# Patient Record
Sex: FEMALE | Race: WHITE | NOT HISPANIC OR LATINO | Employment: OTHER | URBAN - METROPOLITAN AREA
[De-identification: names, ages, dates, MRNs, and addresses within clinical notes are randomized per-mention and may not be internally consistent; named-entity substitution may affect disease eponyms.]

---

## 2017-01-04 ENCOUNTER — CLINICAL SUPPORT (OUTPATIENT)
Dept: FAMILY MEDICINE CLINIC | Facility: CLINIC | Age: 72
End: 2017-01-04

## 2017-01-04 DIAGNOSIS — Z23 IMMUNIZATION DUE: Primary | ICD-10-CM

## 2017-01-04 DIAGNOSIS — E55.9 VITAMIN D DEFICIENCY: ICD-10-CM

## 2017-01-04 DIAGNOSIS — I10 ESSENTIAL HYPERTENSION: Primary | ICD-10-CM

## 2017-01-04 DIAGNOSIS — R73.02 IMPAIRED GLUCOSE TOLERANCE: ICD-10-CM

## 2017-01-04 DIAGNOSIS — E78.5 HYPERLIPIDEMIA, UNSPECIFIED HYPERLIPIDEMIA TYPE: ICD-10-CM

## 2017-01-04 LAB
25(OH)D3+25(OH)D2 SERPL-MCNC: 29.5 NG/ML
ALBUMIN SERPL-MCNC: 4.1 G/DL (ref 3.5–5.2)
ALBUMIN/GLOB SERPL: 1.5 G/DL
ALP SERPL-CCNC: 87 U/L (ref 40–129)
ALT SERPL-CCNC: 15 U/L (ref 5–33)
AST SERPL-CCNC: 19 U/L (ref 5–32)
BASOPHILS # BLD AUTO: 0.03 10*3/MM3 (ref 0–0.2)
BASOPHILS NFR BLD AUTO: 0.4 % (ref 0–2)
BILIRUB SERPL-MCNC: 0.3 MG/DL (ref 0.2–1.2)
BUN SERPL-MCNC: 16 MG/DL (ref 8–23)
BUN/CREAT SERPL: 20 (ref 7–25)
CALCIUM SERPL-MCNC: 9.5 MG/DL (ref 8.8–10.5)
CHLORIDE SERPL-SCNC: 97 MMOL/L (ref 98–107)
CHOLEST SERPL-MCNC: 218 MG/DL (ref 0–200)
CO2 SERPL-SCNC: 28.3 MMOL/L (ref 22–29)
CREAT SERPL-MCNC: 0.8 MG/DL (ref 0.57–1)
EOSINOPHIL # BLD AUTO: 0.1 10*3/MM3 (ref 0.1–0.3)
EOSINOPHIL NFR BLD AUTO: 1.2 % (ref 0–4)
ERYTHROCYTE [DISTWIDTH] IN BLOOD BY AUTOMATED COUNT: 13 % (ref 11.5–14.5)
GLOBULIN SER CALC-MCNC: 2.7 GM/DL
GLUCOSE SERPL-MCNC: 97 MG/DL (ref 65–99)
HBA1C MFR BLD: 5 % (ref 4.8–5.6)
HCT VFR BLD AUTO: 41.1 % (ref 37–47)
HDLC SERPL-MCNC: 68 MG/DL (ref 40–60)
HGB BLD-MCNC: 12.9 G/DL (ref 12–16)
IMM GRANULOCYTES # BLD: 0.02 10*3/MM3 (ref 0–0.03)
IMM GRANULOCYTES NFR BLD: 0.2 % (ref 0–0.5)
LDLC SERPL CALC-MCNC: 115 MG/DL (ref 0–100)
LYMPHOCYTES # BLD AUTO: 2.62 10*3/MM3 (ref 0.6–4.8)
LYMPHOCYTES NFR BLD AUTO: 31.4 % (ref 20–45)
MCH RBC QN AUTO: 26.8 PG (ref 27–31)
MCHC RBC AUTO-ENTMCNC: 31.4 G/DL (ref 31–37)
MCV RBC AUTO: 85.4 FL (ref 81–99)
MONOCYTES # BLD AUTO: 0.45 10*3/MM3 (ref 0–1)
MONOCYTES NFR BLD AUTO: 5.4 % (ref 3–8)
NEUTROPHILS # BLD AUTO: 5.13 10*3/MM3 (ref 1.5–8.3)
NEUTROPHILS NFR BLD AUTO: 61.4 % (ref 45–70)
NRBC BLD AUTO-RTO: 0 /100 WBC (ref 0–0)
PLATELET # BLD AUTO: 178 10*3/MM3 (ref 140–500)
POTASSIUM SERPL-SCNC: 3.5 MMOL/L (ref 3.5–5.2)
PROT SERPL-MCNC: 6.8 G/DL (ref 6–8.5)
RBC # BLD AUTO: 4.81 10*6/MM3 (ref 4.2–5.4)
SODIUM SERPL-SCNC: 139 MMOL/L (ref 136–145)
TRIGL SERPL-MCNC: 173 MG/DL (ref 0–150)
TSH SERPL DL<=0.005 MIU/L-ACNC: 2.25 MIU/ML (ref 0.27–4.2)
VLDLC SERPL CALC-MCNC: 34.6 MG/DL (ref 7–27)
WBC # BLD AUTO: 8.35 10*3/MM3 (ref 4.8–10.8)

## 2017-01-04 PROCEDURE — G0008 ADMIN INFLUENZA VIRUS VAC: HCPCS | Performed by: FAMILY MEDICINE

## 2017-01-12 ENCOUNTER — OFFICE VISIT (OUTPATIENT)
Dept: FAMILY MEDICINE CLINIC | Facility: CLINIC | Age: 72
End: 2017-01-12

## 2017-01-12 VITALS
DIASTOLIC BLOOD PRESSURE: 70 MMHG | OXYGEN SATURATION: 96 % | HEIGHT: 62 IN | TEMPERATURE: 98.1 F | SYSTOLIC BLOOD PRESSURE: 130 MMHG | BODY MASS INDEX: 32.2 KG/M2 | HEART RATE: 79 BPM | WEIGHT: 175 LBS

## 2017-01-12 DIAGNOSIS — H69.81 EUSTACHIAN TUBE DYSFUNCTION, RIGHT: ICD-10-CM

## 2017-01-12 DIAGNOSIS — I10 ESSENTIAL HYPERTENSION: ICD-10-CM

## 2017-01-12 DIAGNOSIS — E78.5 HYPERLIPIDEMIA, UNSPECIFIED HYPERLIPIDEMIA TYPE: ICD-10-CM

## 2017-01-12 DIAGNOSIS — G40.909 SEIZURE DISORDER (HCC): Primary | ICD-10-CM

## 2017-01-12 DIAGNOSIS — E55.9 VITAMIN D DEFICIENCY: ICD-10-CM

## 2017-01-12 PROCEDURE — 99214 OFFICE O/P EST MOD 30 MIN: CPT | Performed by: FAMILY MEDICINE

## 2017-01-12 RX ORDER — ATORVASTATIN CALCIUM 40 MG/1
40 TABLET, FILM COATED ORAL DAILY
Qty: 90 TABLET | Refills: 1 | Status: SHIPPED | OUTPATIENT
Start: 2017-01-12 | End: 2017-05-31 | Stop reason: SDUPTHER

## 2017-01-12 RX ORDER — CARBAMAZEPINE 200 MG/1
200 TABLET ORAL 3 TIMES DAILY
Qty: 270 TABLET | Refills: 1 | Status: SHIPPED | OUTPATIENT
Start: 2017-01-12 | End: 2017-06-10 | Stop reason: SDUPTHER

## 2017-01-12 RX ORDER — CHOLECALCIFEROL (VITAMIN D3) 1250 MCG
50000 CAPSULE ORAL
Qty: 12 CAPSULE | Refills: 1 | Status: ON HOLD | OUTPATIENT
Start: 2017-01-12 | End: 2021-10-23 | Stop reason: ALTCHOICE

## 2017-01-12 RX ORDER — FLUTICASONE PROPIONATE 50 MCG
2 SPRAY, SUSPENSION (ML) NASAL DAILY
Qty: 1 EACH | Refills: 5 | Status: SHIPPED | OUTPATIENT
Start: 2017-01-12 | End: 2017-02-11

## 2017-01-12 NOTE — PROGRESS NOTES
"Subjective   Shyann Davis is a 71 y.o. female with   Chief Complaint   Patient presents with   • Hypertension     follow up on labs   • Earache   .    History of Present Illness     Shyann is a 71 yr old white female that presents today for follow up of HTN, Vitamin D Deficiency, HLD, and Glucose Intolerance.  Current medications include Vitamin D3 to improve Vitamin D Def, Metoprolol to control HTN, and Simvastatin to control cholesterol.  Additionally Shyann uses Tegretol to control seizure disorder.  Fasting labs have been performed prior to this visit and patient is here for review of same.    Shyann has also been having right ear pain for a couple weeks.  She states that she can not equalize her ears at this point.  There has been no congestion, sore throat, cough, fever, or headache.    The following portions of the patient's history were reviewed and updated as appropriate: allergies, current medications, past family history, past medical history, past social history, past surgical history and problem list.    Review of Systems   Constitutional:        Exogenous obesity   HENT: Positive for ear pain.    Cardiovascular:        HTN  HLD   Endocrine:        Vitamin D Deficiency   Neurological: Positive for seizures.   Psychiatric/Behavioral: Positive for dysphoric mood. The patient is nervous/anxious.        Objective     Vitals:    01/12/17 0805   BP: 130/70   Pulse: 79   Temp: 98.1 °F (36.7 °C)   SpO2: 96%     BP Readings from Last 3 Encounters:   01/12/17 130/70   05/12/16 136/84   10/13/15 126/82      Wt Readings from Last 3 Encounters:   01/12/17 175 lb (79.4 kg)   05/12/16 176 lb (79.8 kg)   10/13/15 184 lb 4.9 oz (83.6 kg)      BMI: Estimated body mass index is 32.01 kg/(m^2) as calculated from the following:    Height as of this encounter: 62\" (157.5 cm).    Weight as of this encounter: 175 lb (79.4 kg).    BSA: Estimated body surface area is 1.81 meters squared as calculated from the following:    Height " "as of this encounter: 62\" (157.5 cm).    Weight as of this encounter: 175 lb (79.4 kg).  Recent Results (from the past 168 hour(s))   Carbamazepine Level, Free & Total    Collection Time: 01/12/17  9:01 AM   Result Value Ref Range    Carbamazepine, Free 2.5 0.6 - 4.2 ug/mL     Office Visit on 01/12/2017   Component Date Value Ref Range Status   • Carbamazepine, Free 01/12/2017 2.5  0.6 - 4.2 ug/mL Final                                    Detection Limit = 0.5   Orders Only on 01/04/2017   Component Date Value Ref Range Status   • WBC 01/04/2017 8.35  4.80 - 10.80 10*3/mm3 Final   • RBC 01/04/2017 4.81  4.20 - 5.40 10*6/mm3 Final   • Hemoglobin 01/04/2017 12.9  12.0 - 16.0 g/dL Final   • Hematocrit 01/04/2017 41.1  37.0 - 47.0 % Final   • MCV 01/04/2017 85.4  81.0 - 99.0 fL Final   • MCH 01/04/2017 26.8* 27.0 - 31.0 pg Final   • MCHC 01/04/2017 31.4  31.0 - 37.0 g/dL Final   • RDW 01/04/2017 13.0  11.5 - 14.5 % Final   • Platelets 01/04/2017 178  140 - 500 10*3/mm3 Final   • Neutrophil Rel % 01/04/2017 61.4  45.0 - 70.0 % Final   • Lymphocyte Rel % 01/04/2017 31.4  20.0 - 45.0 % Final   • Monocyte Rel % 01/04/2017 5.4  3.0 - 8.0 % Final   • Eosinophil Rel % 01/04/2017 1.2  0.0 - 4.0 % Final   • Basophil Rel % 01/04/2017 0.4  0.0 - 2.0 % Final   • Neutrophils Absolute 01/04/2017 5.13  1.50 - 8.30 10*3/mm3 Final   • Lymphocytes Absolute 01/04/2017 2.62  0.60 - 4.80 10*3/mm3 Final   • Monocytes Absolute 01/04/2017 0.45  0.00 - 1.00 10*3/mm3 Final   • Eosinophils Absolute 01/04/2017 0.10  0.10 - 0.30 10*3/mm3 Final   • Basophils Absolute 01/04/2017 0.03  0.00 - 0.20 10*3/mm3 Final   • Immature Granulocyte Rel % 01/04/2017 0.2  0.0 - 0.5 % Final   • Immature Grans Absolute 01/04/2017 0.02  0.00 - 0.03 10*3/mm3 Final   • nRBC 01/04/2017 0.0  0.0 - 0.0 /100 WBC Final   • Glucose 01/04/2017 97  65 - 99 mg/dL Final   • BUN 01/04/2017 16  8 - 23 mg/dL Final   • Creatinine 01/04/2017 0.80  0.57 - 1.00 mg/dL Final   • eGFR Non "  Am 01/04/2017 71  >60 mL/min/1.73 Final    Comment: The MDRD GFR formula is only valid for adults with stable  renal function between ages 18 and 70.     • eGFR  Am 01/04/2017 86  >60 mL/min/1.73 Final   • BUN/Creatinine Ratio 01/04/2017 20.0  7.0 - 25.0 Final   • Sodium 01/04/2017 139  136 - 145 mmol/L Final   • Potassium 01/04/2017 3.5  3.5 - 5.2 mmol/L Final   • Chloride 01/04/2017 97* 98 - 107 mmol/L Final   • Total CO2 01/04/2017 28.3  22.0 - 29.0 mmol/L Final   • Calcium 01/04/2017 9.5  8.8 - 10.5 mg/dL Final   • Total Protein 01/04/2017 6.8  6.0 - 8.5 g/dL Final   • Albumin 01/04/2017 4.10  3.50 - 5.20 g/dL Final   • Globulin 01/04/2017 2.7  gm/dL Final   • A/G Ratio 01/04/2017 1.5  g/dL Final   • Total Bilirubin 01/04/2017 0.3  0.2 - 1.2 mg/dL Final   • Alkaline Phosphatase 01/04/2017 87  40 - 129 U/L Final   • AST (SGOT) 01/04/2017 19  5 - 32 U/L Final   • ALT (SGPT) 01/04/2017 15  5 - 33 U/L Final   • Hemoglobin A1C 01/04/2017 5.00  4.80 - 5.60 % Final   • Total Cholesterol 01/04/2017 218* 0 - 200 mg/dL Final   • Triglycerides 01/04/2017 173* 0 - 150 mg/dL Final   • HDL Cholesterol 01/04/2017 68* 40 - 60 mg/dL Final   • VLDL Cholesterol 01/04/2017 34.6* 7 - 27 mg/dL Final   • LDL Cholesterol  01/04/2017 115* 0 - 100 mg/dL Final   • TSH 01/04/2017 2.250  0.270 - 4.200 mIU/mL Final   • 25 Hydroxy, Vitamin D 01/04/2017 29.5  ng/mL Final    Comment: Reference Range for Total Vitamin D 25(OH)  Deficiency    <20.0 ng/mL  Insufficiency 21-29 ng/mL  Sufficiency   30-74 ng/mL       The above results have been reviewed and explained to Shyann with her understanding.  A copy has been provided to her.      Physical Exam   Constitutional: She is oriented to person, place, and time. She appears well-developed and well-nourished.   HENT:   Head: Normocephalic and atraumatic.   Right Ear: Hearing, tympanic membrane, external ear and ear canal normal.   Left Ear: Hearing, tympanic membrane, external ear and  ear canal normal.   Nose: Nose normal.   Mouth/Throat: Uvula is midline, oropharynx is clear and moist and mucous membranes are normal.   Neck: Trachea normal and phonation normal. Neck supple. Normal carotid pulses present. Carotid bruit is not present. No thyroid mass and no thyromegaly present.   Cardiovascular: Normal rate, regular rhythm and normal heart sounds.  Exam reveals no gallop and no friction rub.    No murmur heard.  Pulmonary/Chest: Effort normal and breath sounds normal. No respiratory distress. She has no decreased breath sounds. She has no wheezes. She has no rhonchi. She has no rales.   Lymphadenopathy:     She has no cervical adenopathy.   Neurological: She is alert and oriented to person, place, and time.   Skin: Skin is warm and dry. No rash noted.   Psychiatric: She has a normal mood and affect. Her speech is normal and behavior is normal. Judgment and thought content normal. Cognition and memory are normal.   Nursing note and vitals reviewed.      Assessment/Plan   Shyann was seen today for hypertension and earache.    Diagnoses and all orders for this visit:    Seizure disorder  -     Carbamazepine Level, Free & Total  -     Carbamazepine Level, Free & Total; Future    Essential hypertension  -     CBC & AUTO Differential; Future  -     Comprehensive Metabolic Panel; Future  -     Lipid Panel; Future  -     TSH; Future    Hyperlipidemia, unspecified hyperlipidemia type  -     CBC & AUTO Differential; Future  -     Comprehensive Metabolic Panel; Future  -     Lipid Panel; Future    Vitamin D deficiency  -     CBC & AUTO Differential; Future  -     Comprehensive Metabolic Panel; Future  -     Vitamin D 25 Hydroxy; Future    Eustachian tube dysfunction, right    Other orders  -     Cholecalciferol (VITAMIN D3) 24826 UNITS capsule; Take 1 capsule by mouth Every 7 (Seven) Days.  -     atorvastatin (LIPITOR) 40 MG tablet; Take 1 tablet by mouth Daily.  -     fluticasone (FLONASE) 50 MCG/ACT nasal  spray; 2 sprays into each nostril Daily for 30 days. Administer 2 sprays in each nostril for each dose.  -     carBAMazepine (TEGretol) 200 MG tablet; Take 1 tablet by mouth 3 (Three) Times a Day.        Scribed for Reese Underwood MD by Zohaib Marks. 01/12/2017    IReese MD personally performed the services described in this documentation, as scribed by Zohaib Marks in my presence, and it is both accurate and complete

## 2017-01-12 NOTE — MR AVS SNAPSHOT
Shyann Davis   1/12/2017 8:00 AM   Office Visit    Provider:  Reese Underwood DO   Department:  [facility]   Dept Phone:  [phone]                Your Full Care Plan              Today's Medication Changes          These changes are accurate as of: 1/12/17  8:54 AM.  If you have any questions, ask your nurse or doctor.               New Medication(s)Ordered:     atorvastatin 40 MG tablet   Commonly known as:  LIPITOR   Take 1 tablet by mouth Daily.       fluticasone 50 MCG/ACT nasal spray   Commonly known as:  FLONASE   2 sprays into each nostril Daily for 30 days. Administer 2 sprays in each nostril for each dose.         Medication(s)that have changed:     carBAMazepine 200 MG tablet   Commonly known as:  TEGretol   Take 1 tablet by mouth 3 (Three) Times a Day.   What changed:  See the new instructions.         Stop taking medication(s)listed here:     ergocalciferol 91077 UNITS capsule   Commonly known as:  ERGOCALCIFEROL           simvastatin 40 MG tablet   Commonly known as:  ZOCOR                Where to Get Your Medications      These medications were sent to [facility]/pharmacy #[id number] - [place], [place] - [address] [address] [address] [address] - [phone] [facility] [phone]   [address] [address]     Phone:  [phone]     fluticasone 50 MCG/ACT nasal spray         These medications were sent to [facility] - [place], [place] - [address] - [phone] [facility] [phone] FX  [address], [facility] [address]     Phone:  [phone]     atorvastatin 40 MG tablet    carBAMazepine 200 MG tablet    Vitamin D3 91807 UNITS capsule                  Your Updated Medication List          This list is accurate as of: 1/12/17  8:54 AM.  Always use your most recent med list.                atorvastatin 40 MG tablet   Commonly known as:  LIPITOR   Take 1 tablet by mouth Daily.       carBAMazepine 200 MG  tablet   Commonly known as:  TEGretol   Take 1 tablet by mouth 3 (Three) Times a Day.       clotrimazole-betamethasone 1-0.05 % cream   Commonly known as:  LOTRISONE   Apply  topically 2 (two) times a day.       fluticasone 50 MCG/ACT nasal spray   Commonly known as:  FLONASE   2 sprays into each nostril Daily for 30 days. Administer 2 sprays in each nostril for each dose.       metoprolol succinate XL 25 MG 24 hr tablet   Commonly known as:  TOPROL-XL   TAKE 1 TABLET EVERY DAY       MULTIPLE VITAMINS ESSENTIAL PO       mupirocin 2 % ointment   Commonly known as:  BACTROBAN       PARoxetine 20 MG tablet   Commonly known as:  PAXIL       Vitamin D3 29921 UNITS capsule   Take 1 capsule by mouth Every 7 (Seven) Days.               We Performed the Following     Carbamazepine Level, Free & Total       You Were Diagnosed With        Codes Comments    Seizure disorder    -  Primary ICD-10-CM: G40.909  ICD-9-CM: 345.90     Essential hypertension     ICD-10-CM: I10  ICD-9-CM: 401.9     Hyperlipidemia, unspecified hyperlipidemia type     ICD-10-CM: E78.5  ICD-9-CM: 272.4     Vitamin D deficiency     ICD-10-CM: E55.9  ICD-9-CM: 268.9     Eustachian tube dysfunction, right     ICD-10-CM: H69.81  ICD-9-CM: 381.81       Instructions    Future A1C will only be added to labs if warranted by elevated Glucose demonstrated on CMP. Discontinue Simvastatin and start Atorvastatin.       Patient Instructions History      MyChart Signup     Our records indicate that you have declined [facility] signup. If you would like to sign up for [employer], please email [email or web address] or call [phone] to obtain an activation code.             Other Info from Your Visit           Your Appointments     May 05, 2017 10:10 AM EDT   [facility]   [facility] (--)    [address]  [place] [address]   [phone]            May 11, 2017 10:15 AM EDT   Follow  "Up with Reese Underwood DO   [facility] (--)    [address] [facility] [address] [phone]           Arrive 15 minutes prior to appointment.              Allergies     Cephalexin      Erythromycin  GI Intolerance    Neomycin-bacitracin Zn-polymyx      Sulfa Antibiotics  Other (See Comments)      Reason for Visit     Hypertension follow up on labs    Earache           Vital Signs     Blood Pressure Pulse Temperature Height Weight Oxygen Saturation    130/70 79 98.1 °F (36.7 °C) 62\" (157.5 cm) 175 lb (79.4 kg) 96%    Body Mass Index Smoking Status                32.01 kg/m2 Never Smoker          Problems and Diagnoses Noted     High cholesterol or triglycerides    High blood pressure    Seizure disorder    Vitamin D deficiency    Eustachian tube dysfunction          "

## 2017-01-13 LAB — CARBAMAZEPINE FREE SERPL-MCNC: 2.5 UG/ML (ref 0.6–4.2)

## 2017-03-28 RX ORDER — METOPROLOL SUCCINATE 25 MG/1
TABLET, EXTENDED RELEASE ORAL
Qty: 90 TABLET | Refills: 1 | Status: SHIPPED | OUTPATIENT
Start: 2017-03-28 | End: 2017-05-31 | Stop reason: SDUPTHER

## 2017-04-12 ENCOUNTER — RESULTS ENCOUNTER (OUTPATIENT)
Dept: FAMILY MEDICINE CLINIC | Facility: CLINIC | Age: 72
End: 2017-04-12

## 2017-04-12 DIAGNOSIS — G40.909 SEIZURE DISORDER (HCC): ICD-10-CM

## 2017-04-12 DIAGNOSIS — E55.9 VITAMIN D DEFICIENCY: ICD-10-CM

## 2017-04-12 DIAGNOSIS — E78.5 HYPERLIPIDEMIA, UNSPECIFIED HYPERLIPIDEMIA TYPE: ICD-10-CM

## 2017-04-12 DIAGNOSIS — I10 ESSENTIAL HYPERTENSION: ICD-10-CM

## 2017-05-08 LAB
25(OH)D3+25(OH)D2 SERPL-MCNC: 36.7 NG/ML
ALBUMIN SERPL-MCNC: 3.7 G/DL (ref 3.5–5.2)
ALBUMIN/GLOB SERPL: 1.3 G/DL
ALP SERPL-CCNC: 97 U/L (ref 40–129)
ALT SERPL-CCNC: 17 U/L (ref 5–33)
AST SERPL-CCNC: 18 U/L (ref 5–32)
BASOPHILS # BLD AUTO: 0.02 10*3/MM3 (ref 0–0.2)
BASOPHILS NFR BLD AUTO: 0.2 % (ref 0–2)
BILIRUB SERPL-MCNC: 0.2 MG/DL (ref 0.2–1.2)
BUN SERPL-MCNC: 13 MG/DL (ref 8–23)
BUN/CREAT SERPL: 15.9 (ref 7–25)
CALCIUM SERPL-MCNC: 9.3 MG/DL (ref 8.8–10.5)
CARBAMAZEPINE FREE SERPL-MCNC: 2 UG/ML (ref 0.6–4.2)
CARBAMAZEPINE SERPL-MCNC: 8.7 UG/ML (ref 4–12)
CHLORIDE SERPL-SCNC: 99 MMOL/L (ref 98–107)
CHOLEST SERPL-MCNC: 156 MG/DL (ref 0–200)
CO2 SERPL-SCNC: 28 MMOL/L (ref 22–29)
CREAT SERPL-MCNC: 0.82 MG/DL (ref 0.57–1)
EOSINOPHIL # BLD AUTO: 0.07 10*3/MM3 (ref 0.1–0.3)
EOSINOPHIL NFR BLD AUTO: 0.8 % (ref 0–4)
ERYTHROCYTE [DISTWIDTH] IN BLOOD BY AUTOMATED COUNT: 13 % (ref 11.5–14.5)
GLOBULIN SER CALC-MCNC: 2.9 GM/DL
GLUCOSE SERPL-MCNC: 101 MG/DL (ref 65–99)
HCT VFR BLD AUTO: 38.1 % (ref 37–47)
HDLC SERPL-MCNC: 57 MG/DL (ref 40–60)
HGB BLD-MCNC: 12.4 G/DL (ref 12–16)
IMM GRANULOCYTES # BLD: 0.02 10*3/MM3 (ref 0–0.03)
IMM GRANULOCYTES NFR BLD: 0.2 % (ref 0–0.5)
LDLC SERPL CALC-MCNC: 71 MG/DL (ref 0–100)
LYMPHOCYTES # BLD AUTO: 2.02 10*3/MM3 (ref 0.6–4.8)
LYMPHOCYTES NFR BLD AUTO: 23.6 % (ref 20–45)
MCH RBC QN AUTO: 26.9 PG (ref 27–31)
MCHC RBC AUTO-ENTMCNC: 32.5 G/DL (ref 31–37)
MCV RBC AUTO: 82.6 FL (ref 81–99)
MONOCYTES # BLD AUTO: 0.4 10*3/MM3 (ref 0–1)
MONOCYTES NFR BLD AUTO: 4.7 % (ref 3–8)
NEUTROPHILS # BLD AUTO: 6.04 10*3/MM3 (ref 1.5–8.3)
NEUTROPHILS NFR BLD AUTO: 70.5 % (ref 45–70)
NRBC BLD AUTO-RTO: 0 /100 WBC (ref 0–0)
PLATELET # BLD AUTO: 161 10*3/MM3 (ref 140–500)
POTASSIUM SERPL-SCNC: 3.7 MMOL/L (ref 3.5–5.2)
PROT SERPL-MCNC: 6.6 G/DL (ref 6–8.5)
RBC # BLD AUTO: 4.61 10*6/MM3 (ref 4.2–5.4)
SODIUM SERPL-SCNC: 138 MMOL/L (ref 136–145)
TRIGL SERPL-MCNC: 140 MG/DL (ref 0–150)
TSH SERPL DL<=0.005 MIU/L-ACNC: 1.63 MIU/ML (ref 0.27–4.2)
VLDLC SERPL CALC-MCNC: 28 MG/DL (ref 7–27)
WBC # BLD AUTO: 8.57 10*3/MM3 (ref 4.8–10.8)

## 2017-05-31 ENCOUNTER — OFFICE VISIT (OUTPATIENT)
Dept: FAMILY MEDICINE CLINIC | Facility: CLINIC | Age: 72
End: 2017-05-31

## 2017-05-31 VITALS
DIASTOLIC BLOOD PRESSURE: 70 MMHG | WEIGHT: 176 LBS | TEMPERATURE: 98.1 F | HEIGHT: 62 IN | SYSTOLIC BLOOD PRESSURE: 120 MMHG | BODY MASS INDEX: 32.39 KG/M2 | HEART RATE: 73 BPM | OXYGEN SATURATION: 97 %

## 2017-05-31 DIAGNOSIS — F41.8 DEPRESSION WITH ANXIETY: ICD-10-CM

## 2017-05-31 DIAGNOSIS — R73.02 IMPAIRED GLUCOSE TOLERANCE: ICD-10-CM

## 2017-05-31 DIAGNOSIS — E55.9 VITAMIN D DEFICIENCY: ICD-10-CM

## 2017-05-31 DIAGNOSIS — E78.5 HYPERLIPIDEMIA, UNSPECIFIED HYPERLIPIDEMIA TYPE: Primary | ICD-10-CM

## 2017-05-31 DIAGNOSIS — I10 ESSENTIAL HYPERTENSION: ICD-10-CM

## 2017-05-31 DIAGNOSIS — E66.09 EXOGENOUS OBESITY: ICD-10-CM

## 2017-05-31 DIAGNOSIS — G40.909 SEIZURE DISORDER (HCC): ICD-10-CM

## 2017-05-31 PROCEDURE — 99213 OFFICE O/P EST LOW 20 MIN: CPT | Performed by: FAMILY MEDICINE

## 2017-05-31 RX ORDER — METOPROLOL SUCCINATE 25 MG/1
TABLET, EXTENDED RELEASE ORAL
Qty: 90 TABLET | Refills: 1 | Status: SHIPPED | OUTPATIENT
Start: 2017-05-31 | End: 2017-10-17 | Stop reason: SDUPTHER

## 2017-05-31 RX ORDER — ATORVASTATIN CALCIUM 40 MG/1
40 TABLET, FILM COATED ORAL DAILY
Qty: 90 TABLET | Refills: 1 | Status: SHIPPED | OUTPATIENT
Start: 2017-05-31 | End: 2017-10-17 | Stop reason: SDUPTHER

## 2017-05-31 RX ORDER — PAROXETINE HYDROCHLORIDE 20 MG/1
TABLET, FILM COATED ORAL
Qty: 90 TABLET | Refills: 1 | Status: SHIPPED | OUTPATIENT
Start: 2017-05-31 | End: 2017-10-17 | Stop reason: SDUPTHER

## 2017-06-12 RX ORDER — CARBAMAZEPINE 200 MG/1
TABLET ORAL
Qty: 270 TABLET | Refills: 1 | Status: SHIPPED | OUTPATIENT
Start: 2017-06-12 | End: 2017-10-17 | Stop reason: SDUPTHER

## 2017-06-14 ENCOUNTER — OFFICE VISIT (OUTPATIENT)
Dept: FAMILY MEDICINE CLINIC | Facility: CLINIC | Age: 72
End: 2017-06-14

## 2017-06-14 VITALS
TEMPERATURE: 98.2 F | WEIGHT: 172 LBS | OXYGEN SATURATION: 96 % | BODY MASS INDEX: 31.65 KG/M2 | HEIGHT: 62 IN | SYSTOLIC BLOOD PRESSURE: 110 MMHG | HEART RATE: 79 BPM | DIASTOLIC BLOOD PRESSURE: 64 MMHG

## 2017-06-14 DIAGNOSIS — J01.00 ACUTE NON-RECURRENT MAXILLARY SINUSITIS: Primary | ICD-10-CM

## 2017-06-14 PROCEDURE — 99213 OFFICE O/P EST LOW 20 MIN: CPT | Performed by: FAMILY MEDICINE

## 2017-06-14 RX ORDER — AMOXICILLIN AND CLAVULANATE POTASSIUM 875; 125 MG/1; MG/1
1 TABLET, FILM COATED ORAL 2 TIMES DAILY
Qty: 20 TABLET | Refills: 0 | Status: SHIPPED | OUTPATIENT
Start: 2017-06-14 | End: 2017-12-21

## 2017-06-14 NOTE — PROGRESS NOTES
Subjective   Shyann Davis is a 71 y.o. female with   Chief Complaint   Patient presents with   • Cough   .    History of Present Illness     For the last week, Shyann, a 71 yr old white female, has been having cough, congestion, drainage, with thick mucus that almost chokes her.  She reports that her  was ill just prior to developing symptoms. Cough is mostly all day but may increase at night.  Shyann denies fever, nausea. She denies chest pain, shortness of air or audible wheezing.     The following portions of the patient's history were reviewed and updated as appropriate: allergies, current medications, past family history, past medical history, past social history, past surgical history and problem list.    Review of Systems   HENT: Positive for congestion and postnasal drip.    Respiratory: Positive for cough. Negative for shortness of breath.    All other systems reviewed and are negative.      Objective     Vitals:    06/14/17 0829   BP: 110/64   Pulse: 79   Temp: 98.2 °F (36.8 °C)   SpO2: 96%       No results found for this or any previous visit (from the past 168 hour(s)).    Physical Exam   Constitutional: She is oriented to person, place, and time. She appears well-developed and well-nourished.   HENT:   Head: Normocephalic and atraumatic.   Right Ear: Hearing, tympanic membrane, external ear and ear canal normal.   Left Ear: Hearing, tympanic membrane, external ear and ear canal normal.   Nose: Nose normal.   Mouth/Throat: Uvula is midline, oropharynx is clear and moist and mucous membranes are normal.   Turbinates are somewhat pale on examination   Neck: Trachea normal and phonation normal. Neck supple. Normal carotid pulses present. Carotid bruit is not present. No thyroid mass and no thyromegaly present.   Cardiovascular: Normal rate, regular rhythm and normal heart sounds.  Exam reveals no gallop and no friction rub.    No murmur heard.  Pulmonary/Chest: Effort normal and breath sounds normal.  No respiratory distress. She has no decreased breath sounds. She has no wheezes. She has no rhonchi. She has no rales.   Lymphadenopathy:     She has no cervical adenopathy.   Neurological: She is alert and oriented to person, place, and time.   Skin: Skin is warm and dry. No rash noted.   Psychiatric: She has a normal mood and affect. Her speech is normal and behavior is normal. Judgment and thought content normal. Cognition and memory are normal.   Nursing note and vitals reviewed.      Assessment/Plan   Shyann was seen today for cough.    Diagnoses and all orders for this visit:    Acute non-recurrent maxillary sinusitis    Other orders  -     amoxicillin-clavulanate (AUGMENTIN) 875-125 MG per tablet; Take 1 tablet by mouth 2 (Two) Times a Day.        Return if symptoms worsen or fail to improve.    Scribed for Reese Underwood MD by Zohaib Marks. 06/14/2017    IReese MD personally performed the services described in this documentation, as scribed by Zohaib Marks in my presence, and it is both accurate and complete

## 2017-06-19 ENCOUNTER — APPOINTMENT (OUTPATIENT)
Dept: WOMENS IMAGING | Facility: HOSPITAL | Age: 72
End: 2017-06-19

## 2017-06-19 PROCEDURE — G0202 SCR MAMMO BI INCL CAD: HCPCS | Performed by: RADIOLOGY

## 2017-06-19 PROCEDURE — 76641 ULTRASOUND BREAST COMPLETE: CPT | Performed by: RADIOLOGY

## 2017-06-19 PROCEDURE — 77063 BREAST TOMOSYNTHESIS BI: CPT | Performed by: RADIOLOGY

## 2017-09-22 DIAGNOSIS — G40.909 SEIZURE DISORDER (HCC): Primary | ICD-10-CM

## 2017-09-22 LAB
ALBUMIN SERPL-MCNC: 3.9 G/DL (ref 3.5–5.2)
ALBUMIN/GLOB SERPL: 1.3 G/DL
ALP SERPL-CCNC: 100 U/L (ref 40–129)
ALT SERPL-CCNC: 18 U/L (ref 5–33)
AST SERPL-CCNC: 20 U/L (ref 5–32)
BASOPHILS # BLD AUTO: 0.01 10*3/MM3 (ref 0–0.2)
BASOPHILS NFR BLD AUTO: 0.1 % (ref 0–2)
BILIRUB SERPL-MCNC: 0.3 MG/DL (ref 0.2–1.2)
BUN SERPL-MCNC: 12 MG/DL (ref 8–23)
BUN/CREAT SERPL: 14.8 (ref 7–25)
CALCIUM SERPL-MCNC: 9.5 MG/DL (ref 8.8–10.5)
CARBAMAZEPINE SERPL-MCNC: 6.5 MCG/ML (ref 8–12)
CHLORIDE SERPL-SCNC: 93 MMOL/L (ref 98–107)
CO2 SERPL-SCNC: 30.7 MMOL/L (ref 22–29)
CREAT SERPL-MCNC: 0.81 MG/DL (ref 0.57–1)
EOSINOPHIL # BLD AUTO: 0.04 10*3/MM3 (ref 0.1–0.3)
EOSINOPHIL NFR BLD AUTO: 0.5 % (ref 0–4)
ERYTHROCYTE [DISTWIDTH] IN BLOOD BY AUTOMATED COUNT: 12.9 % (ref 11.5–14.5)
GLOBULIN SER CALC-MCNC: 3.1 GM/DL
GLUCOSE SERPL-MCNC: 92 MG/DL (ref 65–99)
HCT VFR BLD AUTO: 38.7 % (ref 37–47)
HGB BLD-MCNC: 12.7 G/DL (ref 12–16)
IMM GRANULOCYTES # BLD: 0.02 10*3/MM3 (ref 0–0.03)
IMM GRANULOCYTES NFR BLD: 0.3 % (ref 0–0.5)
LYMPHOCYTES # BLD AUTO: 1.46 10*3/MM3 (ref 0.6–4.8)
LYMPHOCYTES NFR BLD AUTO: 20 % (ref 20–45)
MCH RBC QN AUTO: 27.5 PG (ref 27–31)
MCHC RBC AUTO-ENTMCNC: 32.8 G/DL (ref 31–37)
MCV RBC AUTO: 83.9 FL (ref 81–99)
MONOCYTES # BLD AUTO: 0.4 10*3/MM3 (ref 0–1)
MONOCYTES NFR BLD AUTO: 5.5 % (ref 3–8)
NEUTROPHILS # BLD AUTO: 5.38 10*3/MM3 (ref 1.5–8.3)
NEUTROPHILS NFR BLD AUTO: 73.6 % (ref 45–70)
NRBC BLD AUTO-RTO: 0 /100 WBC (ref 0–0)
PLATELET # BLD AUTO: 151 10*3/MM3 (ref 140–500)
POTASSIUM SERPL-SCNC: 3.9 MMOL/L (ref 3.5–5.2)
PROT SERPL-MCNC: 7 G/DL (ref 6–8.5)
RBC # BLD AUTO: 4.61 10*6/MM3 (ref 4.2–5.4)
SODIUM SERPL-SCNC: 134 MMOL/L (ref 136–145)
WBC # BLD AUTO: 7.31 10*3/MM3 (ref 4.8–10.8)

## 2017-10-17 DIAGNOSIS — E78.5 HYPERLIPIDEMIA, UNSPECIFIED HYPERLIPIDEMIA TYPE: ICD-10-CM

## 2017-10-17 DIAGNOSIS — F41.8 DEPRESSION WITH ANXIETY: ICD-10-CM

## 2017-10-17 DIAGNOSIS — I10 ESSENTIAL HYPERTENSION: ICD-10-CM

## 2017-10-17 RX ORDER — ATORVASTATIN CALCIUM 40 MG/1
TABLET, FILM COATED ORAL
Qty: 90 TABLET | Refills: 0 | Status: SHIPPED | OUTPATIENT
Start: 2017-10-17 | End: 2017-12-21 | Stop reason: SDUPTHER

## 2017-10-17 RX ORDER — METOPROLOL SUCCINATE 25 MG/1
TABLET, EXTENDED RELEASE ORAL
Qty: 90 TABLET | Refills: 0 | Status: SHIPPED | OUTPATIENT
Start: 2017-10-17 | End: 2017-12-21 | Stop reason: SDUPTHER

## 2017-10-17 RX ORDER — PAROXETINE HYDROCHLORIDE 20 MG/1
TABLET, FILM COATED ORAL
Qty: 90 TABLET | Refills: 0 | Status: SHIPPED | OUTPATIENT
Start: 2017-10-17 | End: 2017-12-21 | Stop reason: ALTCHOICE

## 2017-10-17 RX ORDER — CARBAMAZEPINE 200 MG/1
TABLET ORAL
Qty: 270 TABLET | Refills: 0 | Status: SHIPPED | OUTPATIENT
Start: 2017-10-17 | End: 2017-12-21 | Stop reason: SDUPTHER

## 2017-11-14 ENCOUNTER — FLU SHOT (OUTPATIENT)
Dept: FAMILY MEDICINE CLINIC | Facility: CLINIC | Age: 72
End: 2017-11-14

## 2017-11-14 PROCEDURE — 90662 IIV NO PRSV INCREASED AG IM: CPT | Performed by: FAMILY MEDICINE

## 2017-11-14 PROCEDURE — G0008 ADMIN INFLUENZA VIRUS VAC: HCPCS | Performed by: FAMILY MEDICINE

## 2017-12-13 DIAGNOSIS — G40.909 SEIZURE DISORDER (HCC): ICD-10-CM

## 2017-12-13 DIAGNOSIS — I10 ESSENTIAL HYPERTENSION: Primary | ICD-10-CM

## 2017-12-13 DIAGNOSIS — E78.5 HYPERLIPIDEMIA, UNSPECIFIED HYPERLIPIDEMIA TYPE: ICD-10-CM

## 2017-12-13 DIAGNOSIS — E55.9 VITAMIN D DEFICIENCY: ICD-10-CM

## 2017-12-13 DIAGNOSIS — R73.02 IMPAIRED GLUCOSE TOLERANCE: ICD-10-CM

## 2017-12-15 LAB
25(OH)D3+25(OH)D2 SERPL-MCNC: 49.2 NG/ML
ALBUMIN SERPL-MCNC: 4 G/DL (ref 3.5–5.2)
ALBUMIN/GLOB SERPL: 1.5 G/DL
ALP SERPL-CCNC: 94 U/L (ref 40–129)
ALT SERPL-CCNC: 20 U/L (ref 5–33)
AST SERPL-CCNC: 20 U/L (ref 5–32)
BASOPHILS # BLD AUTO: 0.02 10*3/MM3 (ref 0–0.2)
BASOPHILS NFR BLD AUTO: 0.2 % (ref 0–2)
BILIRUB SERPL-MCNC: 0.3 MG/DL (ref 0.2–1.2)
BUN SERPL-MCNC: 17 MG/DL (ref 8–23)
BUN/CREAT SERPL: 21 (ref 7–25)
CALCIUM SERPL-MCNC: 9.4 MG/DL (ref 8.8–10.5)
CARBAMAZEPINE SERPL-MCNC: 8.7 MCG/ML (ref 4–12)
CHLORIDE SERPL-SCNC: 98 MMOL/L (ref 98–107)
CHOLEST SERPL-MCNC: 171 MG/DL (ref 0–200)
CO2 SERPL-SCNC: 27.9 MMOL/L (ref 22–29)
CREAT SERPL-MCNC: 0.81 MG/DL (ref 0.57–1)
EOSINOPHIL # BLD AUTO: 0.06 10*3/MM3 (ref 0.1–0.3)
EOSINOPHIL NFR BLD AUTO: 0.7 % (ref 0–4)
ERYTHROCYTE [DISTWIDTH] IN BLOOD BY AUTOMATED COUNT: 13.3 % (ref 11.5–14.5)
GFR SERPLBLD CREATININE-BSD FMLA CKD-EPI: 70 ML/MIN/1.73
GFR SERPLBLD CREATININE-BSD FMLA CKD-EPI: 84 ML/MIN/1.73
GLOBULIN SER CALC-MCNC: 2.7 GM/DL
GLUCOSE SERPL-MCNC: 95 MG/DL (ref 65–99)
HBA1C MFR BLD: 5.3 % (ref 4.8–5.6)
HCT VFR BLD AUTO: 37.9 % (ref 37–47)
HDLC SERPL-MCNC: 57 MG/DL (ref 40–60)
HGB BLD-MCNC: 12.4 G/DL (ref 12–16)
IMM GRANULOCYTES # BLD: 0.03 10*3/MM3 (ref 0–0.03)
IMM GRANULOCYTES NFR BLD: 0.4 % (ref 0–0.5)
LDLC SERPL CALC-MCNC: 78 MG/DL (ref 0–100)
LYMPHOCYTES # BLD AUTO: 1.57 10*3/MM3 (ref 0.6–4.8)
LYMPHOCYTES NFR BLD AUTO: 19.3 % (ref 20–45)
MCH RBC QN AUTO: 27.4 PG (ref 27–31)
MCHC RBC AUTO-ENTMCNC: 32.7 G/DL (ref 31–37)
MCV RBC AUTO: 83.7 FL (ref 81–99)
MONOCYTES # BLD AUTO: 0.49 10*3/MM3 (ref 0–1)
MONOCYTES NFR BLD AUTO: 6 % (ref 3–8)
NEUTROPHILS # BLD AUTO: 5.95 10*3/MM3 (ref 1.5–8.3)
NEUTROPHILS NFR BLD AUTO: 73.4 % (ref 45–70)
NRBC BLD AUTO-RTO: 0 /100 WBC (ref 0–0)
PLATELET # BLD AUTO: 137 10*3/MM3 (ref 140–500)
POTASSIUM SERPL-SCNC: 4.1 MMOL/L (ref 3.5–5.2)
PROT SERPL-MCNC: 6.7 G/DL (ref 6–8.5)
RBC # BLD AUTO: 4.53 10*6/MM3 (ref 4.2–5.4)
SODIUM SERPL-SCNC: 139 MMOL/L (ref 136–145)
TRIGL SERPL-MCNC: 182 MG/DL (ref 0–150)
TSH SERPL DL<=0.005 MIU/L-ACNC: 1.83 MIU/ML (ref 0.27–4.2)
VLDLC SERPL CALC-MCNC: 36.4 MG/DL (ref 7–27)
WBC # BLD AUTO: 8.12 10*3/MM3 (ref 4.8–10.8)

## 2017-12-21 ENCOUNTER — OFFICE VISIT (OUTPATIENT)
Dept: FAMILY MEDICINE CLINIC | Facility: CLINIC | Age: 72
End: 2017-12-21

## 2017-12-21 VITALS
HEIGHT: 62 IN | DIASTOLIC BLOOD PRESSURE: 80 MMHG | OXYGEN SATURATION: 98 % | HEART RATE: 92 BPM | SYSTOLIC BLOOD PRESSURE: 126 MMHG | BODY MASS INDEX: 32.76 KG/M2 | WEIGHT: 178 LBS | TEMPERATURE: 98.2 F

## 2017-12-21 DIAGNOSIS — D69.6 THROMBOCYTOPENIA (HCC): ICD-10-CM

## 2017-12-21 DIAGNOSIS — E66.09 EXOGENOUS OBESITY: ICD-10-CM

## 2017-12-21 DIAGNOSIS — F41.8 DEPRESSION WITH ANXIETY: ICD-10-CM

## 2017-12-21 DIAGNOSIS — G40.909 SEIZURE DISORDER (HCC): ICD-10-CM

## 2017-12-21 DIAGNOSIS — N95.9 POST MENOPAUSAL PROBLEMS: ICD-10-CM

## 2017-12-21 DIAGNOSIS — E78.5 HYPERLIPIDEMIA, UNSPECIFIED HYPERLIPIDEMIA TYPE: Primary | ICD-10-CM

## 2017-12-21 DIAGNOSIS — I10 ESSENTIAL HYPERTENSION: ICD-10-CM

## 2017-12-21 DIAGNOSIS — E55.9 VITAMIN D DEFICIENCY: ICD-10-CM

## 2017-12-21 DIAGNOSIS — R73.02 GLUCOSE INTOLERANCE (IMPAIRED GLUCOSE TOLERANCE): ICD-10-CM

## 2017-12-21 PROCEDURE — 99213 OFFICE O/P EST LOW 20 MIN: CPT | Performed by: FAMILY MEDICINE

## 2017-12-21 RX ORDER — CARBAMAZEPINE 200 MG/1
200 TABLET ORAL 3 TIMES DAILY
Qty: 270 TABLET | Refills: 1 | Status: SHIPPED | OUTPATIENT
Start: 2017-12-21 | End: 2018-05-13 | Stop reason: SDUPTHER

## 2017-12-21 RX ORDER — ATORVASTATIN CALCIUM 40 MG/1
40 TABLET, FILM COATED ORAL DAILY
Qty: 90 TABLET | Refills: 1 | Status: SHIPPED | OUTPATIENT
Start: 2017-12-21 | End: 2018-05-13 | Stop reason: SDUPTHER

## 2017-12-21 RX ORDER — METOPROLOL SUCCINATE 25 MG/1
25 TABLET, EXTENDED RELEASE ORAL DAILY
Qty: 90 TABLET | Refills: 1 | Status: SHIPPED | OUTPATIENT
Start: 2017-12-21 | End: 2018-05-13 | Stop reason: SDUPTHER

## 2017-12-21 RX ORDER — TOLTERODINE TARTRATE 2 MG/1
TABLET, EXTENDED RELEASE ORAL
COMMUNITY
Start: 2017-10-16 | End: 2019-01-02 | Stop reason: CLARIF

## 2017-12-21 RX ORDER — PAROXETINE HYDROCHLORIDE 20 MG/1
20 TABLET, FILM COATED ORAL EVERY MORNING
Qty: 90 TABLET | Refills: 1 | Status: SHIPPED | OUTPATIENT
Start: 2017-12-21 | End: 2018-05-13 | Stop reason: SDUPTHER

## 2017-12-21 NOTE — PROGRESS NOTES
Subjective   Shyann Davis is a 72 y.o. female with   Chief Complaint   Patient presents with   • Hyperlipidemia     follow up labs   • Medication Problem     Insurance company sent letter they wont pay for Paxil after first of year, also a phone call about the Tolterodine   .    History of Present Illness     Patient presents today for follow up of stable HLD, stable HTN, stable Vit D Deficiency, and stable Seizure disorder and stable Glucose Intolerance on current medications.    Patient states that her insurance is demanding a change with medication.  They will no longer cover Paroxetine and is forcing her to change SSRI.    Patient states that she has had difficulty with keeping her balance.  She has fallen several times.  Patient sees Neurology for Seizure disorder.      The following portions of the patient's history were reviewed and updated as appropriate: allergies, current medications, past family history, past medical history, past social history, past surgical history and problem list.    Review of Systems   Cardiovascular: Negative for chest pain, palpitations and leg swelling.        HLD  HTN   Endocrine: Negative for cold intolerance and heat intolerance.        Glucose Intolerance  Vit D Def     Neurological: Positive for seizures.       Objective     Vitals:    12/21/17 1012   BP: 126/80   Pulse: 92   Temp: 98.2 °F (36.8 °C)   SpO2: 98%     BP Readings from Last 3 Encounters:   12/21/17 126/80   06/14/17 110/64   05/31/17 120/70      Wt Readings from Last 3 Encounters:   12/21/17 80.7 kg (178 lb)   06/14/17 78 kg (172 lb)   05/31/17 79.8 kg (176 lb)      Orders Only on 12/13/2017   Component Date Value Ref Range Status   • WBC 12/14/2017 8.12  4.80 - 10.80 10*3/mm3 Final   • RBC 12/14/2017 4.53  4.20 - 5.40 10*6/mm3 Final   • Hemoglobin 12/14/2017 12.4  12.0 - 16.0 g/dL Final   • Hematocrit 12/14/2017 37.9  37.0 - 47.0 % Final   • MCV 12/14/2017 83.7  81.0 - 99.0 fL Final   • MCH 12/14/2017 27.4   27.0 - 31.0 pg Final   • MCHC 12/14/2017 32.7  31.0 - 37.0 g/dL Final   • RDW 12/14/2017 13.3  11.5 - 14.5 % Final   • Platelets 12/14/2017 137* 140 - 500 10*3/mm3 Final   • Neutrophil Rel % 12/14/2017 73.4* 45.0 - 70.0 % Final   • Lymphocyte Rel % 12/14/2017 19.3* 20.0 - 45.0 % Final   • Monocyte Rel % 12/14/2017 6.0  3.0 - 8.0 % Final   • Eosinophil Rel % 12/14/2017 0.7  0.0 - 4.0 % Final   • Basophil Rel % 12/14/2017 0.2  0.0 - 2.0 % Final   • Neutrophils Absolute 12/14/2017 5.95  1.50 - 8.30 10*3/mm3 Final   • Lymphocytes Absolute 12/14/2017 1.57  0.60 - 4.80 10*3/mm3 Final   • Monocytes Absolute 12/14/2017 0.49  0.00 - 1.00 10*3/mm3 Final   • Eosinophils Absolute 12/14/2017 0.06* 0.10 - 0.30 10*3/mm3 Final   • Basophils Absolute 12/14/2017 0.02  0.00 - 0.20 10*3/mm3 Final   • Immature Granulocyte Rel % 12/14/2017 0.4  0.0 - 0.5 % Final   • Immature Grans Absolute 12/14/2017 0.03  0.00 - 0.03 10*3/mm3 Final   • nRBC 12/14/2017 0.0  0.0 - 0.0 /100 WBC Final   • Glucose 12/14/2017 95  65 - 99 mg/dL Final   • BUN 12/14/2017 17  8 - 23 mg/dL Final   • Creatinine 12/14/2017 0.81  0.57 - 1.00 mg/dL Final   • eGFR Non  Am 12/14/2017 70  >60 mL/min/1.73 Final    Comment: The MDRD GFR formula is only valid for adults with stable  renal function between ages 18 and 70.     • eGFR  Am 12/14/2017 84  >60 mL/min/1.73 Final   • BUN/Creatinine Ratio 12/14/2017 21.0  7.0 - 25.0 Final   • Sodium 12/14/2017 139  136 - 145 mmol/L Final   • Potassium 12/14/2017 4.1  3.5 - 5.2 mmol/L Final   • Chloride 12/14/2017 98  98 - 107 mmol/L Final   • Total CO2 12/14/2017 27.9  22.0 - 29.0 mmol/L Final   • Calcium 12/14/2017 9.4  8.8 - 10.5 mg/dL Final   • Total Protein 12/14/2017 6.7  6.0 - 8.5 g/dL Final   • Albumin 12/14/2017 4.00  3.50 - 5.20 g/dL Final   • Globulin 12/14/2017 2.7  gm/dL Final   • A/G Ratio 12/14/2017 1.5  g/dL Final   • Total Bilirubin 12/14/2017 0.3  0.2 - 1.2 mg/dL Final   • Alkaline Phosphatase  12/14/2017 94  40 - 129 U/L Final   • AST (SGOT) 12/14/2017 20  5 - 32 U/L Final   • ALT (SGPT) 12/14/2017 20  5 - 33 U/L Final   • Hemoglobin A1C 12/14/2017 5.30  4.80 - 5.60 % Final   • Total Cholesterol 12/14/2017 171  0 - 200 mg/dL Final   • Triglycerides 12/14/2017 182* 0 - 150 mg/dL Final   • HDL Cholesterol 12/14/2017 57  40 - 60 mg/dL Final   • VLDL Cholesterol 12/14/2017 36.4* 7 - 27 mg/dL Final   • LDL Cholesterol  12/14/2017 78  0 - 100 mg/dL Final   • TSH 12/14/2017 1.830  0.270 - 4.200 mIU/mL Final   • 25 Hydroxy, Vitamin D 12/14/2017 49.2  ng/mL Final    Comment: Reference Range for Total Vitamin D 25(OH)  Deficiency    <20.0 ng/mL  Insufficiency 21-29 ng/mL  Sufficiency   30-74 ng/mL     • Carbamazepine Level 12/14/2017 8.7  4.0 - 12.0 mcg/mL Final       Physical Exam   Constitutional: She is oriented to person, place, and time. She appears well-developed and well-nourished.   HENT:   Head: Normocephalic and atraumatic.   Neck: Trachea normal and phonation normal. Neck supple. Normal carotid pulses present. Carotid bruit is not present. No thyroid mass and no thyromegaly present.   Cardiovascular: Normal rate, regular rhythm and normal heart sounds.  Exam reveals no gallop and no friction rub.    No murmur heard.  Pulmonary/Chest: Effort normal and breath sounds normal. No respiratory distress. She has no decreased breath sounds. She has no wheezes. She has no rhonchi. She has no rales.   Lymphadenopathy:     She has no cervical adenopathy.   Neurological: She is alert and oriented to person, place, and time.   Skin: Skin is warm and dry. No rash noted.   Psychiatric: She has a normal mood and affect. Her speech is normal and behavior is normal. Judgment and thought content normal. Cognition and memory are normal.   Nursing note and vitals reviewed.      Assessment/Plan   Shyann was seen today for hyperlipidemia and medication problem.    Diagnoses and all orders for this visit:    Hyperlipidemia,  unspecified hyperlipidemia type  -     CBC & Differential; Future  -     Comprehensive Metabolic Panel; Future  -     Lipid Panel; Future    Essential hypertension  -     CBC & Differential; Future  -     Comprehensive Metabolic Panel; Future  -     Lipid Panel; Future  -     TSH; Future    Vitamin D deficiency  -     CBC & Differential; Future  -     Comprehensive Metabolic Panel; Future  -     Vitamin D 25 Hydroxy; Future    Seizure disorder  -     CBC & Differential; Future  -     Comprehensive Metabolic Panel; Future  -     Carbamazepine level, total; Future    Glucose intolerance (impaired glucose tolerance)  -     CBC & Differential; Future  -     Comprehensive Metabolic Panel; Future  -     Hemoglobin A1c; Future    Post menopausal problems  -     DEXA Bone Density Axial    Exogenous obesity    Depression with anxiety    Thrombocytopenia    Other orders  -     PARoxetine (PAXIL) 20 MG tablet; Take 1 tablet by mouth Every Morning.  -     atorvastatin (LIPITOR) 40 MG tablet; Take 1 tablet by mouth Daily.  -     carBAMazepine (TEGretol) 200 MG tablet; Take 1 tablet by mouth 3 (Three) Times a Day.  -     metoprolol succinate XL (TOPROL-XL) 25 MG 24 hr tablet; Take 1 tablet by mouth Daily.        Return in about 6 months (around 6/21/2018).        Scribed for Reese Underwood MD by Zohaib Marks. 12/21/2017    IReese MD personally performed the services described in this documentation, as scribed by Zohaib Marks in my presence, and it is both accurate and complete

## 2017-12-26 PROBLEM — F41.8 DEPRESSION WITH ANXIETY: Status: ACTIVE | Noted: 2017-12-26

## 2017-12-26 PROBLEM — D69.6 THROMBOCYTOPENIA (HCC): Status: ACTIVE | Noted: 2017-12-26

## 2017-12-29 ENCOUNTER — APPOINTMENT (OUTPATIENT)
Dept: BONE DENSITY | Facility: HOSPITAL | Age: 72
End: 2017-12-29

## 2017-12-29 PROCEDURE — 77080 DXA BONE DENSITY AXIAL: CPT

## 2018-01-10 DIAGNOSIS — M81.6 LOCALIZED OSTEOPOROSIS WITHOUT CURRENT PATHOLOGICAL FRACTURE: Primary | ICD-10-CM

## 2018-03-08 ENCOUNTER — CLINICAL SUPPORT (OUTPATIENT)
Dept: FAMILY MEDICINE CLINIC | Facility: CLINIC | Age: 73
End: 2018-03-08

## 2018-03-08 DIAGNOSIS — M85.80 OSTEOPENIA, UNSPECIFIED LOCATION: Primary | ICD-10-CM

## 2018-03-08 PROCEDURE — 96372 THER/PROPH/DIAG INJ SC/IM: CPT | Performed by: FAMILY MEDICINE

## 2018-05-14 RX ORDER — ATORVASTATIN CALCIUM 40 MG/1
TABLET, FILM COATED ORAL
Qty: 90 TABLET | Refills: 1 | Status: SHIPPED | OUTPATIENT
Start: 2018-05-14 | End: 2018-11-28 | Stop reason: SDUPTHER

## 2018-05-14 RX ORDER — METOPROLOL SUCCINATE 25 MG/1
TABLET, EXTENDED RELEASE ORAL
Qty: 90 TABLET | Refills: 1 | Status: SHIPPED | OUTPATIENT
Start: 2018-05-14 | End: 2018-11-28 | Stop reason: SDUPTHER

## 2018-05-14 RX ORDER — PAROXETINE HYDROCHLORIDE 20 MG/1
TABLET, FILM COATED ORAL
Qty: 90 TABLET | Refills: 1 | Status: SHIPPED | OUTPATIENT
Start: 2018-05-14 | End: 2018-11-28 | Stop reason: SDUPTHER

## 2018-05-14 RX ORDER — CARBAMAZEPINE 200 MG/1
TABLET ORAL
Qty: 270 TABLET | Refills: 1 | Status: SHIPPED | OUTPATIENT
Start: 2018-05-14 | End: 2018-11-28 | Stop reason: SDUPTHER

## 2018-06-05 DIAGNOSIS — E55.9 VITAMIN D DEFICIENCY: ICD-10-CM

## 2018-06-05 DIAGNOSIS — G40.909 SEIZURE DISORDER (HCC): ICD-10-CM

## 2018-06-05 DIAGNOSIS — I10 ESSENTIAL HYPERTENSION: ICD-10-CM

## 2018-06-05 DIAGNOSIS — E78.5 HYPERLIPIDEMIA, UNSPECIFIED HYPERLIPIDEMIA TYPE: ICD-10-CM

## 2018-06-05 DIAGNOSIS — R73.02 GLUCOSE INTOLERANCE (IMPAIRED GLUCOSE TOLERANCE): ICD-10-CM

## 2018-06-22 LAB
25(OH)D3+25(OH)D2 SERPL-MCNC: 49.8 NG/ML (ref 30–100)
ALBUMIN SERPL-MCNC: 4 G/DL (ref 3.5–4.8)
ALBUMIN/GLOB SERPL: 1.4 {RATIO} (ref 1.2–2.2)
ALP SERPL-CCNC: 82 IU/L (ref 39–117)
ALT SERPL-CCNC: 17 IU/L (ref 0–32)
AST SERPL-CCNC: 20 IU/L (ref 0–40)
BASOPHILS # BLD AUTO: 0 X10E3/UL (ref 0–0.2)
BASOPHILS NFR BLD AUTO: 0 %
BILIRUB SERPL-MCNC: 0.4 MG/DL (ref 0–1.2)
BUN SERPL-MCNC: 19 MG/DL (ref 8–27)
BUN/CREAT SERPL: 18 (ref 12–28)
CALCIUM SERPL-MCNC: 9.9 MG/DL (ref 8.7–10.3)
CARBAMAZEPINE SERPL-MCNC: 8.7 UG/ML (ref 4–12)
CHLORIDE SERPL-SCNC: 98 MMOL/L (ref 96–106)
CHOLEST SERPL-MCNC: 167 MG/DL (ref 100–199)
CO2 SERPL-SCNC: 23 MMOL/L (ref 20–29)
CREAT SERPL-MCNC: 1.08 MG/DL (ref 0.57–1)
EOSINOPHIL # BLD AUTO: 0.1 X10E3/UL (ref 0–0.4)
EOSINOPHIL NFR BLD AUTO: 1 %
ERYTHROCYTE [DISTWIDTH] IN BLOOD BY AUTOMATED COUNT: 14.7 % (ref 12.3–15.4)
GFR SERPLBLD CREATININE-BSD FMLA CKD-EPI: 51 ML/MIN/1.73
GFR SERPLBLD CREATININE-BSD FMLA CKD-EPI: 59 ML/MIN/1.73
GLOBULIN SER CALC-MCNC: 2.9 G/DL (ref 1.5–4.5)
GLUCOSE SERPL-MCNC: 114 MG/DL (ref 65–99)
HBA1C MFR BLD: 5.5 % (ref 4.8–5.6)
HCT VFR BLD AUTO: 37.8 % (ref 34–46.6)
HDLC SERPL-MCNC: 53 MG/DL
HGB BLD-MCNC: 12.4 G/DL (ref 11.1–15.9)
IMM GRANULOCYTES # BLD: 0 X10E3/UL (ref 0–0.1)
IMM GRANULOCYTES NFR BLD: 0 %
LDLC SERPL CALC-MCNC: 70 MG/DL (ref 0–99)
LYMPHOCYTES # BLD AUTO: 2 X10E3/UL (ref 0.7–3.1)
LYMPHOCYTES NFR BLD AUTO: 25 %
MCH RBC QN AUTO: 27 PG (ref 26.6–33)
MCHC RBC AUTO-ENTMCNC: 32.8 G/DL (ref 31.5–35.7)
MCV RBC AUTO: 82 FL (ref 79–97)
MONOCYTES # BLD AUTO: 0.4 X10E3/UL (ref 0.1–0.9)
MONOCYTES NFR BLD AUTO: 4 %
NEUTROPHILS # BLD AUTO: 5.7 X10E3/UL (ref 1.4–7)
NEUTROPHILS NFR BLD AUTO: 70 %
PLATELET # BLD AUTO: 154 X10E3/UL (ref 150–379)
POTASSIUM SERPL-SCNC: 3.7 MMOL/L (ref 3.5–5.2)
PROT SERPL-MCNC: 6.9 G/DL (ref 6–8.5)
RBC # BLD AUTO: 4.6 X10E6/UL (ref 3.77–5.28)
SODIUM SERPL-SCNC: 139 MMOL/L (ref 134–144)
TRIGL SERPL-MCNC: 218 MG/DL (ref 0–149)
TSH SERPL DL<=0.005 MIU/L-ACNC: 2.39 UIU/ML (ref 0.45–4.5)
VLDLC SERPL CALC-MCNC: 44 MG/DL (ref 5–40)
WBC # BLD AUTO: 8.2 X10E3/UL (ref 3.4–10.8)

## 2018-06-28 ENCOUNTER — OFFICE VISIT (OUTPATIENT)
Dept: FAMILY MEDICINE CLINIC | Facility: CLINIC | Age: 73
End: 2018-06-28

## 2018-06-28 VITALS
HEIGHT: 62 IN | WEIGHT: 176 LBS | SYSTOLIC BLOOD PRESSURE: 122 MMHG | OXYGEN SATURATION: 98 % | TEMPERATURE: 98.1 F | HEART RATE: 82 BPM | DIASTOLIC BLOOD PRESSURE: 70 MMHG | RESPIRATION RATE: 16 BRPM | BODY MASS INDEX: 32.39 KG/M2

## 2018-06-28 DIAGNOSIS — E66.09 EXOGENOUS OBESITY: ICD-10-CM

## 2018-06-28 DIAGNOSIS — N28.9 RENAL INSUFFICIENCY: ICD-10-CM

## 2018-06-28 DIAGNOSIS — E55.9 VITAMIN D DEFICIENCY: ICD-10-CM

## 2018-06-28 DIAGNOSIS — E78.2 MIXED HYPERLIPIDEMIA: ICD-10-CM

## 2018-06-28 DIAGNOSIS — R73.02 IMPAIRED GLUCOSE TOLERANCE: ICD-10-CM

## 2018-06-28 DIAGNOSIS — I10 ESSENTIAL HYPERTENSION: Primary | ICD-10-CM

## 2018-06-28 PROCEDURE — 99213 OFFICE O/P EST LOW 20 MIN: CPT | Performed by: FAMILY MEDICINE

## 2018-07-01 PROBLEM — N28.9 RENAL INSUFFICIENCY: Status: ACTIVE | Noted: 2018-07-01

## 2018-07-01 NOTE — PROGRESS NOTES
Subjective   Shyann Davis is a 72 y.o. female with   Chief Complaint   Patient presents with   • Follow-up     on labs   .    History of Present Illness   72-year-old white female here in follow-up with multiple medical problems.  Patient with history of essential hypertension, hyperlipidemia and vitamin D deficiency and overactive bladder.  Medications include Lipitor, vitamin D3, Toprol-XL, and Detrol.  All medications are used on a regular basis and are well tolerated.  Patient also suffers from depression with anxiety features followed by a psychiatrist and also has a seizure disorder followed by neurology.  Other medications include Tegretol, and Paxil.  The following portions of the patient's history were reviewed and updated as appropriate: allergies, current medications, past family history, past medical history, past social history, past surgical history and problem list.    Review of Systems   Constitutional:        Exogenous obesity   Cardiovascular:        Essential hypertension, hyperlipidemia   Genitourinary: Positive for difficulty urinating.   Neurological: Positive for seizures.   Psychiatric/Behavioral: Positive for dysphoric mood. Negative for agitation, decreased concentration, self-injury, sleep disturbance and suicidal ideas. The patient is nervous/anxious.        Objective     Vitals:    06/28/18 1501   BP: 122/70   Pulse: 82   Resp: 16   Temp: 98.1 °F (36.7 °C)   SpO2: 98%       No results found for this or any previous visit (from the past 168 hour(s)).    Physical Exam   Constitutional: She is oriented to person, place, and time. She appears well-developed and well-nourished.   HENT:   Head: Normocephalic and atraumatic.   Neck: Trachea normal and phonation normal. Neck supple. Normal carotid pulses present. Carotid bruit is not present. No thyroid mass and no thyromegaly present.   Cardiovascular: Normal rate, regular rhythm and normal heart sounds.  Exam reveals no gallop and no friction  rub.    No murmur heard.  Pulmonary/Chest: Effort normal and breath sounds normal. No respiratory distress. She has no decreased breath sounds. She has no wheezes. She has no rhonchi. She has no rales.   Lymphadenopathy:     She has no cervical adenopathy.   Neurological: She is alert and oriented to person, place, and time.   Skin: Skin is warm and dry. No rash noted.   Psychiatric: She has a normal mood and affect. Her speech is normal and behavior is normal. Judgment and thought content normal. Cognition and memory are normal.   Nursing note and vitals reviewed.    Orders Only on 06/05/2018   Component Date Value Ref Range Status   • WBC 06/21/2018 8.2  3.4 - 10.8 x10E3/uL Final   • RBC 06/21/2018 4.60  3.77 - 5.28 x10E6/uL Final   • Hemoglobin 06/21/2018 12.4  11.1 - 15.9 g/dL Final   • Hematocrit 06/21/2018 37.8  34.0 - 46.6 % Final   • MCV 06/21/2018 82  79 - 97 fL Final   • MCH 06/21/2018 27.0  26.6 - 33.0 pg Final   • MCHC 06/21/2018 32.8  31.5 - 35.7 g/dL Final   • RDW 06/21/2018 14.7  12.3 - 15.4 % Final   • Platelets 06/21/2018 154  150 - 379 x10E3/uL Final   • Neutrophil Rel % 06/21/2018 70  Not Estab. % Final   • Lymphocyte Rel % 06/21/2018 25  Not Estab. % Final   • Monocyte Rel % 06/21/2018 4  Not Estab. % Final   • Eosinophil Rel % 06/21/2018 1  Not Estab. % Final   • Basophil Rel % 06/21/2018 0  Not Estab. % Final   • Neutrophils Absolute 06/21/2018 5.7  1.4 - 7.0 x10E3/uL Final   • Lymphocytes Absolute 06/21/2018 2.0  0.7 - 3.1 x10E3/uL Final   • Monocytes Absolute 06/21/2018 0.4  0.1 - 0.9 x10E3/uL Final   • Eosinophils Absolute 06/21/2018 0.1  0.0 - 0.4 x10E3/uL Final   • Basophils Absolute 06/21/2018 0.0  0.0 - 0.2 x10E3/uL Final   • Immature Granulocyte Rel % 06/21/2018 0  Not Estab. % Final   • Immature Grans Absolute 06/21/2018 0.0  0.0 - 0.1 x10E3/uL Final   • Glucose 06/21/2018 114* 65 - 99 mg/dL Final   • BUN 06/21/2018 19  8 - 27 mg/dL Final   • Creatinine 06/21/2018 1.08* 0.57 - 1.00  mg/dL Final   • eGFR Non  Am 06/21/2018 51* >59 mL/min/1.73 Final   • eGFR African Am 06/21/2018 59* >59 mL/min/1.73 Final   • BUN/Creatinine Ratio 06/21/2018 18  12 - 28 Final   • Sodium 06/21/2018 139  134 - 144 mmol/L Final   • Potassium 06/21/2018 3.7  3.5 - 5.2 mmol/L Final   • Chloride 06/21/2018 98  96 - 106 mmol/L Final   • Total CO2 06/21/2018 23  20 - 29 mmol/L Final                  **Please note reference interval change**   • Calcium 06/21/2018 9.9  8.7 - 10.3 mg/dL Final   • Total Protein 06/21/2018 6.9  6.0 - 8.5 g/dL Final   • Albumin 06/21/2018 4.0  3.5 - 4.8 g/dL Final   • Globulin 06/21/2018 2.9  1.5 - 4.5 g/dL Final   • A/G Ratio 06/21/2018 1.4  1.2 - 2.2 Final   • Total Bilirubin 06/21/2018 0.4  0.0 - 1.2 mg/dL Final   • Alkaline Phosphatase 06/21/2018 82  39 - 117 IU/L Final   • AST (SGOT) 06/21/2018 20  0 - 40 IU/L Final   • ALT (SGPT) 06/21/2018 17  0 - 32 IU/L Final   • Hemoglobin A1C 06/21/2018 5.5  4.8 - 5.6 % Final    Comment:          Pre-diabetes: 5.7 - 6.4           Diabetes: >6.4           Glycemic control for adults with diabetes: <7.0     • Total Cholesterol 06/21/2018 167  100 - 199 mg/dL Final   • Triglycerides 06/21/2018 218* 0 - 149 mg/dL Final   • HDL Cholesterol 06/21/2018 53  >39 mg/dL Final   • VLDL Cholesterol 06/21/2018 44* 5 - 40 mg/dL Final   • LDL Cholesterol  06/21/2018 70  0 - 99 mg/dL Final   • TSH 06/21/2018 2.390  0.450 - 4.500 uIU/mL Final   • 25 Hydroxy, Vitamin D 06/21/2018 49.8  30.0 - 100.0 ng/mL Final    Comment: Vitamin D deficiency has been defined by the [place] of  [employer]icine and an Endocrine Society practice guideline as a  level of serum 25-OH vitamin D less than 20 ng/mL (1,2).  The Endocrine Society went on to further define vitamin D  insufficiency as a level between 21 and 29 ng/mL (2).  1. IOM ([place] [employer]). 2010. Dietary reference     intakes for calcium and D. [place]: The     National Academies Press.  2. Kp PEARCE,  Dannie KAPLAN, Violeta SHARP, et al.     Evaluation, treatment, and prevention of vitamin D     deficiency: an Endocrine Society clinical practice     guideline. JCEM. 2011 Jul; 96(7):1911-30.     • Carbamazepine Level 06/21/2018 8.7  4.0 - 12.0 ug/mL Final    Comment:          In conjunction with other antiepileptic drugs                                 Therapeutic  4.0 -  8.0                                 Toxicity     9.0 - 12.0                                     Carbamazepine alone                                 Therapeutic  8.0 - 12.0                                  Detection Limit =  2.0                            <2.0 indicated None Detected           Assessment/Plan   Shyann was seen today for follow-up.    Diagnoses and all orders for this visit:    Essential hypertension    Mixed hyperlipidemia    Exogenous obesity    Vitamin D deficiency    Impaired glucose tolerance    Renal insufficiency        Return in about 6 months (around 12/28/2018) for Recheck.

## 2018-09-11 ENCOUNTER — CLINICAL SUPPORT (OUTPATIENT)
Dept: FAMILY MEDICINE CLINIC | Facility: CLINIC | Age: 73
End: 2018-09-11

## 2018-09-11 DIAGNOSIS — M85.80 OSTEOPENIA, UNSPECIFIED LOCATION: Primary | ICD-10-CM

## 2018-09-11 PROCEDURE — 96372 THER/PROPH/DIAG INJ SC/IM: CPT | Performed by: FAMILY MEDICINE

## 2018-11-26 ENCOUNTER — LAB (OUTPATIENT)
Dept: LAB | Facility: HOSPITAL | Age: 73
End: 2018-11-26

## 2018-11-26 ENCOUNTER — TRANSCRIBE ORDERS (OUTPATIENT)
Dept: ADMINISTRATIVE | Facility: HOSPITAL | Age: 73
End: 2018-11-26

## 2018-11-26 DIAGNOSIS — Z79.899 LONG-TERM CURRENT USE OF HIGH RISK MEDICATION OTHER THAN ANTICOAGULANT: ICD-10-CM

## 2018-11-26 DIAGNOSIS — D64.9 FATIGUE ASSOCIATED WITH ANEMIA: Primary | ICD-10-CM

## 2018-11-26 DIAGNOSIS — D64.9 FATIGUE ASSOCIATED WITH ANEMIA: ICD-10-CM

## 2018-11-26 LAB
ALBUMIN SERPL-MCNC: 4.1 G/DL (ref 3.5–5.2)
ALBUMIN/GLOB SERPL: 1.2 G/DL
ALP SERPL-CCNC: 71 U/L (ref 40–129)
ALT SERPL W P-5'-P-CCNC: 18 U/L (ref 5–33)
ANION GAP SERPL CALCULATED.3IONS-SCNC: 10.8 MMOL/L
AST SERPL-CCNC: 20 U/L (ref 5–32)
BASOPHILS # BLD AUTO: 0.01 10*3/MM3 (ref 0–0.2)
BASOPHILS NFR BLD AUTO: 0.1 % (ref 0–2)
BILIRUB SERPL-MCNC: 0.3 MG/DL (ref 0.2–1.2)
BUN BLD-MCNC: 18 MG/DL (ref 8–23)
BUN/CREAT SERPL: 20.5 (ref 7–25)
CALCIUM SPEC-SCNC: 9.3 MG/DL (ref 8.8–10.5)
CHLORIDE SERPL-SCNC: 98 MMOL/L (ref 98–107)
CO2 SERPL-SCNC: 26.2 MMOL/L (ref 22–29)
CREAT BLD-MCNC: 0.88 MG/DL (ref 0.57–1)
DEPRECATED RDW RBC AUTO: 40.7 FL (ref 37–54)
EOSINOPHIL # BLD AUTO: 0.05 10*3/MM3 (ref 0.1–0.3)
EOSINOPHIL NFR BLD AUTO: 0.5 % (ref 0–4)
ERYTHROCYTE [DISTWIDTH] IN BLOOD BY AUTOMATED COUNT: 13.6 % (ref 11.5–14.5)
GFR SERPL CREATININE-BSD FRML MDRD: 63 ML/MIN/1.73
GLOBULIN UR ELPH-MCNC: 3.3 GM/DL
GLUCOSE BLD-MCNC: 111 MG/DL (ref 65–99)
HCT VFR BLD AUTO: 38 % (ref 37–47)
HGB BLD-MCNC: 12.8 G/DL (ref 12–16)
IMM GRANULOCYTES # BLD: 0.02 10*3/MM3 (ref 0–0.03)
IMM GRANULOCYTES NFR BLD: 0.2 % (ref 0–0.5)
LYMPHOCYTES # BLD AUTO: 2.01 10*3/MM3 (ref 0.6–4.8)
LYMPHOCYTES NFR BLD AUTO: 22 % (ref 20–45)
MCH RBC QN AUTO: 28 PG (ref 27–31)
MCHC RBC AUTO-ENTMCNC: 33.7 G/DL (ref 31–37)
MCV RBC AUTO: 83.2 FL (ref 81–99)
MONOCYTES # BLD AUTO: 0.48 10*3/MM3 (ref 0–1)
MONOCYTES NFR BLD AUTO: 5.3 % (ref 3–8)
NEUTROPHILS # BLD AUTO: 6.57 10*3/MM3 (ref 1.5–8.3)
NEUTROPHILS NFR BLD AUTO: 71.9 % (ref 45–70)
NRBC BLD MANUAL-RTO: 0 /100 WBC (ref 0–0)
PLATELET # BLD AUTO: 139 10*3/MM3 (ref 140–500)
PMV BLD AUTO: 11.6 FL (ref 7.4–10.4)
POTASSIUM BLD-SCNC: 4 MMOL/L (ref 3.5–5.2)
PROT SERPL-MCNC: 7.4 G/DL (ref 6–8.5)
RBC # BLD AUTO: 4.57 10*6/MM3 (ref 4.2–5.4)
SODIUM BLD-SCNC: 135 MMOL/L (ref 136–145)
WBC NRBC COR # BLD: 9.14 10*3/MM3 (ref 4.8–10.8)

## 2018-11-26 PROCEDURE — 85025 COMPLETE CBC W/AUTO DIFF WBC: CPT

## 2018-11-26 PROCEDURE — 80157 ASSAY CARBAMAZEPINE FREE: CPT

## 2018-11-26 PROCEDURE — 80053 COMPREHEN METABOLIC PANEL: CPT

## 2018-11-26 PROCEDURE — 36415 COLL VENOUS BLD VENIPUNCTURE: CPT

## 2018-11-26 PROCEDURE — 80156 ASSAY CARBAMAZEPINE TOTAL: CPT

## 2018-11-28 LAB
CARBAMAZEPINE FREE SERPL-MCNC: 2.5 UG/ML (ref 0.6–4.2)
CARBAMAZEPINE SERPL-MCNC: 10.6 UG/ML (ref 4–12)

## 2018-11-28 RX ORDER — METOPROLOL SUCCINATE 25 MG/1
TABLET, EXTENDED RELEASE ORAL
Qty: 90 TABLET | Refills: 1 | Status: SHIPPED | OUTPATIENT
Start: 2018-11-28 | End: 2019-05-25 | Stop reason: SDUPTHER

## 2018-11-28 RX ORDER — CARBAMAZEPINE 200 MG/1
TABLET ORAL
Qty: 270 TABLET | Refills: 1 | Status: SHIPPED | OUTPATIENT
Start: 2018-11-28 | End: 2019-05-25 | Stop reason: SDUPTHER

## 2018-11-28 RX ORDER — PAROXETINE HYDROCHLORIDE 20 MG/1
TABLET, FILM COATED ORAL
Qty: 90 TABLET | Refills: 1 | Status: SHIPPED | OUTPATIENT
Start: 2018-11-28 | End: 2019-05-25 | Stop reason: SDUPTHER

## 2018-11-28 RX ORDER — ATORVASTATIN CALCIUM 40 MG/1
TABLET, FILM COATED ORAL
Qty: 90 TABLET | Refills: 1 | Status: SHIPPED | OUTPATIENT
Start: 2018-11-28 | End: 2019-05-25 | Stop reason: SDUPTHER

## 2018-11-29 DIAGNOSIS — G40.909 SEIZURE DISORDER (HCC): ICD-10-CM

## 2018-11-29 DIAGNOSIS — R73.02 IMPAIRED GLUCOSE TOLERANCE: Primary | ICD-10-CM

## 2018-11-29 DIAGNOSIS — E55.9 VITAMIN D DEFICIENCY: ICD-10-CM

## 2018-11-29 DIAGNOSIS — E78.2 MIXED HYPERLIPIDEMIA: ICD-10-CM

## 2018-11-29 DIAGNOSIS — Z79.899 HIGH RISK MEDICATION USE: ICD-10-CM

## 2018-12-11 ENCOUNTER — APPOINTMENT (OUTPATIENT)
Dept: WOMENS IMAGING | Facility: HOSPITAL | Age: 73
End: 2018-12-11

## 2018-12-11 PROCEDURE — 77063 BREAST TOMOSYNTHESIS BI: CPT | Performed by: RADIOLOGY

## 2018-12-11 PROCEDURE — 77067 SCR MAMMO BI INCL CAD: CPT | Performed by: RADIOLOGY

## 2018-12-19 ENCOUNTER — FLU SHOT (OUTPATIENT)
Dept: FAMILY MEDICINE CLINIC | Facility: CLINIC | Age: 73
End: 2018-12-19

## 2018-12-19 DIAGNOSIS — Z23 NEED FOR INFLUENZA VACCINATION: Primary | ICD-10-CM

## 2018-12-19 PROCEDURE — G0008 ADMIN INFLUENZA VIRUS VAC: HCPCS | Performed by: FAMILY MEDICINE

## 2018-12-19 PROCEDURE — 90662 IIV NO PRSV INCREASED AG IM: CPT | Performed by: FAMILY MEDICINE

## 2018-12-20 ENCOUNTER — APPOINTMENT (OUTPATIENT)
Dept: WOMENS IMAGING | Facility: HOSPITAL | Age: 73
End: 2018-12-20

## 2018-12-20 DIAGNOSIS — N63.24 MASS OF LOWER INNER QUADRANT OF LEFT BREAST: ICD-10-CM

## 2018-12-20 DIAGNOSIS — N63.20 MASS OF BREAST, LEFT: Primary | ICD-10-CM

## 2018-12-20 LAB
25(OH)D3+25(OH)D2 SERPL-MCNC: 56.9 NG/ML
ALBUMIN SERPL-MCNC: 4.1 G/DL (ref 3.5–5.2)
ALBUMIN/GLOB SERPL: 1.3 G/DL
ALP SERPL-CCNC: 74 U/L (ref 40–129)
ALT SERPL-CCNC: 20 U/L (ref 5–33)
AST SERPL-CCNC: 23 U/L (ref 5–32)
BASOPHILS # BLD AUTO: 0.01 10*3/MM3 (ref 0–0.2)
BASOPHILS NFR BLD AUTO: 0.1 % (ref 0–2)
BILIRUB SERPL-MCNC: 0.4 MG/DL (ref 0.2–1.2)
BUN SERPL-MCNC: 14 MG/DL (ref 8–23)
BUN/CREAT SERPL: 17.3 (ref 7–25)
CALCIUM SERPL-MCNC: 9.7 MG/DL (ref 8.8–10.5)
CARBAMAZEPINE SERPL-MCNC: 8 MCG/ML (ref 4–12)
CHLORIDE SERPL-SCNC: 96 MMOL/L (ref 98–107)
CHOLEST SERPL-MCNC: 164 MG/DL (ref 0–200)
CO2 SERPL-SCNC: 28.9 MMOL/L (ref 22–29)
CREAT SERPL-MCNC: 0.81 MG/DL (ref 0.57–1)
EOSINOPHIL # BLD AUTO: 0.05 10*3/MM3 (ref 0.1–0.3)
EOSINOPHIL NFR BLD AUTO: 0.6 % (ref 0–4)
ERYTHROCYTE [DISTWIDTH] IN BLOOD BY AUTOMATED COUNT: 13.6 % (ref 11.5–14.5)
GLOBULIN SER CALC-MCNC: 3.1 GM/DL
GLUCOSE SERPL-MCNC: 98 MG/DL (ref 65–99)
HBA1C MFR BLD: 5 % (ref 4.8–5.6)
HCT VFR BLD AUTO: 39.5 % (ref 37–47)
HDLC SERPL-MCNC: 56 MG/DL (ref 40–60)
HGB BLD-MCNC: 12.6 G/DL (ref 12–16)
IMM GRANULOCYTES # BLD: 0.03 10*3/MM3 (ref 0–0.03)
IMM GRANULOCYTES NFR BLD: 0.4 % (ref 0–0.5)
LDLC SERPL CALC-MCNC: 70 MG/DL (ref 0–100)
LYMPHOCYTES # BLD AUTO: 1.78 10*3/MM3 (ref 0.6–4.8)
LYMPHOCYTES NFR BLD AUTO: 22.4 % (ref 20–45)
MCH RBC QN AUTO: 26.8 PG (ref 27–31)
MCHC RBC AUTO-ENTMCNC: 31.9 G/DL (ref 31–37)
MCV RBC AUTO: 83.9 FL (ref 81–99)
MONOCYTES # BLD AUTO: 0.44 10*3/MM3 (ref 0–1)
MONOCYTES NFR BLD AUTO: 5.5 % (ref 3–8)
NEUTROPHILS # BLD AUTO: 5.65 10*3/MM3 (ref 1.5–8.3)
NEUTROPHILS NFR BLD AUTO: 71 % (ref 45–70)
NRBC BLD AUTO-RTO: 0 /100 WBC (ref 0–0)
PLATELET # BLD AUTO: 138 10*3/MM3 (ref 140–500)
POTASSIUM SERPL-SCNC: 4.1 MMOL/L (ref 3.5–5.2)
PROT SERPL-MCNC: 7.2 G/DL (ref 6–8.5)
RBC # BLD AUTO: 4.71 10*6/MM3 (ref 4.2–5.4)
SODIUM SERPL-SCNC: 135 MMOL/L (ref 136–145)
TRIGL SERPL-MCNC: 190 MG/DL (ref 0–150)
TSH SERPL DL<=0.005 MIU/L-ACNC: 1.81 MIU/ML (ref 0.27–4.2)
VLDLC SERPL CALC-MCNC: 38 MG/DL (ref 7–27)
WBC # BLD AUTO: 7.96 10*3/MM3 (ref 4.8–10.8)

## 2018-12-20 PROCEDURE — 76641 ULTRASOUND BREAST COMPLETE: CPT | Performed by: RADIOLOGY

## 2019-01-02 ENCOUNTER — OFFICE VISIT (OUTPATIENT)
Dept: FAMILY MEDICINE CLINIC | Facility: CLINIC | Age: 74
End: 2019-01-02

## 2019-01-02 VITALS
TEMPERATURE: 98.1 F | DIASTOLIC BLOOD PRESSURE: 80 MMHG | SYSTOLIC BLOOD PRESSURE: 110 MMHG | HEART RATE: 78 BPM | OXYGEN SATURATION: 98 % | RESPIRATION RATE: 16 BRPM | BODY MASS INDEX: 31.83 KG/M2 | HEIGHT: 62 IN | WEIGHT: 173 LBS

## 2019-01-02 DIAGNOSIS — Z00.00 MEDICARE ANNUAL WELLNESS VISIT, SUBSEQUENT: Primary | ICD-10-CM

## 2019-01-02 DIAGNOSIS — E55.9 VITAMIN D DEFICIENCY: ICD-10-CM

## 2019-01-02 DIAGNOSIS — N32.81 OVERACTIVE BLADDER: ICD-10-CM

## 2019-01-02 DIAGNOSIS — G40.909 SEIZURE DISORDER (HCC): ICD-10-CM

## 2019-01-02 DIAGNOSIS — I10 ESSENTIAL HYPERTENSION: ICD-10-CM

## 2019-01-02 DIAGNOSIS — E66.09 EXOGENOUS OBESITY: ICD-10-CM

## 2019-01-02 DIAGNOSIS — R73.02 IMPAIRED GLUCOSE TOLERANCE: ICD-10-CM

## 2019-01-02 DIAGNOSIS — E78.2 MIXED HYPERLIPIDEMIA: ICD-10-CM

## 2019-01-02 PROCEDURE — G0439 PPPS, SUBSEQ VISIT: HCPCS | Performed by: FAMILY MEDICINE

## 2019-01-02 PROCEDURE — 96160 PT-FOCUSED HLTH RISK ASSMT: CPT | Performed by: FAMILY MEDICINE

## 2019-01-02 PROCEDURE — 99213 OFFICE O/P EST LOW 20 MIN: CPT | Performed by: FAMILY MEDICINE

## 2019-01-02 RX ORDER — FESOTERODINE FUMARATE 4 MG/1
4 TABLET, EXTENDED RELEASE ORAL
Qty: 90 TABLET | Refills: 1 | Status: SHIPPED | OUTPATIENT
Start: 2019-01-02 | End: 2019-03-13

## 2019-01-02 NOTE — PROGRESS NOTES
QUICK REFERENCE INFORMATION:   The ABCs of Providing the Annual Wellness Visit   CMS.gov [employer] Subsequent Wellness Visit      Subjective   History of Present Illness    Shyann Davis is a 73 y.o. female who presents for an Subsequent Wellness Visit. In addition, we addressed the following health issues: stable HTN, stable HLD, stable glucose intolerance and stable glucose intolerance.  Pt has a history of exogenous obesity and her weight remain stable.  Pt is currently prescribed Lipitor 40img Tegretol 200mg,,Toprol XL 25 mg Paxil 20mg and she tolerates these well.  Pt states she does have pain and problems moving to a standing position.  She does report a couple falls. But these have usually been an occasion when she has slipped on water.  She also repots a hearing deficient.  She also states she is unable to drive due to some visual changes since having brain surgery done due to her seizure disorder.  Shyann states she is doing well and she has no further complaints at this time.      PMH, PSH, SocHx, FamHx, Allergies, and Medications: Reviewed and updated in the Visit Navigator.     Outpatient Medications Prior to Visit   Medication Sig Dispense Refill   • atorvastatin (LIPITOR) 40 MG tablet TAKE 1 TABLET EVERY DAY 90 tablet 1   • carBAMazepine (TEGretol) 200 MG tablet TAKE 1 TABLET THREE TIMES DAILY 270 tablet 1   • Cholecalciferol (VITAMIN D3) 80797 UNITS capsule Take 1 capsule by mouth Every 7 (Seven) Days. 12 capsule 1   • clotrimazole-betamethasone (LOTRISONE) cream Apply  topically 2 (two) times a day. 15 g 0   • metoprolol succinate XL (TOPROL-XL) 25 MG 24 hr tablet TAKE 1 TABLET EVERY DAY 90 tablet 1   • MULTIPLE VITAMINS ESSENTIAL PO Take  by mouth.     • mupirocin (BACTROBAN) 2 % ointment Apply  topically.     • PARoxetine (PAXIL) 20 MG tablet TAKE 1 TABLET EVERY MORNING 90 tablet 1   • PROLIA 60 MG/ML solution syringe FOR  BY YOUR PHYSICIAN TO INJECT 60MG  (1ML) SUBCUTANEOUSLY EVERY 6 MONTHS 1 syringe 0   • tolterodine (DETROL) 2 MG tablet        No facility-administered medications prior to visit.        Patient Active Problem List   Diagnosis   • Impaired glucose tolerance   • Hyperlipidemia   • Hypertension   • Seizure disorder (CMS/HCC)   • Vitamin D deficiency   • Osteopenia   • Arteriovenous malformation of gastrointestinal tract   • Exogenous obesity   • Depression with anxiety   • Thrombocytopenia (CMS/HCC)   • Renal insufficiency   • Overactive bladder       Health Habits:  Dental Exam. up to date  Eye Exam. up to date  Exercise: 5 times/week.  Current exercise activities include: walking    Social:  See review in SnapShot activity and in SocHx section of Visit Navigator.    Health Risk Assessment:  The patient has completed a Health Risk Assessment. This has been reviewed with them and has been scanned into OnBase as a separate document.    Current Medical Providers:  Patient Care Team:  Reese Underwood DO as PCP - General (Family Medicine)    The [facility] providers who are involved in the care of this patient are listed above. Additional providers and suppliers are listed below:      Recent Hospitalizations:  No recent hospitalization(s)..    Age-appropriate Screening Schedule:  Refer to the list below for future screening recommendations based on patient's age. Orders for these recommended tests are listed in the plan section. The patient has been provided with a written plan.    Health Maintenance   Topic Date Due   • HEPATITIS A VACCINE ADULT (1 of 2) 07/20/1963   • ZOSTER VACCINE (1 of 2) 07/20/1995   • PNEUMOCOCCAL VACCINES (65+ LOW/MEDIUM RISK) (1 of 2 - PCV13) 07/20/2010   • HEPATITIS C SCREENING  01/19/2016   • MEDICARE ANNUAL WELLNESS  01/19/2016   • COLONOSCOPY  01/19/2016   • LIPID PANEL  12/19/2019   • DXA SCAN  12/29/2019   • MAMMOGRAM  12/11/2020   • TDAP/TD VACCINES (2 - Td) 10/01/2022   • INFLUENZA VACCINE  Completed       Depression  Screen:   PHQ-2/PHQ-9 Depression Screening 1/2/2019   Little interest or pleasure in doing things 0   Feeling down, depressed, or hopeless 0   Total Score 0       Functional and Cognitive Screening:  Functional & Cognitive Status 1/2/2019   Do you have difficulty preparing food and eating? No   Do you have difficulty bathing yourself, getting dressed or grooming yourself? No   Do you have difficulty using the toilet? No   Do you have difficulty moving around from place to place? No   Do you have trouble with steps or getting out of a bed or a chair? Yes   In the past year have you fallen or experienced a near fall? Yes   Current Diet Well Balanced Diet   Dental Exam Up to date   Eye Exam Up to date   Exercise (times per week) 5 times per week   Current Exercise Activities Include Walking   Do you need help using the phone?  No   Are you deaf or do you have serious difficulty hearing?  Yes   Do you need help with transportation? Yes   Do you need help shopping? Yes   Do you need help preparing meals?  No   Do you need help with housework?  No   Do you need help with laundry? No   Do you need help taking your medications? No   Do you need help managing money? No   Do you ever drive or ride in a car without wearing a seat belt? No   Have you felt unusual stress, anger or loneliness in the last month? No   Who do you live with? Spouse   If you need help, do you have trouble finding someone available to you? No   Have you been bothered in the last four weeks by sexual problems? No   Do you have difficulty concentrating, remembering or making decisions? Yes       Does the patient have evidence of cognitive impairment? No    Identification of Risk Factors:  Risk factors include: weight , unhealthy diet, cardiovascular risk and inactivity.    Review of Systems   Constitutional:        Exogenous Obesity   Cardiovascular: Negative for chest pain, palpitations and leg swelling.        HTN  HLD   Endocrine:        Impaired  "Glucose Intolerance  Vit D Def   Neurological: Positive for seizures.   All other systems reviewed and are negative.    Physical Exam   Constitutional: She is oriented to person, place, and time. She appears well-developed and well-nourished.   HENT:   Head: Normocephalic and atraumatic.   Neck: Trachea normal and phonation normal. Neck supple. Normal carotid pulses present. Carotid bruit is not present. No thyroid mass and no thyromegaly present.   Cardiovascular: Normal rate, regular rhythm and normal heart sounds. Exam reveals no gallop and no friction rub.   No murmur heard.  Pulmonary/Chest: Effort normal and breath sounds normal. No respiratory distress. She has no decreased breath sounds. She has no wheezes. She has no rhonchi. She has no rales.   Lymphadenopathy:     She has no cervical adenopathy.   Neurological: She is alert and oriented to person, place, and time.   Skin: Skin is warm and dry. No rash noted.   Psychiatric: She has a normal mood and affect. Her speech is normal and behavior is normal. Judgment and thought content normal. Cognition and memory are normal.   Nursing note and vitals reviewed.      Pertinent items are noted in HPI.    Objective     Vitals:    01/02/19 0812   BP: 110/80   Pulse: 78   Resp: 16   Temp: 98.1 °F (36.7 °C)   TempSrc: Oral   SpO2: 98%   Weight: 78.5 kg (173 lb)   Height: 157.5 cm (62\")     BP Readings from Last 3 Encounters:   01/02/19 110/80   06/28/18 122/70   12/21/17 126/80      Wt Readings from Last 3 Encounters:   01/02/19 78.5 kg (173 lb)   06/28/18 79.8 kg (176 lb)   12/21/17 80.7 kg (178 lb)        Body mass index is 31.64 kg/m².  No visits with results within 2 Week(s) from this visit.   Latest known visit with results is:   Orders Only on 11/29/2018   Component Date Value Ref Range Status   • WBC 12/19/2018 7.96  4.80 - 10.80 10*3/mm3 Final   • RBC 12/19/2018 4.71  4.20 - 5.40 10*6/mm3 Final   • Hemoglobin 12/19/2018 12.6  12.0 - 16.0 g/dL Final   • " Hematocrit 12/19/2018 39.5  37.0 - 47.0 % Final   • MCV 12/19/2018 83.9  81.0 - 99.0 fL Final   • MCH 12/19/2018 26.8* 27.0 - 31.0 pg Final   • MCHC 12/19/2018 31.9  31.0 - 37.0 g/dL Final   • RDW 12/19/2018 13.6  11.5 - 14.5 % Final   • Platelets 12/19/2018 138* 140 - 500 10*3/mm3 Final   • Neutrophil Rel % 12/19/2018 71.0* 45.0 - 70.0 % Final   • Lymphocyte Rel % 12/19/2018 22.4  20.0 - 45.0 % Final   • Monocyte Rel % 12/19/2018 5.5  3.0 - 8.0 % Final   • Eosinophil Rel % 12/19/2018 0.6  0.0 - 4.0 % Final   • Basophil Rel % 12/19/2018 0.1  0.0 - 2.0 % Final   • Neutrophils Absolute 12/19/2018 5.65  1.50 - 8.30 10*3/mm3 Final   • Lymphocytes Absolute 12/19/2018 1.78  0.60 - 4.80 10*3/mm3 Final   • Monocytes Absolute 12/19/2018 0.44  0.00 - 1.00 10*3/mm3 Final   • Eosinophils Absolute 12/19/2018 0.05* 0.10 - 0.30 10*3/mm3 Final   • Basophils Absolute 12/19/2018 0.01  0.00 - 0.20 10*3/mm3 Final   • Immature Granulocyte Rel % 12/19/2018 0.4  0.0 - 0.5 % Final   • Immature Grans Absolute 12/19/2018 0.03  0.00 - 0.03 10*3/mm3 Final   • nRBC 12/19/2018 0.0  0.0 - 0.0 /100 WBC Final   • Glucose 12/19/2018 98  65 - 99 mg/dL Final   • BUN 12/19/2018 14  8 - 23 mg/dL Final   • Creatinine 12/19/2018 0.81  0.57 - 1.00 mg/dL Final   • eGFR Non  Am 12/19/2018 69  >60 mL/min/1.73 Final    Comment: The MDRD GFR formula is only valid for adults with stable  renal function between ages 18 and 70.     • eGFR  Am 12/19/2018 84  >60 mL/min/1.73 Final   • BUN/Creatinine Ratio 12/19/2018 17.3  7.0 - 25.0 Final   • Sodium 12/19/2018 135* 136 - 145 mmol/L Final   • Potassium 12/19/2018 4.1  3.5 - 5.2 mmol/L Final   • Chloride 12/19/2018 96* 98 - 107 mmol/L Final   • Total CO2 12/19/2018 28.9  22.0 - 29.0 mmol/L Final   • Calcium 12/19/2018 9.7  8.8 - 10.5 mg/dL Final   • Total Protein 12/19/2018 7.2  6.0 - 8.5 g/dL Final   • Albumin 12/19/2018 4.10  3.50 - 5.20 g/dL Final   • Globulin 12/19/2018 3.1  gm/dL Final   • A/G Ratio  12/19/2018 1.3  g/dL Final   • Total Bilirubin 12/19/2018 0.4  0.2 - 1.2 mg/dL Final   • Alkaline Phosphatase 12/19/2018 74  40 - 129 U/L Final   • AST (SGOT) 12/19/2018 23  5 - 32 U/L Final   • ALT (SGPT) 12/19/2018 20  5 - 33 U/L Final   • Hemoglobin A1C 12/19/2018 5.00  4.80 - 5.60 % Final   • Total Cholesterol 12/19/2018 164  0 - 200 mg/dL Final   • Triglycerides 12/19/2018 190* 0 - 150 mg/dL Final   • HDL Cholesterol 12/19/2018 56  40 - 60 mg/dL Final   • VLDL Cholesterol 12/19/2018 38* 7 - 27 mg/dL Final   • LDL Cholesterol  12/19/2018 70  0 - 100 mg/dL Final   • TSH 12/19/2018 1.810  0.270 - 4.200 mIU/mL Final   • 25 Hydroxy, Vitamin D 12/19/2018 56.9  ng/ml Final    Comment: Reference Range for Total Vitamin D 25(OH)  Deficiency <20.0 ng/mL  Insufficiency 21-29 ng/mL  Sufficiency  ng/mL  Toxicity >100 ng/mL     • Carbamazepine Level 12/19/2018 8.0  4.0 - 12.0 mcg/mL Final       Assessment/Plan   Patient Self-Management and Personalized Health Advice  The patient has been provided with information about: diet, exercise, weight management, prevention of cardiac or vascular disease and the relationship between weight and GERD and preventive services including:   · Advance directive.    Discussed the patient's BMI with her. The BMI is above average; BMI management plan is completed.    Orders:  No orders of the defined types were placed in this encounter.    Patient Instructions     No visits with results within 2 Week(s) from this visit.   Latest known visit with results is:   Orders Only on 11/29/2018   Component Date Value Ref Range Status   • WBC 12/19/2018 7.96  4.80 - 10.80 10*3/mm3 Final   • RBC 12/19/2018 4.71  4.20 - 5.40 10*6/mm3 Final   • Hemoglobin 12/19/2018 12.6  12.0 - 16.0 g/dL Final   • Hematocrit 12/19/2018 39.5  37.0 - 47.0 % Final   • MCV 12/19/2018 83.9  81.0 - 99.0 fL Final   • MCH 12/19/2018 26.8* 27.0 - 31.0 pg Final   • MCHC 12/19/2018 31.9  31.0 - 37.0 g/dL Final   • RDW  12/19/2018 13.6  11.5 - 14.5 % Final   • Platelets 12/19/2018 138* 140 - 500 10*3/mm3 Final   • Neutrophil Rel % 12/19/2018 71.0* 45.0 - 70.0 % Final   • Lymphocyte Rel % 12/19/2018 22.4  20.0 - 45.0 % Final   • Monocyte Rel % 12/19/2018 5.5  3.0 - 8.0 % Final   • Eosinophil Rel % 12/19/2018 0.6  0.0 - 4.0 % Final   • Basophil Rel % 12/19/2018 0.1  0.0 - 2.0 % Final   • Neutrophils Absolute 12/19/2018 5.65  1.50 - 8.30 10*3/mm3 Final   • Lymphocytes Absolute 12/19/2018 1.78  0.60 - 4.80 10*3/mm3 Final   • Monocytes Absolute 12/19/2018 0.44  0.00 - 1.00 10*3/mm3 Final   • Eosinophils Absolute 12/19/2018 0.05* 0.10 - 0.30 10*3/mm3 Final   • Basophils Absolute 12/19/2018 0.01  0.00 - 0.20 10*3/mm3 Final   • Immature Granulocyte Rel % 12/19/2018 0.4  0.0 - 0.5 % Final   • Immature Grans Absolute 12/19/2018 0.03  0.00 - 0.03 10*3/mm3 Final   • nRBC 12/19/2018 0.0  0.0 - 0.0 /100 WBC Final   • Glucose 12/19/2018 98  65 - 99 mg/dL Final   • BUN 12/19/2018 14  8 - 23 mg/dL Final   • Creatinine 12/19/2018 0.81  0.57 - 1.00 mg/dL Final   • eGFR Non  Am 12/19/2018 69  >60 mL/min/1.73 Final    Comment: The MDRD GFR formula is only valid for adults with stable  renal function between ages 18 and 70.     • eGFR  Am 12/19/2018 84  >60 mL/min/1.73 Final   • BUN/Creatinine Ratio 12/19/2018 17.3  7.0 - 25.0 Final   • Sodium 12/19/2018 135* 136 - 145 mmol/L Final   • Potassium 12/19/2018 4.1  3.5 - 5.2 mmol/L Final   • Chloride 12/19/2018 96* 98 - 107 mmol/L Final   • Total CO2 12/19/2018 28.9  22.0 - 29.0 mmol/L Final   • Calcium 12/19/2018 9.7  8.8 - 10.5 mg/dL Final   • Total Protein 12/19/2018 7.2  6.0 - 8.5 g/dL Final   • Albumin 12/19/2018 4.10  3.50 - 5.20 g/dL Final   • Globulin 12/19/2018 3.1  gm/dL Final   • A/G Ratio 12/19/2018 1.3  g/dL Final   • Total Bilirubin 12/19/2018 0.4  0.2 - 1.2 mg/dL Final   • Alkaline Phosphatase 12/19/2018 74  40 - 129 U/L Final   • AST (SGOT) 12/19/2018 23  5 - 32 U/L Final   •  ALT (SGPT) 12/19/2018 20  5 - 33 U/L Final   • Hemoglobin A1C 12/19/2018 5.00  4.80 - 5.60 % Final   • Total Cholesterol 12/19/2018 164  0 - 200 mg/dL Final   • Triglycerides 12/19/2018 190* 0 - 150 mg/dL Final   • HDL Cholesterol 12/19/2018 56  40 - 60 mg/dL Final   • VLDL Cholesterol 12/19/2018 38* 7 - 27 mg/dL Final   • LDL Cholesterol  12/19/2018 70  0 - 100 mg/dL Final   • TSH 12/19/2018 1.810  0.270 - 4.200 mIU/mL Final   • 25 Hydroxy, Vitamin D 12/19/2018 56.9  ng/ml Final    Comment: Reference Range for Total Vitamin D 25(OH)  Deficiency <20.0 ng/mL  Insufficiency 21-29 ng/mL  Sufficiency  ng/mL  Toxicity >100 ng/mL     • Carbamazepine Level 12/19/2018 8.0  4.0 - 12.0 mcg/mL Final         Follow Up:  Return in about 6 months (around 7/2/2019).     An After Visit Summary and PPPS with all of these plans were given to the patient.      Scribed for Reese Underwood MD by Anastasia Buchanan CMA. 01/02/2019    I, Reese Underwood MD personally performed the services described in this documentation, as scribed by Anastasia Buchanan CMA in my presence, and it is both accurate and complete

## 2019-01-02 NOTE — PATIENT INSTRUCTIONS
No visits with results within 2 Week(s) from this visit.   Latest known visit with results is:   Orders Only on 11/29/2018   Component Date Value Ref Range Status   • WBC 12/19/2018 7.96  4.80 - 10.80 10*3/mm3 Final   • RBC 12/19/2018 4.71  4.20 - 5.40 10*6/mm3 Final   • Hemoglobin 12/19/2018 12.6  12.0 - 16.0 g/dL Final   • Hematocrit 12/19/2018 39.5  37.0 - 47.0 % Final   • MCV 12/19/2018 83.9  81.0 - 99.0 fL Final   • MCH 12/19/2018 26.8* 27.0 - 31.0 pg Final   • MCHC 12/19/2018 31.9  31.0 - 37.0 g/dL Final   • RDW 12/19/2018 13.6  11.5 - 14.5 % Final   • Platelets 12/19/2018 138* 140 - 500 10*3/mm3 Final   • Neutrophil Rel % 12/19/2018 71.0* 45.0 - 70.0 % Final   • Lymphocyte Rel % 12/19/2018 22.4  20.0 - 45.0 % Final   • Monocyte Rel % 12/19/2018 5.5  3.0 - 8.0 % Final   • Eosinophil Rel % 12/19/2018 0.6  0.0 - 4.0 % Final   • Basophil Rel % 12/19/2018 0.1  0.0 - 2.0 % Final   • Neutrophils Absolute 12/19/2018 5.65  1.50 - 8.30 10*3/mm3 Final   • Lymphocytes Absolute 12/19/2018 1.78  0.60 - 4.80 10*3/mm3 Final   • Monocytes Absolute 12/19/2018 0.44  0.00 - 1.00 10*3/mm3 Final   • Eosinophils Absolute 12/19/2018 0.05* 0.10 - 0.30 10*3/mm3 Final   • Basophils Absolute 12/19/2018 0.01  0.00 - 0.20 10*3/mm3 Final   • Immature Granulocyte Rel % 12/19/2018 0.4  0.0 - 0.5 % Final   • Immature Grans Absolute 12/19/2018 0.03  0.00 - 0.03 10*3/mm3 Final   • nRBC 12/19/2018 0.0  0.0 - 0.0 /100 WBC Final   • Glucose 12/19/2018 98  65 - 99 mg/dL Final   • BUN 12/19/2018 14  8 - 23 mg/dL Final   • Creatinine 12/19/2018 0.81  0.57 - 1.00 mg/dL Final   • eGFR Non  Am 12/19/2018 69  >60 mL/min/1.73 Final    Comment: The MDRD GFR formula is only valid for adults with stable  renal function between ages 18 and 70.     • eGFR  Am 12/19/2018 84  >60 mL/min/1.73 Final   • BUN/Creatinine Ratio 12/19/2018 17.3  7.0 - 25.0 Final   • Sodium 12/19/2018 135* 136 - 145 mmol/L Final   • Potassium 12/19/2018 4.1  3.5 - 5.2  mmol/L Final   • Chloride 12/19/2018 96* 98 - 107 mmol/L Final   • Total CO2 12/19/2018 28.9  22.0 - 29.0 mmol/L Final   • Calcium 12/19/2018 9.7  8.8 - 10.5 mg/dL Final   • Total Protein 12/19/2018 7.2  6.0 - 8.5 g/dL Final   • Albumin 12/19/2018 4.10  3.50 - 5.20 g/dL Final   • Globulin 12/19/2018 3.1  gm/dL Final   • A/G Ratio 12/19/2018 1.3  g/dL Final   • Total Bilirubin 12/19/2018 0.4  0.2 - 1.2 mg/dL Final   • Alkaline Phosphatase 12/19/2018 74  40 - 129 U/L Final   • AST (SGOT) 12/19/2018 23  5 - 32 U/L Final   • ALT (SGPT) 12/19/2018 20  5 - 33 U/L Final   • Hemoglobin A1C 12/19/2018 5.00  4.80 - 5.60 % Final   • Total Cholesterol 12/19/2018 164  0 - 200 mg/dL Final   • Triglycerides 12/19/2018 190* 0 - 150 mg/dL Final   • HDL Cholesterol 12/19/2018 56  40 - 60 mg/dL Final   • VLDL Cholesterol 12/19/2018 38* 7 - 27 mg/dL Final   • LDL Cholesterol  12/19/2018 70  0 - 100 mg/dL Final   • TSH 12/19/2018 1.810  0.270 - 4.200 mIU/mL Final   • 25 Hydroxy, Vitamin D 12/19/2018 56.9  ng/ml Final    Comment: Reference Range for Total Vitamin D 25(OH)  Deficiency <20.0 ng/mL  Insufficiency 21-29 ng/mL  Sufficiency  ng/mL  Toxicity >100 ng/mL     • Carbamazepine Level 12/19/2018 8.0  4.0 - 12.0 mcg/mL Final

## 2019-01-10 DIAGNOSIS — R92.8 OTHER ABNORMAL AND INCONCLUSIVE FINDINGS ON DIAGNOSTIC IMAGING OF BREAST: ICD-10-CM

## 2019-01-10 DIAGNOSIS — N60.02 CYST (SOLITARY) OF BREAST, LEFT: Primary | ICD-10-CM

## 2019-01-16 ENCOUNTER — APPOINTMENT (OUTPATIENT)
Dept: WOMENS IMAGING | Facility: HOSPITAL | Age: 74
End: 2019-01-16

## 2019-01-16 PROCEDURE — 76942 ECHO GUIDE FOR BIOPSY: CPT | Performed by: RADIOLOGY

## 2019-01-16 PROCEDURE — 19000 PUNCTURE ASPIR CYST BREAST: CPT | Performed by: RADIOLOGY

## 2019-02-07 ENCOUNTER — TELEPHONE (OUTPATIENT)
Dept: FAMILY MEDICINE CLINIC | Facility: CLINIC | Age: 74
End: 2019-02-07

## 2019-02-07 NOTE — TELEPHONE ENCOUNTER
This patient is due for her Prolia Injection March 12th.  Could you please input this order for her?    Thank you!!

## 2019-02-08 DIAGNOSIS — M81.0 AGE-RELATED OSTEOPOROSIS WITHOUT CURRENT PATHOLOGICAL FRACTURE: Primary | ICD-10-CM

## 2019-03-13 ENCOUNTER — HOSPITAL ENCOUNTER (OUTPATIENT)
Dept: INFUSION THERAPY | Facility: HOSPITAL | Age: 74
Discharge: HOME | End: 2019-03-13
Admitting: PHYSICIAN ASSISTANT

## 2019-03-13 VITALS
HEART RATE: 79 BPM | OXYGEN SATURATION: 96 % | RESPIRATION RATE: 16 BRPM | HEIGHT: 62 IN | TEMPERATURE: 97.1 F | SYSTOLIC BLOOD PRESSURE: 126 MMHG | BODY MASS INDEX: 32.2 KG/M2 | DIASTOLIC BLOOD PRESSURE: 70 MMHG | WEIGHT: 175 LBS

## 2019-03-13 DIAGNOSIS — M81.0 AGE-RELATED OSTEOPOROSIS WITHOUT CURRENT PATHOLOGICAL FRACTURE: Primary | ICD-10-CM

## 2019-03-13 DIAGNOSIS — E83.42 HYPOMAGNESEMIA: Primary | ICD-10-CM

## 2019-03-13 LAB
CALCIUM SPEC-SCNC: 9.6 MG/DL (ref 8.6–10.5)
MAGNESIUM SERPL-MCNC: 1.5 MG/DL (ref 1.6–2.4)
PHOSPHATE SERPL-MCNC: 3.2 MG/DL (ref 2.5–4.5)

## 2019-03-13 PROCEDURE — 96372 THER/PROPH/DIAG INJ SC/IM: CPT

## 2019-03-13 PROCEDURE — 25010000002 DENOSUMAB 60 MG/ML SOLUTION

## 2019-03-13 PROCEDURE — 83735 ASSAY OF MAGNESIUM: CPT | Performed by: PHYSICIAN ASSISTANT

## 2019-03-13 PROCEDURE — 82310 ASSAY OF CALCIUM: CPT | Performed by: PHYSICIAN ASSISTANT

## 2019-03-13 PROCEDURE — 84100 ASSAY OF PHOSPHORUS: CPT | Performed by: PHYSICIAN ASSISTANT

## 2019-03-13 RX ORDER — TOLTERODINE TARTRATE 2 MG/1
2 TABLET, EXTENDED RELEASE ORAL 2 TIMES DAILY
COMMUNITY
End: 2019-12-17 | Stop reason: ALTCHOICE

## 2019-03-13 RX ADMIN — DENOSUMAB 60 MG: 60 INJECTION SUBCUTANEOUS at 12:21

## 2019-03-13 NOTE — NURSING NOTE
NURSING PROGRESS NOTE       Pt to [facility] per ambulatory with spouse.Pt ID verified by (2) identifiers. Allergies, medications and medical history reviewed and verified.Labs obtained.1200 Call placed to [facility], talked with Sarah with Lab value MG 1.5 (LOW), calcium , phosphorus, normal limits. Talked with Timmy in ref to lab values.Informed for patient to come in next week to [place] office for repeat of labs. Josefa Kumar RN      Tolerated prescribed treatment without incident. Patient received AVS, Pt verbalized understanding.  Discharged to home. Condition Satisfactory .  Josefa Kumar RN

## 2019-03-18 ENCOUNTER — RESULTS ENCOUNTER (OUTPATIENT)
Dept: FAMILY MEDICINE CLINIC | Facility: CLINIC | Age: 74
End: 2019-03-18

## 2019-03-18 DIAGNOSIS — E83.42 HYPOMAGNESEMIA: ICD-10-CM

## 2019-03-20 LAB — MAGNESIUM SERPL-MCNC: 1.9 MG/DL (ref 1.6–2.4)

## 2019-04-18 DIAGNOSIS — R73.02 IMPAIRED GLUCOSE TOLERANCE: Primary | ICD-10-CM

## 2019-04-18 DIAGNOSIS — E55.9 VITAMIN D DEFICIENCY: ICD-10-CM

## 2019-04-18 DIAGNOSIS — G40.909 SEIZURE DISORDER (HCC): ICD-10-CM

## 2019-04-18 DIAGNOSIS — E78.2 MIXED HYPERLIPIDEMIA: ICD-10-CM

## 2019-04-18 DIAGNOSIS — I10 ESSENTIAL HYPERTENSION: ICD-10-CM

## 2019-05-28 RX ORDER — PAROXETINE HYDROCHLORIDE 20 MG/1
TABLET, FILM COATED ORAL
Qty: 90 TABLET | Refills: 1 | Status: SHIPPED | OUTPATIENT
Start: 2019-05-28 | End: 2019-12-17 | Stop reason: SDUPTHER

## 2019-05-28 RX ORDER — FESOTERODINE FUMARATE 4 MG/1
TABLET, FILM COATED, EXTENDED RELEASE ORAL
Qty: 90 TABLET | Refills: 1 | Status: SHIPPED | OUTPATIENT
Start: 2019-05-28 | End: 2019-12-17 | Stop reason: SDUPTHER

## 2019-05-28 RX ORDER — ATORVASTATIN CALCIUM 40 MG/1
TABLET, FILM COATED ORAL
Qty: 90 TABLET | Refills: 1 | Status: SHIPPED | OUTPATIENT
Start: 2019-05-28 | End: 2019-12-17 | Stop reason: SDUPTHER

## 2019-05-28 RX ORDER — METOPROLOL SUCCINATE 25 MG/1
TABLET, EXTENDED RELEASE ORAL
Qty: 90 TABLET | Refills: 1 | Status: SHIPPED | OUTPATIENT
Start: 2019-05-28 | End: 2019-12-17 | Stop reason: SDUPTHER

## 2019-05-28 RX ORDER — CARBAMAZEPINE 200 MG/1
TABLET ORAL
Qty: 270 TABLET | Refills: 1 | Status: SHIPPED | OUTPATIENT
Start: 2019-05-28 | End: 2019-12-17 | Stop reason: SDUPTHER

## 2019-05-28 NOTE — PATIENT INSTRUCTIONS
"  Call [facility] at [phone] if you have any problems or concerns.    We know you have a Choice in healthcare and appreciate you using [facility].  Our purpose is to provide you \"Excellent Care\".  We hope that you will always choose us in the future and continue to recommend us to your family and friends.              Denosumab injection  What is this medicine?  DENOSUMAB (den oh keena mab) slows bone breakdown. Prolia is used to treat osteoporosis in women after menopause and in men, and in people who are taking corticosteroids for 6 months or more. Xgeva is used to treat a high calcium level due to cancer and to prevent bone fractures and other bone problems caused by multiple myeloma or cancer bone metastases. Xgeva is also used to treat giant cell tumor of the bone.  This medicine may be used for other purposes; ask your health care provider or pharmacist if you have questions.  COMMON BRAND NAME(S): Prolia, XGEVA  What should I tell my health care provider before I take this medicine?  They need to know if you have any of these conditions:  -dental disease  -having surgery or tooth extraction  -infection  -kidney disease  -low levels of calcium or Vitamin D in the blood  -malnutrition  -on hemodialysis  -skin conditions or sensitivity  -thyroid or parathyroid disease  -an unusual reaction to denosumab, other medicines, foods, dyes, or preservatives  -pregnant or trying to get pregnant  -breast-feeding  How should I use this medicine?  This medicine is for injection under the skin. It is given by a health care professional in a hospital or clinic setting.  If you are getting Prolia, a special MedGuide will be given to you by the pharmacist with each prescription and refill. Be sure to read this information carefully each time.  For Prolia, talk to your pediatrician regarding the use of this medicine in children. Special care may be needed. For Xgeva, talk to your " 27-May-2019 pediatrician regarding the use of this medicine in children. While this drug may be prescribed for children as young as 13 years for selected conditions, precautions do apply.  Overdosage: If you think you have taken too much of this medicine contact a poison control center or emergency room at once.  NOTE: This medicine is only for you. Do not share this medicine with others.  What if I miss a dose?  It is important not to miss your dose. Call your doctor or health care professional if you are unable to keep an appointment.  What may interact with this medicine?  Do not take this medicine with any of the following medications:  -other medicines containing denosumab  This medicine may also interact with the following medications:  -medicines that lower your chance of fighting infection  -steroid medicines like prednisone or cortisone  This list may not describe all possible interactions. Give your health care provider a list of all the medicines, herbs, non-prescription drugs, or dietary supplements you use. Also tell them if you smoke, drink alcohol, or use illegal drugs. Some items may interact with your medicine.  What should I watch for while using this medicine?  Visit your doctor or health care professional for regular checks on your progress. Your doctor or health care professional may order blood tests and other tests to see how you are doing.  Call your doctor or health care professional for advice if you get a fever, chills or sore throat, or other symptoms of a cold or flu. Do not treat yourself. This drug may decrease your body's ability to fight infection. Try to avoid being around people who are sick.  You should make sure you get enough calcium and vitamin D while you are taking this medicine, unless your doctor tells you not to. Discuss the foods you eat and the vitamins you take with your health care professional.  See your dentist regularly. Brush and floss your teeth as directed. Before you have  any dental work done, tell your dentist you are receiving this medicine.  Do not become pregnant while taking this medicine or for 5 months after stopping it. Talk with your doctor or health care professional about your birth control options while taking this medicine. Women should inform their doctor if they wish to become pregnant or think they might be pregnant. There is a potential for serious side effects to an unborn child. Talk to your health care professional or pharmacist for more information.  What side effects may I notice from receiving this medicine?  Side effects that you should report to your doctor or health care professional as soon as possible:  -allergic reactions like skin rash, itching or hives, swelling of the face, lips, or tongue  -bone pain  -breathing problems  -dizziness  -jaw pain, especially after dental work  -redness, blistering, peeling of the skin  -signs and symptoms of infection like fever or chills; cough; sore throat; pain or trouble passing urine  -signs of low calcium like fast heartbeat, muscle cramps or muscle pain; pain, tingling, numbness in the hands or feet; seizures  -unusual bleeding or bruising  -unusually weak or tired  Side effects that usually do not require medical attention (report to your doctor or health care professional if they continue or are bothersome):  -constipation  -diarrhea  -headache  -joint pain  -loss of appetite  -muscle pain  -runny nose  -tiredness  -upset stomach  This list may not describe all possible side effects. Call your doctor for medical advice about side effects. You may report side effects to FDA at 9-571-FDA-6419.  Where should I keep my medicine?  This medicine is only given in a clinic, doctor's office, or other health care setting and will not be stored at home.  NOTE: This sheet is a summary. It may not cover all possible information. If you have questions about this medicine, talk to your doctor, pharmacist, or health care  provider.  © 2019 [employer] Standard (2018-05-23 14:50:05)    Bone Health  Bones protect organs, store calcium, and anchor muscles. Good health habits, such as eating nutritious foods and exercising regularly, are important for maintaining healthy bones. They can also help to prevent a condition that causes bones to lose density and become weak and brittle (osteoporosis).  Why is bone mass important?  Bone mass refers to the amount of bone tissue that you have. The higher your bone mass, the stronger your bones. An important step toward having healthy bones throughout life is to have strong and dense bones during childhood. A young adult who has a high bone mass is more likely to have a high bone mass later in life. Bone mass at its greatest it is called peak bone mass.  A large decline in bone mass occurs in older adults. In women, it occurs about the time of menopause. During this time, it is important to practice good health habits, because if more bone is lost than what is replaced, the bones will become less healthy and more likely to break (fracture). If you find that you have a low bone mass, you may be able to prevent osteoporosis or further bone loss by changing your diet and lifestyle.  How can I find out if my bone mass is low?  Bone mass can be measured with an X-ray test that is called a bone mineral density (BMD) test. This test is recommended for all women who are age 65 or older. It may also be recommended for men who are age 70 or older, or for people who are more likely to develop osteoporosis due to:  · Having bones that break easily.  · Having a long-term disease that weakens bones, such as kidney disease or rheumatoid arthritis.  · Having menopause earlier than normal.  · Taking medicine that weakens bones, such as steroids, thyroid hormones, or hormone treatment for breast cancer or prostate cancer.  · Smoking.  · Drinking three or more alcoholic drinks each day.    What are the nutritional  recommendations for healthy bones?  To have healthy bones, you need to get enough of the right minerals and vitamins. Most nutrition experts recommend getting these nutrients from the foods that you eat. Nutritional recommendations vary from person to person. Ask your health care provider what is healthy for you. Here are some general guidelines.  Calcium Recommendations  Calcium is the most important (essential) mineral for bone health. Most people can get enough calcium from their diet, but supplements may be recommended for people who are at risk for osteoporosis. Good sources of calcium include:  · Dairy products, such as low-fat or nonfat milk, cheese, and yogurt.  · Dark green leafy vegetables, such as bok danelle and broccoli.  · Calcium-fortified foods, such as orange juice, cereal, bread, soy beverages, and tofu products.  · Nuts, such as almonds.    Follow these recommended amounts for daily calcium intake:  · Children, age 1?3: 700 mg.  · Children, age 4?8: 1,000 mg.  · Children, age 9?13: 1,300 mg.  · Teens, age 14?18: 1,300 mg.  · Adults, age 19?50: 1,000 mg.  · Adults, age 51?70:  ? Men: 1,000 mg.  ? Women: 1,200 mg.  · Adults, age 71 or older: 1,200 mg.  · Pregnant and breastfeeding females:  ? Teens: 1,300 mg.  ? Adults: 1,000 mg.    Vitamin D Recommendations  Vitamin D is the most essential vitamin for bone health. It helps the body to absorb calcium. Sunlight stimulates the skin to make vitamin D, so be sure to get enough sunlight. If you live in a cold climate or you do not get outside often, your health care provider may recommend that you take vitamin D supplements. Good sources of vitamin D in your diet include:  · Egg yolks.  · Saltwater fish.  · Milk and cereal fortified with vitamin D.    Follow these recommended amounts for daily vitamin D intake:  · Children and teens, age 1?18: 600 international units.  · Adults, age 50 or younger: 400-800 international units.  · Adults, age 51 or older:  800-1,000 international units.    Other Nutrients  Other nutrients for bone health include:  · Phosphorus. This mineral is found in meat, poultry, dairy foods, nuts, and legumes. The recommended daily intake for adult men and adult women is 700 mg.  · Magnesium. This mineral is found in seeds, nuts, dark green vegetables, and legumes. The recommended daily intake for adult men is 400?420 mg. For adult women, it is 310?320 mg.  · Vitamin K. This vitamin is found in green leafy vegetables. The recommended daily intake is 120 mg for adult men and 90 mg for adult women.    What type of physical activity is best for building and maintaining healthy bones?  Weight-bearing and strength-building activities are important for building and maintaining peak bone mass. Weight-bearing activities cause muscles and bones to work against gravity. Strength-building activities increases muscle strength that supports bones. Weight-bearing and muscle-building activities include:  · Walking and hiking.  · Jogging and running.  · Dancing.  · Gym exercises.  · Lifting weights.  · Tennis and racquetball.  · Climbing stairs.  · Aerobics.    Adults should get at least 30 minutes of moderate physical activity on most days. Children should get at least 60 minutes of moderate physical activity on most days. Ask your health care provide what type of exercise is best for you.  Where can I find more information?  For more information, check out the following websites:  · National Osteoporosis Foundation: http://nof.org/learn/basics  · National Institutes of Health: http://www.niams.nih.gov/Health_Info/Bone/Bone_Health/bone_health_for_life.asp    This information is not intended to replace advice given to you by your health care provider. Make sure you discuss any questions you have with your health care provider.  Document Released: 03/09/2005 Document Revised: 07/07/2017 Document Reviewed: 12/23/2015  [employer] Interactive Patient Education © 2019  [employer]    Osteoporosis  Osteoporosis happens when your bones become thinner and weaker. Weak bones can break (fracture) more easily when you slip or fall. Bones most at risk of breaking are in the hip, wrist, and spine.  Follow these instructions at home:  · Get enough calcium and vitamin D. These nutrients are good for your bones.  · Exercise as told by your doctor.  · Do not use any tobacco products. This includes cigarettes, chewing tobacco, and electronic cigarettes. If you need help quitting, ask your doctor.  · Limit the amount of alcohol you drink.  · Take medicines only as told by your doctor.  · Keep all follow-up visits as told by your doctor. This is important.  · Take care at home to prevent falls. Some ways to do this are:  ? Keep rooms well lit and tidy.  ? Put safety rails on your stairs.  ? Put a rubber mat in the bathroom and other places that are often wet or slippery.  Get help right away if:  · You fall.  · You hurt yourself.  This information is not intended to replace advice given to you by your health care provider. Make sure you discuss any questions you have with your health care provider.  Document Released: 03/11/2013 Document Revised: 05/25/2017 Document Reviewed: 05/28/2015  [employer] Interactive Patient Education © 2018 [employer]

## 2019-06-19 LAB
25(OH)D3+25(OH)D2 SERPL-MCNC: 56.9 NG/ML (ref 30–100)
ALBUMIN SERPL-MCNC: 4.1 G/DL (ref 3.5–5.2)
ALBUMIN/GLOB SERPL: 1.7 G/DL
ALP SERPL-CCNC: 71 U/L (ref 39–117)
ALT SERPL-CCNC: 27 U/L (ref 1–33)
AST SERPL-CCNC: 21 U/L (ref 1–32)
BASOPHILS # BLD AUTO: (no result) 10*3/UL
BILIRUB SERPL-MCNC: 0.4 MG/DL (ref 0.2–1.2)
BUN SERPL-MCNC: 13 MG/DL (ref 8–23)
BUN/CREAT SERPL: 12.7 (ref 7–25)
CALCIUM SERPL-MCNC: 9.5 MG/DL (ref 8.6–10.5)
CARBAMAZEPINE FREE SERPL-MCNC: 2.2 UG/ML (ref 0.6–4.2)
CARBAMAZEPINE SERPL-MCNC: 8.9 UG/ML (ref 4–12)
CHLORIDE SERPL-SCNC: 99 MMOL/L (ref 98–107)
CHOLEST SERPL-MCNC: 145 MG/DL (ref 0–200)
CO2 SERPL-SCNC: 30 MMOL/L (ref 22–29)
CREAT SERPL-MCNC: 1.02 MG/DL (ref 0.57–1)
DIFFERENTIAL COMMENT: ABNORMAL
EOSINOPHIL # BLD AUTO: (no result) 10*3/UL
EOSINOPHIL NFR BLD AUTO: (no result) %
ERYTHROCYTE [DISTWIDTH] IN BLOOD BY AUTOMATED COUNT: 14.5 % (ref 12.3–15.4)
GLOBULIN SER CALC-MCNC: 2.4 GM/DL
GLUCOSE SERPL-MCNC: 100 MG/DL (ref 65–99)
HBA1C MFR BLD: 5.2 % (ref 4.8–5.6)
HCT VFR BLD AUTO: 37.5 % (ref 34–46.6)
HDLC SERPL-MCNC: 56 MG/DL (ref 40–60)
HGB BLD-MCNC: 11.7 G/DL (ref 12–15.9)
LDLC SERPL CALC-MCNC: 54 MG/DL (ref 0–100)
LYMPHOCYTES # BLD AUTO: (no result) 10*3/UL
LYMPHOCYTES # BLD MANUAL: 1.11 10*3/MM3 (ref 0.7–3.1)
LYMPHOCYTES NFR BLD AUTO: (no result) %
LYMPHOCYTES NFR BLD MANUAL: 15.2 % (ref 19.6–45.3)
MCH RBC QN AUTO: 25.9 PG (ref 26.6–33)
MCHC RBC AUTO-ENTMCNC: 31.2 G/DL (ref 31.5–35.7)
MCV RBC AUTO: 83 FL (ref 79–97)
MONOCYTES # BLD MANUAL: 0.37 10*3/MM3 (ref 0.1–0.9)
MONOCYTES NFR BLD AUTO: (no result) %
MONOCYTES NFR BLD MANUAL: 5.1 % (ref 5–12)
NEUTROPHILS # BLD MANUAL: 5.8 10*3/MM3 (ref 1.7–7)
NEUTROPHILS NFR BLD AUTO: (no result) %
NEUTROPHILS NFR BLD MANUAL: 79.8 % (ref 42.7–76)
PLATELET # BLD AUTO: 134 10*3/MM3 (ref 140–450)
PLATELET BLD QL SMEAR: ABNORMAL
POTASSIUM SERPL-SCNC: 3.9 MMOL/L (ref 3.5–5.2)
PROT SERPL-MCNC: 6.5 G/DL (ref 6–8.5)
RBC # BLD AUTO: 4.52 10*6/MM3 (ref 3.77–5.28)
RBC MORPH BLD: ABNORMAL
SODIUM SERPL-SCNC: 139 MMOL/L (ref 136–145)
TRIGL SERPL-MCNC: 177 MG/DL (ref 0–150)
TSH SERPL DL<=0.005 MIU/L-ACNC: 2.47 MIU/ML (ref 0.27–4.2)
VLDLC SERPL CALC-MCNC: 35.4 MG/DL (ref 5–40)
WBC # BLD AUTO: 7.27 10*3/MM3 (ref 3.4–10.8)

## 2019-07-01 ENCOUNTER — OFFICE VISIT (OUTPATIENT)
Dept: FAMILY MEDICINE CLINIC | Facility: CLINIC | Age: 74
End: 2019-07-01

## 2019-07-01 VITALS
OXYGEN SATURATION: 96 % | HEIGHT: 62 IN | SYSTOLIC BLOOD PRESSURE: 108 MMHG | DIASTOLIC BLOOD PRESSURE: 70 MMHG | HEART RATE: 70 BPM | WEIGHT: 170 LBS | BODY MASS INDEX: 31.28 KG/M2

## 2019-07-01 DIAGNOSIS — N32.81 OVERACTIVE BLADDER: ICD-10-CM

## 2019-07-01 DIAGNOSIS — R73.02 IMPAIRED GLUCOSE TOLERANCE: ICD-10-CM

## 2019-07-01 DIAGNOSIS — E55.9 VITAMIN D DEFICIENCY: ICD-10-CM

## 2019-07-01 DIAGNOSIS — E78.2 MIXED HYPERLIPIDEMIA: ICD-10-CM

## 2019-07-01 DIAGNOSIS — M81.0 AGE-RELATED OSTEOPOROSIS WITHOUT CURRENT PATHOLOGICAL FRACTURE: ICD-10-CM

## 2019-07-01 DIAGNOSIS — E66.09 EXOGENOUS OBESITY: ICD-10-CM

## 2019-07-01 DIAGNOSIS — I10 ESSENTIAL HYPERTENSION: Primary | ICD-10-CM

## 2019-07-01 PROCEDURE — 99213 OFFICE O/P EST LOW 20 MIN: CPT | Performed by: FAMILY MEDICINE

## 2019-07-01 NOTE — PROGRESS NOTES
Subjective   Shyann Davis is a 73 y.o. female with   Chief Complaint   Patient presents with   • Hypertension     lab follow up.     .    History of Present Illness   73-year-old white female with multiple medical problems here in follow-up.  Patient with known history of hyperlipidemia as well as essential hypertension.  She is using atorvastatin at 40 mg daily as well as Toprol-XL at 25 mg daily.  She has a history of overactive bladder and is using Detrol 2 mg daily.  She also has a history of seizure disorder using Tegretol at 200 mg 3 times a day.  She also has a history of depression with anxiety features and is followed by psychiatry using fluoxetine at 20 mg daily.  There is also a history of osteoporosis with patient using Prolia at 60 mg every 6 months.  Fasting labs have been acquired prior to this visit.  She apparently was found to be low in magnesium at her last Prolia shot which one day later was in the normal range.  The following portions of the patient's history were reviewed and updated as appropriate: allergies, current medications, past family history, past medical history, past social history, past surgical history and problem list.    Review of Systems   Constitutional:        Exogenous obesity   Cardiovascular:        Essential hypertension, hyperlipidemia   Genitourinary:        Overactive bladder   Musculoskeletal:        Osteoporosis   Neurological: Positive for seizures.   Psychiatric/Behavioral: Positive for dysphoric mood. The patient is nervous/anxious.        Objective     Vitals:    07/01/19 0955   BP: 108/70   Pulse: 70   SpO2: 96%       No results found for this or any previous visit (from the past 168 hour(s)).    Physical Exam   Constitutional: She is oriented to person, place, and time. She appears well-developed and well-nourished.   HENT:   Head: Normocephalic and atraumatic.   Neck: Trachea normal and phonation normal. Neck supple. Normal carotid pulses present. Carotid  bruit is not present. No thyroid mass and no thyromegaly present.   Cardiovascular: Normal rate, regular rhythm and normal heart sounds. Exam reveals no gallop and no friction rub.   No murmur heard.  Pulmonary/Chest: Effort normal and breath sounds normal. No respiratory distress. She has no decreased breath sounds. She has no wheezes. She has no rhonchi. She has no rales.   Lymphadenopathy:     She has no cervical adenopathy.   Neurological: She is alert and oriented to person, place, and time.   Skin: Skin is warm and dry. No rash noted.   Psychiatric: She has a normal mood and affect. Her speech is normal and behavior is normal. Judgment and thought content normal. Cognition and memory are normal.   Nursing note and vitals reviewed.    No visits with results within 4 Week(s) from this visit.   Latest known visit with results is:   Orders Only on 04/18/2019   Component Date Value Ref Range Status   • Carbamazepine Level 06/17/2019 8.9  4.0 - 12.0 ug/mL Final    Comment:          In conjunction with other antiepileptic drugs                                 Therapeutic  4.0 -  8.0                                 Toxicity     9.0 - 12.0                                     Carbamazepine alone                                 Therapeutic  8.0 - 12.0                                  Detection Limit =  0.5                            <0.5 indicated None Detected     • Carbamazepine, Free 06/17/2019 2.2  0.6 - 4.2 ug/mL Final                                    Detection Limit = 0.5   • WBC 06/17/2019 7.27  3.40 - 10.80 10*3/mm3 Final   • RBC 06/17/2019 4.52  3.77 - 5.28 10*6/mm3 Final   • Hemoglobin 06/17/2019 11.7* 12.0 - 15.9 g/dL Final   • Hematocrit 06/17/2019 37.5  34.0 - 46.6 % Final   • MCV 06/17/2019 83.0  79.0 - 97.0 fL Final   • MCH 06/17/2019 25.9* 26.6 - 33.0 pg Final   • MCHC 06/17/2019 31.2* 31.5 - 35.7 g/dL Final   • RDW 06/17/2019 14.5  12.3 - 15.4 % Final   • Platelets 06/17/2019 134* 140 - 450 10*3/mm3  Final   • Neutrophil Rel % 06/17/2019 CANCELED   Final-Edited    Comment: Test not performed    Result canceled by the ancillary.     • Lymphocyte Rel % 06/17/2019 CANCELED   Final-Edited    Comment: Test not performed    Result canceled by the ancillary.     • Monocyte Rel % 06/17/2019 CANCELED   Final-Edited    Comment: Test not performed    Result canceled by the ancillary.     • Eosinophil Rel % 06/17/2019 CANCELED   Final-Edited    Comment: Test not performed    Result canceled by the ancillary.     • Lymphocytes Absolute 06/17/2019 CANCELED   Final-Edited    Comment: Test not performed    Result canceled by the ancillary.     • Eosinophils Absolute 06/17/2019 CANCELED   Final-Edited    Comment: Test not performed    Result canceled by the ancillary.     • Basophils Absolute 06/17/2019 CANCELED   Final-Edited    Comment: Test not performed    Result canceled by the ancillary.     • Glucose 06/17/2019 100* 65 - 99 mg/dL Final   • BUN 06/17/2019 13  8 - 23 mg/dL Final   • Creatinine 06/17/2019 1.02* 0.57 - 1.00 mg/dL Final   • eGFR Non African Am 06/17/2019 53* >60 mL/min/1.73 Final    Comment: The MDRD GFR formula is only valid for adults with stable  renal function between ages 18 and 70.     • eGFR  Am 06/17/2019 64  >60 mL/min/1.73 Final   • BUN/Creatinine Ratio 06/17/2019 12.7  7.0 - 25.0 Final   • Sodium 06/17/2019 139  136 - 145 mmol/L Final   • Potassium 06/17/2019 3.9  3.5 - 5.2 mmol/L Final   • Chloride 06/17/2019 99  98 - 107 mmol/L Final   • Total CO2 06/17/2019 30.0* 22.0 - 29.0 mmol/L Final   • Calcium 06/17/2019 9.5  8.6 - 10.5 mg/dL Final   • Total Protein 06/17/2019 6.5  6.0 - 8.5 g/dL Final   • Albumin 06/17/2019 4.10  3.50 - 5.20 g/dL Final   • Globulin 06/17/2019 2.4  gm/dL Final   • A/G Ratio 06/17/2019 1.7  g/dL Final   • Total Bilirubin 06/17/2019 0.4  0.2 - 1.2 mg/dL Final   • Alkaline Phosphatase 06/17/2019 71  39 - 117 U/L Final   • AST (SGOT) 06/17/2019 21  1 - 32 U/L Final   •  ALT (SGPT) 06/17/2019 27  1 - 33 U/L Final   • Hemoglobin A1C 06/17/2019 5.20  4.80 - 5.60 % Final    Comment: Hemoglobin A1C Ranges:  Increased Risk for Diabetes  5.7% to 6.4%  Diabetes                     >= 6.5%  Diabetic Goal                < 7.0%     • Total Cholesterol 06/17/2019 145  0 - 200 mg/dL Final   • Triglycerides 06/17/2019 177* 0 - 150 mg/dL Final   • HDL Cholesterol 06/17/2019 56  40 - 60 mg/dL Final   • VLDL Cholesterol 06/17/2019 35.4  5 - 40 mg/dL Final   • LDL Cholesterol  06/17/2019 54  0 - 100 mg/dL Final   • TSH 06/17/2019 2.470  0.270 - 4.200 mIU/mL Final   • 25 Hydroxy, Vitamin D 06/17/2019 56.9  30.0 - 100.0 ng/mL Final    Comment: Reference Range for Total Vitamin D 25(OH)  Deficiency <20.0 ng/mL  Insufficiency 21-29 ng/mL  Sufficiency  ng/mL  Toxicity >100 ng/ml     • Neutrophil Rel % 06/17/2019 79.8* 42.7 - 76.0 % Final   • Lymphocyte Rel % 06/17/2019 15.2* 19.6 - 45.3 % Final   • Monocyte Rel % 06/17/2019 5.1  5.0 - 12.0 % Final   • Neutrophils Absolute 06/17/2019 5.80  1.70 - 7.00 10*3/mm3 Final   • Lymphocytes Absolute 06/17/2019 1.11  0.70 - 3.10 10*3/mm3 Final   • Monocytes Absolute 06/17/2019 0.37  0.10 - 0.90 10*3/mm3 Final   • Differential Comment 06/17/2019 Comment   Final    WBC MORPHOLOGY               Normal                      Normal     N   • Comment 06/17/2019 Comment   Final    ANISOCYTOSIS                 Mod/2+                      None Seen  N   • Plt Comment 06/17/2019 Comment   Final    PLATELET MORPHOLOGY          Normal                      Normal     N         Assessment/Plan   Shyann was seen today for hypertension.    Diagnoses and all orders for this visit:    Essential hypertension    Mixed hyperlipidemia    Exogenous obesity    Vitamin D deficiency    Impaired glucose tolerance    Age-related osteoporosis without current pathological fracture    Overactive bladder        Return in about 6 months (around 1/1/2020) for Recheck.

## 2019-09-16 ENCOUNTER — HOSPITAL ENCOUNTER (OUTPATIENT)
Dept: INFUSION THERAPY | Facility: HOSPITAL | Age: 74
Discharge: HOME | End: 2019-09-16
Admitting: PHYSICIAN ASSISTANT

## 2019-09-16 VITALS
HEIGHT: 62 IN | WEIGHT: 168.6 LBS | RESPIRATION RATE: 16 BRPM | TEMPERATURE: 98.2 F | OXYGEN SATURATION: 96 % | DIASTOLIC BLOOD PRESSURE: 68 MMHG | HEART RATE: 71 BPM | BODY MASS INDEX: 31.03 KG/M2 | SYSTOLIC BLOOD PRESSURE: 125 MMHG

## 2019-09-16 DIAGNOSIS — M81.0 AGE-RELATED OSTEOPOROSIS WITHOUT CURRENT PATHOLOGICAL FRACTURE: Primary | ICD-10-CM

## 2019-09-16 LAB
CALCIUM SPEC-SCNC: 9.9 MG/DL (ref 8.6–10.5)
MAGNESIUM SERPL-MCNC: 1.9 MG/DL (ref 1.6–2.4)
PHOSPHATE SERPL-MCNC: 3 MG/DL (ref 2.5–4.5)

## 2019-09-16 PROCEDURE — 96372 THER/PROPH/DIAG INJ SC/IM: CPT

## 2019-09-16 PROCEDURE — 83735 ASSAY OF MAGNESIUM: CPT | Performed by: PHYSICIAN ASSISTANT

## 2019-09-16 PROCEDURE — 82310 ASSAY OF CALCIUM: CPT | Performed by: PHYSICIAN ASSISTANT

## 2019-09-16 PROCEDURE — 36415 COLL VENOUS BLD VENIPUNCTURE: CPT

## 2019-09-16 PROCEDURE — 84100 ASSAY OF PHOSPHORUS: CPT | Performed by: PHYSICIAN ASSISTANT

## 2019-09-16 PROCEDURE — 25010000002 DENOSUMAB 60 MG/ML SOLUTION PREFILLED SYRINGE

## 2019-09-16 RX ADMIN — DENOSUMAB 60 MG: 60 INJECTION SUBCUTANEOUS at 11:43

## 2019-09-16 NOTE — PATIENT INSTRUCTIONS
"  Call [facility] at [phone] if you have any problems or concerns.    We know you have a Choice in healthcare and appreciate you using [facility].  Our purpose is to provide you \"Excellent Care\".  We hope that you will always choose us in the future and continue to recommend us to your family and friends.  Denosumab injection  What is this medicine?  DENOSUMAB (den oh keena mab) slows bone breakdown. Prolia is used to treat osteoporosis in women after menopause and in men, and in people who are taking corticosteroids for 6 months or more. Xgeva is used to treat a high calcium level due to cancer and to prevent bone fractures and other bone problems caused by multiple myeloma or cancer bone metastases. Xgeva is also used to treat giant cell tumor of the bone.  This medicine may be used for other purposes; ask your health care provider or pharmacist if you have questions.  COMMON BRAND NAME(S): Prolia, XGEVA  What should I tell my health care provider before I take this medicine?  They need to know if you have any of these conditions:  -dental disease  -having surgery or tooth extraction  -infection  -kidney disease  -low levels of calcium or Vitamin D in the blood  -malnutrition  -on hemodialysis  -skin conditions or sensitivity  -thyroid or parathyroid disease  -an unusual reaction to denosumab, other medicines, foods, dyes, or preservatives  -pregnant or trying to get pregnant  -breast-feeding  How should I use this medicine?  This medicine is for injection under the skin. It is given by a health care professional in a hospital or clinic setting.  A special MedGuide will be given to you before each treatment. Be sure to read this information carefully each time.  For Prolia, talk to your pediatrician regarding the use of this medicine in children. Special care may be needed. For Xgeva, talk to your pediatrician regarding the use of this medicine in children. While this " drug may be prescribed for children as young as 13 years for selected conditions, precautions do apply.  Overdosage: If you think you have taken too much of this medicine contact a poison control center or emergency room at once.  NOTE: This medicine is only for you. Do not share this medicine with others.  What if I miss a dose?  It is important not to miss your dose. Call your doctor or health care professional if you are unable to keep an appointment.  What may interact with this medicine?  Do not take this medicine with any of the following medications:  -other medicines containing denosumab  This medicine may also interact with the following medications:  -medicines that lower your chance of fighting infection  -steroid medicines like prednisone or cortisone  This list may not describe all possible interactions. Give your health care provider a list of all the medicines, herbs, non-prescription drugs, or dietary supplements you use. Also tell them if you smoke, drink alcohol, or use illegal drugs. Some items may interact with your medicine.  What should I watch for while using this medicine?  Visit your doctor or health care professional for regular checks on your progress. Your doctor or health care professional may order blood tests and other tests to see how you are doing.  Call your doctor or health care professional for advice if you get a fever, chills or sore throat, or other symptoms of a cold or flu. Do not treat yourself. This drug may decrease your body's ability to fight infection. Try to avoid being around people who are sick.  You should make sure you get enough calcium and vitamin D while you are taking this medicine, unless your doctor tells you not to. Discuss the foods you eat and the vitamins you take with your health care professional.  See your dentist regularly. Brush and floss your teeth as directed. Before you have any dental work done, tell your dentist you are receiving this  medicine.  Do not become pregnant while taking this medicine or for 5 months after stopping it. Talk with your doctor or health care professional about your birth control options while taking this medicine. Women should inform their doctor if they wish to become pregnant or think they might be pregnant. There is a potential for serious side effects to an unborn child. Talk to your health care professional or pharmacist for more information.  What side effects may I notice from receiving this medicine?  Side effects that you should report to your doctor or health care professional as soon as possible:  -allergic reactions like skin rash, itching or hives, swelling of the face, lips, or tongue  -bone pain  -breathing problems  -dizziness  -jaw pain, especially after dental work  -redness, blistering, peeling of the skin  -signs and symptoms of infection like fever or chills; cough; sore throat; pain or trouble passing urine  -signs of low calcium like fast heartbeat, muscle cramps or muscle pain; pain, tingling, numbness in the hands or feet; seizures  -unusual bleeding or bruising  -unusually weak or tired  Side effects that usually do not require medical attention (report to your doctor or health care professional if they continue or are bothersome):  -constipation  -diarrhea  -headache  -joint pain  -loss of appetite  -muscle pain  -runny nose  -tiredness  -upset stomach  This list may not describe all possible side effects. Call your doctor for medical advice about side effects. You may report side effects to FDA at 1-983-FDA-6481.  Where should I keep my medicine?  This medicine is only given in a clinic, doctor's office, or other health care setting and will not be stored at home.  NOTE: This sheet is a summary. It may not cover all possible information. If you have questions about this medicine, talk to your doctor, pharmacist, or health care provider.  © 2019 [employer]/Gold Standard (2019-04-26  16:10:44)  Fesoterodine extended-release tablets  What is this medicine?  FESOTERODINE (fes oh TER oh ish) is used to treat overactive bladder. This medicine reduces the amount of bathroom visits. It may also help to control wetting accidents.  This medicine may be used for other purposes; ask your health care provider or pharmacist if you have questions.  COMMON BRAND NAME(S): Edie  What should I tell my health care provider before I take this medicine?  They need to know if you have any of these conditions:  -difficulty passing urine  -glaucoma  -kidney disease  -liver disease  -myasthenia gravis  -prostate disease  -stomach or intestine problems  -an unusual or allergic reaction to fesoterodine, tolterodine, other medicines, foods, dyes, or preservatives  -pregnant or trying to get pregnant  -breast-feeding  How should I use this medicine?  Take this medicine by mouth with a glass of water. Follow the directions on the prescription label. Do not cut, crush or chew this medicine. Take your doses at regular intervals. Do not take your medicine more often than directed.  Talk to your pediatrician regarding the use of this medicine in children. Special care may be needed.  Overdosage: If you think you have taken too much of this medicine contact a poison control center or emergency room at once.  NOTE: This medicine is only for you. Do not share this medicine with others.  What if I miss a dose?  If you miss a dose, skip the missed dose and begin taking this medicine again the next day. If it is almost time for your next dose, take only that dose. Do not take double or extra doses.  What may interact with this medicine?  -antihistamines for allergy, cough and cold  -atropine  -certain medicines for bladder problems like oxybutynin, tolterodine  -certain medicines for Parkinson's disease like benztropine, trihexyphenidyl  -certain medicines for stomach problems like dicyclomine, hyoscyamine  -certain medicines for  travel sickness like scopolamine  -clarithromycin  -ipratropium  -itraconazole  -ketoconazole  -rifampin  This list may not describe all possible interactions. Give your health care provider a list of all the medicines, herbs, non-prescription drugs, or dietary supplements you use. Also tell them if you smoke, drink alcohol, or use illegal drugs. Some items may interact with your medicine.  What should I watch for while using this medicine?  It may take a few weeks to notice the full benefit from this medicine.  You may need to limit your intake tea, coffee, caffeinated sodas, and alcohol. These drinks may make your symptoms worse.  You may get drowsy or dizzy. Do not drive, use machinery, or do anything that needs mental alertness until you know how this drug affects you. Do not stand or sit up quickly, especially if you are an older patient. This reduces the risk of dizzy or fainting spells. Alcohol may interfere with the effect of this medicine. Avoid alcoholic drinks.  Your mouth may get dry. Chewing sugarless gum or sucking hard candy and drinking plenty of water will help.  This medicine may cause dry eyes and blurred vision. If you wear contact lenses you may feel some discomfort. Lubricating drops may help. See your eye doctor if the problem does not go away or is severe.  Avoid extreme heat. This medicine can cause you to sweat less than normal. Your body temperature could increase to dangerous levels, which may lead to heat stroke.  What side effects may I notice from receiving this medicine?  Side effects that you should report to your doctor or health care professional as soon as possible:  -allergic reactions like skin rash, itching or hives, swelling of the face, lips, or tongue  -breathing problems  -fast, irregular heartbeat  -trouble passing urine or change in the amount of urine  Side effects that usually do not require medical attention (report to your doctor or health care professional if they  continue or are bothersome):  -changes in vision  -constipation  -dizziness  -drowsiness  -dry eyes or mouth  -upset stomach  This list may not describe all possible side effects. Call your doctor for medical advice about side effects. You may report side effects to FDA at 7-141-FDA-3475.  Where should I keep my medicine?  Keep out of the reach of children.  Store at room temperature between 15 and 30 degrees C (59 and 86 degrees F). Protect from moisture. Throw away any unused medicine after the expiration date.  NOTE: This sheet is a summary. It may not cover all possible information. If you have questions about this medicine, talk to your doctor, pharmacist, or health care provider.  © 2019 [employer]vier/Gold Standard (2017-10-06 11:19:40)

## 2019-09-16 NOTE — NURSING NOTE
NURSING PROGRESS NOTE      1055  Pt to [facility] per ambulatory with spouse.Pt ID verified by (2) identifiers. Allergies, medications and medical history reviewed and verified. Labs drawn per MD order.Tolerated prescribed treatment without incident. Patient received AVS, Pt verbalized understanding.  Discharged to home per self with spouse  @ 1210. Condition Satisfactory .  Josefa Kumar RN

## 2019-11-13 ENCOUNTER — PREP FOR SURGERY (OUTPATIENT)
Dept: OTHER | Facility: HOSPITAL | Age: 74
End: 2019-11-13

## 2019-11-13 DIAGNOSIS — Z12.11 SCREEN FOR COLON CANCER: Primary | ICD-10-CM

## 2019-12-05 DIAGNOSIS — G40.909 SEIZURE DISORDER (HCC): ICD-10-CM

## 2019-12-05 DIAGNOSIS — E55.9 VITAMIN D DEFICIENCY: ICD-10-CM

## 2019-12-05 DIAGNOSIS — R73.02 IMPAIRED GLUCOSE TOLERANCE: ICD-10-CM

## 2019-12-05 DIAGNOSIS — E78.2 MIXED HYPERLIPIDEMIA: Primary | ICD-10-CM

## 2019-12-11 LAB
25(OH)D3+25(OH)D2 SERPL-MCNC: 63.5 NG/ML (ref 30–100)
ALBUMIN SERPL-MCNC: 4.1 G/DL (ref 3.5–5.2)
ALBUMIN/GLOB SERPL: 1.5 G/DL
ALP SERPL-CCNC: 73 U/L (ref 39–117)
ALT SERPL-CCNC: 17 U/L (ref 1–33)
AST SERPL-CCNC: 20 U/L (ref 1–32)
BASOPHILS # BLD AUTO: ABNORMAL 10*3/UL
BILIRUB SERPL-MCNC: 0.4 MG/DL (ref 0.2–1.2)
BUN SERPL-MCNC: 15 MG/DL (ref 8–23)
BUN/CREAT SERPL: 14.3 (ref 7–25)
CALCIUM SERPL-MCNC: 8.9 MG/DL (ref 8.6–10.5)
CARBAMAZEPINE FREE SERPL-MCNC: 2.1 UG/ML (ref 0.6–4.2)
CARBAMAZEPINE SERPL-MCNC: 7.8 UG/ML (ref 4–12)
CHLORIDE SERPL-SCNC: 100 MMOL/L (ref 98–107)
CHOLEST SERPL-MCNC: 149 MG/DL (ref 0–200)
CO2 SERPL-SCNC: 24.5 MMOL/L (ref 22–29)
CREAT SERPL-MCNC: 1.05 MG/DL (ref 0.57–1)
DIFFERENTIAL COMMENT: ABNORMAL
EOSINOPHIL # BLD AUTO: ABNORMAL 10*3/UL
EOSINOPHIL # BLD MANUAL: 0.08 10*3/MM3 (ref 0–0.4)
EOSINOPHIL NFR BLD AUTO: ABNORMAL %
EOSINOPHIL NFR BLD MANUAL: 1 % (ref 0.3–6.2)
ERYTHROCYTE [DISTWIDTH] IN BLOOD BY AUTOMATED COUNT: 14.5 % (ref 12.3–15.4)
GLOBULIN SER CALC-MCNC: 2.8 GM/DL
GLUCOSE SERPL-MCNC: 111 MG/DL (ref 65–99)
HBA1C MFR BLD: 5.3 % (ref 4.8–5.6)
HCT VFR BLD AUTO: 35.8 % (ref 34–46.6)
HDLC SERPL-MCNC: 57 MG/DL (ref 40–60)
HGB BLD-MCNC: 12 G/DL (ref 12–15.9)
LDLC SERPL CALC-MCNC: 59 MG/DL (ref 0–100)
LYMPHOCYTES # BLD AUTO: ABNORMAL 10*3/UL
LYMPHOCYTES # BLD MANUAL: 1.7 10*3/MM3 (ref 0.7–3.1)
LYMPHOCYTES NFR BLD AUTO: ABNORMAL %
LYMPHOCYTES NFR BLD MANUAL: 22.2 % (ref 19.6–45.3)
MCH RBC QN AUTO: 26.1 PG (ref 26.6–33)
MCHC RBC AUTO-ENTMCNC: 33.5 G/DL (ref 31.5–35.7)
MCV RBC AUTO: 78 FL (ref 79–97)
MONOCYTES # BLD MANUAL: 0.15 10*3/MM3 (ref 0.1–0.9)
MONOCYTES NFR BLD AUTO: ABNORMAL %
MONOCYTES NFR BLD MANUAL: 2 % (ref 5–12)
NEUTROPHILS # BLD MANUAL: 5.73 10*3/MM3 (ref 1.7–7)
NEUTROPHILS NFR BLD AUTO: ABNORMAL %
NEUTROPHILS NFR BLD MANUAL: 74.7 % (ref 42.7–76)
PLATELET # BLD AUTO: 118 10*3/MM3 (ref 140–450)
PLATELET BLD QL SMEAR: ABNORMAL
POTASSIUM SERPL-SCNC: 3.8 MMOL/L (ref 3.5–5.2)
PROT SERPL-MCNC: 6.9 G/DL (ref 6–8.5)
RBC # BLD AUTO: 4.59 10*6/MM3 (ref 3.77–5.28)
RBC MORPH BLD: ABNORMAL
SODIUM SERPL-SCNC: 139 MMOL/L (ref 136–145)
TRIGL SERPL-MCNC: 163 MG/DL (ref 0–150)
TSH SERPL DL<=0.005 MIU/L-ACNC: 2.24 UIU/ML (ref 0.27–4.2)
VLDLC SERPL CALC-MCNC: 32.6 MG/DL
WBC # BLD AUTO: 7.67 10*3/MM3 (ref 3.4–10.8)

## 2019-12-12 LAB
FERRITIN SERPL-MCNC: 49.6 NG/ML (ref 13–150)
IRON SATN MFR SERPL: 31 % (ref 20–50)
IRON SERPL-MCNC: 120 MCG/DL (ref 37–145)
TIBC SERPL-MCNC: 390 MCG/DL
UIBC SERPL-MCNC: 270 MCG/DL (ref 112–346)
WRITTEN AUTHORIZATION: NORMAL

## 2019-12-16 ENCOUNTER — OFFICE VISIT (OUTPATIENT)
Dept: FAMILY MEDICINE CLINIC | Facility: CLINIC | Age: 74
End: 2019-12-16

## 2019-12-16 VITALS
DIASTOLIC BLOOD PRESSURE: 70 MMHG | WEIGHT: 168 LBS | OXYGEN SATURATION: 97 % | SYSTOLIC BLOOD PRESSURE: 122 MMHG | HEIGHT: 62 IN | BODY MASS INDEX: 30.91 KG/M2 | HEART RATE: 74 BPM

## 2019-12-16 DIAGNOSIS — R73.02 IMPAIRED GLUCOSE TOLERANCE: ICD-10-CM

## 2019-12-16 DIAGNOSIS — I10 ESSENTIAL HYPERTENSION: ICD-10-CM

## 2019-12-16 DIAGNOSIS — E55.9 VITAMIN D DEFICIENCY: ICD-10-CM

## 2019-12-16 DIAGNOSIS — G40.909 SEIZURE DISORDER (HCC): ICD-10-CM

## 2019-12-16 DIAGNOSIS — Z23 IMMUNIZATION DUE: ICD-10-CM

## 2019-12-16 DIAGNOSIS — E66.09 EXOGENOUS OBESITY: ICD-10-CM

## 2019-12-16 DIAGNOSIS — F41.8 DEPRESSION WITH ANXIETY: ICD-10-CM

## 2019-12-16 DIAGNOSIS — M81.0 AGE-RELATED OSTEOPOROSIS WITHOUT CURRENT PATHOLOGICAL FRACTURE: ICD-10-CM

## 2019-12-16 DIAGNOSIS — E78.2 MIXED HYPERLIPIDEMIA: Primary | ICD-10-CM

## 2019-12-16 PROCEDURE — 90670 PCV13 VACCINE IM: CPT | Performed by: FAMILY MEDICINE

## 2019-12-16 PROCEDURE — 90674 CCIIV4 VAC NO PRSV 0.5 ML IM: CPT | Performed by: FAMILY MEDICINE

## 2019-12-16 PROCEDURE — 99213 OFFICE O/P EST LOW 20 MIN: CPT | Performed by: FAMILY MEDICINE

## 2019-12-16 PROCEDURE — G0009 ADMIN PNEUMOCOCCAL VACCINE: HCPCS | Performed by: FAMILY MEDICINE

## 2019-12-16 PROCEDURE — G0008 ADMIN INFLUENZA VIRUS VAC: HCPCS | Performed by: FAMILY MEDICINE

## 2019-12-16 RX ORDER — ZOSTER VACCINE RECOMBINANT, ADJUVANTED 50 MCG/0.5
50 KIT INTRAMUSCULAR
Qty: 1 EACH | Refills: 1 | Status: SHIPPED | OUTPATIENT
Start: 2019-12-16 | End: 2020-06-14

## 2019-12-17 RX ORDER — PAROXETINE HYDROCHLORIDE 20 MG/1
20 TABLET, FILM COATED ORAL EVERY MORNING
Qty: 90 TABLET | Refills: 1 | Status: SHIPPED | OUTPATIENT
Start: 2019-12-17 | End: 2020-05-11

## 2019-12-17 RX ORDER — CARBAMAZEPINE 200 MG/1
200 TABLET ORAL 3 TIMES DAILY
Qty: 270 TABLET | Refills: 1 | Status: SHIPPED | OUTPATIENT
Start: 2019-12-17 | End: 2020-05-11

## 2019-12-17 RX ORDER — ATORVASTATIN CALCIUM 40 MG/1
40 TABLET, FILM COATED ORAL DAILY
Qty: 90 TABLET | Refills: 1 | Status: SHIPPED | OUTPATIENT
Start: 2019-12-17 | End: 2020-05-11

## 2019-12-17 RX ORDER — METOPROLOL SUCCINATE 25 MG/1
25 TABLET, EXTENDED RELEASE ORAL DAILY
Qty: 90 TABLET | Refills: 1 | Status: SHIPPED | OUTPATIENT
Start: 2019-12-17 | End: 2020-05-11

## 2019-12-17 RX ORDER — FESOTERODINE FUMARATE 4 MG/1
4 TABLET, EXTENDED RELEASE ORAL DAILY
Qty: 90 TABLET | Refills: 1 | Status: SHIPPED | OUTPATIENT
Start: 2019-12-17 | End: 2020-05-11

## 2019-12-18 NOTE — PROGRESS NOTES
Subjective   Shyann Davis is a 74 y.o. female with   Chief Complaint   Patient presents with   • Hyperlipidemia     follow up labs   .    History of Present Illness   74-year-old white female with history of hyperlipidemia here for further medical management.  Patient is currently using atorvastatin at 40 mg daily tolerated this product well.  He also has a history of essential hypertension using Toprol-XL at 25 mg daily.  Is a history of seizure disorder following brain surgery many years ago uses Tegretol at 200 mg 3 times a day.  Patient cannot remember her last seizure as it was so long ago.  History of osteoporosis using Prolia injections every 6 months and as well as a history of depression with anxiety features using Paxil at 20 mg daily.  Toviaz is used for urinary incontinence.  All medications are used on a regular basis and are well-tolerated.  The following portions of the patient's history were reviewed and updated as appropriate: allergies, current medications, past family history, past medical history, past social history, past surgical history and problem list.    Review of Systems   Cardiovascular:        Hypertension, hyperlipidemia   Genitourinary:        Urinary incontinence   Neurological: Positive for seizures.   Psychiatric/Behavioral: Positive for dysphoric mood. Negative for self-injury and suicidal ideas. The patient is nervous/anxious.        Objective     Vitals:    12/16/19 0934   BP: 122/70   Pulse: 74   SpO2: 97%       Recent Results (from the past 672 hour(s))   CBC & Differential    Collection Time: 12/09/19  9:30 AM   Result Value Ref Range    WBC 7.67 3.40 - 10.80 10*3/mm3    RBC 4.59 3.77 - 5.28 10*6/mm3    Hemoglobin 12.0 12.0 - 15.9 g/dL    Hematocrit 35.8 34.0 - 46.6 %    MCV 78.0 (L) 79.0 - 97.0 fL    MCH 26.1 (L) 26.6 - 33.0 pg    MCHC 33.5 31.5 - 35.7 g/dL    RDW 14.5 12.3 - 15.4 %    Platelets 118 (L) 140 - 450 10*3/mm3    Neutrophil Rel % CANCELED     Lymphocyte Rel %  CANCELED     Monocyte Rel % CANCELED     Eosinophil Rel % CANCELED     Lymphocytes Absolute CANCELED     Eosinophils Absolute CANCELED     Basophils Absolute CANCELED    Comprehensive Metabolic Panel    Collection Time: 12/09/19  9:30 AM   Result Value Ref Range    Glucose 111 (H) 65 - 99 mg/dL    BUN 15 8 - 23 mg/dL    Creatinine 1.05 (H) 0.57 - 1.00 mg/dL    eGFR Non African Am 51 (L) >60 mL/min/1.73    eGFR African Am 62 >60 mL/min/1.73    BUN/Creatinine Ratio 14.3 7.0 - 25.0    Sodium 139 136 - 145 mmol/L    Potassium 3.8 3.5 - 5.2 mmol/L    Chloride 100 98 - 107 mmol/L    Total CO2 24.5 22.0 - 29.0 mmol/L    Calcium 8.9 8.6 - 10.5 mg/dL    Total Protein 6.9 6.0 - 8.5 g/dL    Albumin 4.10 3.50 - 5.20 g/dL    Globulin 2.8 gm/dL    A/G Ratio 1.5 g/dL    Total Bilirubin 0.4 0.2 - 1.2 mg/dL    Alkaline Phosphatase 73 39 - 117 U/L    AST (SGOT) 20 1 - 32 U/L    ALT (SGPT) 17 1 - 33 U/L   Hemoglobin A1c    Collection Time: 12/09/19  9:30 AM   Result Value Ref Range    Hemoglobin A1C 5.30 4.80 - 5.60 %   Lipid Panel    Collection Time: 12/09/19  9:30 AM   Result Value Ref Range    Total Cholesterol 149 0 - 200 mg/dL    Triglycerides 163 (H) 0 - 150 mg/dL    HDL Cholesterol 57 40 - 60 mg/dL    VLDL Cholesterol 32.6 mg/dL    LDL Cholesterol  59 0 - 100 mg/dL   TSH    Collection Time: 12/09/19  9:30 AM   Result Value Ref Range    TSH 2.240 0.270 - 4.200 uIU/mL   Vitamin D 25 Hydroxy    Collection Time: 12/09/19  9:30 AM   Result Value Ref Range    25 Hydroxy, Vitamin D 63.5 30.0 - 100.0 ng/ml   Carbamazepine Level, Free & Total    Collection Time: 12/09/19  9:30 AM   Result Value Ref Range    Carbamazepine Level 7.8 4.0 - 12.0 ug/mL    Carbamazepine, Free 2.1 0.6 - 4.2 ug/mL   Manual Differential    Collection Time: 12/09/19  9:30 AM   Result Value Ref Range    Neutrophil Rel % 74.7 42.7 - 76.0 %    Lymphocyte Rel % 22.2 19.6 - 45.3 %    Monocyte Rel % 2.0 (L) 5.0 - 12.0 %    Eosinophil Rel % 1.0 0.3 - 6.2 %     Neutrophils Absolute 5.73 1.70 - 7.00 10*3/mm3    Lymphocytes Absolute 1.70 0.70 - 3.10 10*3/mm3    Monocytes Absolute 0.15 0.10 - 0.90 10*3/mm3    Eosinophil Abs 0.08 0.00 - 0.40 10*3/mm3    Differential Comment Comment     Comment Comment     Plt Comment Comment    Iron Profile    Collection Time: 12/09/19  9:30 AM   Result Value Ref Range    TIBC 390 mcg/dL    UIBC 270 112 - 346 mcg/dL    Iron 120 37 - 145 mcg/dL    Iron Saturation 31 20 - 50 %   Ferritin    Collection Time: 12/09/19  9:30 AM   Result Value Ref Range    Ferritin 49.60 13.00 - 150.00 ng/mL   Written Authorization    Collection Time: 12/09/19  9:30 AM   Result Value Ref Range    Written Authorization Comment        Physical Exam   Constitutional: She is oriented to person, place, and time. She appears well-developed and well-nourished.   Borderline obesity with a BMI of 30.7   HENT:   Head: Normocephalic and atraumatic.   Neck: Trachea normal and phonation normal. Neck supple. Normal carotid pulses present. Carotid bruit is not present. No thyroid mass and no thyromegaly present.   Cardiovascular: Normal rate, regular rhythm and normal heart sounds. Exam reveals no gallop and no friction rub.   No murmur heard.  Pulmonary/Chest: Effort normal and breath sounds normal. No respiratory distress. She has no decreased breath sounds. She has no wheezes. She has no rhonchi. She has no rales.   Lymphadenopathy:     She has no cervical adenopathy.   Neurological: She is alert and oriented to person, place, and time.   Skin: Skin is warm and dry. No rash noted.   Psychiatric: She has a normal mood and affect. Her speech is normal and behavior is normal. Judgment and thought content normal. Cognition and memory are normal.   Nursing note and vitals reviewed.      Assessment/Plan   Shyann was seen today for hyperlipidemia.    Diagnoses and all orders for this visit:    Mixed hyperlipidemia  -     Lipid panel; Future  -     Hemoglobin A1c; Future  -      Comprehensive metabolic panel; Future  -     TSH; Future  -     Vitamin D 25 hydroxy; Future  -     CBC w AUTO Differential; Future  -     atorvastatin (LIPITOR) 40 MG tablet; Take 1 tablet by mouth Daily.    Essential hypertension  -     Lipid panel; Future  -     Hemoglobin A1c; Future  -     Comprehensive metabolic panel; Future  -     TSH; Future  -     Vitamin D 25 hydroxy; Future  -     CBC w AUTO Differential; Future  -     metoprolol succinate XL (TOPROL-XL) 25 MG 24 hr tablet; Take 1 tablet by mouth Daily.    Vitamin D deficiency  -     Lipid panel; Future  -     Hemoglobin A1c; Future  -     Comprehensive metabolic panel; Future  -     TSH; Future  -     Vitamin D 25 hydroxy; Future  -     CBC w AUTO Differential; Future    Exogenous obesity  -     Lipid panel; Future  -     Hemoglobin A1c; Future  -     Comprehensive metabolic panel; Future  -     TSH; Future  -     Vitamin D 25 hydroxy; Future  -     CBC w AUTO Differential; Future    Impaired glucose tolerance  -     Lipid panel; Future  -     Hemoglobin A1c; Future  -     Comprehensive metabolic panel; Future  -     TSH; Future  -     Vitamin D 25 hydroxy; Future  -     CBC w AUTO Differential; Future    Seizure disorder (CMS/HCC)  -     Lipid panel; Future  -     Hemoglobin A1c; Future  -     Comprehensive metabolic panel; Future  -     TSH; Future  -     Vitamin D 25 hydroxy; Future  -     CBC w AUTO Differential; Future  -     carBAMazepine (TEGretol) 200 MG tablet; Take 1 tablet by mouth 3 (Three) Times a Day.    Age-related osteoporosis without current pathological fracture  -     Lipid panel; Future  -     Hemoglobin A1c; Future  -     Comprehensive metabolic panel; Future  -     TSH; Future  -     Vitamin D 25 hydroxy; Future  -     CBC w AUTO Differential; Future    Depression with anxiety  -     Lipid panel; Future  -     Hemoglobin A1c; Future  -     Comprehensive metabolic panel; Future  -     TSH; Future  -     Vitamin D 25 hydroxy;  Future  -     CBC w AUTO Differential; Future  -     PARoxetine (PAXIL) 20 MG tablet; Take 1 tablet by mouth Every Morning.    Immunization due  -     Flucelvax Quad=>4Years (PFS)  -     Pneumococcal Conjugate Vaccine 13-Valent All  -     SHINGRIX 50 MCG/0.5ML reconstituted suspension; Inject 0.5 mL into the appropriate muscle as directed by prescriber Every 6 (Six) Months for 2 doses.  -     Lipid panel; Future  -     Hemoglobin A1c; Future  -     Comprehensive metabolic panel; Future  -     TSH; Future  -     Vitamin D 25 hydroxy; Future  -     CBC w AUTO Differential; Future    Other orders  -     fesoterodine fumarate (TOVIAZ) 4 MG tablet sustained-release 24 hour tablet; Take 1 tablet by mouth Daily.        Return in about 6 months (around 6/16/2020) for Recheck.

## 2020-02-21 ENCOUNTER — TELEPHONE (OUTPATIENT)
Dept: FAMILY MEDICINE CLINIC | Facility: CLINIC | Age: 75
End: 2020-02-21

## 2020-02-21 DIAGNOSIS — M81.0 AGE-RELATED OSTEOPOROSIS WITHOUT CURRENT PATHOLOGICAL FRACTURE: Primary | ICD-10-CM

## 2020-05-10 DIAGNOSIS — F41.8 DEPRESSION WITH ANXIETY: ICD-10-CM

## 2020-05-10 DIAGNOSIS — G40.909 SEIZURE DISORDER (HCC): ICD-10-CM

## 2020-05-10 DIAGNOSIS — I10 ESSENTIAL HYPERTENSION: ICD-10-CM

## 2020-05-10 DIAGNOSIS — E78.2 MIXED HYPERLIPIDEMIA: ICD-10-CM

## 2020-05-11 RX ORDER — ATORVASTATIN CALCIUM 40 MG/1
TABLET, FILM COATED ORAL
Qty: 90 TABLET | Refills: 1 | Status: SHIPPED | OUTPATIENT
Start: 2020-05-11 | End: 2021-01-08 | Stop reason: SDUPTHER

## 2020-05-11 RX ORDER — METOPROLOL SUCCINATE 25 MG/1
TABLET, EXTENDED RELEASE ORAL
Qty: 90 TABLET | Refills: 1 | Status: SHIPPED | OUTPATIENT
Start: 2020-05-11 | End: 2021-01-08 | Stop reason: SDUPTHER

## 2020-05-11 RX ORDER — FESOTERODINE FUMARATE 4 MG/1
TABLET, FILM COATED, EXTENDED RELEASE ORAL
Qty: 90 TABLET | Refills: 1 | Status: SHIPPED | OUTPATIENT
Start: 2020-05-11 | End: 2021-01-07

## 2020-05-11 RX ORDER — CARBAMAZEPINE 200 MG/1
TABLET ORAL
Qty: 270 TABLET | Refills: 1 | Status: SHIPPED | OUTPATIENT
Start: 2020-05-11 | End: 2021-01-09 | Stop reason: SDUPTHER

## 2020-05-11 RX ORDER — PAROXETINE HYDROCHLORIDE 20 MG/1
TABLET, FILM COATED ORAL
Qty: 90 TABLET | Refills: 1 | Status: SHIPPED | OUTPATIENT
Start: 2020-05-11 | End: 2021-01-08 | Stop reason: SDUPTHER

## 2020-06-30 ENCOUNTER — RESULTS ENCOUNTER (OUTPATIENT)
Dept: FAMILY MEDICINE CLINIC | Facility: CLINIC | Age: 75
End: 2020-06-30

## 2020-06-30 DIAGNOSIS — M81.0 AGE-RELATED OSTEOPOROSIS WITHOUT CURRENT PATHOLOGICAL FRACTURE: ICD-10-CM

## 2020-06-30 DIAGNOSIS — Z23 IMMUNIZATION DUE: ICD-10-CM

## 2020-06-30 DIAGNOSIS — I10 ESSENTIAL HYPERTENSION: ICD-10-CM

## 2020-06-30 DIAGNOSIS — E66.09 EXOGENOUS OBESITY: ICD-10-CM

## 2020-06-30 DIAGNOSIS — R73.02 IMPAIRED GLUCOSE TOLERANCE: ICD-10-CM

## 2020-06-30 DIAGNOSIS — E55.9 VITAMIN D DEFICIENCY: ICD-10-CM

## 2020-06-30 DIAGNOSIS — E78.2 MIXED HYPERLIPIDEMIA: ICD-10-CM

## 2020-06-30 DIAGNOSIS — F41.8 DEPRESSION WITH ANXIETY: ICD-10-CM

## 2020-06-30 DIAGNOSIS — G40.909 SEIZURE DISORDER (HCC): ICD-10-CM

## 2020-07-22 ENCOUNTER — TELEPHONE (OUTPATIENT)
Dept: FAMILY MEDICINE CLINIC | Facility: CLINIC | Age: 75
End: 2020-07-22

## 2020-07-22 NOTE — TELEPHONE ENCOUNTER
Patient called wanting to know if she could get a color block machine for her colon. Patient also wants to schedule an appointment to get lab work done because it has been a while. Please advise.    Patient call back: [phone].

## 2020-07-22 NOTE — TELEPHONE ENCOUNTER
Please call patient and get clarification on what test she would like?  Cologuard?  Also schedule fasting lab work and follow-up with Dr. Underwood.

## 2020-07-23 ENCOUNTER — RESULTS ENCOUNTER (OUTPATIENT)
Dept: FAMILY MEDICINE CLINIC | Facility: CLINIC | Age: 75
End: 2020-07-23

## 2020-07-23 DIAGNOSIS — Z12.12 SCREENING FOR MALIGNANT NEOPLASM OF THE RECTUM: ICD-10-CM

## 2020-07-23 DIAGNOSIS — Z12.11 SPECIAL SCREENING FOR MALIGNANT NEOPLASMS, COLON: ICD-10-CM

## 2020-07-23 DIAGNOSIS — Z12.12 SCREENING FOR MALIGNANT NEOPLASM OF THE RECTUM: Primary | ICD-10-CM

## 2020-07-23 NOTE — TELEPHONE ENCOUNTER
Pt is requesting that the colorguard be ordered.  She is also due for labs and to see him, I transferred her call up front to make that appt

## 2020-08-05 LAB
25(OH)D3+25(OH)D2 SERPL-MCNC: 62.6 NG/ML (ref 30–100)
ALBUMIN SERPL-MCNC: 4.3 G/DL (ref 3.5–5.2)
ALBUMIN/GLOB SERPL: 1.9 G/DL
ALP SERPL-CCNC: 87 U/L (ref 39–117)
ALT SERPL-CCNC: 16 U/L (ref 1–33)
AST SERPL-CCNC: 18 U/L (ref 1–32)
BASOPHILS # BLD AUTO: ABNORMAL 10*3/UL
BILIRUB SERPL-MCNC: 0.4 MG/DL (ref 0–1.2)
BUN SERPL-MCNC: 19 MG/DL (ref 8–23)
BUN/CREAT SERPL: 17.3 (ref 7–25)
CALCIUM SERPL-MCNC: 9.9 MG/DL (ref 8.6–10.5)
CHLORIDE SERPL-SCNC: 97 MMOL/L (ref 98–107)
CHOLEST SERPL-MCNC: 157 MG/DL (ref 0–200)
CO2 SERPL-SCNC: 28.1 MMOL/L (ref 22–29)
CREAT SERPL-MCNC: 1.1 MG/DL (ref 0.57–1)
DIFFERENTIAL COMMENT: ABNORMAL
EOSINOPHIL # BLD AUTO: ABNORMAL 10*3/UL
EOSINOPHIL # BLD MANUAL: 0.09 10*3/MM3 (ref 0–0.4)
EOSINOPHIL NFR BLD AUTO: ABNORMAL %
EOSINOPHIL NFR BLD MANUAL: 1 % (ref 0.3–6.2)
ERYTHROCYTE [DISTWIDTH] IN BLOOD BY AUTOMATED COUNT: 14.3 % (ref 12.3–15.4)
GLOBULIN SER CALC-MCNC: 2.3 GM/DL
GLUCOSE SERPL-MCNC: 103 MG/DL (ref 65–99)
HBA1C MFR BLD: 5.2 % (ref 4.8–5.6)
HCT VFR BLD AUTO: 34.6 % (ref 34–46.6)
HDLC SERPL-MCNC: 58 MG/DL (ref 40–60)
HGB BLD-MCNC: 11.8 G/DL (ref 12–15.9)
LDLC SERPL CALC-MCNC: 59 MG/DL (ref 0–100)
LYMPHOCYTES # BLD AUTO: ABNORMAL 10*3/UL
LYMPHOCYTES # BLD MANUAL: 1.26 10*3/MM3 (ref 0.7–3.1)
LYMPHOCYTES NFR BLD AUTO: ABNORMAL %
LYMPHOCYTES NFR BLD MANUAL: 14.1 % (ref 19.6–45.3)
MCH RBC QN AUTO: 25.7 PG (ref 26.6–33)
MCHC RBC AUTO-ENTMCNC: 34.1 G/DL (ref 31.5–35.7)
MCV RBC AUTO: 75.2 FL (ref 79–97)
MONOCYTES # BLD MANUAL: 0.63 10*3/MM3 (ref 0.1–0.9)
MONOCYTES NFR BLD AUTO: ABNORMAL %
MONOCYTES NFR BLD MANUAL: 7.1 % (ref 5–12)
NEUTROPHILS # BLD MANUAL: 6.86 10*3/MM3 (ref 1.7–7)
NEUTROPHILS NFR BLD AUTO: ABNORMAL %
NEUTROPHILS NFR BLD MANUAL: 76.8 % (ref 42.7–76)
PLATELET # BLD AUTO: 98 10*3/MM3 (ref 140–450)
PLATELET BLD QL SMEAR: ABNORMAL
POTASSIUM SERPL-SCNC: 3.8 MMOL/L (ref 3.5–5.2)
PROT SERPL-MCNC: 6.6 G/DL (ref 6–8.5)
RBC # BLD AUTO: 4.6 10*6/MM3 (ref 3.77–5.28)
RBC MORPH BLD: ABNORMAL
SODIUM SERPL-SCNC: 136 MMOL/L (ref 136–145)
TRIGL SERPL-MCNC: 200 MG/DL (ref 0–150)
TSH SERPL DL<=0.005 MIU/L-ACNC: 1.57 UIU/ML (ref 0.27–4.2)
VLDLC SERPL CALC-MCNC: 40 MG/DL
WBC # BLD AUTO: 8.93 10*3/MM3 (ref 3.4–10.8)

## 2020-08-26 ENCOUNTER — TELEPHONE (OUTPATIENT)
Dept: FAMILY MEDICINE CLINIC | Facility: CLINIC | Age: 75
End: 2020-08-26

## 2020-08-26 NOTE — TELEPHONE ENCOUNTER
Please notify patient that her Cologuard testing was negative.  We will need to recheck in 2 years.

## 2020-09-18 ENCOUNTER — HOSPITAL ENCOUNTER (OUTPATIENT)
Dept: INFUSION THERAPY | Facility: HOSPITAL | Age: 75
Discharge: HOME | End: 2020-09-18
Admitting: FAMILY MEDICINE

## 2020-09-18 VITALS
DIASTOLIC BLOOD PRESSURE: 62 MMHG | OXYGEN SATURATION: 95 % | HEART RATE: 68 BPM | RESPIRATION RATE: 16 BRPM | TEMPERATURE: 96.9 F | HEIGHT: 64 IN | BODY MASS INDEX: 28.85 KG/M2 | SYSTOLIC BLOOD PRESSURE: 138 MMHG | WEIGHT: 169 LBS

## 2020-09-18 DIAGNOSIS — M81.0 AGE-RELATED OSTEOPOROSIS WITHOUT CURRENT PATHOLOGICAL FRACTURE: Primary | ICD-10-CM

## 2020-09-18 PROCEDURE — 96372 THER/PROPH/DIAG INJ SC/IM: CPT

## 2020-09-18 PROCEDURE — 25010000002 DENOSUMAB 60 MG/ML SOLUTION PREFILLED SYRINGE: Performed by: FAMILY MEDICINE

## 2020-09-18 RX ADMIN — DENOSUMAB 60 MG: 60 INJECTION SUBCUTANEOUS at 08:58

## 2020-09-18 NOTE — PATIENT INSTRUCTIONS
Call Dr. Reese Underwood [facility] at [phone] Denosumab injection  What is this medicine?  DENOSUMAB (den oh keena mab) slows bone breakdown. Prolia is used to treat osteoporosis in women after menopause and in men, and in people who are taking corticosteroids for 6 months or more. Xgeva is used to treat a high calcium level due to cancer and to prevent bone fractures and other bone problems caused by multiple myeloma or cancer bone metastases. Xgeva is also used to treat giant cell tumor of the bone.  This medicine may be used for other purposes; ask your health care provider or pharmacist if you have questions.  COMMON BRAND NAME(S): Prolia, XGEVA  What should I tell my health care provider before I take this medicine?  They need to know if you have any of these conditions:  · dental disease  · having surgery or tooth extraction  · infection  · kidney disease  · low levels of calcium or Vitamin D in the blood  · malnutrition  · on hemodialysis  · skin conditions or sensitivity  · thyroid or parathyroid disease  · an unusual reaction to denosumab, other medicines, foods, dyes, or preservatives  · pregnant or trying to get pregnant  · breast-feeding  How should I use this medicine?  This medicine is for injection under the skin. It is given by a health care professional in a hospital or clinic setting.  A special MedGuide will be given to you before each treatment. Be sure to read this information carefully each time.  For Prolia, talk to your pediatrician regarding the use of this medicine in children. Special care may be needed. For Xgeva, talk to your pediatrician regarding the use of this medicine in children. While this drug may be prescribed for children as young as 13 years for selected conditions, precautions do apply.  Overdosage: If you think you have taken too much of this medicine contact a poison control center or emergency room at once.  NOTE: This medicine is only for  you. Do not share this medicine with others.  What if I miss a dose?  It is important not to miss your dose. Call your doctor or health care professional if you are unable to keep an appointment.  What may interact with this medicine?  Do not take this medicine with any of the following medications:  · other medicines containing denosumab  This medicine may also interact with the following medications:  · medicines that lower your chance of fighting infection  · steroid medicines like prednisone or cortisone  This list may not describe all possible interactions. Give your health care provider a list of all the medicines, herbs, non-prescription drugs, or dietary supplements you use. Also tell them if you smoke, drink alcohol, or use illegal drugs. Some items may interact with your medicine.  What should I watch for while using this medicine?  Visit your doctor or health care professional for regular checks on your progress. Your doctor or health care professional may order blood tests and other tests to see how you are doing.  Call your doctor or health care professional for advice if you get a fever, chills or sore throat, or other symptoms of a cold or flu. Do not treat yourself. This drug may decrease your body's ability to fight infection. Try to avoid being around people who are sick.  You should make sure you get enough calcium and vitamin D while you are taking this medicine, unless your doctor tells you not to. Discuss the foods you eat and the vitamins you take with your health care professional.  See your dentist regularly. Brush and floss your teeth as directed. Before you have any dental work done, tell your dentist you are receiving this medicine.  Do not become pregnant while taking this medicine or for 5 months after stopping it. Talk with your doctor or health care professional about your birth control options while taking this medicine. Women should inform their doctor if they wish to become  "pregnant or think they might be pregnant. There is a potential for serious side effects to an unborn child. Talk to your health care professional or pharmacist for more information.  What side effects may I notice from receiving this medicine?  Side effects that you should report to your doctor or health care professional as soon as possible:  · allergic reactions like skin rash, itching or hives, swelling of the face, lips, or tongue  · bone pain  · breathing problems  · dizziness  · jaw pain, especially after dental work  · redness, blistering, peeling of the skin  · signs and symptoms of infection like fever or chills; cough; sore throat; pain or trouble passing urine  · signs of low calcium like fast heartbeat, muscle cramps or muscle pain; pain, tingling, numbness in the hands or feet; seizures  · unusual bleeding or bruising  · unusually weak or tired  Side effects that usually do not require medical attention (report to your doctor or health care professional if they continue or are bothersome):  · constipation  · diarrhea  · headache  · joint pain  · loss of appetite  · muscle pain  · runny nose  · tiredness  · upset stomach  This list may not describe all possible side effects. Call your doctor for medical advice about side effects. You may report side effects to FDA at 6-056-FDA-6632.  Where should I keep my medicine?  This medicine is only given in a clinic, doctor's office, or other health care setting and will not be stored at home.  NOTE: This sheet is a summary. It may not cover all possible information. If you have questions about this medicine, talk to your doctor, pharmacist, or health care provider.  © 2020 [employer] (2019-04-26 16:10:44)   if you have any problems or concerns.    We know you have a Choice in healthcare and appreciate you using [facility].  Our purpose is to provide you \"Excellent Care\".  We hope that you will always choose us in the future and continue " to recommend us to your family and friends.

## 2020-09-18 NOTE — NURSING NOTE
NURSE PROGRESS NOTE    PT. ARRIVED @ [facility] FOR SCHEDULED INJECTION AT 0845 .  INJECTION WAS PERFORMED WITHOUT INCIDENT.  PT. DISCHARGED TO HOME AT 0905.AVS PRINTED & REVIEWED WITH PATIENT PRIOR TO DISCHARGE.

## 2020-10-09 DIAGNOSIS — E78.2 MIXED HYPERLIPIDEMIA: ICD-10-CM

## 2020-10-09 DIAGNOSIS — I10 ESSENTIAL HYPERTENSION: ICD-10-CM

## 2020-10-09 DIAGNOSIS — F41.8 DEPRESSION WITH ANXIETY: ICD-10-CM

## 2020-10-09 RX ORDER — PAROXETINE HYDROCHLORIDE 20 MG/1
TABLET, FILM COATED ORAL
Qty: 90 TABLET | Refills: 1 | OUTPATIENT
Start: 2020-10-09

## 2020-10-09 RX ORDER — METOPROLOL SUCCINATE 25 MG/1
TABLET, EXTENDED RELEASE ORAL
Qty: 90 TABLET | Refills: 1 | OUTPATIENT
Start: 2020-10-09

## 2020-10-09 RX ORDER — ATORVASTATIN CALCIUM 40 MG/1
TABLET, FILM COATED ORAL
Qty: 90 TABLET | Refills: 1 | OUTPATIENT
Start: 2020-10-09

## 2020-12-03 ENCOUNTER — FLU SHOT (OUTPATIENT)
Dept: FAMILY MEDICINE CLINIC | Facility: CLINIC | Age: 75
End: 2020-12-03

## 2020-12-03 DIAGNOSIS — Z23 NEED FOR INFLUENZA VACCINATION: ICD-10-CM

## 2020-12-03 PROCEDURE — 90694 VACC AIIV4 NO PRSRV 0.5ML IM: CPT | Performed by: FAMILY MEDICINE

## 2020-12-03 PROCEDURE — G0008 ADMIN INFLUENZA VIRUS VAC: HCPCS | Performed by: FAMILY MEDICINE

## 2021-01-07 ENCOUNTER — OFFICE VISIT (OUTPATIENT)
Dept: FAMILY MEDICINE CLINIC | Facility: CLINIC | Age: 76
End: 2021-01-07

## 2021-01-07 VITALS
SYSTOLIC BLOOD PRESSURE: 120 MMHG | HEART RATE: 75 BPM | TEMPERATURE: 98 F | WEIGHT: 170 LBS | BODY MASS INDEX: 29.02 KG/M2 | OXYGEN SATURATION: 98 % | DIASTOLIC BLOOD PRESSURE: 78 MMHG | HEIGHT: 64 IN

## 2021-01-07 DIAGNOSIS — D69.6 THROMBOCYTOPENIA (HCC): ICD-10-CM

## 2021-01-07 DIAGNOSIS — R73.03 PREDIABETES: ICD-10-CM

## 2021-01-07 DIAGNOSIS — I10 ESSENTIAL HYPERTENSION: ICD-10-CM

## 2021-01-07 DIAGNOSIS — M81.0 AGE-RELATED OSTEOPOROSIS WITHOUT CURRENT PATHOLOGICAL FRACTURE: ICD-10-CM

## 2021-01-07 DIAGNOSIS — G40.909 SEIZURE DISORDER (HCC): Primary | ICD-10-CM

## 2021-01-07 DIAGNOSIS — N32.81 OVERACTIVE BLADDER: ICD-10-CM

## 2021-01-07 DIAGNOSIS — E55.9 VITAMIN D DEFICIENCY: ICD-10-CM

## 2021-01-07 DIAGNOSIS — F41.8 DEPRESSION WITH ANXIETY: ICD-10-CM

## 2021-01-07 DIAGNOSIS — E78.2 MIXED HYPERLIPIDEMIA: ICD-10-CM

## 2021-01-07 PROCEDURE — 99213 OFFICE O/P EST LOW 20 MIN: CPT | Performed by: FAMILY MEDICINE

## 2021-01-09 RX ORDER — CARBAMAZEPINE 200 MG/1
200 TABLET ORAL 3 TIMES DAILY
Qty: 270 TABLET | Refills: 1 | Status: SHIPPED | OUTPATIENT
Start: 2021-01-09 | End: 2021-09-13

## 2021-01-09 RX ORDER — ATORVASTATIN CALCIUM 40 MG/1
40 TABLET, FILM COATED ORAL DAILY
Qty: 90 TABLET | Refills: 1 | Status: SHIPPED | OUTPATIENT
Start: 2021-01-09 | End: 2021-08-30

## 2021-01-09 RX ORDER — METOPROLOL SUCCINATE 25 MG/1
25 TABLET, EXTENDED RELEASE ORAL DAILY
Qty: 90 TABLET | Refills: 1 | Status: SHIPPED | OUTPATIENT
Start: 2021-01-09 | End: 2021-08-30

## 2021-01-09 RX ORDER — PAROXETINE HYDROCHLORIDE 20 MG/1
20 TABLET, FILM COATED ORAL EVERY MORNING
Qty: 90 TABLET | Refills: 1 | Status: SHIPPED | OUTPATIENT
Start: 2021-01-09 | End: 2021-08-30

## 2021-01-09 NOTE — PROGRESS NOTES
Subjective   Shyann Davis is a 75 y.o. female with   Chief Complaint   Patient presents with   • Hyperlipidemia     Follow up from labwork last August.  She hadnt been seen since   .    History of Present Illness   75-year-old white female with hyperlipidemia here for further medical management.  Patient is currently using atorvastatin at 40 mg daily and doing so on a regular basis.  She is tolerating this product well and there have been no side effects.  Patient is followed by neurology in regards to his seizure disorder and has been successfully using Tegretol.  Toprol-XL is used for hypertension and patient does use Myrbetriq for stress urinary incontinence.  Patient does have a history of depression with anxiety features and for years has been using Paxil 20 mg daily.  This product has been successful in controlling both depression and anxiety issues and patient's moods have been very stable.  She is also using Prolia on a regular basis for osteoporosis.  The following portions of the patient's history were reviewed and updated as appropriate: allergies, current medications, past family history, past medical history, past social history, past surgical history and problem list.    Review of Systems   Cardiovascular:        Hyperlipidemia, hypertension   Musculoskeletal:        Osteoporosis   Neurological: Positive for seizures.   Psychiatric/Behavioral: Positive for dysphoric mood. The patient is nervous/anxious.        Objective     Vitals:    01/07/21 1523   BP: 120/78   Pulse: 75   Temp: 98 °F (36.7 °C)   SpO2: 98%       No results found for this or any previous visit (from the past 672 hour(s)).    Physical Exam  Vitals signs and nursing note reviewed.   Constitutional:       Appearance: Normal appearance. She is well-developed, well-groomed and overweight.   HENT:      Head: Normocephalic and atraumatic.   Neck:      Musculoskeletal: Neck supple.      Thyroid: No thyroid mass or thyromegaly.      Vascular:  Normal carotid pulses. No carotid bruit.      Trachea: Trachea and phonation normal.   Cardiovascular:      Rate and Rhythm: Normal rate and regular rhythm.      Heart sounds: Normal heart sounds. No murmur. No friction rub. No gallop.    Pulmonary:      Effort: Pulmonary effort is normal. No respiratory distress.      Breath sounds: Normal breath sounds. No decreased breath sounds, wheezing, rhonchi or rales.   Lymphadenopathy:      Cervical: No cervical adenopathy.   Skin:     General: Skin is warm and dry.      Findings: No rash.   Neurological:      Mental Status: She is alert and oriented to person, place, and time.   Psychiatric:         Attention and Perception: Attention and perception normal.         Mood and Affect: Mood and affect normal.         Speech: Speech normal.         Behavior: Behavior normal. Behavior is cooperative.         Thought Content: Thought content normal.         Cognition and Memory: Cognition and memory normal.         Judgment: Judgment normal.       No visits with results within 6 Week(s) from this visit.   Latest known visit with results is:   Results Encounter on 06/30/2020   Component Date Value Ref Range Status   • Total Cholesterol 08/04/2020 157  0 - 200 mg/dL Final   • Triglycerides 08/04/2020 200* 0 - 150 mg/dL Final   • HDL Cholesterol 08/04/2020 58  40 - 60 mg/dL Final   • VLDL Cholesterol 08/04/2020 40  mg/dL Final   • LDL Cholesterol  08/04/2020 59  0 - 100 mg/dL Final   • Hemoglobin A1C 08/04/2020 5.20  4.80 - 5.60 % Final    Comment: Hemoglobin A1C Ranges:  Increased Risk for Diabetes  5.7% to 6.4%  Diabetes                     >= 6.5%  Diabetic Goal                < 7.0%     • Glucose 08/04/2020 103* 65 - 99 mg/dL Final   • BUN 08/04/2020 19  8 - 23 mg/dL Final   • Creatinine 08/04/2020 1.10* 0.57 - 1.00 mg/dL Final   • eGFR Non African Am 08/04/2020 48* >60 mL/min/1.73 Final    Comment: The MDRD GFR formula is only valid for adults with stable  renal function  between ages 18 and 70.     • eGFR  Am 08/04/2020 59* >60 mL/min/1.73 Final   • BUN/Creatinine Ratio 08/04/2020 17.3  7.0 - 25.0 Final   • Sodium 08/04/2020 136  136 - 145 mmol/L Final   • Potassium 08/04/2020 3.8  3.5 - 5.2 mmol/L Final   • Chloride 08/04/2020 97* 98 - 107 mmol/L Final   • Total CO2 08/04/2020 28.1  22.0 - 29.0 mmol/L Final   • Calcium 08/04/2020 9.9  8.6 - 10.5 mg/dL Final   • Total Protein 08/04/2020 6.6  6.0 - 8.5 g/dL Final   • Albumin 08/04/2020 4.30  3.50 - 5.20 g/dL Final   • Globulin 08/04/2020 2.3  gm/dL Final   • A/G Ratio 08/04/2020 1.9  g/dL Final   • Total Bilirubin 08/04/2020 0.4  0.0 - 1.2 mg/dL Final   • Alkaline Phosphatase 08/04/2020 87  39 - 117 U/L Final   • AST (SGOT) 08/04/2020 18  1 - 32 U/L Final   • ALT (SGPT) 08/04/2020 16  1 - 33 U/L Final   • TSH 08/04/2020 1.570  0.270 - 4.200 uIU/mL Final   • 25 Hydroxy, Vitamin D 08/04/2020 62.6  30.0 - 100.0 ng/mL Final    Comment: Results may be falsely increased if patient taking Biotin.  Reference Range for Total Vitamin D 25(OH)  Deficiency <20.0 ng/mL  Insufficiency 21-29 ng/mL  Sufficiency  ng/mL  Toxicity >100 ng/ml     • WBC 08/04/2020 8.93  3.40 - 10.80 10*3/mm3 Final   • RBC 08/04/2020 4.60  3.77 - 5.28 10*6/mm3 Final   • Hemoglobin 08/04/2020 11.8* 12.0 - 15.9 g/dL Final   • Hematocrit 08/04/2020 34.6  34.0 - 46.6 % Final   • MCV 08/04/2020 75.2* 79.0 - 97.0 fL Final   • MCH 08/04/2020 25.7* 26.6 - 33.0 pg Final   • MCHC 08/04/2020 34.1  31.5 - 35.7 g/dL Final   • RDW 08/04/2020 14.3  12.3 - 15.4 % Final   • Platelets 08/04/2020 98* 140 - 450 10*3/mm3 Final   • Neutrophil Rel % 08/04/2020 CANCELED   Final-Edited    Comment: Test not performed    Result canceled by the ancillary.     • Lymphocyte Rel % 08/04/2020 CANCELED   Final-Edited    Comment: Test not performed    Result canceled by the ancillary.     • Monocyte Rel % 08/04/2020 CANCELED   Final-Edited    Comment: Test not performed    Result canceled by  the ancillary.     • Eosinophil Rel % 08/04/2020 CANCELED   Final-Edited    Comment: Test not performed    Result canceled by the ancillary.     • Lymphocytes Absolute 08/04/2020 CANCELED   Final-Edited    Comment: Test not performed    Result canceled by the ancillary.     • Eosinophils Absolute 08/04/2020 CANCELED   Final-Edited    Comment: Test not performed    Result canceled by the ancillary.     • Basophils Absolute 08/04/2020 CANCELED   Final-Edited    Comment: Test not performed    Result canceled by the ancillary.     • Neutrophil Rel % 08/04/2020 76.8* 42.7 - 76.0 % Final   • Lymphocyte Rel % 08/04/2020 14.1* 19.6 - 45.3 % Final   • Monocyte Rel % 08/04/2020 7.1  5.0 - 12.0 % Final   • Eosinophil Rel % 08/04/2020 1.0  0.3 - 6.2 % Final   • Neutrophils Absolute 08/04/2020 6.86  1.70 - 7.00 10*3/mm3 Final   • Lymphocytes Absolute 08/04/2020 1.26  0.70 - 3.10 10*3/mm3 Final   • Monocytes Absolute 08/04/2020 0.63  0.10 - 0.90 10*3/mm3 Final   • Eosinophil Abs 08/04/2020 0.09  0.00 - 0.40 10*3/mm3 Final   • Differential Comment 08/04/2020 Comment   Final    Comment: ATYPICAL LYMPHOCYTE %        1.0              %          0.0-5.0    N  NRBC                         1.0              /100 WBC   0.0-0.2    H  WBC MORPHOLOGY               Normal                      Normal     N     • Comment 08/04/2020 Comment   Final    ANISOCYTOSIS                 Mod/2+                      None Seen  N   • Plt Comment 08/04/2020 Comment   Final    PLATELET MORPHOLOGY          Normal                      Normal     N         Assessment/Plan   Diagnoses and all orders for this visit:    1. Seizure disorder (CMS/[facility]) (Primary)  -     carBAMazepine (TEGretol) 200 MG tablet; Take 1 tablet by mouth 3 (Three) Times a Day.  Dispense: 270 tablet; Refill: 1  -     Lipid panel; Future  -     Comprehensive metabolic panel; Future  -     Hemoglobin A1c; Future  -     TSH; Future  -     Vitamin D 25 hydroxy; Future  -     CBC w AUTO  Differential; Future    2. Essential hypertension  -     metoprolol succinate XL (TOPROL-XL) 25 MG 24 hr tablet; Take 1 tablet by mouth Daily.  Dispense: 90 tablet; Refill: 1  -     Lipid panel; Future  -     Comprehensive metabolic panel; Future  -     Hemoglobin A1c; Future  -     TSH; Future  -     Vitamin D 25 hydroxy; Future  -     CBC w AUTO Differential; Future    3. Mixed hyperlipidemia  -     atorvastatin (LIPITOR) 40 MG tablet; Take 1 tablet by mouth Daily.  Dispense: 90 tablet; Refill: 1  -     Lipid panel; Future  -     Comprehensive metabolic panel; Future  -     Hemoglobin A1c; Future  -     TSH; Future  -     Vitamin D 25 hydroxy; Future  -     CBC w AUTO Differential; Future    4. Depression with anxiety  -     PARoxetine (PAXIL) 20 MG tablet; Take 1 tablet by mouth Every Morning.  Dispense: 90 tablet; Refill: 1  -     Lipid panel; Future  -     Comprehensive metabolic panel; Future  -     Hemoglobin A1c; Future  -     TSH; Future  -     Vitamin D 25 hydroxy; Future  -     CBC w AUTO Differential; Future    5. Age-related osteoporosis without current pathological fracture  -     Lipid panel; Future  -     Comprehensive metabolic panel; Future  -     Hemoglobin A1c; Future  -     TSH; Future  -     Vitamin D 25 hydroxy; Future  -     CBC w AUTO Differential; Future    6. Overactive bladder  -     Lipid panel; Future  -     Comprehensive metabolic panel; Future  -     Hemoglobin A1c; Future  -     TSH; Future  -     Vitamin D 25 hydroxy; Future  -     CBC w AUTO Differential; Future    7. Vitamin D deficiency  -     Lipid panel; Future  -     Comprehensive metabolic panel; Future  -     Hemoglobin A1c; Future  -     TSH; Future  -     Vitamin D 25 hydroxy; Future  -     CBC w AUTO Differential; Future    8. Thrombocytopenia (CMS/HCC)  -     Lipid panel; Future  -     Comprehensive metabolic panel; Future  -     Hemoglobin A1c; Future  -     TSH; Future  -     Vitamin D 25 hydroxy; Future  -     CBC w  AUTO Differential; Future    9. Prediabetes  -     Hemoglobin A1c; Future        Return in about 6 months (around 7/7/2021) for Recheck.

## 2021-02-23 ENCOUNTER — TELEPHONE (OUTPATIENT)
Dept: FAMILY MEDICINE CLINIC | Facility: CLINIC | Age: 76
End: 2021-02-23

## 2021-02-25 DIAGNOSIS — M81.0 AGE-RELATED OSTEOPOROSIS WITHOUT CURRENT PATHOLOGICAL FRACTURE: Primary | ICD-10-CM

## 2021-02-25 DIAGNOSIS — M81.0 AGE-RELATED OSTEOPOROSIS WITHOUT CURRENT PATHOLOGICAL FRACTURE: ICD-10-CM

## 2021-02-25 DIAGNOSIS — E78.2 MIXED HYPERLIPIDEMIA: ICD-10-CM

## 2021-02-25 DIAGNOSIS — E55.9 VITAMIN D DEFICIENCY: ICD-10-CM

## 2021-02-25 DIAGNOSIS — I10 ESSENTIAL HYPERTENSION: ICD-10-CM

## 2021-02-25 DIAGNOSIS — N32.81 OVERACTIVE BLADDER: ICD-10-CM

## 2021-02-25 DIAGNOSIS — F41.8 DEPRESSION WITH ANXIETY: ICD-10-CM

## 2021-02-25 DIAGNOSIS — R73.03 PREDIABETES: ICD-10-CM

## 2021-02-25 DIAGNOSIS — G40.909 SEIZURE DISORDER (HCC): ICD-10-CM

## 2021-02-25 DIAGNOSIS — D69.6 THROMBOCYTOPENIA (HCC): ICD-10-CM

## 2021-03-04 LAB
25(OH)D3+25(OH)D2 SERPL-MCNC: 66.5 NG/ML (ref 30–100)
ALBUMIN SERPL-MCNC: 3.9 G/DL (ref 3.5–5.2)
ALBUMIN/GLOB SERPL: 1.5 G/DL
ALP SERPL-CCNC: 72 U/L (ref 39–117)
ALT SERPL-CCNC: 20 U/L (ref 1–33)
AST SERPL-CCNC: 22 U/L (ref 1–32)
BASOPHILS # BLD AUTO: ABNORMAL 10*3/UL
BASOPHILS # BLD MANUAL: 0.09 10*3/MM3 (ref 0–0.2)
BASOPHILS NFR BLD MANUAL: 1.1 % (ref 0–1.5)
BILIRUB SERPL-MCNC: 0.3 MG/DL (ref 0–1.2)
BUN SERPL-MCNC: 15 MG/DL (ref 8–23)
BUN/CREAT SERPL: 17.2 (ref 7–25)
CALCIUM SERPL-MCNC: 9.6 MG/DL (ref 8.6–10.5)
CHLORIDE SERPL-SCNC: 103 MMOL/L (ref 98–107)
CHOLEST SERPL-MCNC: 163 MG/DL (ref 0–200)
CO2 SERPL-SCNC: 30 MMOL/L (ref 22–29)
CREAT SERPL-MCNC: 0.87 MG/DL (ref 0.57–1)
DIFFERENTIAL COMMENT: ABNORMAL
EOSINOPHIL # BLD AUTO: ABNORMAL 10*3/UL
EOSINOPHIL NFR BLD AUTO: ABNORMAL %
ERYTHROCYTE [DISTWIDTH] IN BLOOD BY AUTOMATED COUNT: 14.2 % (ref 12.3–15.4)
GLOBULIN SER CALC-MCNC: 2.6 GM/DL
GLUCOSE SERPL-MCNC: 101 MG/DL (ref 65–99)
HBA1C MFR BLD: 5.3 % (ref 4.8–5.6)
HCT VFR BLD AUTO: 38.9 % (ref 34–46.6)
HDLC SERPL-MCNC: 59 MG/DL (ref 40–60)
HGB BLD-MCNC: 12.6 G/DL (ref 12–15.9)
LDLC SERPL CALC-MCNC: 78 MG/DL (ref 0–100)
LYMPHOCYTES # BLD AUTO: ABNORMAL 10*3/UL
LYMPHOCYTES # BLD MANUAL: 1.45 10*3/MM3 (ref 0.7–3.1)
LYMPHOCYTES NFR BLD AUTO: ABNORMAL %
LYMPHOCYTES NFR BLD MANUAL: 17 % (ref 19.6–45.3)
MCH RBC QN AUTO: 24.7 PG (ref 26.6–33)
MCHC RBC AUTO-ENTMCNC: 32.4 G/DL (ref 31.5–35.7)
MCV RBC AUTO: 76.1 FL (ref 79–97)
MONOCYTES # BLD MANUAL: 0.09 10*3/MM3 (ref 0.1–0.9)
MONOCYTES NFR BLD AUTO: ABNORMAL %
MONOCYTES NFR BLD MANUAL: 1.1 % (ref 5–12)
NEUTROPHILS # BLD MANUAL: 6.91 10*3/MM3 (ref 1.7–7)
NEUTROPHILS NFR BLD AUTO: ABNORMAL %
NEUTROPHILS NFR BLD MANUAL: 80.9 % (ref 42.7–76)
PLATELET # BLD AUTO: 82 10*3/MM3 (ref 140–450)
PLATELET BLD QL SMEAR: ABNORMAL
POTASSIUM SERPL-SCNC: 4 MMOL/L (ref 3.5–5.2)
PROT SERPL-MCNC: 6.5 G/DL (ref 6–8.5)
RBC # BLD AUTO: 5.11 10*6/MM3 (ref 3.77–5.28)
RBC MORPH BLD: ABNORMAL
SODIUM SERPL-SCNC: 141 MMOL/L (ref 136–145)
TRIGL SERPL-MCNC: 151 MG/DL (ref 0–150)
TSH SERPL DL<=0.005 MIU/L-ACNC: 1.64 UIU/ML (ref 0.27–4.2)
VLDLC SERPL CALC-MCNC: 26 MG/DL (ref 5–40)
WBC # BLD AUTO: 8.54 10*3/MM3 (ref 3.4–10.8)

## 2021-03-10 ENCOUNTER — OFFICE VISIT (OUTPATIENT)
Dept: FAMILY MEDICINE CLINIC | Facility: CLINIC | Age: 76
End: 2021-03-10

## 2021-03-10 VITALS
BODY MASS INDEX: 28.85 KG/M2 | OXYGEN SATURATION: 97 % | HEIGHT: 64 IN | DIASTOLIC BLOOD PRESSURE: 82 MMHG | WEIGHT: 169 LBS | HEART RATE: 72 BPM | SYSTOLIC BLOOD PRESSURE: 138 MMHG | TEMPERATURE: 97.7 F

## 2021-03-10 DIAGNOSIS — D69.6 THROMBOCYTOPENIA (HCC): ICD-10-CM

## 2021-03-10 DIAGNOSIS — I10 ESSENTIAL HYPERTENSION: ICD-10-CM

## 2021-03-10 DIAGNOSIS — R73.02 IMPAIRED GLUCOSE TOLERANCE: ICD-10-CM

## 2021-03-10 DIAGNOSIS — Z00.01 ENCOUNTER FOR WELL ADULT EXAM WITH ABNORMAL FINDINGS: Primary | ICD-10-CM

## 2021-03-10 DIAGNOSIS — E78.2 MIXED HYPERLIPIDEMIA: ICD-10-CM

## 2021-03-10 DIAGNOSIS — G40.909 SEIZURE DISORDER (HCC): ICD-10-CM

## 2021-03-10 DIAGNOSIS — E55.9 VITAMIN D DEFICIENCY: ICD-10-CM

## 2021-03-10 DIAGNOSIS — F41.8 DEPRESSION WITH ANXIETY: ICD-10-CM

## 2021-03-10 PROCEDURE — 96160 PT-FOCUSED HLTH RISK ASSMT: CPT | Performed by: FAMILY MEDICINE

## 2021-03-10 PROCEDURE — G0439 PPPS, SUBSEQ VISIT: HCPCS | Performed by: FAMILY MEDICINE

## 2021-03-10 PROCEDURE — 99213 OFFICE O/P EST LOW 20 MIN: CPT | Performed by: FAMILY MEDICINE

## 2021-03-10 PROCEDURE — 1159F MED LIST DOCD IN RCRD: CPT | Performed by: FAMILY MEDICINE

## 2021-03-10 PROCEDURE — 1170F FXNL STATUS ASSESSED: CPT | Performed by: FAMILY MEDICINE

## 2021-03-10 NOTE — PROGRESS NOTES
The ABCs of the Annual Wellness Visit  Subsequent Medicare Wellness Visit    Chief Complaint   Patient presents with   • Medicare Wellness-subsequent       Subjective   History of Present Illness:  Shyann Davis is a 75 y.o. female who presents for a Subsequent Medicare Wellness Visit.  75-year-old white female here for subsequent Medicare wellness visit as well as further medical management of hyperlipidemia, hypertension, thrombocytopenia as well as impaired glucose tolerance.  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  She has a long term seizure disorder that has been well managed by neurology.  There is a history of overactive bladder, renal insufficiency as well as depression with anxiety features.  She is currently using Prolia for age-related osteoporosis.  Medications include atorvastatin, Tegretol, vitamin D3 as well as Toprol-XL, Myrbetriq and paroxetine.  Prolia is used as mentioned above.  She is also using over-the-counter vitamins and supplements.  Fasting labs have been acquired prior to this visit.  Her life is rather sedentary living with her elderly .  With the pandemic she is for the most part staying home and only going to the grocery store as little as possible.    HEALTH RISK ASSESSMENT    Recent Hospitalizations: NO      Current Medical Providers:  Patient Care Team:  Reese Underwood DO as PCP - General (Family Medicine)    Smoking Status:  Social History     Tobacco Use   Smoking Status Never Smoker   Smokeless Tobacco Never Used       Alcohol Consumption:  Social History     Substance and Sexual Activity   Alcohol Use No       Depression Screen:   PHQ-2/PHQ-9 Depression Screening 3/10/2021   Little interest or pleasure in doing things 0   Feeling down, depressed, or hopeless 0   Trouble falling or staying asleep, or sleeping too much 0   Feeling tired or having little energy 0   Poor appetite or overeating 0   Feeling bad about yourself - or that you  are a failure or have let yourself or your family down 0   Trouble concentrating on things, such as reading the newspaper or watching television 0   Moving or speaking so slowly that other people could have noticed. Or the opposite - being so fidgety or restless that you have been moving around a lot more than usual 0   Thoughts that you would be better off dead, or of hurting yourself in some way 0   Total Score 0       Fall Risk Screen:  STEFFANY Fall Risk Assessment was completed, and patient is at LOW risk for falls.Assessment completed on:3/10/2021    Health Habits and Functional and Cognitive Screening:  Functional & Cognitive Status 3/10/2021   Do you have difficulty preparing food and eating? No   Do you have difficulty bathing yourself, getting dressed or grooming yourself? No   Do you have difficulty using the toilet? No   Do you have difficulty moving around from place to place? No   Do you have trouble with steps or getting out of a bed or a chair? Yes   Current Diet Well Balanced Diet   Dental Exam Up to date   Eye Exam Up to date   Exercise (times per week) 0 times per week   Current Exercise Activities Include -   Do you need help using the phone?  No   Are you deaf or do you have serious difficulty hearing?  No   Do you need help with transportation? Yes   Do you need help shopping? No   Do you need help preparing meals?  No   Do you need help with housework?  No   Do you need help with laundry? No   Do you need help taking your medications? No   Do you need help managing money? No   Do you ever drive or ride in a car without wearing a seat belt? No   Have you felt unusual stress, anger or loneliness in the last month? No   Who do you live with? Spouse   If you need help, do you have trouble finding someone available to you? No   Have you been bothered in the last four weeks by sexual problems? -   Do you have difficulty concentrating, remembering or making decisions? Yes         Does the patient have  evidence of cognitive impairment? No    Asprin use counseling:Does not need ASA (and currently is not on it)    Age-appropriate Screening Schedule:  Refer to the list below for future screening recommendations based on patient's age, sex and/or medical conditions. Orders for these recommended tests are listed in the plan section. The patient has been provided with a written plan.    Health Maintenance   Topic Date Due   • ZOSTER VACCINE (1 of 2) Never done   • DXA SCAN  12/29/2019   • MAMMOGRAM  12/11/2020   • LIPID PANEL  03/03/2022   • TDAP/TD VACCINES (2 - Td) 10/01/2022   • INFLUENZA VACCINE  Completed   • COLONOSCOPY  Discontinued          The following portions of the patient's history were reviewed and updated as appropriate: problem list.    Outpatient Medications Prior to Visit   Medication Sig Dispense Refill   • atorvastatin (LIPITOR) 40 MG tablet Take 1 tablet by mouth Daily. 90 tablet 1   • Calcium 600-200 MG-UNIT per tablet Take 1 tablet by mouth 2 (Two) Times a Day.     • carBAMazepine (TEGretol) 200 MG tablet Take 1 tablet by mouth 3 (Three) Times a Day. 270 tablet 1   • Cholecalciferol (VITAMIN D3) 14769 UNITS capsule Take 1 capsule by mouth Every 7 (Seven) Days. 12 capsule 1   • metoprolol succinate XL (TOPROL-XL) 25 MG 24 hr tablet Take 1 tablet by mouth Daily. 90 tablet 1   • Mirabegron ER (Myrbetriq) 25 MG tablet sustained-release 24 hour 24 hr tablet Take 25 mg by mouth Daily.     • MULTIPLE VITAMINS ESSENTIAL PO Take  by mouth.     • PARoxetine (PAXIL) 20 MG tablet Take 1 tablet by mouth Every Morning. 90 tablet 1   • PROLIA 60 MG/ML solution syringe FOR  BY YOUR PHYSICIAN TO INJECT 60MG (1ML) SUBCUTANEOUSLY EVERY 6 MONTHS 1 syringe 0     No facility-administered medications prior to visit.       Patient Active Problem List   Diagnosis   • Impaired glucose tolerance   • Mixed hyperlipidemia   • Essential hypertension   • Seizure disorder (CMS/HCC)   • Vitamin D deficiency  "  • Arteriovenous malformation of gastrointestinal tract   • Exogenous obesity   • Depression with anxiety   • Thrombocytopenia (CMS/HCC)   • Renal insufficiency   • Overactive bladder   • Age-related osteoporosis without current pathological fracture       Advanced Care Planning:  ACP discussion was held with the patient during this visit. Patient does not have an advance directive, information provided.    Review of Systems   Cardiovascular:        Hypertension, hyperlipidemia   Genitourinary:        Overactive bladder   Musculoskeletal:        Osteoporosis   Neurological: Positive for seizures.   Psychiatric/Behavioral: Positive for dysphoric mood. The patient is nervous/anxious.        Compared to one year ago, the patient feels her physical health is the same.  Compared to one year ago, the patient feels her mental health is the same.    Reviewed chart for potential of high risk medication in the elderly: not applicable  Reviewed chart for potential of harmful drug interactions in the elderly:not applicable    Objective         Vitals:    03/10/21 1500   BP: 138/82   BP Location: Left arm   Patient Position: Sitting   Pulse: 72   Temp: 97.7 °F (36.5 °C)   SpO2: 97%   Weight: 76.7 kg (169 lb)   Height: 162.6 cm (64\")       Body mass index is 29.01 kg/m².  Discussed the patient's BMI with her. The BMI is above average; no BMI management plan is appropriate..    Physical Exam  Vitals and nursing note reviewed.   Constitutional:       Appearance: Normal appearance. She is well-developed, well-groomed and overweight.   HENT:      Head: Normocephalic and atraumatic.   Neck:      Thyroid: No thyroid mass or thyromegaly.      Vascular: Normal carotid pulses. No carotid bruit.      Trachea: Trachea and phonation normal.   Cardiovascular:      Rate and Rhythm: Normal rate and regular rhythm.      Heart sounds: Normal heart sounds. No murmur. No friction rub. No gallop.    Pulmonary:      Effort: Pulmonary effort is " normal. No respiratory distress.      Breath sounds: Normal breath sounds. No decreased breath sounds, wheezing, rhonchi or rales.   Musculoskeletal:      Cervical back: Neck supple.   Lymphadenopathy:      Cervical: No cervical adenopathy.   Skin:     General: Skin is warm and dry.      Findings: No rash.   Neurological:      Mental Status: She is alert and oriented to person, place, and time.   Psychiatric:         Attention and Perception: Attention and perception normal.         Mood and Affect: Mood and affect normal.         Speech: Speech normal.         Behavior: Behavior normal. Behavior is cooperative.         Thought Content: Thought content normal.         Cognition and Memory: Cognition and memory normal.         Judgment: Judgment normal.       Orders Only on 02/25/2021   Component Date Value Ref Range Status   • WBC 03/03/2021 8.54  3.40 - 10.80 10*3/mm3 Final   • RBC 03/03/2021 5.11  3.77 - 5.28 10*6/mm3 Final   • Hemoglobin 03/03/2021 12.6  12.0 - 15.9 g/dL Final   • Hematocrit 03/03/2021 38.9  34.0 - 46.6 % Final   • MCV 03/03/2021 76.1* 79.0 - 97.0 fL Final   • MCH 03/03/2021 24.7* 26.6 - 33.0 pg Final   • MCHC 03/03/2021 32.4  31.5 - 35.7 g/dL Final   • RDW 03/03/2021 14.2  12.3 - 15.4 % Final   • Platelets 03/03/2021 82* 140 - 450 10*3/mm3 Final   • Neutrophil Rel % 03/03/2021 CANCELED   Final-Edited    Comment: Test not performed    Result canceled by the ancillary.     • Lymphocyte Rel % 03/03/2021 CANCELED   Final-Edited    Comment: Test not performed    Result canceled by the ancillary.     • Monocyte Rel % 03/03/2021 CANCELED   Final-Edited    Comment: Test not performed    Result canceled by the ancillary.     • Eosinophil Rel % 03/03/2021 CANCELED   Final-Edited    Comment: Test not performed    Result canceled by the ancillary.     • Lymphocytes Absolute 03/03/2021 CANCELED   Final-Edited    Comment: Test not performed    Result canceled by the ancillary.     • Eosinophils Absolute  03/03/2021 CANCELED   Final-Edited    Comment: Test not performed    Result canceled by the ancillary.     • Basophils Absolute 03/03/2021 CANCELED   Final-Edited    Comment: Test not performed    Result canceled by the ancillary.     • 25 Hydroxy, Vitamin D 03/03/2021 66.5  30.0 - 100.0 ng/mL Final    Comment: Results may be falsely increased if patient taking Biotin.  Reference Range for Total Vitamin D 25(OH)  Deficiency <20.0 ng/mL  Insufficiency 21-29 ng/mL  Sufficiency  ng/mL  Toxicity >100 ng/ml     • TSH 03/03/2021 1.640  0.270 - 4.200 uIU/mL Final   • Hemoglobin A1C 03/03/2021 5.30  4.80 - 5.60 % Final    Comment: Hemoglobin A1C Ranges:  Increased Risk for Diabetes  5.7% to 6.4%  Diabetes                     >= 6.5%  Diabetic Goal                < 7.0%     • Glucose 03/03/2021 101* 65 - 99 mg/dL Final   • BUN 03/03/2021 15  8 - 23 mg/dL Final   • Creatinine 03/03/2021 0.87  0.57 - 1.00 mg/dL Final   • eGFR Non  Am 03/03/2021 63  >60 mL/min/1.73 Final    Comment: The MDRD GFR formula is only valid for adults with stable  renal function between ages 18 and 70.     • eGFR  Am 03/03/2021 77  >60 mL/min/1.73 Final   • BUN/Creatinine Ratio 03/03/2021 17.2  7.0 - 25.0 Final   • Sodium 03/03/2021 141  136 - 145 mmol/L Final   • Potassium 03/03/2021 4.0  3.5 - 5.2 mmol/L Final   • Chloride 03/03/2021 103  98 - 107 mmol/L Final   • Total CO2 03/03/2021 30.0* 22.0 - 29.0 mmol/L Final   • Calcium 03/03/2021 9.6  8.6 - 10.5 mg/dL Final   • Total Protein 03/03/2021 6.5  6.0 - 8.5 g/dL Final   • Albumin 03/03/2021 3.90  3.50 - 5.20 g/dL Final   • Globulin 03/03/2021 2.6  gm/dL Final   • A/G Ratio 03/03/2021 1.5  g/dL Final   • Total Bilirubin 03/03/2021 0.3  0.0 - 1.2 mg/dL Final   • Alkaline Phosphatase 03/03/2021 72  39 - 117 U/L Final   • AST (SGOT) 03/03/2021 22  1 - 32 U/L Final   • ALT (SGPT) 03/03/2021 20  1 - 33 U/L Final   • Total Cholesterol 03/03/2021 163  0 - 200 mg/dL Final    Comment:  Cholesterol Reference Ranges  (U.S. Department of Health and Human Services ATP III  Classifications)  Desirable          <200 mg/dL  Borderline High    200-239 mg/dL  High Risk          >240 mg/dL  Triglyceride Reference Ranges  (U.S. Department of Health and Human Services ATP III  Classifications)  Normal           <150 mg/dL  Borderline High  150-199 mg/dL  High             200-499 mg/dL  Very High        >500 mg/dL  HDL Reference Ranges  (U.S. Department of Health and Human Services ATP III  Classifcations)  Low     <40 mg/dl (major risk factor for CHD)  High    >60 mg/dl ('negative' risk factor for CHD)  LDL Reference Ranges  (U.S. Department of Health and Human Services ATP III  Classifcations)  Optimal          <100 mg/dL  Near Optimal     100-129 mg/dL  Borderline High  130-159 mg/dL  High             160-189 mg/dL  Very High        >189 mg/dL     • Triglycerides 03/03/2021 151* 0 - 150 mg/dL Final   • HDL Cholesterol 03/03/2021 59  40 - 60 mg/dL Final   • VLDL Cholesterol Anders 03/03/2021 26  5 - 40 mg/dL Final   • LDL Chol Calc ([facility]) 03/03/2021 78  0 - 100 mg/dL Final   • Neutrophil Rel % 03/03/2021 80.9* 42.7 - 76.0 % Final   • Lymphocyte Rel % 03/03/2021 17.0* 19.6 - 45.3 % Final   • Monocyte Rel % 03/03/2021 1.1* 5.0 - 12.0 % Final   • Basophil Rel % 03/03/2021 1.1  0.0 - 1.5 % Final   • Neutrophils Absolute 03/03/2021 6.91  1.70 - 7.00 10*3/mm3 Final   • Lymphocytes Absolute 03/03/2021 1.45  0.70 - 3.10 10*3/mm3 Final   • Monocytes Absolute 03/03/2021 0.09* 0.10 - 0.90 10*3/mm3 Final   • Basophils Absolute 03/03/2021 0.09  0.00 - 0.20 10*3/mm3 Final   • Differential Comment 03/03/2021 Comment   Final    WBC MORPHOLOGY               Normal                      Normal     N   • Comment 03/03/2021 Comment   Final    Comment: ANISOCYTOSIS                 Slight/1+                   None Seen  N  MICROCYTES                   Mod/2+                      None Seen  N     • Plt Comment 03/03/2021 Comment    Final    PLATELET MORPHOLOGY          Normal                      Normal     N         Lab Results   Component Value Date     (H) 03/03/2021    CHLPL 163 03/03/2021    TRIG 151 (H) 03/03/2021    HDL 59 03/03/2021    LDL 78 03/03/2021    VLDL 26 03/03/2021    HGBA1C 5.30 03/03/2021        Assessment/Plan   Medicare Risks and Personalized Health Plan  CMS Preventative Services Quick Reference  Advance Directive Discussion  Cardiovascular risk  Dementia/Memory   Depression/Dysphoria  Diabetic Lab Screening   Fall Risk  Hearing Problem  Immunizations Discussed/Encouraged (specific immunizations; Influenza, Pneumococcal 23 and Shingrix )  Inactivity/Sedentary  Obesity/Overweight   Osteoprorosis Risk  Polypharmacy    The above risks/problems have been discussed with the patient.  Pertinent information has been shared with the patient in the After Visit Summary.  Follow up plans and orders are seen below in the Assessment/Plan Section.    Diagnoses and all orders for this visit:    1. Encounter for well adult exam with abnormal findings (Primary)  -     Hemoglobin A1c; Future  -     Comprehensive metabolic panel; Future  -     TSH; Future  -     Vitamin D 25 hydroxy; Future  -     Lipid panel; Future  -     CBC w AUTO Differential; Future    2. Essential hypertension  -     Hemoglobin A1c; Future  -     Comprehensive metabolic panel; Future  -     TSH; Future  -     Vitamin D 25 hydroxy; Future  -     Lipid panel; Future  -     CBC w AUTO Differential; Future    3. Mixed hyperlipidemia  -     Hemoglobin A1c; Future  -     Comprehensive metabolic panel; Future  -     TSH; Future  -     Vitamin D 25 hydroxy; Future  -     Lipid panel; Future  -     CBC w AUTO Differential; Future    4. Thrombocytopenia (CMS/[facility])  -     Ambulatory Referral to Hematology  -     Hemoglobin A1c; Future  -     Comprehensive metabolic panel; Future  -     TSH; Future  -     Vitamin D 25 hydroxy; Future  -     Lipid panel; Future  -     CBC  w AUTO Differential; Future    5. Impaired glucose tolerance  -     Hemoglobin A1c; Future  -     Comprehensive metabolic panel; Future  -     TSH; Future  -     Vitamin D 25 hydroxy; Future  -     Lipid panel; Future  -     CBC w AUTO Differential; Future    6. Vitamin D deficiency  -     Hemoglobin A1c; Future  -     Comprehensive metabolic panel; Future  -     TSH; Future  -     Vitamin D 25 hydroxy; Future  -     Lipid panel; Future  -     CBC w AUTO Differential; Future    7. Seizure disorder (CMS/HCC)  -     Hemoglobin A1c; Future  -     Comprehensive metabolic panel; Future  -     TSH; Future  -     Vitamin D 25 hydroxy; Future  -     Lipid panel; Future  -     CBC w AUTO Differential; Future    8. Depression with anxiety  -     Hemoglobin A1c; Future  -     Comprehensive metabolic panel; Future  -     TSH; Future  -     Vitamin D 25 hydroxy; Future  -     Lipid panel; Future  -     CBC w AUTO Differential; Future      Follow Up:  Return in about 6 months (around 9/10/2021) for Recheck.     An After Visit Summary and PPPS were given to the patient.

## 2021-03-19 ENCOUNTER — CONSULT (OUTPATIENT)
Dept: ONCOLOGY | Facility: CLINIC | Age: 76
End: 2021-03-19

## 2021-03-19 ENCOUNTER — APPOINTMENT (OUTPATIENT)
Dept: OTHER | Facility: HOSPITAL | Age: 76
End: 2021-03-19

## 2021-03-19 VITALS
RESPIRATION RATE: 16 BRPM | WEIGHT: 168 LBS | HEIGHT: 63 IN | OXYGEN SATURATION: 97 % | BODY MASS INDEX: 29.77 KG/M2 | HEART RATE: 64 BPM | TEMPERATURE: 97.3 F | DIASTOLIC BLOOD PRESSURE: 80 MMHG | SYSTOLIC BLOOD PRESSURE: 129 MMHG

## 2021-03-19 DIAGNOSIS — D69.6 THROMBOCYTOPENIA (HCC): Primary | ICD-10-CM

## 2021-03-19 LAB
BASOPHILS # BLD AUTO: 0.01 10*3/MM3 (ref 0–0.2)
BASOPHILS NFR BLD AUTO: 0.1 % (ref 0–1.5)
DEPRECATED RDW RBC AUTO: 40.5 FL (ref 37–54)
EOSINOPHIL # BLD AUTO: 0.07 10*3/MM3 (ref 0–0.4)
EOSINOPHIL NFR BLD AUTO: 0.9 % (ref 0.3–6.2)
ERYTHROCYTE [DISTWIDTH] IN BLOOD BY AUTOMATED COUNT: 14.6 % (ref 12.3–15.4)
FERRITIN SERPL-MCNC: 46.7 NG/ML (ref 13–150)
FOLATE SERPL-MCNC: >20 NG/ML (ref 4.78–24.2)
HCT VFR BLD AUTO: 37.5 % (ref 34–46.6)
HGB BLD-MCNC: 11.6 G/DL (ref 12–15.9)
IMM GRANULOCYTES # BLD AUTO: 0.06 10*3/MM3 (ref 0–0.05)
IMM GRANULOCYTES NFR BLD AUTO: 0.7 % (ref 0–0.5)
IRON 24H UR-MRATE: 91 MCG/DL (ref 37–145)
IRON SATN MFR SERPL: 24 % (ref 20–50)
LYMPHOCYTES # BLD AUTO: 1.99 10*3/MM3 (ref 0.7–3.1)
LYMPHOCYTES NFR BLD AUTO: 24.7 % (ref 19.6–45.3)
MCH RBC QN AUTO: 23.9 PG (ref 26.6–33)
MCHC RBC AUTO-ENTMCNC: 30.9 G/DL (ref 31.5–35.7)
MCV RBC AUTO: 77.3 FL (ref 79–97)
MONOCYTES # BLD AUTO: 0.45 10*3/MM3 (ref 0.1–0.9)
MONOCYTES NFR BLD AUTO: 5.6 % (ref 5–12)
NEUTROPHILS NFR BLD AUTO: 5.49 10*3/MM3 (ref 1.7–7)
NEUTROPHILS NFR BLD AUTO: 68 % (ref 42.7–76)
NRBC BLD AUTO-RTO: 0 /100 WBC (ref 0–0.2)
PLATELET # BLD AUTO: 79 10*3/MM3 (ref 140–450)
PLATELET # BLD AUTO: 79 10*3/MM3 (ref 140–450)
PLATELETS.RETICULATED NFR BLD AUTO: 31.4 % (ref 0.9–6.5)
RBC # BLD AUTO: 4.85 10*6/MM3 (ref 3.77–5.28)
TIBC SERPL-MCNC: 374 MCG/DL (ref 298–536)
TRANSFERRIN SERPL-MCNC: 251 MG/DL (ref 200–360)
VIT B12 BLD-MCNC: 999 PG/ML (ref 211–946)
WBC # BLD AUTO: 8.07 10*3/MM3 (ref 3.4–10.8)

## 2021-03-19 PROCEDURE — 82607 VITAMIN B-12: CPT | Performed by: INTERNAL MEDICINE

## 2021-03-19 PROCEDURE — 82746 ASSAY OF FOLIC ACID SERUM: CPT | Performed by: INTERNAL MEDICINE

## 2021-03-19 PROCEDURE — 85055 RETICULATED PLATELET ASSAY: CPT | Performed by: INTERNAL MEDICINE

## 2021-03-19 PROCEDURE — 83540 ASSAY OF IRON: CPT | Performed by: INTERNAL MEDICINE

## 2021-03-19 PROCEDURE — 99204 OFFICE O/P NEW MOD 45 MIN: CPT | Performed by: INTERNAL MEDICINE

## 2021-03-19 PROCEDURE — 85025 COMPLETE CBC W/AUTO DIFF WBC: CPT | Performed by: INTERNAL MEDICINE

## 2021-03-19 PROCEDURE — 84466 ASSAY OF TRANSFERRIN: CPT | Performed by: INTERNAL MEDICINE

## 2021-03-19 PROCEDURE — 82728 ASSAY OF FERRITIN: CPT | Performed by: INTERNAL MEDICINE

## 2021-03-19 PROCEDURE — 36415 COLL VENOUS BLD VENIPUNCTURE: CPT | Performed by: INTERNAL MEDICINE

## 2021-03-19 NOTE — PROGRESS NOTES
Subjective     REASON FOR CONSULTATION: Mild chronic thrombocytopenia  Provide an opinion on any further workup or treatment                             REQUESTING PHYSICIAN: Reese Underwood DO    RECORDS OBTAINED:  Records of the patients history including those obtained from the referring provider were reviewed and summarized in detail.    HISTORY OF PRESENT ILLNESS:  The patient is a 75 y.o. year old female who is here for an opinion about the above issue.  She is referred to us from her primary care office for evaluation of mild chronic thrombocytopenia.  Her platelet count has been slightly low for at least 2 years with a platelet count of 134,000 in June 2019.  It has drifted down to 82,000 on her most recent labs from 3/3/2021.  Her hemoglobin and white cells have been normal.    She has remote history of breast cancer treated with lumpectomy and radiation in 2002.    She had a negative Cologuard July 2020    History of Present Illness     Past Medical History:   Diagnosis Date   • Age-related osteoporosis without current pathological fracture 2/8/2019   • Anxiety    • Breast cancer (CMS/HCC)     Right   • Depression    • History of thrombocytopenia    • Hyperlipidemia    • Hypertension    • Osteoporosis    • Seizure (CMS/HCC)     after brain surgery only        Past Surgical History:   Procedure Laterality Date   • APPENDECTOMY  2007   • BRAIN AVM REPAIR  1992   • BREAST LUMPECTOMY Right 2002   • BREAST SURGERY     • HYSTERECTOMY  1976        Current Outpatient Medications on File Prior to Visit   Medication Sig Dispense Refill   • atorvastatin (LIPITOR) 40 MG tablet Take 1 tablet by mouth Daily. 90 tablet 1   • Calcium 600-200 MG-UNIT per tablet Take 1 tablet by mouth 2 (Two) Times a Day.     • carBAMazepine (TEGretol) 200 MG tablet Take 1 tablet by mouth 3 (Three) Times a Day. 270 tablet 1   • Cholecalciferol (VITAMIN D3) 84746 UNITS capsule Take 1 capsule by mouth Every 7 (Seven) Days. 12 capsule 1   •  metoprolol succinate XL (TOPROL-XL) 25 MG 24 hr tablet Take 1 tablet by mouth Daily. 90 tablet 1   • Mirabegron ER (Myrbetriq) 25 MG tablet sustained-release 24 hour 24 hr tablet Take 25 mg by mouth Daily.     • MULTIPLE VITAMINS ESSENTIAL PO Take  by mouth.     • PARoxetine (PAXIL) 20 MG tablet Take 1 tablet by mouth Every Morning. 90 tablet 1   • PROLIA 60 MG/ML solution syringe FOR  BY YOUR PHYSICIAN TO INJECT 60MG (1ML) SUBCUTANEOUSLY EVERY 6 MONTHS 1 syringe 0     No current facility-administered medications on file prior to visit.        ALLERGIES:    Allergies   Allergen Reactions   • Cephalexin Myalgia     Migraine   • Sulfa Antibiotics Myalgia     Migraine   • Erythromycin GI Intolerance   • Neomycin-Bacitracin Zn-Polymyx Rash        Social History     Socioeconomic History   • Marital status:      Spouse name: Thanh   • Number of children: Not on file   • Years of education: Not on file   • Highest education level: Not on file   Tobacco Use   • Smoking status: Never Smoker   • Smokeless tobacco: Never Used   Substance and Sexual Activity   • Alcohol use: No   • Drug use: No   • Sexual activity: Defer        No family history on file.     Review of Systems   Constitutional: Negative for activity change, chills, fatigue and fever.   HENT: Negative for mouth sores, trouble swallowing and voice change.    Eyes: Negative for pain and visual disturbance.   Respiratory: Negative for cough, shortness of breath and wheezing.    Cardiovascular: Negative for chest pain and palpitations.   Gastrointestinal: Negative for abdominal pain, constipation, diarrhea, nausea and vomiting.   Genitourinary: Negative for difficulty urinating, frequency and urgency.        Stress incontinence   Musculoskeletal: Negative for arthralgias and joint swelling.   Skin: Negative for rash.   Neurological: Negative for dizziness, seizures, weakness and headaches.   Hematological: Negative for adenopathy. Does not  "bruise/bleed easily.   Psychiatric/Behavioral: Negative for behavioral problems and confusion. The patient is not nervous/anxious.         Memory deficit        Objective     Vitals:    03/19/21 1026   BP: 129/80   Pulse: 64   Resp: 16   Temp: 97.3 °F (36.3 °C)   TempSrc: Skin   SpO2: 97%   Weight: 76.2 kg (168 lb)  Comment: new weight   Height: 161.2 cm (63.47\")  Comment: new height   PainSc: 0-No pain     No flowsheet data found.    Physical Exam  Constitutional:       General: She is not in acute distress.     Appearance: She is well-developed.   HENT:      Head: Normocephalic.   Eyes:      General: No scleral icterus.     Conjunctiva/sclera: Conjunctivae normal.      Pupils: Pupils are equal, round, and reactive to light.   Neck:      Thyroid: No thyromegaly.      Vascular: No JVD.   Cardiovascular:      Rate and Rhythm: Normal rate and regular rhythm.      Heart sounds: No murmur heard.   No friction rub. No gallop.    Pulmonary:      Effort: Pulmonary effort is normal.      Breath sounds: Normal breath sounds. No wheezing or rales.   Abdominal:      General: There is no distension.      Palpations: Abdomen is soft. There is no mass.      Tenderness: There is no abdominal tenderness.   Musculoskeletal:         General: No deformity. Normal range of motion.      Cervical back: Normal range of motion and neck supple.      Comments: Kyphosis   Lymphadenopathy:      Cervical: No cervical adenopathy.   Skin:     General: Skin is warm and dry.      Findings: No erythema or rash.   Neurological:      Mental Status: She is alert and oriented to person, place, and time.      Cranial Nerves: No cranial nerve deficit.      Deep Tendon Reflexes: Reflexes are normal and symmetric.   Psychiatric:         Behavior: Behavior normal.         Judgment: Judgment normal.           RECENT LABS:  Hematology WBC   Date Value Ref Range Status   03/19/2021 8.07 3.40 - 10.80 10*3/mm3 Final   03/03/2021 8.54 3.40 - 10.80 10*3/mm3 Final "     RBC   Date Value Ref Range Status   03/19/2021 4.85 3.77 - 5.28 10*6/mm3 Final   03/03/2021 5.11 3.77 - 5.28 10*6/mm3 Final     Hemoglobin   Date Value Ref Range Status   03/19/2021 11.6 (L) 12.0 - 15.9 g/dL Final     Hematocrit   Date Value Ref Range Status   03/19/2021 37.5 34.0 - 46.6 % Final     Platelets   Date Value Ref Range Status   03/19/2021 79 (L) 140 - 450 10*3/mm3 Final   03/19/2021 79 (L) 140 - 450 10*3/mm3 Final        IPF   0.90 - 6.50 % 31.40High     Platelets   140 - 450 10*3/mm3 79Low           Assessment/Plan   1.  Mild chronic thrombocytopenia with a platelet count that has been drifting down slightly over the last 2 years.  We suspect this is due to mild chronic ITP.  2.  Remote history of breast cancer.  She is past due for her screening mammogram and we [facility]  3.  Negative colon cancer screening with Cologuard July 2020  4.  Microcytic anemia    Recommendations  1.  I reassured the patient that I believe this is likely a benign situation most likely related to chronic mild ITP.  Her platelet count has been chronically low but not in a dangerous range and she is not experiencing any abnormal bleeding or bruising.  2.  Additional labs were ordered from the office today including B12 and folate, immature platelet fraction, and an iron panel and ferritin.  3.  We will continue to observe her platelet count for now with plans to check back with an MD follow-up and repeat CBC in 1 month.  4.  We did briefly discuss that if her platelet count continues to drop and gets below 50,000 we might consider treating her with steroids or Rituxan.  5.  We provided the patient with printed information regarding ITP from up-to-date.

## 2021-04-16 ENCOUNTER — OFFICE VISIT (OUTPATIENT)
Dept: ONCOLOGY | Facility: CLINIC | Age: 76
End: 2021-04-16

## 2021-04-16 ENCOUNTER — LAB (OUTPATIENT)
Dept: OTHER | Facility: HOSPITAL | Age: 76
End: 2021-04-16

## 2021-04-16 VITALS
TEMPERATURE: 97.1 F | DIASTOLIC BLOOD PRESSURE: 87 MMHG | SYSTOLIC BLOOD PRESSURE: 153 MMHG | BODY MASS INDEX: 29.73 KG/M2 | WEIGHT: 167.8 LBS | RESPIRATION RATE: 16 BRPM | OXYGEN SATURATION: 97 % | HEART RATE: 69 BPM | HEIGHT: 63 IN

## 2021-04-16 DIAGNOSIS — D69.6 THROMBOCYTOPENIA (HCC): ICD-10-CM

## 2021-04-16 DIAGNOSIS — D69.6 THROMBOCYTOPENIA (HCC): Primary | ICD-10-CM

## 2021-04-16 DIAGNOSIS — D69.3 CHRONIC ITP (IDIOPATHIC THROMBOCYTOPENIC PURPURA) (HCC): ICD-10-CM

## 2021-04-16 LAB
ANISOCYTOSIS BLD QL: NORMAL
BASOPHILS # BLD AUTO: 0.02 10*3/MM3 (ref 0–0.2)
BASOPHILS NFR BLD AUTO: 0.3 % (ref 0–1.5)
DEPRECATED RDW RBC AUTO: 38.6 FL (ref 37–54)
EOSINOPHIL # BLD AUTO: 0.09 10*3/MM3 (ref 0–0.4)
EOSINOPHIL NFR BLD AUTO: 1.1 % (ref 0.3–6.2)
ERYTHROCYTE [DISTWIDTH] IN BLOOD BY AUTOMATED COUNT: 14.4 % (ref 12.3–15.4)
GIANT PLATELETS: NORMAL
HCT VFR BLD AUTO: 37.9 % (ref 34–46.6)
HGB BLD-MCNC: 12.3 G/DL (ref 12–15.9)
IMM GRANULOCYTES # BLD AUTO: 0.07 10*3/MM3 (ref 0–0.05)
IMM GRANULOCYTES NFR BLD AUTO: 0.9 % (ref 0–0.5)
LYMPHOCYTES # BLD AUTO: 1.79 10*3/MM3 (ref 0.7–3.1)
LYMPHOCYTES NFR BLD AUTO: 22.6 % (ref 19.6–45.3)
MCH RBC QN AUTO: 24.2 PG (ref 26.6–33)
MCHC RBC AUTO-ENTMCNC: 32.5 G/DL (ref 31.5–35.7)
MCV RBC AUTO: 74.6 FL (ref 79–97)
MICROCYTES BLD QL: NORMAL
MONOCYTES # BLD AUTO: 0.42 10*3/MM3 (ref 0.1–0.9)
MONOCYTES NFR BLD AUTO: 5.3 % (ref 5–12)
NEUTROPHILS NFR BLD AUTO: 5.54 10*3/MM3 (ref 1.7–7)
NEUTROPHILS NFR BLD AUTO: 69.8 % (ref 42.7–76)
NRBC BLD AUTO-RTO: 0 /100 WBC (ref 0–0.2)
PLATELET # BLD AUTO: 94 10*3/MM3 (ref 140–450)
PLATELET # BLD AUTO: 94 10*3/MM3 (ref 140–450)
PLATELETS.RETICULATED NFR BLD AUTO: 26.4 % (ref 0.9–6.5)
POIKILOCYTOSIS BLD QL SMEAR: NORMAL
RBC # BLD AUTO: 5.08 10*6/MM3 (ref 3.77–5.28)
WBC # BLD AUTO: 7.93 10*3/MM3 (ref 3.4–10.8)
WBC MORPH BLD: NORMAL

## 2021-04-16 PROCEDURE — 85055 RETICULATED PLATELET ASSAY: CPT | Performed by: INTERNAL MEDICINE

## 2021-04-16 PROCEDURE — 99214 OFFICE O/P EST MOD 30 MIN: CPT | Performed by: INTERNAL MEDICINE

## 2021-04-16 PROCEDURE — 85025 COMPLETE CBC W/AUTO DIFF WBC: CPT | Performed by: INTERNAL MEDICINE

## 2021-04-16 PROCEDURE — 85007 BL SMEAR W/DIFF WBC COUNT: CPT | Performed by: INTERNAL MEDICINE

## 2021-04-16 PROCEDURE — 36415 COLL VENOUS BLD VENIPUNCTURE: CPT

## 2021-04-16 NOTE — PROGRESS NOTES
Subjective     REASON FOR CONSULTATION: Mild chronic ITP    HISTORY OF PRESENT ILLNESS:  The patient is a 75 y.o. year old female who was referred to us from her primary care office for evaluation of mild chronic thrombocytopenia.  Her platelet count had been slightly low for at least 2 years with a platelet count of 134,000 in June 2019.  It has drifted down to 82,000 on her most recent labs from 3/3/2021.  Her hemoglobin and white cells have been normal.    With her initial consult visit of 3/19/2021 we performed an immature platelet fraction which was markedly elevated consistent with ITP.  She returns today for further discussion and review of another platelet count.  Her platelets today are actually improved at 94,000.  We plan to continue to observe this for now without specific treatment.    She has remote history of breast cancer treated with lumpectomy and radiation in 2002.    She had a negative Cologuard July 2020    History of Present Illness     Past Medical History:   Diagnosis Date   • Age-related osteoporosis without current pathological fracture 2/8/2019   • Anxiety    • Breast cancer (CMS/HCC)     Right   • Depression    • History of thrombocytopenia    • Hyperlipidemia    • Hypertension    • Osteoporosis    • Seizure (CMS/HCC)     after brain surgery only        Past Surgical History:   Procedure Laterality Date   • APPENDECTOMY  2007   • BRAIN AVM REPAIR  1992   • BREAST LUMPECTOMY Right 2002   • BREAST SURGERY     • HYSTERECTOMY  1976        Current Outpatient Medications on File Prior to Visit   Medication Sig Dispense Refill   • atorvastatin (LIPITOR) 40 MG tablet Take 1 tablet by mouth Daily. 90 tablet 1   • Calcium 600-200 MG-UNIT per tablet Take 1 tablet by mouth 2 (Two) Times a Day.     • carBAMazepine (TEGretol) 200 MG tablet Take 1 tablet by mouth 3 (Three) Times a Day. 270 tablet 1   • Cholecalciferol (VITAMIN D3) 66969 UNITS capsule Take 1 capsule by mouth Every 7 (Seven) Days. 12  capsule 1   • metoprolol succinate XL (TOPROL-XL) 25 MG 24 hr tablet Take 1 tablet by mouth Daily. 90 tablet 1   • Mirabegron ER (Myrbetriq) 25 MG tablet sustained-release 24 hour 24 hr tablet Take 25 mg by mouth Daily.     • MULTIPLE VITAMINS ESSENTIAL PO Take  by mouth.     • PARoxetine (PAXIL) 20 MG tablet Take 1 tablet by mouth Every Morning. 90 tablet 1   • PROLIA 60 MG/ML solution syringe FOR  BY YOUR PHYSICIAN TO INJECT 60MG (1ML) SUBCUTANEOUSLY EVERY 6 MONTHS 1 syringe 0     No current facility-administered medications on file prior to visit.        ALLERGIES:    Allergies   Allergen Reactions   • Cephalexin Myalgia     Migraine   • Sulfa Antibiotics Myalgia     Migraine   • Erythromycin GI Intolerance   • Neomycin-Bacitracin Zn-Polymyx Rash        Social History     Socioeconomic History   • Marital status:      Spouse name: Thanh   • Number of children: Not on file   • Years of education: Not on file   • Highest education level: Not on file   Tobacco Use   • Smoking status: Never Smoker   • Smokeless tobacco: Never Used   Substance and Sexual Activity   • Alcohol use: No   • Drug use: No   • Sexual activity: Defer        No family history on file.     Review of Systems   Constitutional: Negative for activity change, chills, fatigue and fever.   HENT: Negative for mouth sores, trouble swallowing and voice change.    Eyes: Negative for pain and visual disturbance.   Respiratory: Negative for cough, shortness of breath and wheezing.    Cardiovascular: Negative for chest pain and palpitations.   Gastrointestinal: Negative for abdominal pain, constipation, diarrhea, nausea and vomiting.   Genitourinary: Negative for difficulty urinating, frequency and urgency.        Stress incontinence   Musculoskeletal: Negative for arthralgias and joint swelling.   Skin: Negative for rash.   Neurological: Negative for dizziness, seizures, weakness and headaches.   Hematological: Negative for  "adenopathy. Does not bruise/bleed easily.   Psychiatric/Behavioral: Negative for behavioral problems and confusion. The patient is not nervous/anxious.         Memory deficit        Objective     Vitals:    04/16/21 1104   BP: 153/87   Pulse: 69   Resp: 16   Temp: 97.1 °F (36.2 °C)   TempSrc: Skin   SpO2: 97%   Weight: 76.1 kg (167 lb 12.8 oz)   Height: 161.2 cm (63.47\")   PainSc: 0-No pain     No flowsheet data found.    Physical Exam  Constitutional:       General: She is not in acute distress.     Appearance: She is well-developed.   HENT:      Head: Normocephalic.   Eyes:      General: No scleral icterus.     Conjunctiva/sclera: Conjunctivae normal.      Pupils: Pupils are equal, round, and reactive to light.   Neck:      Thyroid: No thyromegaly.      Vascular: No JVD.   Cardiovascular:      Rate and Rhythm: Normal rate and regular rhythm.      Heart sounds: No murmur heard.   No friction rub. No gallop.    Pulmonary:      Effort: Pulmonary effort is normal.      Breath sounds: Normal breath sounds. No wheezing or rales.   Abdominal:      General: There is no distension.      Palpations: Abdomen is soft. There is no mass.      Tenderness: There is no abdominal tenderness.   Musculoskeletal:         General: No deformity. Normal range of motion.      Cervical back: Normal range of motion and neck supple.      Comments: Kyphosis   Lymphadenopathy:      Cervical: No cervical adenopathy.   Skin:     General: Skin is warm and dry.      Findings: No erythema or rash.   Neurological:      Mental Status: She is alert and oriented to person, place, and time.      Cranial Nerves: No cranial nerve deficit.      Deep Tendon Reflexes: Reflexes are normal and symmetric.   Psychiatric:         Behavior: Behavior normal.         Judgment: Judgment normal.           RECENT LABS:  Hematology WBC   Date Value Ref Range Status   04/16/2021 7.93 3.40 - 10.80 10*3/mm3 Final   03/03/2021 8.54 3.40 - 10.80 10*3/mm3 Final     RBC   Date " Value Ref Range Status   04/16/2021 5.08 3.77 - 5.28 10*6/mm3 Final   03/03/2021 5.11 3.77 - 5.28 10*6/mm3 Final     Hemoglobin   Date Value Ref Range Status   04/16/2021 12.3 12.0 - 15.9 g/dL Final     Hematocrit   Date Value Ref Range Status   04/16/2021 37.9 34.0 - 46.6 % Final     Platelets   Date Value Ref Range Status   04/16/2021 94 (L) 140 - 450 10*3/mm3 Final   04/16/2021 94 (L) 140 - 450 10*3/mm3 Final        4/16/2021  IPF   0.90 - 6.50 % 26.40High     Platelets   140 - 450 10*3/mm3 94Low       Lab Results   Component Value Date    IRON 91 03/19/2021    TIBC 374 03/19/2021    FERRITIN 46.70 03/19/2021         Assessment/Plan   1.  Mild chronic ITP with a platelet count that has been in a safe range from 70,000 200,000.  Platelet count in the office today is 94,000.  2.  Remote history of breast cancer.  She is past due for her screening mammogram and we [facility]  3.  Negative colon cancer screening with Cologuard 8/18/2020  4.  Microcytic anemia.  No evidence of iron deficiency.      Recommendations  1.  I reassured the patient that I believe this is likely a benign situation most likely related to chronic mild ITP.  Her platelet count has been chronically low but not in a dangerous range and she is not experiencing any abnormal bleeding or bruising.  2.  We will continue to observe this for now without any specific treatment.  3.  She will be scheduled for lab with RN review in 2 months in 4 months including a CBC and immature platelet fraction.  4.  MD follow-up in 6 months with repeat labs.  5.  We did instruct the patient to notify us if she ends up having to undergo significant invasive procedure or surgery in the future.

## 2021-04-23 ENCOUNTER — HOSPITAL ENCOUNTER (OUTPATIENT)
Dept: INFUSION THERAPY | Facility: HOSPITAL | Age: 76
Discharge: HOME | End: 2021-04-23
Admitting: FAMILY MEDICINE

## 2021-04-23 VITALS
DIASTOLIC BLOOD PRESSURE: 86 MMHG | TEMPERATURE: 98 F | WEIGHT: 169 LBS | HEIGHT: 63 IN | BODY MASS INDEX: 29.95 KG/M2 | OXYGEN SATURATION: 96 % | HEART RATE: 67 BPM | RESPIRATION RATE: 16 BRPM | SYSTOLIC BLOOD PRESSURE: 148 MMHG

## 2021-04-23 DIAGNOSIS — M81.0 AGE-RELATED OSTEOPOROSIS WITHOUT CURRENT PATHOLOGICAL FRACTURE: Primary | ICD-10-CM

## 2021-04-23 LAB
CALCIUM SPEC-SCNC: 10.3 MG/DL (ref 8.6–10.5)
MAGNESIUM SERPL-MCNC: 1.7 MG/DL (ref 1.6–2.4)
PHOSPHATE SERPL-MCNC: 3.2 MG/DL (ref 2.5–4.5)

## 2021-04-23 PROCEDURE — 25010000002 DENOSUMAB 60 MG/ML SOLUTION PREFILLED SYRINGE: Performed by: FAMILY MEDICINE

## 2021-04-23 PROCEDURE — 84100 ASSAY OF PHOSPHORUS: CPT | Performed by: FAMILY MEDICINE

## 2021-04-23 PROCEDURE — 83735 ASSAY OF MAGNESIUM: CPT | Performed by: FAMILY MEDICINE

## 2021-04-23 PROCEDURE — 36415 COLL VENOUS BLD VENIPUNCTURE: CPT

## 2021-04-23 PROCEDURE — 82310 ASSAY OF CALCIUM: CPT | Performed by: FAMILY MEDICINE

## 2021-04-23 PROCEDURE — 96372 THER/PROPH/DIAG INJ SC/IM: CPT

## 2021-04-23 RX ADMIN — DENOSUMAB 60 MG: 60 INJECTION SUBCUTANEOUS at 15:32

## 2021-04-23 NOTE — PATIENT INSTRUCTIONS
".Call [facility] at [phone] if you have any problems or concerns.    We know you have a Choice in healthcare and appreciate you using [facility].  Our purpose is to provide you \"Excellent Care\".  We hope that you will always choose us in the future and continue to recommend us to your family and friends.    Denosumab injection  What is this medicine?  DENOSUMAB (den oh keena mab) slows bone breakdown. Prolia is used to treat osteoporosis in women after menopause and in men, and in people who are taking corticosteroids for 6 months or more. Xgeva is used to treat a high calcium level due to cancer and to prevent bone fractures and other bone problems caused by multiple myeloma or cancer bone metastases. Xgeva is also used to treat giant cell tumor of the bone.  This medicine may be used for other purposes; ask your health care provider or pharmacist if you have questions.  COMMON BRAND NAME(S): Prolia, XGEVA  What should I tell my health care provider before I take this medicine?  They need to know if you have any of these conditions:  · dental disease  · having surgery or tooth extraction  · infection  · kidney disease  · low levels of calcium or Vitamin D in the blood  · malnutrition  · on hemodialysis  · skin conditions or sensitivity  · thyroid or parathyroid disease  · an unusual reaction to denosumab, other medicines, foods, dyes, or preservatives  · pregnant or trying to get pregnant  · breast-feeding  How should I use this medicine?  This medicine is for injection under the skin. It is given by a health care professional in a hospital or clinic setting.  A special MedGuide will be given to you before each treatment. Be sure to read this information carefully each time.  For Prolia, talk to your pediatrician regarding the use of this medicine in children. Special care may be needed. For Xgeva, talk to your pediatrician regarding the use of this medicine in " children. While this drug may be prescribed for children as young as 13 years for selected conditions, precautions do apply.  Overdosage: If you think you have taken too much of this medicine contact a poison control center or emergency room at once.  NOTE: This medicine is only for you. Do not share this medicine with others.  What if I miss a dose?  It is important not to miss your dose. Call your doctor or health care professional if you are unable to keep an appointment.  What may interact with this medicine?  Do not take this medicine with any of the following medications:  · other medicines containing denosumab  This medicine may also interact with the following medications:  · medicines that lower your chance of fighting infection  · steroid medicines like prednisone or cortisone  This list may not describe all possible interactions. Give your health care provider a list of all the medicines, herbs, non-prescription drugs, or dietary supplements you use. Also tell them if you smoke, drink alcohol, or use illegal drugs. Some items may interact with your medicine.  What should I watch for while using this medicine?  Visit your doctor or health care professional for regular checks on your progress. Your doctor or health care professional may order blood tests and other tests to see how you are doing.  Call your doctor or health care professional for advice if you get a fever, chills or sore throat, or other symptoms of a cold or flu. Do not treat yourself. This drug may decrease your body's ability to fight infection. Try to avoid being around people who are sick.  You should make sure you get enough calcium and vitamin D while you are taking this medicine, unless your doctor tells you not to. Discuss the foods you eat and the vitamins you take with your health care professional.  See your dentist regularly. Brush and floss your teeth as directed. Before you have any dental work done, tell your dentist you are  receiving this medicine.  Do not become pregnant while taking this medicine or for 5 months after stopping it. Talk with your doctor or health care professional about your birth control options while taking this medicine. Women should inform their doctor if they wish to become pregnant or think they might be pregnant. There is a potential for serious side effects to an unborn child. Talk to your health care professional or pharmacist for more information.  What side effects may I notice from receiving this medicine?  Side effects that you should report to your doctor or health care professional as soon as possible:  · allergic reactions like skin rash, itching or hives, swelling of the face, lips, or tongue  · bone pain  · breathing problems  · dizziness  · jaw pain, especially after dental work  · redness, blistering, peeling of the skin  · signs and symptoms of infection like fever or chills; cough; sore throat; pain or trouble passing urine  · signs of low calcium like fast heartbeat, muscle cramps or muscle pain; pain, tingling, numbness in the hands or feet; seizures  · unusual bleeding or bruising  · unusually weak or tired  Side effects that usually do not require medical attention (report to your doctor or health care professional if they continue or are bothersome):  · constipation  · diarrhea  · headache  · joint pain  · loss of appetite  · muscle pain  · runny nose  · tiredness  · upset stomach  This list may not describe all possible side effects. Call your doctor for medical advice about side effects. You may report side effects to FDA at 0-017-FDA-1358.  Where should I keep my medicine?  This medicine is only given in a clinic, doctor's office, or other health care setting and will not be stored at home.  NOTE: This sheet is a summary. It may not cover all possible information. If you have questions about this medicine, talk to your doctor, pharmacist, or health care provider.  © 2021 [employer] (2019-04-26 16:10:44)

## 2021-04-23 NOTE — NURSING NOTE
NURSING PROGRESS NOTE      Pt to [facility] per  scheduled appointment.  Pt ID verified by (2) identifiers. Allergies, medications and medical history reviewed and verified. Tolerated prescribed treatment without incident. Patient  Received  AVS, Pt verbalized understanding.  Discharged to home   Condition Satisfactory .  Josefa Kumar RN

## 2021-05-25 ENCOUNTER — APPOINTMENT (OUTPATIENT)
Dept: WOMENS IMAGING | Facility: HOSPITAL | Age: 76
End: 2021-05-25

## 2021-05-25 PROCEDURE — 77063 BREAST TOMOSYNTHESIS BI: CPT | Performed by: RADIOLOGY

## 2021-05-25 PROCEDURE — 77067 SCR MAMMO BI INCL CAD: CPT | Performed by: RADIOLOGY

## 2021-06-11 ENCOUNTER — OFFICE VISIT (OUTPATIENT)
Dept: ONCOLOGY | Facility: CLINIC | Age: 76
End: 2021-06-11

## 2021-06-11 ENCOUNTER — LAB (OUTPATIENT)
Dept: OTHER | Facility: HOSPITAL | Age: 76
End: 2021-06-11

## 2021-06-11 ENCOUNTER — APPOINTMENT (OUTPATIENT)
Dept: ONCOLOGY | Facility: HOSPITAL | Age: 76
End: 2021-06-11

## 2021-06-11 DIAGNOSIS — D69.3 CHRONIC ITP (IDIOPATHIC THROMBOCYTOPENIC PURPURA) (HCC): ICD-10-CM

## 2021-06-11 DIAGNOSIS — D69.6 THROMBOCYTOPENIA (HCC): ICD-10-CM

## 2021-06-11 DIAGNOSIS — D69.3 CHRONIC ITP (IDIOPATHIC THROMBOCYTOPENIC PURPURA) (HCC): Primary | ICD-10-CM

## 2021-06-11 LAB
BASOPHILS # BLD AUTO: 0.02 10*3/MM3 (ref 0–0.2)
BASOPHILS NFR BLD AUTO: 0.2 % (ref 0–1.5)
DEPRECATED RDW RBC AUTO: 38.9 FL (ref 37–54)
EOSINOPHIL # BLD AUTO: 0.11 10*3/MM3 (ref 0–0.4)
EOSINOPHIL NFR BLD AUTO: 1.3 % (ref 0.3–6.2)
ERYTHROCYTE [DISTWIDTH] IN BLOOD BY AUTOMATED COUNT: 14.6 % (ref 12.3–15.4)
HCT VFR BLD AUTO: 37.3 % (ref 34–46.6)
HGB BLD-MCNC: 11.9 G/DL (ref 12–15.9)
IMM GRANULOCYTES # BLD AUTO: 0.04 10*3/MM3 (ref 0–0.05)
IMM GRANULOCYTES NFR BLD AUTO: 0.5 % (ref 0–0.5)
LYMPHOCYTES # BLD AUTO: 1.93 10*3/MM3 (ref 0.7–3.1)
LYMPHOCYTES NFR BLD AUTO: 22.2 % (ref 19.6–45.3)
MCH RBC QN AUTO: 23.8 PG (ref 26.6–33)
MCHC RBC AUTO-ENTMCNC: 31.9 G/DL (ref 31.5–35.7)
MCV RBC AUTO: 74.6 FL (ref 79–97)
MONOCYTES # BLD AUTO: 0.42 10*3/MM3 (ref 0.1–0.9)
MONOCYTES NFR BLD AUTO: 4.8 % (ref 5–12)
NEUTROPHILS NFR BLD AUTO: 6.16 10*3/MM3 (ref 1.7–7)
NEUTROPHILS NFR BLD AUTO: 71 % (ref 42.7–76)
NRBC BLD AUTO-RTO: 0 /100 WBC (ref 0–0.2)
PLAT MORPH BLD: NORMAL
PLATELET # BLD AUTO: 95 10*3/MM3 (ref 140–450)
PLATELET # BLD AUTO: 95 10*3/MM3 (ref 140–450)
PLATELETS.RETICULATED NFR BLD AUTO: 25.6 % (ref 0.9–6.5)
RBC # BLD AUTO: 5 10*6/MM3 (ref 3.77–5.28)
RBC MORPH BLD: NORMAL
WBC # BLD AUTO: 8.68 10*3/MM3 (ref 3.4–10.8)
WBC MORPH BLD: NORMAL

## 2021-06-11 PROCEDURE — 85055 RETICULATED PLATELET ASSAY: CPT | Performed by: INTERNAL MEDICINE

## 2021-06-11 PROCEDURE — 85025 COMPLETE CBC W/AUTO DIFF WBC: CPT | Performed by: INTERNAL MEDICINE

## 2021-06-11 PROCEDURE — 85007 BL SMEAR W/DIFF WBC COUNT: CPT | Performed by: INTERNAL MEDICINE

## 2021-06-11 PROCEDURE — 36415 COLL VENOUS BLD VENIPUNCTURE: CPT

## 2021-06-11 NOTE — PROGRESS NOTES
The patient returns to clinic today 6/11/2021 for lab review as it relates to chronic ITP. Today upon review the patients PLT's are stable at 95,000, but with a persistently elevated IPF of 25.60. She denies any signs/symptoms of bleeding and is overall feeling well.     The patient has been instructed to call our office with any questions or concerns. She is scheduled to come back on 8/6/21 for subsequent lab review, and then will plan to see MD on 10/1/21.     Lab Results   Component Value Date    WBC 8.68 06/11/2021    HGB 11.9 (L) 06/11/2021    HCT 37.3 06/11/2021    MCV 74.6 (L) 06/11/2021    PLT 95 (L) 06/11/2021    PLT 95 (L) 06/11/2021     FACUNDO Flores

## 2021-08-06 ENCOUNTER — LAB (OUTPATIENT)
Dept: OTHER | Facility: HOSPITAL | Age: 76
End: 2021-08-06

## 2021-08-06 ENCOUNTER — CLINICAL SUPPORT (OUTPATIENT)
Dept: ONCOLOGY | Facility: HOSPITAL | Age: 76
End: 2021-08-06

## 2021-08-06 VITALS
OXYGEN SATURATION: 98 % | BODY MASS INDEX: 29.94 KG/M2 | DIASTOLIC BLOOD PRESSURE: 72 MMHG | SYSTOLIC BLOOD PRESSURE: 113 MMHG | RESPIRATION RATE: 18 BRPM | TEMPERATURE: 96.9 F | HEIGHT: 63 IN | HEART RATE: 68 BPM

## 2021-08-06 DIAGNOSIS — D69.3 CHRONIC ITP (IDIOPATHIC THROMBOCYTOPENIC PURPURA) (HCC): ICD-10-CM

## 2021-08-06 DIAGNOSIS — D69.6 THROMBOCYTOPENIA (HCC): ICD-10-CM

## 2021-08-06 LAB
ALBUMIN SERPL-MCNC: 4 G/DL (ref 3.5–5.2)
ALBUMIN/GLOB SERPL: 1.3 G/DL
ALP SERPL-CCNC: 67 U/L (ref 39–117)
ALT SERPL W P-5'-P-CCNC: 17 U/L (ref 1–33)
ANION GAP SERPL CALCULATED.3IONS-SCNC: 6.7 MMOL/L (ref 5–15)
AST SERPL-CCNC: 21 U/L (ref 1–32)
BASOPHILS # BLD AUTO: 0.03 10*3/MM3 (ref 0–0.2)
BASOPHILS NFR BLD AUTO: 0.4 % (ref 0–1.5)
BILIRUB SERPL-MCNC: 0.5 MG/DL (ref 0–1.2)
BUN SERPL-MCNC: 16 MG/DL (ref 8–23)
BUN/CREAT SERPL: 14.8 (ref 7–25)
CALCIUM SPEC-SCNC: 10.4 MG/DL (ref 8.6–10.5)
CHLORIDE SERPL-SCNC: 103 MMOL/L (ref 98–107)
CO2 SERPL-SCNC: 29.3 MMOL/L (ref 22–29)
CREAT SERPL-MCNC: 1.08 MG/DL (ref 0.57–1)
DEPRECATED RDW RBC AUTO: 38.5 FL (ref 37–54)
EOSINOPHIL # BLD AUTO: 0.07 10*3/MM3 (ref 0–0.4)
EOSINOPHIL NFR BLD AUTO: 0.8 % (ref 0.3–6.2)
ERYTHROCYTE [DISTWIDTH] IN BLOOD BY AUTOMATED COUNT: 14 % (ref 12.3–15.4)
FOLATE SERPL-MCNC: >20 NG/ML (ref 4.78–24.2)
GFR SERPL CREATININE-BSD FRML MDRD: 49 ML/MIN/1.73
GIANT PLATELETS: NORMAL
GLOBULIN UR ELPH-MCNC: 3 GM/DL
GLUCOSE SERPL-MCNC: 105 MG/DL (ref 65–99)
HCT VFR BLD AUTO: 38.6 % (ref 34–46.6)
HGB BLD-MCNC: 12.1 G/DL (ref 12–15.9)
IMM GRANULOCYTES # BLD AUTO: 0.03 10*3/MM3 (ref 0–0.05)
IMM GRANULOCYTES NFR BLD AUTO: 0.4 % (ref 0–0.5)
LYMPHOCYTES # BLD AUTO: 1.64 10*3/MM3 (ref 0.7–3.1)
LYMPHOCYTES NFR BLD AUTO: 19.7 % (ref 19.6–45.3)
MCH RBC QN AUTO: 23.9 PG (ref 26.6–33)
MCHC RBC AUTO-ENTMCNC: 31.3 G/DL (ref 31.5–35.7)
MCV RBC AUTO: 76.3 FL (ref 79–97)
MONOCYTES # BLD AUTO: 0.43 10*3/MM3 (ref 0.1–0.9)
MONOCYTES NFR BLD AUTO: 5.2 % (ref 5–12)
NEUTROPHILS NFR BLD AUTO: 6.11 10*3/MM3 (ref 1.7–7)
NEUTROPHILS NFR BLD AUTO: 73.5 % (ref 42.7–76)
NRBC BLD AUTO-RTO: 0 /100 WBC (ref 0–0.2)
PLATELET # BLD AUTO: 89 10*3/MM3 (ref 140–450)
PLATELET # BLD AUTO: 89 10*3/MM3 (ref 140–450)
PLATELETS.RETICULATED NFR BLD AUTO: 28.4 % (ref 0.9–6.5)
POTASSIUM SERPL-SCNC: 3.9 MMOL/L (ref 3.5–5.2)
PROT SERPL-MCNC: 7 G/DL (ref 6–8.5)
RBC # BLD AUTO: 5.06 10*6/MM3 (ref 3.77–5.28)
RBC MORPH BLD: NORMAL
SODIUM SERPL-SCNC: 139 MMOL/L (ref 136–145)
VIT B12 BLD-MCNC: 1238 PG/ML (ref 211–946)
WBC # BLD AUTO: 8.31 10*3/MM3 (ref 3.4–10.8)
WBC MORPH BLD: NORMAL

## 2021-08-06 PROCEDURE — 80053 COMPREHEN METABOLIC PANEL: CPT | Performed by: INTERNAL MEDICINE

## 2021-08-06 PROCEDURE — 85055 RETICULATED PLATELET ASSAY: CPT | Performed by: INTERNAL MEDICINE

## 2021-08-06 PROCEDURE — 82746 ASSAY OF FOLIC ACID SERUM: CPT | Performed by: INTERNAL MEDICINE

## 2021-08-06 PROCEDURE — 36415 COLL VENOUS BLD VENIPUNCTURE: CPT

## 2021-08-06 PROCEDURE — G0463 HOSPITAL OUTPT CLINIC VISIT: HCPCS

## 2021-08-06 PROCEDURE — 82607 VITAMIN B-12: CPT | Performed by: INTERNAL MEDICINE

## 2021-08-06 PROCEDURE — 85007 BL SMEAR W/DIFF WBC COUNT: CPT | Performed by: INTERNAL MEDICINE

## 2021-08-06 PROCEDURE — 85025 COMPLETE CBC W/AUTO DIFF WBC: CPT | Performed by: INTERNAL MEDICINE

## 2021-08-06 NOTE — PROGRESS NOTES
Pt to infusion room for labs and RN review  Lab Results   Component Value Date    WBC 8.31 08/06/2021    HGB 12.1 08/06/2021    HCT 38.6 08/06/2021    MCV 76.3 (L) 08/06/2021    PLT 89 (L) 08/06/2021    PLT 89 (L) 08/06/2021       These labs are stable for this patient with a platelet count of 89,000.   Pt denies any problems with bleeding .  Pt is feeling well overall  Pt instructed to call for any S/S bleeding or any other concerns. Pt has F/U appts scheduled.

## 2021-08-29 DIAGNOSIS — F41.8 DEPRESSION WITH ANXIETY: ICD-10-CM

## 2021-08-29 DIAGNOSIS — I10 ESSENTIAL HYPERTENSION: ICD-10-CM

## 2021-08-29 DIAGNOSIS — E78.2 MIXED HYPERLIPIDEMIA: ICD-10-CM

## 2021-08-30 RX ORDER — ATORVASTATIN CALCIUM 40 MG/1
TABLET, FILM COATED ORAL
Qty: 90 TABLET | Refills: 0 | Status: SHIPPED | OUTPATIENT
Start: 2021-08-30 | End: 2021-12-13

## 2021-08-30 RX ORDER — PAROXETINE HYDROCHLORIDE 20 MG/1
TABLET, FILM COATED ORAL
Qty: 90 TABLET | Refills: 0 | Status: SHIPPED | OUTPATIENT
Start: 2021-08-30 | End: 2021-12-13

## 2021-08-30 RX ORDER — METOPROLOL SUCCINATE 25 MG/1
TABLET, EXTENDED RELEASE ORAL
Qty: 90 TABLET | Refills: 0 | Status: SHIPPED | OUTPATIENT
Start: 2021-08-30 | End: 2021-11-02 | Stop reason: HOSPADM

## 2021-09-01 DIAGNOSIS — Z00.01 ENCOUNTER FOR WELL ADULT EXAM WITH ABNORMAL FINDINGS: ICD-10-CM

## 2021-09-01 DIAGNOSIS — G40.909 SEIZURE DISORDER (HCC): ICD-10-CM

## 2021-09-01 DIAGNOSIS — F41.8 DEPRESSION WITH ANXIETY: ICD-10-CM

## 2021-09-01 DIAGNOSIS — R73.02 IMPAIRED GLUCOSE TOLERANCE: ICD-10-CM

## 2021-09-01 DIAGNOSIS — E55.9 VITAMIN D DEFICIENCY: ICD-10-CM

## 2021-09-01 DIAGNOSIS — E78.2 MIXED HYPERLIPIDEMIA: ICD-10-CM

## 2021-09-01 DIAGNOSIS — I10 ESSENTIAL HYPERTENSION: ICD-10-CM

## 2021-09-01 DIAGNOSIS — D69.6 THROMBOCYTOPENIA (HCC): ICD-10-CM

## 2021-09-03 LAB
25(OH)D3+25(OH)D2 SERPL-MCNC: 83.3 NG/ML (ref 30–100)
ALBUMIN SERPL-MCNC: 4.1 G/DL (ref 3.5–5.2)
ALBUMIN/GLOB SERPL: 1.7 G/DL
ALP SERPL-CCNC: 78 U/L (ref 39–117)
ALT SERPL-CCNC: 21 U/L (ref 1–33)
AST SERPL-CCNC: 23 U/L (ref 1–32)
BASOPHILS # BLD AUTO: ABNORMAL 10*3/UL
BASOPHILS # BLD MANUAL: 0.1 10*3/MM3 (ref 0–0.2)
BASOPHILS NFR BLD MANUAL: 1 % (ref 0–1.5)
BILIRUB SERPL-MCNC: 0.5 MG/DL (ref 0–1.2)
BUN SERPL-MCNC: 12 MG/DL (ref 8–23)
BUN/CREAT SERPL: 11.1 (ref 7–25)
CALCIUM SERPL-MCNC: 9.9 MG/DL (ref 8.6–10.5)
CHLORIDE SERPL-SCNC: 105 MMOL/L (ref 98–107)
CHOLEST SERPL-MCNC: 134 MG/DL (ref 0–200)
CO2 SERPL-SCNC: 27 MMOL/L (ref 22–29)
CREAT SERPL-MCNC: 1.08 MG/DL (ref 0.57–1)
DIFFERENTIAL COMMENT: ABNORMAL
EOSINOPHIL # BLD AUTO: ABNORMAL 10*3/UL
EOSINOPHIL NFR BLD AUTO: ABNORMAL %
ERYTHROCYTE [DISTWIDTH] IN BLOOD BY AUTOMATED COUNT: 14.1 % (ref 12.3–15.4)
GLOBULIN SER CALC-MCNC: 2.4 GM/DL
GLUCOSE SERPL-MCNC: 134 MG/DL (ref 65–99)
HBA1C MFR BLD: 5.2 % (ref 4.8–5.6)
HCT VFR BLD AUTO: 40 % (ref 34–46.6)
HDLC SERPL-MCNC: 51 MG/DL (ref 40–60)
HGB BLD-MCNC: 12.9 G/DL (ref 12–15.9)
LDLC SERPL CALC-MCNC: 60 MG/DL (ref 0–100)
LYMPHOCYTES # BLD AUTO: ABNORMAL 10*3/UL
LYMPHOCYTES # BLD MANUAL: 2.21 10*3/MM3 (ref 0.7–3.1)
LYMPHOCYTES NFR BLD AUTO: ABNORMAL %
LYMPHOCYTES NFR BLD MANUAL: 12 % (ref 19.6–45.3)
MCH RBC QN AUTO: 24.1 PG (ref 26.6–33)
MCHC RBC AUTO-ENTMCNC: 32.3 G/DL (ref 31.5–35.7)
MCV RBC AUTO: 74.6 FL (ref 79–97)
MONOCYTES # BLD MANUAL: 0.1 10*3/MM3 (ref 0.1–0.9)
MONOCYTES NFR BLD AUTO: ABNORMAL %
MONOCYTES NFR BLD MANUAL: 1 % (ref 5–12)
NEUTROPHILS # BLD MANUAL: 7.63 10*3/MM3 (ref 1.7–7)
NEUTROPHILS NFR BLD AUTO: ABNORMAL %
NEUTROPHILS NFR BLD MANUAL: 76 % (ref 42.7–76)
PLATELET # BLD AUTO: 112 10*3/MM3 (ref 140–450)
PLATELET BLD QL SMEAR: ABNORMAL
POTASSIUM SERPL-SCNC: 3.7 MMOL/L (ref 3.5–5.2)
PROT SERPL-MCNC: 6.5 G/DL (ref 6–8.5)
RBC # BLD AUTO: 5.36 10*6/MM3 (ref 3.77–5.28)
RBC MORPH BLD: ABNORMAL
SODIUM SERPL-SCNC: 140 MMOL/L (ref 136–145)
TRIGL SERPL-MCNC: 128 MG/DL (ref 0–150)
TSH SERPL DL<=0.005 MIU/L-ACNC: 1.53 UIU/ML (ref 0.27–4.2)
VLDLC SERPL CALC-MCNC: 23 MG/DL (ref 5–40)
WBC # BLD AUTO: 10.04 10*3/MM3 (ref 3.4–10.8)

## 2021-09-13 ENCOUNTER — OFFICE VISIT (OUTPATIENT)
Dept: FAMILY MEDICINE CLINIC | Facility: CLINIC | Age: 76
End: 2021-09-13

## 2021-09-13 VITALS
DIASTOLIC BLOOD PRESSURE: 66 MMHG | WEIGHT: 168 LBS | HEART RATE: 76 BPM | HEIGHT: 63 IN | TEMPERATURE: 97.5 F | OXYGEN SATURATION: 97 % | BODY MASS INDEX: 29.77 KG/M2 | SYSTOLIC BLOOD PRESSURE: 110 MMHG

## 2021-09-13 DIAGNOSIS — G40.909 SEIZURE DISORDER (HCC): ICD-10-CM

## 2021-09-13 DIAGNOSIS — D69.3 CHRONIC ITP (IDIOPATHIC THROMBOCYTOPENIC PURPURA) (HCC): ICD-10-CM

## 2021-09-13 DIAGNOSIS — M81.0 AGE-RELATED OSTEOPOROSIS WITHOUT CURRENT PATHOLOGICAL FRACTURE: ICD-10-CM

## 2021-09-13 DIAGNOSIS — F41.8 DEPRESSION WITH ANXIETY: ICD-10-CM

## 2021-09-13 DIAGNOSIS — N32.81 OVERACTIVE BLADDER: ICD-10-CM

## 2021-09-13 DIAGNOSIS — D69.6 THROMBOCYTOPENIA (HCC): ICD-10-CM

## 2021-09-13 DIAGNOSIS — R73.02 IMPAIRED GLUCOSE TOLERANCE: ICD-10-CM

## 2021-09-13 DIAGNOSIS — E66.09 EXOGENOUS OBESITY: ICD-10-CM

## 2021-09-13 DIAGNOSIS — E78.2 MIXED HYPERLIPIDEMIA: ICD-10-CM

## 2021-09-13 DIAGNOSIS — E55.9 VITAMIN D DEFICIENCY: ICD-10-CM

## 2021-09-13 DIAGNOSIS — I10 ESSENTIAL HYPERTENSION: Primary | ICD-10-CM

## 2021-09-13 PROCEDURE — 99214 OFFICE O/P EST MOD 30 MIN: CPT | Performed by: FAMILY MEDICINE

## 2021-09-13 RX ORDER — LEVETIRACETAM 500 MG/1
TABLET ORAL
COMMUNITY
Start: 2021-08-20

## 2021-09-13 NOTE — PROGRESS NOTES
Subjective   Shyann Davis is a 76 y.o. female with   Chief Complaint   Patient presents with   • Hypertension   • Hyperlipidemia   .    History of Present Illness   76-year-old white female with known history of essential hypertension hyperlipidemia here for further medical management.  Current medications include atorvastatin at 40 mg daily in combination with Toprol-XL at 25 mg daily.  Patient also with history of thrombocytopenia followed by Dr. Conde of the oncology service.  There is history of depression with anxiety features with patient successfully using paroxetine at 20 mg daily.  She also has history of osteoporosis using Prolia at 60 mg every 6 months.  Myrbetriq is used for overactive bladder and Keppra for seizure disorder.  Overall she is doing very well and has no acute complaints.  She has had a mammogram since last visit which was read as normal.  Fasting labs have been acquired prior to this visit.  She is tolerating the above medications without side effects.  The following portions of the patient's history were reviewed and updated as appropriate: allergies, current medications, past family history, past medical history, past social history, past surgical history and problem list.    Review of Systems   Cardiovascular:        Hypertension, hyperlipidemia   Musculoskeletal:        Osteoporosis   Neurological: Positive for seizures.   Psychiatric/Behavioral: Positive for dysphoric mood. The patient is nervous/anxious.        Objective     Vitals:    09/13/21 1504   BP: 110/66   Pulse: 76   Temp: 97.5 °F (36.4 °C)   SpO2: 97%       Recent Results (from the past 672 hour(s))   CBC w AUTO Differential    Collection Time: 09/02/21 10:29 AM    Specimen: Blood   Result Value Ref Range    WBC 10.04 3.40 - 10.80 10*3/mm3    RBC 5.36 (H) 3.77 - 5.28 10*6/mm3    Hemoglobin 12.9 12.0 - 15.9 g/dL    Hematocrit 40.0 34.0 - 46.6 %    MCV 74.6 (L) 79.0 - 97.0 fL    MCH 24.1 (L) 26.6 - 33.0 pg    MCHC 32.3 31.5 -  35.7 g/dL    RDW 14.1 12.3 - 15.4 %    Platelets 112 (L) 140 - 450 10*3/mm3    Neutrophil Rel % CANCELED     Lymphocyte Rel % CANCELED     Monocyte Rel % CANCELED     Eosinophil Rel % CANCELED     Lymphocytes Absolute CANCELED     Eosinophils Absolute CANCELED     Basophils Absolute CANCELED    Lipid panel    Collection Time: 09/02/21 10:29 AM    Specimen: Blood   Result Value Ref Range    Total Cholesterol 134 0 - 200 mg/dL    Triglycerides 128 0 - 150 mg/dL    HDL Cholesterol 51 40 - 60 mg/dL    VLDL Cholesterol Anders 23 5 - 40 mg/dL    LDL Chol Calc (NIH) 60 0 - 100 mg/dL   Vitamin D 25 hydroxy    Collection Time: 09/02/21 10:29 AM    Specimen: Blood   Result Value Ref Range    25 Hydroxy, Vitamin D 83.3 30.0 - 100.0 ng/ml   TSH    Collection Time: 09/02/21 10:29 AM    Specimen: Blood   Result Value Ref Range    TSH 1.530 0.270 - 4.200 uIU/mL   Comprehensive metabolic panel    Collection Time: 09/02/21 10:29 AM    Specimen: Blood   Result Value Ref Range    Glucose 134 (H) 65 - 99 mg/dL    BUN 12 8 - 23 mg/dL    Creatinine 1.08 (H) 0.57 - 1.00 mg/dL    eGFR Non African Am 49 (L) >60 mL/min/1.73    eGFR African Am 60 (L) >60 mL/min/1.73    BUN/Creatinine Ratio 11.1 7.0 - 25.0    Sodium 140 136 - 145 mmol/L    Potassium 3.7 3.5 - 5.2 mmol/L    Chloride 105 98 - 107 mmol/L    Total CO2 27.0 22.0 - 29.0 mmol/L    Calcium 9.9 8.6 - 10.5 mg/dL    Total Protein 6.5 6.0 - 8.5 g/dL    Albumin 4.10 3.50 - 5.20 g/dL    Globulin 2.4 gm/dL    A/G Ratio 1.7 g/dL    Total Bilirubin 0.5 0.0 - 1.2 mg/dL    Alkaline Phosphatase 78 39 - 117 U/L    AST (SGOT) 23 1 - 32 U/L    ALT (SGPT) 21 1 - 33 U/L   Hemoglobin A1c    Collection Time: 09/02/21 10:29 AM    Specimen: Blood   Result Value Ref Range    Hemoglobin A1C 5.20 4.80 - 5.60 %   Manual Differential    Collection Time: 09/02/21 10:29 AM   Result Value Ref Range    Neutrophil Rel % 76.0 42.7 - 76.0 %    Lymphocyte Rel % 12.0 (L) 19.6 - 45.3 %    Monocyte Rel % 1.0 (L) 5.0 - 12.0  %    Basophil Rel % 1.0 0.0 - 1.5 %    Neutrophils Absolute 7.63 (H) 1.70 - 7.00 10*3/mm3    Lymphocytes Absolute 2.21 0.70 - 3.10 10*3/mm3    Monocytes Absolute 0.10 0.10 - 0.90 10*3/mm3    Basophils Absolute 0.10 0.00 - 0.20 10*3/mm3    Differential Comment Comment     Comment Comment     Plt Comment Comment        Physical Exam  Vitals and nursing note reviewed.   Constitutional:       Appearance: Normal appearance. She is well-developed and well-groomed. She is obese.      Comments: Borderline exogenous obesity with a BMI of 29.8   HENT:      Head: Normocephalic and atraumatic.   Neck:      Thyroid: No thyroid mass or thyromegaly.      Vascular: Normal carotid pulses. No carotid bruit.      Trachea: Trachea and phonation normal.   Cardiovascular:      Rate and Rhythm: Normal rate and regular rhythm.      Heart sounds: Normal heart sounds. No murmur heard.   No friction rub. No gallop.    Pulmonary:      Effort: Pulmonary effort is normal. No respiratory distress.      Breath sounds: Normal breath sounds. No decreased breath sounds, wheezing, rhonchi or rales.   Musculoskeletal:      Cervical back: Neck supple.   Lymphadenopathy:      Cervical: No cervical adenopathy.   Skin:     General: Skin is warm and dry.      Findings: No rash.   Neurological:      Mental Status: She is alert and oriented to person, place, and time.   Psychiatric:         Attention and Perception: Attention and perception normal.         Mood and Affect: Mood and affect normal.         Speech: Speech normal.         Behavior: Behavior normal. Behavior is cooperative.         Thought Content: Thought content normal.         Cognition and Memory: Cognition and memory normal.         Judgment: Judgment normal.         Assessment/Plan   Diagnoses and all orders for this visit:    1. Essential hypertension (Primary)  -     Comprehensive metabolic panel; Future  -     Hemoglobin A1c; Future  -     Lipid panel; Future  -     Vitamin D 25 hydroxy;  Future  -     TSH; Future  -     CBC w AUTO Differential; Future    2. Mixed hyperlipidemia  -     Comprehensive metabolic panel; Future  -     Hemoglobin A1c; Future  -     Lipid panel; Future  -     Vitamin D 25 hydroxy; Future  -     TSH; Future  -     CBC w AUTO Differential; Future    3. Chronic ITP (idiopathic thrombocytopenic purpura) (CMS/HCC)  -     Comprehensive metabolic panel; Future  -     Hemoglobin A1c; Future  -     Lipid panel; Future  -     Vitamin D 25 hydroxy; Future  -     TSH; Future  -     CBC w AUTO Differential; Future    4. Thrombocytopenia (CMS/HCC)  -     Comprehensive metabolic panel; Future  -     Hemoglobin A1c; Future  -     Lipid panel; Future  -     Vitamin D 25 hydroxy; Future  -     TSH; Future  -     CBC w AUTO Differential; Future    5. Exogenous obesity  -     Comprehensive metabolic panel; Future  -     Hemoglobin A1c; Future  -     Lipid panel; Future  -     Vitamin D 25 hydroxy; Future  -     TSH; Future  -     CBC w AUTO Differential; Future    6. Impaired glucose tolerance  -     Comprehensive metabolic panel; Future  -     Hemoglobin A1c; Future  -     Lipid panel; Future  -     Vitamin D 25 hydroxy; Future  -     TSH; Future  -     CBC w AUTO Differential; Future    7. Vitamin D deficiency  -     Comprehensive metabolic panel; Future  -     Hemoglobin A1c; Future  -     Lipid panel; Future  -     Vitamin D 25 hydroxy; Future  -     TSH; Future  -     CBC w AUTO Differential; Future    8. Overactive bladder  -     Comprehensive metabolic panel; Future  -     Hemoglobin A1c; Future  -     Lipid panel; Future  -     Vitamin D 25 hydroxy; Future  -     TSH; Future  -     CBC w AUTO Differential; Future    9. Depression with anxiety  -     Comprehensive metabolic panel; Future  -     Hemoglobin A1c; Future  -     Lipid panel; Future  -     Vitamin D 25 hydroxy; Future  -     TSH; Future  -     CBC w AUTO Differential; Future    10. Age-related osteoporosis without current  pathological fracture  -     Comprehensive metabolic panel; Future  -     Hemoglobin A1c; Future  -     Lipid panel; Future  -     Vitamin D 25 hydroxy; Future  -     TSH; Future  -     CBC w AUTO Differential; Future    11. Seizure disorder (CMS/HCC)  -     Comprehensive metabolic panel; Future  -     Hemoglobin A1c; Future  -     Lipid panel; Future  -     Vitamin D 25 hydroxy; Future  -     TSH; Future  -     CBC w AUTO Differential; Future    Continue current medications without alteration.  Weight loss would benefit especially in regards to sugar status.  Future fasting labs will take place in 6 months with follow-up with me thereafter.    Return in about 6 months (around 3/13/2022) for Recheck.

## 2021-10-01 ENCOUNTER — OFFICE VISIT (OUTPATIENT)
Dept: ONCOLOGY | Facility: CLINIC | Age: 76
End: 2021-10-01

## 2021-10-01 ENCOUNTER — LAB (OUTPATIENT)
Dept: OTHER | Facility: HOSPITAL | Age: 76
End: 2021-10-01

## 2021-10-01 VITALS
RESPIRATION RATE: 18 BRPM | TEMPERATURE: 97.3 F | BODY MASS INDEX: 30.3 KG/M2 | HEIGHT: 63 IN | HEART RATE: 67 BPM | WEIGHT: 171 LBS | SYSTOLIC BLOOD PRESSURE: 146 MMHG | DIASTOLIC BLOOD PRESSURE: 85 MMHG | OXYGEN SATURATION: 97 %

## 2021-10-01 DIAGNOSIS — D69.6 THROMBOCYTOPENIA (HCC): ICD-10-CM

## 2021-10-01 DIAGNOSIS — D69.3 CHRONIC ITP (IDIOPATHIC THROMBOCYTOPENIC PURPURA) (HCC): Primary | ICD-10-CM

## 2021-10-01 DIAGNOSIS — D69.3 CHRONIC ITP (IDIOPATHIC THROMBOCYTOPENIC PURPURA) (HCC): ICD-10-CM

## 2021-10-01 LAB
BASOPHILS # BLD AUTO: 0.02 10*3/MM3 (ref 0–0.2)
BASOPHILS NFR BLD AUTO: 0.2 % (ref 0–1.5)
DEPRECATED RDW RBC AUTO: 37.2 FL (ref 37–54)
EOSINOPHIL # BLD AUTO: 0.07 10*3/MM3 (ref 0–0.4)
EOSINOPHIL NFR BLD AUTO: 0.7 % (ref 0.3–6.2)
ERYTHROCYTE [DISTWIDTH] IN BLOOD BY AUTOMATED COUNT: 14.3 % (ref 12.3–15.4)
HCT VFR BLD AUTO: 39 % (ref 34–46.6)
HGB BLD-MCNC: 12.3 G/DL (ref 12–15.9)
IMM GRANULOCYTES # BLD AUTO: 0.06 10*3/MM3 (ref 0–0.05)
IMM GRANULOCYTES NFR BLD AUTO: 0.6 % (ref 0–0.5)
LYMPHOCYTES # BLD AUTO: 2.05 10*3/MM3 (ref 0.7–3.1)
LYMPHOCYTES NFR BLD AUTO: 21.3 % (ref 19.6–45.3)
MCH RBC QN AUTO: 23 PG (ref 26.6–33)
MCHC RBC AUTO-ENTMCNC: 31.5 G/DL (ref 31.5–35.7)
MCV RBC AUTO: 73 FL (ref 79–97)
MICROCYTES BLD QL: NORMAL
MONOCYTES # BLD AUTO: 0.43 10*3/MM3 (ref 0.1–0.9)
MONOCYTES NFR BLD AUTO: 4.5 % (ref 5–12)
NEUTROPHILS NFR BLD AUTO: 6.99 10*3/MM3 (ref 1.7–7)
NEUTROPHILS NFR BLD AUTO: 72.7 % (ref 42.7–76)
NRBC BLD AUTO-RTO: 0 /100 WBC (ref 0–0.2)
PLAT MORPH BLD: NORMAL
PLATELET # BLD AUTO: 90 10*3/MM3 (ref 140–450)
PLATELET # BLD AUTO: 90 10*3/MM3 (ref 140–450)
PLATELETS.RETICULATED NFR BLD AUTO: 28.1 % (ref 0.9–6.5)
RBC # BLD AUTO: 5.34 10*6/MM3 (ref 3.77–5.28)
SPHEROCYTES BLD QL SMEAR: NORMAL
WBC # BLD AUTO: 9.62 10*3/MM3 (ref 3.4–10.8)
WBC MORPH BLD: NORMAL

## 2021-10-01 PROCEDURE — 99213 OFFICE O/P EST LOW 20 MIN: CPT | Performed by: INTERNAL MEDICINE

## 2021-10-01 PROCEDURE — 36415 COLL VENOUS BLD VENIPUNCTURE: CPT

## 2021-10-01 PROCEDURE — 85025 COMPLETE CBC W/AUTO DIFF WBC: CPT | Performed by: INTERNAL MEDICINE

## 2021-10-01 PROCEDURE — 85055 RETICULATED PLATELET ASSAY: CPT | Performed by: INTERNAL MEDICINE

## 2021-10-01 PROCEDURE — 85007 BL SMEAR W/DIFF WBC COUNT: CPT | Performed by: INTERNAL MEDICINE

## 2021-10-01 NOTE — PROGRESS NOTES
Subjective     REASON FOR CONSULTATION: Mild chronic ITP    HISTORY OF PRESENT ILLNESS:  The patient is a 76 y.o. year old female who was referred to us from her primary care office for evaluation of mild chronic thrombocytopenia.  Her platelet count had been slightly low for at least 2 years with a platelet count of 134,000 in June 2019.  It had drifted down to 82,000 on labs from 3/3/2021.  Her hemoglobin and white cells were normal.    With her initial consult visit of 3/19/2021 we performed an immature platelet fraction which was markedly elevated consistent with ITP.      We have been observing her without treatment as her platelet count is remained in a safe range from 80,000-110,000.  Her platelet count today is stable at 90,000.  She is not having any unusual bleeding or bruising.  She does not have any invasive procedures scheduled.    She has remote history of breast cancer treated with lumpectomy and radiation in 2002.    She had a negative Cologuard July 2020    History of Present Illness     Past Medical History:   Diagnosis Date   • Age-related osteoporosis without current pathological fracture 2/8/2019   • Anxiety    • Breast cancer (HCC)     Right   • Depression    • History of thrombocytopenia    • Hyperlipidemia    • Hypertension    • Osteoporosis    • Seizure (HCC)     after brain surgery only        Past Surgical History:   Procedure Laterality Date   • APPENDECTOMY  2007   • BRAIN AVM REPAIR  1992   • BREAST LUMPECTOMY Right 2002   • BREAST SURGERY     • HYSTERECTOMY  1976        Current Outpatient Medications on File Prior to Visit   Medication Sig Dispense Refill   • atorvastatin (LIPITOR) 40 MG tablet TAKE 1 TABLET EVERY DAY 90 tablet 0   • Calcium 600-200 MG-UNIT per tablet Take 1 tablet by mouth 2 (Two) Times a Day.     • Cholecalciferol (VITAMIN D3) 21392 UNITS capsule Take 1 capsule by mouth Every 7 (Seven) Days. 12 capsule 1   • levETIRAcetam (KEPPRA) 500 MG tablet TAKE 1/2 TAB TWICE A  DAY FOR A WEEK THEN 1 TAB TWICE A DAY     • metoprolol succinate XL (TOPROL-XL) 25 MG 24 hr tablet TAKE 1 TABLET EVERY DAY 90 tablet 0   • Mirabegron ER (Myrbetriq) 25 MG tablet sustained-release 24 hour 24 hr tablet Take 25 mg by mouth Daily.     • MULTIPLE VITAMINS ESSENTIAL PO Take  by mouth.     • PARoxetine (PAXIL) 20 MG tablet TAKE 1 TABLET EVERY MORNING 90 tablet 0   • PROLIA 60 MG/ML solution syringe FOR  BY YOUR PHYSICIAN TO INJECT 60MG (1ML) SUBCUTANEOUSLY EVERY 6 MONTHS 1 syringe 0     No current facility-administered medications on file prior to visit.        ALLERGIES:    Allergies   Allergen Reactions   • Cephalexin Myalgia     Migraine   • Sulfa Antibiotics Myalgia     Migraine   • Erythromycin GI Intolerance   • Neomycin-Bacitracin Zn-Polymyx Rash        Social History     Socioeconomic History   • Marital status:      Spouse name: Thanh   • Number of children: Not on file   • Years of education: Not on file   • Highest education level: Not on file   Tobacco Use   • Smoking status: Never Smoker   • Smokeless tobacco: Never Used   Substance and Sexual Activity   • Alcohol use: No   • Drug use: No   • Sexual activity: Defer        No family history on file.     Review of Systems   Constitutional: Negative for activity change, chills, fatigue and fever.   HENT: Negative for mouth sores, trouble swallowing and voice change.    Eyes: Negative for pain and visual disturbance.   Respiratory: Negative for cough, shortness of breath and wheezing.    Cardiovascular: Negative for chest pain and palpitations.   Gastrointestinal: Negative for abdominal pain, constipation, diarrhea, nausea and vomiting.   Genitourinary: Negative for difficulty urinating, frequency and urgency.        Stress incontinence   Musculoskeletal: Negative for arthralgias and joint swelling.   Skin: Negative for rash.   Neurological: Negative for dizziness, seizures, weakness and headaches.   Hematological:  "Negative for adenopathy. Does not bruise/bleed easily.   Psychiatric/Behavioral: Negative for behavioral problems and confusion. The patient is not nervous/anxious.         Memory deficit        Objective     Vitals:    10/01/21 1309   BP: 146/85   Pulse: 67   Resp: 18   Temp: 97.3 °F (36.3 °C)   TempSrc: Temporal   SpO2: 97%   Weight: 77.6 kg (171 lb)   Height: 160 cm (62.99\")   PainSc: 0-No pain     Current Status 10/1/2021   ECOG score 0       Physical Exam  Constitutional:       General: She is not in acute distress.     Appearance: She is well-developed.   HENT:      Head: Normocephalic.   Eyes:      General: No scleral icterus.     Conjunctiva/sclera: Conjunctivae normal.      Pupils: Pupils are equal, round, and reactive to light.   Neck:      Thyroid: No thyromegaly.      Vascular: No JVD.   Cardiovascular:      Rate and Rhythm: Normal rate and regular rhythm.      Heart sounds: No murmur heard.   No friction rub. No gallop.    Pulmonary:      Effort: Pulmonary effort is normal.      Breath sounds: Normal breath sounds. No wheezing or rales.   Abdominal:      General: There is no distension.      Palpations: Abdomen is soft. There is no mass.      Tenderness: There is no abdominal tenderness.   Musculoskeletal:         General: No deformity. Normal range of motion.      Cervical back: Normal range of motion and neck supple.      Comments: Kyphosis   Lymphadenopathy:      Cervical: No cervical adenopathy.   Skin:     General: Skin is warm and dry.      Findings: No erythema or rash.   Neurological:      Mental Status: She is alert and oriented to person, place, and time.      Cranial Nerves: No cranial nerve deficit.      Deep Tendon Reflexes: Reflexes are normal and symmetric.   Psychiatric:         Behavior: Behavior normal.         Judgment: Judgment normal.           RECENT LABS:  Hematology WBC   Date Value Ref Range Status   09/02/2021 10.04 3.40 - 10.80 10*3/mm3 Final     RBC   Date Value Ref Range " Status   09/02/2021 5.36 (H) 3.77 - 5.28 10*6/mm3 Final     Hemoglobin   Date Value Ref Range Status   09/02/2021 12.9 12.0 - 15.9 g/dL Final   08/06/2021 12.1 12.0 - 15.9 g/dL Final     Hematocrit   Date Value Ref Range Status   09/02/2021 40.0 34.0 - 46.6 % Final   08/06/2021 38.6 34.0 - 46.6 % Final     Platelets   Date Value Ref Range Status   10/01/2021 90 (L) 140 - 450 10*3/mm3 Final        4/16/2021  IPF   0.90 - 6.50 % 26.40High     Platelets   140 - 450 10*3/mm3 94Low       Lab Results   Component Value Date    IRON 91 03/19/2021    TIBC 374 03/19/2021    FERRITIN 46.70 03/19/2021         Assessment/Plan   1.  Mild chronic ITP with a platelet count that has been in a safe range from 80,000-110,000.  Platelet count in the office today is 90,000.  2.  Remote history of breast cancer.    3.  Negative colon cancer screening with Cologuard 8/18/2020  4.  Microcytic anemia.  No evidence of iron deficiency.      PLAN  1.  I reassured the patient that I believe this is likely a benign situation most likely related to chronic mild ITP.  Her platelet count remains in a stable and safe range   2.  We will continue to observe this for now without any specific treatment.  3.  She will be scheduled for lab with RN review in 3 months including a CBC and immature platelet fraction.  4.  MD follow-up in 6 months with repeat labs.  5.  We did instruct the patient to notify us if she ends up having to undergo significant invasive procedure or surgery in the future.

## 2021-10-22 ENCOUNTER — HOSPITAL ENCOUNTER (INPATIENT)
Facility: HOSPITAL | Age: 76
LOS: 9 days | Discharge: HOME HEALTH | End: 2021-11-02
Attending: EMERGENCY MEDICINE | Admitting: SURGERY

## 2021-10-22 ENCOUNTER — APPOINTMENT (OUTPATIENT)
Dept: CT IMAGING | Facility: HOSPITAL | Age: 76
End: 2021-10-22

## 2021-10-22 DIAGNOSIS — A41.9 SEPSIS WITHOUT ACUTE ORGAN DYSFUNCTION, DUE TO UNSPECIFIED ORGANISM (HCC): Primary | ICD-10-CM

## 2021-10-22 DIAGNOSIS — R93.7 ABNORMAL X-RAY OF LUMBAR SPINE: ICD-10-CM

## 2021-10-22 DIAGNOSIS — K81.0 ACUTE CHOLECYSTITIS: ICD-10-CM

## 2021-10-22 DIAGNOSIS — R10.10 PAIN OF UPPER ABDOMEN: ICD-10-CM

## 2021-10-22 DIAGNOSIS — R93.7 ABNORMAL X-RAY OF THORACIC SPINE: ICD-10-CM

## 2021-10-22 LAB
ALBUMIN SERPL-MCNC: 4.7 G/DL (ref 3.5–5.2)
ALBUMIN/GLOB SERPL: 1.4 G/DL
ALP SERPL-CCNC: 91 U/L (ref 39–117)
ALT SERPL W P-5'-P-CCNC: 22 U/L (ref 1–33)
ANION GAP SERPL CALCULATED.3IONS-SCNC: 17.3 MMOL/L (ref 5–15)
AST SERPL-CCNC: 26 U/L (ref 1–32)
BASOPHILS # BLD AUTO: 0.05 10*3/MM3 (ref 0–0.2)
BASOPHILS NFR BLD AUTO: 0.2 % (ref 0–1.5)
BILIRUB SERPL-MCNC: 0.9 MG/DL (ref 0–1.2)
BUN SERPL-MCNC: 13 MG/DL (ref 8–23)
BUN/CREAT SERPL: 14.9 (ref 7–25)
CALCIUM SPEC-SCNC: 10.6 MG/DL (ref 8.6–10.5)
CHLORIDE SERPL-SCNC: 96 MMOL/L (ref 98–107)
CO2 SERPL-SCNC: 25.7 MMOL/L (ref 22–29)
CREAT SERPL-MCNC: 0.87 MG/DL (ref 0.57–1)
DEPRECATED RDW RBC AUTO: 37.3 FL (ref 37–54)
EOSINOPHIL # BLD AUTO: 0.01 10*3/MM3 (ref 0–0.4)
EOSINOPHIL NFR BLD AUTO: 0 % (ref 0.3–6.2)
ERYTHROCYTE [DISTWIDTH] IN BLOOD BY AUTOMATED COUNT: 14.8 % (ref 12.3–15.4)
FLUAV RNA RESP QL NAA+PROBE: NOT DETECTED
FLUBV RNA RESP QL NAA+PROBE: NOT DETECTED
GFR SERPL CREATININE-BSD FRML MDRD: 63 ML/MIN/1.73
GIANT PLATELETS: ABNORMAL
GLOBULIN UR ELPH-MCNC: 3.4 GM/DL
GLUCOSE SERPL-MCNC: 188 MG/DL (ref 65–99)
HCT VFR BLD AUTO: 45.2 % (ref 34–46.6)
HGB BLD-MCNC: 14 G/DL (ref 12–15.9)
IMM GRANULOCYTES # BLD AUTO: 0.22 10*3/MM3 (ref 0–0.05)
IMM GRANULOCYTES NFR BLD AUTO: 0.8 % (ref 0–0.5)
LYMPHOCYTES # BLD AUTO: 1.36 10*3/MM3 (ref 0.7–3.1)
LYMPHOCYTES # BLD MANUAL: 0.54 10*3/MM3 (ref 0.7–3.1)
LYMPHOCYTES NFR BLD AUTO: 5 % (ref 19.6–45.3)
LYMPHOCYTES NFR BLD MANUAL: 1 % (ref 5–12)
LYMPHOCYTES NFR BLD MANUAL: 2 % (ref 19.6–45.3)
MCH RBC QN AUTO: 22.8 PG (ref 26.6–33)
MCHC RBC AUTO-ENTMCNC: 31 G/DL (ref 31.5–35.7)
MCV RBC AUTO: 73.5 FL (ref 79–97)
MONOCYTES # BLD AUTO: 0.27 10*3/MM3 (ref 0.1–0.9)
MONOCYTES # BLD AUTO: 0.82 10*3/MM3 (ref 0.1–0.9)
MONOCYTES NFR BLD AUTO: 3 % (ref 5–12)
NEUTROPHILS # BLD AUTO: 26.42 10*3/MM3 (ref 1.7–7)
NEUTROPHILS NFR BLD AUTO: 24.78 10*3/MM3 (ref 1.7–7)
NEUTROPHILS NFR BLD AUTO: 91 % (ref 42.7–76)
NEUTROPHILS NFR BLD MANUAL: 95 % (ref 42.7–76)
NEUTS BAND NFR BLD MANUAL: 2 % (ref 0–5)
NRBC BLD AUTO-RTO: 0 /100 WBC (ref 0–0.2)
PLATELET # BLD AUTO: 101 10*3/MM3 (ref 140–450)
PMV BLD AUTO: ABNORMAL FL
POTASSIUM SERPL-SCNC: 3.5 MMOL/L (ref 3.5–5.2)
PROT SERPL-MCNC: 8.1 G/DL (ref 6–8.5)
RBC # BLD AUTO: 6.15 10*6/MM3 (ref 3.77–5.28)
RBC MORPH BLD: NORMAL
SARS-COV-2 RNA RESP QL NAA+PROBE: NOT DETECTED
SODIUM SERPL-SCNC: 139 MMOL/L (ref 136–145)
WBC # BLD AUTO: 27.24 10*3/MM3 (ref 3.4–10.8)
WBC MORPH BLD: NORMAL

## 2021-10-22 PROCEDURE — 83036 HEMOGLOBIN GLYCOSYLATED A1C: CPT | Performed by: NURSE PRACTITIONER

## 2021-10-22 PROCEDURE — 80053 COMPREHEN METABOLIC PANEL: CPT | Performed by: EMERGENCY MEDICINE

## 2021-10-22 PROCEDURE — 82150 ASSAY OF AMYLASE: CPT | Performed by: NURSE PRACTITIONER

## 2021-10-22 PROCEDURE — 85025 COMPLETE CBC W/AUTO DIFF WBC: CPT | Performed by: EMERGENCY MEDICINE

## 2021-10-22 PROCEDURE — 83690 ASSAY OF LIPASE: CPT | Performed by: NURSE PRACTITIONER

## 2021-10-22 PROCEDURE — 84484 ASSAY OF TROPONIN QUANT: CPT | Performed by: NURSE PRACTITIONER

## 2021-10-22 PROCEDURE — 25010000002 ONDANSETRON PER 1 MG: Performed by: EMERGENCY MEDICINE

## 2021-10-22 PROCEDURE — 85007 BL SMEAR W/DIFF WBC COUNT: CPT | Performed by: EMERGENCY MEDICINE

## 2021-10-22 PROCEDURE — 84443 ASSAY THYROID STIM HORMONE: CPT | Performed by: NURSE PRACTITIONER

## 2021-10-22 PROCEDURE — 99284 EMERGENCY DEPT VISIT MOD MDM: CPT

## 2021-10-22 PROCEDURE — 99285 EMERGENCY DEPT VISIT HI MDM: CPT | Performed by: EMERGENCY MEDICINE

## 2021-10-22 PROCEDURE — 84145 PROCALCITONIN (PCT): CPT | Performed by: EMERGENCY MEDICINE

## 2021-10-22 PROCEDURE — 87636 SARSCOV2 & INF A&B AMP PRB: CPT | Performed by: EMERGENCY MEDICINE

## 2021-10-22 RX ORDER — ONDANSETRON 2 MG/ML
8 INJECTION INTRAMUSCULAR; INTRAVENOUS ONCE
Status: COMPLETED | OUTPATIENT
Start: 2021-10-22 | End: 2021-10-22

## 2021-10-22 RX ORDER — SODIUM CHLORIDE 0.9 % (FLUSH) 0.9 %
10 SYRINGE (ML) INJECTION AS NEEDED
Status: DISCONTINUED | OUTPATIENT
Start: 2021-10-22 | End: 2021-11-02 | Stop reason: HOSPADM

## 2021-10-22 RX ADMIN — ONDANSETRON 8 MG: 2 INJECTION INTRAMUSCULAR; INTRAVENOUS at 23:17

## 2021-10-22 RX ADMIN — DIATRIZOATE MEGLUMINE AND DIATRIZOATE SODIUM 30 ML: 600; 100 SOLUTION ORAL; RECTAL at 23:25

## 2021-10-23 ENCOUNTER — APPOINTMENT (OUTPATIENT)
Dept: GENERAL RADIOLOGY | Facility: HOSPITAL | Age: 76
End: 2021-10-23

## 2021-10-23 ENCOUNTER — APPOINTMENT (OUTPATIENT)
Dept: CT IMAGING | Facility: HOSPITAL | Age: 76
End: 2021-10-23

## 2021-10-23 PROBLEM — A41.9 SEPSIS WITHOUT ACUTE ORGAN DYSFUNCTION (HCC): Status: ACTIVE | Noted: 2021-10-23

## 2021-10-23 LAB
ALBUMIN SERPL-MCNC: 3.5 G/DL (ref 3.5–5.2)
ALBUMIN SERPL-MCNC: 3.6 G/DL (ref 3.5–5.2)
ALBUMIN/GLOB SERPL: 1.1 G/DL
ALBUMIN/GLOB SERPL: 1.1 G/DL
ALP SERPL-CCNC: 70 U/L (ref 39–117)
ALP SERPL-CCNC: 74 U/L (ref 39–117)
ALT SERPL W P-5'-P-CCNC: 18 U/L (ref 1–33)
ALT SERPL W P-5'-P-CCNC: 21 U/L (ref 1–33)
AMYLASE SERPL-CCNC: 32 U/L (ref 28–100)
ANION GAP SERPL CALCULATED.3IONS-SCNC: 11.8 MMOL/L (ref 5–15)
ANION GAP SERPL CALCULATED.3IONS-SCNC: 12.7 MMOL/L (ref 5–15)
AST SERPL-CCNC: 22 U/L (ref 1–32)
AST SERPL-CCNC: 30 U/L (ref 1–32)
BACTERIA UR QL AUTO: ABNORMAL /HPF
BASOPHILS # BLD AUTO: 0.05 10*3/MM3 (ref 0–0.2)
BASOPHILS # BLD AUTO: 0.07 10*3/MM3 (ref 0–0.2)
BASOPHILS NFR BLD AUTO: 0.2 % (ref 0–1.5)
BASOPHILS NFR BLD AUTO: 0.2 % (ref 0–1.5)
BILIRUB SERPL-MCNC: 0.8 MG/DL (ref 0–1.2)
BILIRUB SERPL-MCNC: 0.8 MG/DL (ref 0–1.2)
BILIRUB UR QL STRIP: NEGATIVE
BUN SERPL-MCNC: 12 MG/DL (ref 8–23)
BUN SERPL-MCNC: 13 MG/DL (ref 8–23)
BUN/CREAT SERPL: 13.5 (ref 7–25)
BUN/CREAT SERPL: 14.6 (ref 7–25)
CALCIUM SPEC-SCNC: 8.5 MG/DL (ref 8.6–10.5)
CALCIUM SPEC-SCNC: 9 MG/DL (ref 8.6–10.5)
CHLORIDE SERPL-SCNC: 104 MMOL/L (ref 98–107)
CHLORIDE SERPL-SCNC: 99 MMOL/L (ref 98–107)
CLARITY UR: CLEAR
CO2 SERPL-SCNC: 18.3 MMOL/L (ref 22–29)
CO2 SERPL-SCNC: 23.2 MMOL/L (ref 22–29)
COARSE GRAN CASTS URNS QL MICRO: ABNORMAL /LPF
COLOR UR: YELLOW
CREAT SERPL-MCNC: 0.89 MG/DL (ref 0.57–1)
CREAT SERPL-MCNC: 0.89 MG/DL (ref 0.57–1)
CRP SERPL-MCNC: 0.49 MG/DL (ref 0–0.5)
D-LACTATE SERPL-SCNC: 2.7 MMOL/L (ref 0.5–2)
D-LACTATE SERPL-SCNC: 2.9 MMOL/L (ref 0.5–2)
D-LACTATE SERPL-SCNC: 2.9 MMOL/L (ref 0.5–2)
D-LACTATE SERPL-SCNC: 4.3 MMOL/L (ref 0.5–2)
DEPRECATED RDW RBC AUTO: 37.5 FL (ref 37–54)
DEPRECATED RDW RBC AUTO: 39.2 FL (ref 37–54)
EOSINOPHIL # BLD AUTO: 0 10*3/MM3 (ref 0–0.4)
EOSINOPHIL # BLD AUTO: 0.04 10*3/MM3 (ref 0–0.4)
EOSINOPHIL NFR BLD AUTO: 0 % (ref 0.3–6.2)
EOSINOPHIL NFR BLD AUTO: 0.1 % (ref 0.3–6.2)
ERYTHROCYTE [DISTWIDTH] IN BLOOD BY AUTOMATED COUNT: 14.5 % (ref 12.3–15.4)
ERYTHROCYTE [DISTWIDTH] IN BLOOD BY AUTOMATED COUNT: 14.7 % (ref 12.3–15.4)
GFR SERPL CREATININE-BSD FRML MDRD: 62 ML/MIN/1.73
GFR SERPL CREATININE-BSD FRML MDRD: 62 ML/MIN/1.73
GIANT PLATELETS: NORMAL
GLOBULIN UR ELPH-MCNC: 3.3 GM/DL
GLOBULIN UR ELPH-MCNC: 3.4 GM/DL
GLUCOSE SERPL-MCNC: 140 MG/DL (ref 65–99)
GLUCOSE SERPL-MCNC: 173 MG/DL (ref 65–99)
GLUCOSE UR STRIP-MCNC: ABNORMAL MG/DL
HBA1C MFR BLD: 5.4 % (ref 4.8–5.6)
HCT VFR BLD AUTO: 42.2 % (ref 34–46.6)
HCT VFR BLD AUTO: 42.5 % (ref 34–46.6)
HGB BLD-MCNC: 13.1 G/DL (ref 12–15.9)
HGB BLD-MCNC: 13.2 G/DL (ref 12–15.9)
HGB UR QL STRIP.AUTO: ABNORMAL
HYALINE CASTS UR QL AUTO: ABNORMAL /LPF
IMM GRANULOCYTES # BLD AUTO: 0.4 10*3/MM3 (ref 0–0.05)
IMM GRANULOCYTES # BLD AUTO: 0.5 10*3/MM3 (ref 0–0.05)
IMM GRANULOCYTES NFR BLD AUTO: 1.4 % (ref 0–0.5)
IMM GRANULOCYTES NFR BLD AUTO: 1.5 % (ref 0–0.5)
KETONES UR QL STRIP: NEGATIVE
LEUKOCYTE ESTERASE UR QL STRIP.AUTO: NEGATIVE
LIPASE SERPL-CCNC: 17 U/L (ref 13–60)
LYMPHOCYTES # BLD AUTO: 1.23 10*3/MM3 (ref 0.7–3.1)
LYMPHOCYTES # BLD AUTO: 1.72 10*3/MM3 (ref 0.7–3.1)
LYMPHOCYTES NFR BLD AUTO: 3.7 % (ref 19.6–45.3)
LYMPHOCYTES NFR BLD AUTO: 6.2 % (ref 19.6–45.3)
MAGNESIUM SERPL-MCNC: 1.5 MG/DL (ref 1.6–2.4)
MCH RBC QN AUTO: 22.8 PG (ref 26.6–33)
MCH RBC QN AUTO: 23 PG (ref 26.6–33)
MCHC RBC AUTO-ENTMCNC: 30.8 G/DL (ref 31.5–35.7)
MCHC RBC AUTO-ENTMCNC: 31.3 G/DL (ref 31.5–35.7)
MCV RBC AUTO: 72.9 FL (ref 79–97)
MCV RBC AUTO: 74.7 FL (ref 79–97)
MICROCYTES BLD QL: NORMAL
MONOCYTES # BLD AUTO: 0.84 10*3/MM3 (ref 0.1–0.9)
MONOCYTES # BLD AUTO: 1 10*3/MM3 (ref 0.1–0.9)
MONOCYTES NFR BLD AUTO: 2.5 % (ref 5–12)
MONOCYTES NFR BLD AUTO: 3.6 % (ref 5–12)
NEUTROPHILS NFR BLD AUTO: 24.4 10*3/MM3 (ref 1.7–7)
NEUTROPHILS NFR BLD AUTO: 30.64 10*3/MM3 (ref 1.7–7)
NEUTROPHILS NFR BLD AUTO: 88.5 % (ref 42.7–76)
NEUTROPHILS NFR BLD AUTO: 92.1 % (ref 42.7–76)
NITRITE UR QL STRIP: NEGATIVE
NRBC BLD AUTO-RTO: 0 /100 WBC (ref 0–0.2)
NRBC BLD AUTO-RTO: 0.1 /100 WBC (ref 0–0.2)
PH UR STRIP.AUTO: 7 [PH] (ref 4.5–8)
PLATELET # BLD AUTO: 74 10*3/MM3 (ref 140–450)
PLATELET # BLD AUTO: 83 10*3/MM3 (ref 140–450)
PMV BLD AUTO: ABNORMAL FL
POTASSIUM SERPL-SCNC: 3.3 MMOL/L (ref 3.5–5.2)
POTASSIUM SERPL-SCNC: 3.9 MMOL/L (ref 3.5–5.2)
POTASSIUM SERPL-SCNC: 4 MMOL/L (ref 3.5–5.2)
PROCALCITONIN SERPL-MCNC: 0.07 NG/ML (ref 0–0.25)
PROT SERPL-MCNC: 6.8 G/DL (ref 6–8.5)
PROT SERPL-MCNC: 7 G/DL (ref 6–8.5)
PROT UR QL STRIP: ABNORMAL
RBC # BLD AUTO: 5.69 10*6/MM3 (ref 3.77–5.28)
RBC # BLD AUTO: 5.79 10*6/MM3 (ref 3.77–5.28)
RBC # UR: ABNORMAL /HPF
RBC MORPH BLD: NORMAL
REF LAB TEST METHOD: ABNORMAL
SODIUM SERPL-SCNC: 134 MMOL/L (ref 136–145)
SODIUM SERPL-SCNC: 135 MMOL/L (ref 136–145)
SP GR UR STRIP: 1.02 (ref 1–1.03)
SQUAMOUS #/AREA URNS HPF: ABNORMAL /HPF
TROPONIN T SERPL-MCNC: <0.01 NG/ML (ref 0–0.03)
TSH SERPL DL<=0.05 MIU/L-ACNC: 1.21 UIU/ML (ref 0.27–4.2)
UROBILINOGEN UR QL STRIP: ABNORMAL
WBC # BLD AUTO: 27.61 10*3/MM3 (ref 3.4–10.8)
WBC # BLD AUTO: 33.28 10*3/MM3 (ref 3.4–10.8)
WBC MORPH BLD: NORMAL
WBC UR QL AUTO: ABNORMAL /HPF

## 2021-10-23 PROCEDURE — 70498 CT ANGIOGRAPHY NECK: CPT

## 2021-10-23 PROCEDURE — 74177 CT ABD & PELVIS W/CONTRAST: CPT

## 2021-10-23 PROCEDURE — 0 DIATRIZOATE MEGLUMINE & SODIUM PER 1 ML: Performed by: EMERGENCY MEDICINE

## 2021-10-23 PROCEDURE — 70496 CT ANGIOGRAPHY HEAD: CPT

## 2021-10-23 PROCEDURE — 70450 CT HEAD/BRAIN W/O DYE: CPT

## 2021-10-23 PROCEDURE — 25010000002 MAGNESIUM SULFATE IN D5W 1G/100ML (PREMIX) 1-5 GM/100ML-% SOLUTION: Performed by: NURSE PRACTITIONER

## 2021-10-23 PROCEDURE — 25010000002 PIPERACILLIN SOD-TAZOBACTAM PER 1 G

## 2021-10-23 PROCEDURE — 90686 IIV4 VACC NO PRSV 0.5 ML IM: CPT | Performed by: INTERNAL MEDICINE

## 2021-10-23 PROCEDURE — 83735 ASSAY OF MAGNESIUM: CPT | Performed by: NURSE PRACTITIONER

## 2021-10-23 PROCEDURE — 25010000002 METOCLOPRAMIDE PER 10 MG: Performed by: EMERGENCY MEDICINE

## 2021-10-23 PROCEDURE — 83605 ASSAY OF LACTIC ACID: CPT | Performed by: INTERNAL MEDICINE

## 2021-10-23 PROCEDURE — 71046 X-RAY EXAM CHEST 2 VIEWS: CPT

## 2021-10-23 PROCEDURE — 83605 ASSAY OF LACTIC ACID: CPT | Performed by: EMERGENCY MEDICINE

## 2021-10-23 PROCEDURE — 86140 C-REACTIVE PROTEIN: CPT | Performed by: NURSE PRACTITIONER

## 2021-10-23 PROCEDURE — 85025 COMPLETE CBC W/AUTO DIFF WBC: CPT | Performed by: NURSE PRACTITIONER

## 2021-10-23 PROCEDURE — 85007 BL SMEAR W/DIFF WBC COUNT: CPT | Performed by: NURSE PRACTITIONER

## 2021-10-23 PROCEDURE — 25010000002 INFLUENZA VAC SPLIT QUAD 0.5 ML SUSPENSION PREFILLED SYRINGE: Performed by: INTERNAL MEDICINE

## 2021-10-23 PROCEDURE — G0378 HOSPITAL OBSERVATION PER HR: HCPCS

## 2021-10-23 PROCEDURE — 25010000002 LEVETIRACETAM IN NACL 0.82% 500 MG/100ML SOLUTION: Performed by: NURSE PRACTITIONER

## 2021-10-23 PROCEDURE — 83605 ASSAY OF LACTIC ACID: CPT | Performed by: NURSE PRACTITIONER

## 2021-10-23 PROCEDURE — G0008 ADMIN INFLUENZA VIRUS VAC: HCPCS | Performed by: INTERNAL MEDICINE

## 2021-10-23 PROCEDURE — 25010000002 MEROPENEM PER 100 MG

## 2021-10-23 PROCEDURE — 85025 COMPLETE CBC W/AUTO DIFF WBC: CPT | Performed by: INTERNAL MEDICINE

## 2021-10-23 PROCEDURE — 25010000002 MAGNESIUM SULFATE 2 GM/50ML SOLUTION: Performed by: NURSE PRACTITIONER

## 2021-10-23 PROCEDURE — 87633 RESP VIRUS 12-25 TARGETS: CPT | Performed by: INTERNAL MEDICINE

## 2021-10-23 PROCEDURE — 0 IOPAMIDOL PER 1 ML: Performed by: EMERGENCY MEDICINE

## 2021-10-23 PROCEDURE — 81001 URINALYSIS AUTO W/SCOPE: CPT | Performed by: EMERGENCY MEDICINE

## 2021-10-23 PROCEDURE — 0 IOPAMIDOL PER 1 ML: Performed by: INTERNAL MEDICINE

## 2021-10-23 PROCEDURE — 99223 1ST HOSP IP/OBS HIGH 75: CPT | Performed by: NURSE PRACTITIONER

## 2021-10-23 PROCEDURE — 84132 ASSAY OF SERUM POTASSIUM: CPT | Performed by: INTERNAL MEDICINE

## 2021-10-23 PROCEDURE — 25010000002 PIPERACILLIN SOD-TAZOBACTAM PER 1 G: Performed by: NURSE PRACTITIONER

## 2021-10-23 PROCEDURE — 94799 UNLISTED PULMONARY SVC/PX: CPT

## 2021-10-23 PROCEDURE — 25010000002 METHYLPREDNISOLONE PER 125 MG: Performed by: PSYCHIATRY & NEUROLOGY

## 2021-10-23 PROCEDURE — 74018 RADEX ABDOMEN 1 VIEW: CPT

## 2021-10-23 PROCEDURE — 99222 1ST HOSP IP/OBS MODERATE 55: CPT | Performed by: PSYCHIATRY & NEUROLOGY

## 2021-10-23 PROCEDURE — 87040 BLOOD CULTURE FOR BACTERIA: CPT | Performed by: EMERGENCY MEDICINE

## 2021-10-23 PROCEDURE — 80053 COMPREHEN METABOLIC PANEL: CPT | Performed by: NURSE PRACTITIONER

## 2021-10-23 PROCEDURE — 80053 COMPREHEN METABOLIC PANEL: CPT | Performed by: INTERNAL MEDICINE

## 2021-10-23 RX ORDER — ONDANSETRON 4 MG/1
4 TABLET, FILM COATED ORAL EVERY 8 HOURS PRN
Qty: 10 TABLET | Refills: 0 | Status: CANCELLED | OUTPATIENT
Start: 2021-10-23

## 2021-10-23 RX ORDER — ONDANSETRON 4 MG/1
4 TABLET, FILM COATED ORAL EVERY 6 HOURS PRN
Status: DISCONTINUED | OUTPATIENT
Start: 2021-10-23 | End: 2021-11-02 | Stop reason: HOSPADM

## 2021-10-23 RX ORDER — POTASSIUM CHLORIDE 7.45 MG/ML
10 INJECTION INTRAVENOUS
Status: DISCONTINUED | OUTPATIENT
Start: 2021-10-23 | End: 2021-11-02 | Stop reason: HOSPADM

## 2021-10-23 RX ORDER — MAGNESIUM SULFATE HEPTAHYDRATE 40 MG/ML
2 INJECTION, SOLUTION INTRAVENOUS AS NEEDED
Status: DISCONTINUED | OUTPATIENT
Start: 2021-10-23 | End: 2021-11-02 | Stop reason: HOSPADM

## 2021-10-23 RX ORDER — MAGNESIUM SULFATE HEPTAHYDRATE 40 MG/ML
4 INJECTION, SOLUTION INTRAVENOUS AS NEEDED
Status: DISCONTINUED | OUTPATIENT
Start: 2021-10-23 | End: 2021-11-02 | Stop reason: HOSPADM

## 2021-10-23 RX ORDER — METHYLPREDNISOLONE SODIUM SUCCINATE 125 MG/2ML
80 INJECTION, POWDER, LYOPHILIZED, FOR SOLUTION INTRAMUSCULAR; INTRAVENOUS DAILY
Status: COMPLETED | OUTPATIENT
Start: 2021-10-23 | End: 2021-10-24

## 2021-10-23 RX ORDER — SODIUM CHLORIDE 0.9 % (FLUSH) 0.9 %
10 SYRINGE (ML) INJECTION AS NEEDED
Status: DISCONTINUED | OUTPATIENT
Start: 2021-10-23 | End: 2021-11-02 | Stop reason: HOSPADM

## 2021-10-23 RX ORDER — SODIUM CHLORIDE 9 MG/ML
40 INJECTION, SOLUTION INTRAVENOUS AS NEEDED
Status: DISCONTINUED | OUTPATIENT
Start: 2021-10-23 | End: 2021-10-25

## 2021-10-23 RX ORDER — ACETAMINOPHEN 650 MG/1
650 SUPPOSITORY RECTAL EVERY 4 HOURS PRN
Status: DISCONTINUED | OUTPATIENT
Start: 2021-10-23 | End: 2021-11-02 | Stop reason: HOSPADM

## 2021-10-23 RX ORDER — LEVETIRACETAM 5 MG/ML
500 INJECTION INTRAVASCULAR EVERY 12 HOURS SCHEDULED
Status: DISCONTINUED | OUTPATIENT
Start: 2021-10-23 | End: 2021-10-23

## 2021-10-23 RX ORDER — MAGNESIUM SULFATE 1 G/100ML
1 INJECTION INTRAVENOUS AS NEEDED
Status: DISCONTINUED | OUTPATIENT
Start: 2021-10-23 | End: 2021-11-02 | Stop reason: HOSPADM

## 2021-10-23 RX ORDER — NALOXONE HCL 0.4 MG/ML
0.4 VIAL (ML) INJECTION
Status: DISCONTINUED | OUTPATIENT
Start: 2021-10-23 | End: 2021-10-25

## 2021-10-23 RX ORDER — MEROPENEM 1 G/1
INJECTION, POWDER, FOR SOLUTION INTRAVENOUS
Status: COMPLETED
Start: 2021-10-23 | End: 2021-10-23

## 2021-10-23 RX ORDER — POTASSIUM CHLORIDE 20 MEQ/1
40 TABLET, EXTENDED RELEASE ORAL AS NEEDED
Status: DISCONTINUED | OUTPATIENT
Start: 2021-10-23 | End: 2021-11-02 | Stop reason: HOSPADM

## 2021-10-23 RX ORDER — POTASSIUM CHLORIDE 1.5 G/1.77G
40 POWDER, FOR SOLUTION ORAL AS NEEDED
Status: DISCONTINUED | OUTPATIENT
Start: 2021-10-23 | End: 2021-11-02 | Stop reason: HOSPADM

## 2021-10-23 RX ORDER — SODIUM CHLORIDE 0.9 % (FLUSH) 0.9 %
10 SYRINGE (ML) INJECTION EVERY 12 HOURS SCHEDULED
Status: DISCONTINUED | OUTPATIENT
Start: 2021-10-23 | End: 2021-11-02 | Stop reason: HOSPADM

## 2021-10-23 RX ORDER — ACETAMINOPHEN 160 MG/5ML
650 SOLUTION ORAL EVERY 4 HOURS PRN
Status: DISCONTINUED | OUTPATIENT
Start: 2021-10-23 | End: 2021-11-02 | Stop reason: HOSPADM

## 2021-10-23 RX ORDER — SODIUM CHLORIDE 9 MG/ML
INJECTION, SOLUTION INTRAVENOUS
Status: COMPLETED
Start: 2021-10-23 | End: 2021-10-23

## 2021-10-23 RX ORDER — METOCLOPRAMIDE HYDROCHLORIDE 5 MG/ML
10 INJECTION INTRAMUSCULAR; INTRAVENOUS ONCE
Status: COMPLETED | OUTPATIENT
Start: 2021-10-23 | End: 2021-10-23

## 2021-10-23 RX ORDER — FAMOTIDINE 10 MG/ML
20 INJECTION, SOLUTION INTRAVENOUS EVERY 12 HOURS SCHEDULED
Status: DISCONTINUED | OUTPATIENT
Start: 2021-10-23 | End: 2021-10-23

## 2021-10-23 RX ORDER — LEVETIRACETAM 500 MG/1
500 TABLET ORAL EVERY 12 HOURS SCHEDULED
Status: DISCONTINUED | OUTPATIENT
Start: 2021-10-23 | End: 2021-11-02 | Stop reason: HOSPADM

## 2021-10-23 RX ORDER — CLONAZEPAM 0.5 MG/1
0.25 TABLET ORAL 2 TIMES DAILY
Status: DISCONTINUED | OUTPATIENT
Start: 2021-10-23 | End: 2021-10-26

## 2021-10-23 RX ORDER — ONDANSETRON 2 MG/ML
4 INJECTION INTRAMUSCULAR; INTRAVENOUS EVERY 6 HOURS PRN
Status: DISCONTINUED | OUTPATIENT
Start: 2021-10-23 | End: 2021-11-02 | Stop reason: HOSPADM

## 2021-10-23 RX ORDER — CLONAZEPAM 0.5 MG/1
TABLET ORAL
Qty: 8 TABLET | Refills: 0 | Status: CANCELLED | OUTPATIENT
Start: 2021-10-23

## 2021-10-23 RX ORDER — MORPHINE SULFATE 2 MG/ML
1 INJECTION, SOLUTION INTRAMUSCULAR; INTRAVENOUS EVERY 4 HOURS PRN
Status: DISCONTINUED | OUTPATIENT
Start: 2021-10-23 | End: 2021-10-25

## 2021-10-23 RX ORDER — SODIUM CHLORIDE 9 MG/ML
125 INJECTION, SOLUTION INTRAVENOUS CONTINUOUS
Status: DISCONTINUED | OUTPATIENT
Start: 2021-10-23 | End: 2021-10-25

## 2021-10-23 RX ORDER — ACETAMINOPHEN 325 MG/1
650 TABLET ORAL EVERY 4 HOURS PRN
Status: DISCONTINUED | OUTPATIENT
Start: 2021-10-23 | End: 2021-11-02 | Stop reason: HOSPADM

## 2021-10-23 RX ORDER — FAMOTIDINE 20 MG/1
20 TABLET, FILM COATED ORAL
Status: DISCONTINUED | OUTPATIENT
Start: 2021-10-23 | End: 2021-11-02 | Stop reason: HOSPADM

## 2021-10-23 RX ORDER — METRONIDAZOLE 500 MG/1
500 TABLET ORAL 3 TIMES DAILY
Qty: 15 TABLET | Refills: 0 | Status: CANCELLED | OUTPATIENT
Start: 2021-10-23

## 2021-10-23 RX ADMIN — METOPROLOL TARTRATE 5 MG: 5 INJECTION INTRAVENOUS at 21:28

## 2021-10-23 RX ADMIN — SODIUM CHLORIDE 100 ML/HR: 9 INJECTION, SOLUTION INTRAVENOUS at 08:11

## 2021-10-23 RX ADMIN — DIATRIZOATE MEGLUMINE AND DIATRIZOATE SODIUM 30 ML: 600; 100 SOLUTION ORAL; RECTAL at 04:06

## 2021-10-23 RX ADMIN — POTASSIUM CHLORIDE 40 MEQ: 1500 TABLET, EXTENDED RELEASE ORAL at 11:58

## 2021-10-23 RX ADMIN — MEROPENEM 1000 MG: 1 INJECTION, POWDER, FOR SOLUTION INTRAVENOUS at 18:25

## 2021-10-23 RX ADMIN — METHYLPREDNISOLONE SODIUM SUCCINATE 80 MG: 125 INJECTION, POWDER, FOR SOLUTION INTRAMUSCULAR; INTRAVENOUS at 12:37

## 2021-10-23 RX ADMIN — MAGNESIUM SULFATE HEPTAHYDRATE 1 G: 1 INJECTION, SOLUTION INTRAVENOUS at 14:19

## 2021-10-23 RX ADMIN — SODIUM CHLORIDE 3.38 G: 900 INJECTION, SOLUTION INTRAVENOUS at 13:50

## 2021-10-23 RX ADMIN — FAMOTIDINE 20 MG: 20 TABLET ORAL at 17:16

## 2021-10-23 RX ADMIN — SODIUM CHLORIDE 1000 ML: 9 INJECTION, SOLUTION INTRAVENOUS at 07:47

## 2021-10-23 RX ADMIN — POTASSIUM CHLORIDE 40 MEQ: 1500 TABLET, EXTENDED RELEASE ORAL at 08:52

## 2021-10-23 RX ADMIN — METOPROLOL TARTRATE 25 MG: 25 TABLET, FILM COATED ORAL at 00:40

## 2021-10-23 RX ADMIN — METOPROLOL TARTRATE 5 MG: 5 INJECTION INTRAVENOUS at 12:38

## 2021-10-23 RX ADMIN — IOPAMIDOL 100 ML: 755 INJECTION, SOLUTION INTRAVENOUS at 06:56

## 2021-10-23 RX ADMIN — METOCLOPRAMIDE 10 MG: 5 INJECTION, SOLUTION INTRAMUSCULAR; INTRAVENOUS at 01:10

## 2021-10-23 RX ADMIN — SODIUM CHLORIDE 100 ML/HR: 9 INJECTION, SOLUTION INTRAVENOUS at 06:47

## 2021-10-23 RX ADMIN — SODIUM CHLORIDE 1000 ML: 9 INJECTION, SOLUTION INTRAVENOUS at 17:18

## 2021-10-23 RX ADMIN — SODIUM CHLORIDE 1000 ML: 9 INJECTION, SOLUTION INTRAVENOUS at 04:05

## 2021-10-23 RX ADMIN — MAGNESIUM SULFATE HEPTAHYDRATE 2 G: 40 INJECTION, SOLUTION INTRAVENOUS at 10:31

## 2021-10-23 RX ADMIN — LEVETIRACETAM 500 MG: 500 TABLET ORAL at 21:27

## 2021-10-23 RX ADMIN — SODIUM CHLORIDE, PRESERVATIVE FREE 10 ML: 5 INJECTION INTRAVENOUS at 07:48

## 2021-10-23 RX ADMIN — MAGNESIUM SULFATE HEPTAHYDRATE 1 G: 1 INJECTION, SOLUTION INTRAVENOUS at 12:42

## 2021-10-23 RX ADMIN — SODIUM CHLORIDE 3.38 G: 900 INJECTION, SOLUTION INTRAVENOUS at 09:32

## 2021-10-23 RX ADMIN — FAMOTIDINE 20 MG: 10 INJECTION INTRAVENOUS at 08:52

## 2021-10-23 RX ADMIN — CLONAZEPAM 0.25 MG: 0.5 TABLET ORAL at 21:28

## 2021-10-23 RX ADMIN — SODIUM CHLORIDE 100 ML: 900 INJECTION INTRAVENOUS at 18:26

## 2021-10-23 RX ADMIN — IOPAMIDOL 100 ML: 755 INJECTION, SOLUTION INTRAVENOUS at 01:52

## 2021-10-23 RX ADMIN — INFLUENZA VIRUS VACCINE 0.5 ML: 15; 15; 15; 15 SUSPENSION INTRAMUSCULAR at 11:59

## 2021-10-23 RX ADMIN — CLONAZEPAM 0.25 MG: 0.5 TABLET ORAL at 12:38

## 2021-10-23 RX ADMIN — SODIUM CHLORIDE, PRESERVATIVE FREE 10 ML: 5 INJECTION INTRAVENOUS at 22:16

## 2021-10-23 RX ADMIN — SODIUM CHLORIDE 125 ML/HR: 9 INJECTION, SOLUTION INTRAVENOUS at 22:22

## 2021-10-23 RX ADMIN — METOPROLOL TARTRATE 5 MG: 5 INJECTION INTRAVENOUS at 07:47

## 2021-10-23 RX ADMIN — LEVETIRACETAM 500 MG: 5 INJECTION INTRAVENOUS at 08:52

## 2021-10-24 ENCOUNTER — APPOINTMENT (OUTPATIENT)
Dept: ULTRASOUND IMAGING | Facility: HOSPITAL | Age: 76
End: 2021-10-24

## 2021-10-24 ENCOUNTER — APPOINTMENT (OUTPATIENT)
Dept: NUCLEAR MEDICINE | Facility: HOSPITAL | Age: 76
End: 2021-10-24

## 2021-10-24 PROBLEM — K81.0 ACUTE CHOLECYSTITIS: Status: ACTIVE | Noted: 2021-10-24

## 2021-10-24 LAB
ADV 40+41 DNA STL QL NAA+NON-PROBE: NOT DETECTED
ALBUMIN SERPL-MCNC: 3.1 G/DL (ref 3.5–5.2)
ALBUMIN/GLOB SERPL: 1 G/DL
ALP SERPL-CCNC: 59 U/L (ref 39–117)
ALT SERPL W P-5'-P-CCNC: 23 U/L (ref 1–33)
ANION GAP SERPL CALCULATED.3IONS-SCNC: 7.8 MMOL/L (ref 5–15)
AST SERPL-CCNC: 37 U/L (ref 1–32)
ASTRO TYP 1-8 RNA STL QL NAA+NON-PROBE: NOT DETECTED
B PARAPERT DNA SPEC QL NAA+PROBE: NOT DETECTED
B PERT DNA SPEC QL NAA+PROBE: NOT DETECTED
BASOPHILS # BLD AUTO: 0.05 10*3/MM3 (ref 0–0.2)
BASOPHILS NFR BLD AUTO: 0.2 % (ref 0–1.5)
BILIRUB SERPL-MCNC: 0.6 MG/DL (ref 0–1.2)
BUN SERPL-MCNC: 10 MG/DL (ref 8–23)
BUN/CREAT SERPL: 12.7 (ref 7–25)
C CAYETANENSIS DNA STL QL NAA+NON-PROBE: NOT DETECTED
C COLI+JEJ+UPSA DNA STL QL NAA+NON-PROBE: NOT DETECTED
C PNEUM DNA NPH QL NAA+NON-PROBE: NOT DETECTED
CALCIUM SPEC-SCNC: 7.8 MG/DL (ref 8.6–10.5)
CHLORIDE SERPL-SCNC: 107 MMOL/L (ref 98–107)
CO2 SERPL-SCNC: 22.2 MMOL/L (ref 22–29)
CREAT SERPL-MCNC: 0.79 MG/DL (ref 0.57–1)
CRYPTOSP DNA STL QL NAA+NON-PROBE: NOT DETECTED
DEPRECATED RDW RBC AUTO: 38.6 FL (ref 37–54)
E HISTOLYT DNA STL QL NAA+NON-PROBE: NOT DETECTED
EAEC PAA PLAS AGGR+AATA ST NAA+NON-PRB: NOT DETECTED
EC STX1+STX2 GENES STL QL NAA+NON-PROBE: NOT DETECTED
EOSINOPHIL # BLD AUTO: 0 10*3/MM3 (ref 0–0.4)
EOSINOPHIL NFR BLD AUTO: 0 % (ref 0.3–6.2)
EPEC EAE GENE STL QL NAA+NON-PROBE: NOT DETECTED
ERYTHROCYTE [DISTWIDTH] IN BLOOD BY AUTOMATED COUNT: 15 % (ref 12.3–15.4)
ETEC LTA+ST1A+ST1B TOX ST NAA+NON-PROBE: NOT DETECTED
FLUAV SUBTYP SPEC NAA+PROBE: NOT DETECTED
FLUBV RNA ISLT QL NAA+PROBE: NOT DETECTED
G LAMBLIA DNA STL QL NAA+NON-PROBE: NOT DETECTED
GFR SERPL CREATININE-BSD FRML MDRD: 71 ML/MIN/1.73
GLOBULIN UR ELPH-MCNC: 3.2 GM/DL
GLUCOSE SERPL-MCNC: 120 MG/DL (ref 65–99)
HADV DNA SPEC NAA+PROBE: NOT DETECTED
HCOV 229E RNA SPEC QL NAA+PROBE: NOT DETECTED
HCOV HKU1 RNA SPEC QL NAA+PROBE: NOT DETECTED
HCOV NL63 RNA SPEC QL NAA+PROBE: NOT DETECTED
HCOV OC43 RNA SPEC QL NAA+PROBE: NOT DETECTED
HCT VFR BLD AUTO: 37.9 % (ref 34–46.6)
HGB BLD-MCNC: 12 G/DL (ref 12–15.9)
HMPV RNA NPH QL NAA+NON-PROBE: NOT DETECTED
HPIV1 RNA SPEC QL NAA+PROBE: NOT DETECTED
HPIV2 RNA SPEC QL NAA+PROBE: NOT DETECTED
HPIV3 RNA NPH QL NAA+PROBE: NOT DETECTED
HPIV4 P GENE NPH QL NAA+PROBE: NOT DETECTED
IMM GRANULOCYTES # BLD AUTO: 0.56 10*3/MM3 (ref 0–0.05)
IMM GRANULOCYTES NFR BLD AUTO: 1.7 % (ref 0–0.5)
LYMPHOCYTES # BLD AUTO: 1.4 10*3/MM3 (ref 0.7–3.1)
LYMPHOCYTES NFR BLD AUTO: 4.3 % (ref 19.6–45.3)
M PNEUMO IGG SER IA-ACNC: NOT DETECTED
MAGNESIUM SERPL-MCNC: 2.6 MG/DL (ref 1.6–2.4)
MCH RBC QN AUTO: 23 PG (ref 26.6–33)
MCHC RBC AUTO-ENTMCNC: 31.7 G/DL (ref 31.5–35.7)
MCV RBC AUTO: 72.7 FL (ref 79–97)
MONOCYTES # BLD AUTO: 1.47 10*3/MM3 (ref 0.1–0.9)
MONOCYTES NFR BLD AUTO: 4.5 % (ref 5–12)
NEUTROPHILS NFR BLD AUTO: 29.17 10*3/MM3 (ref 1.7–7)
NEUTROPHILS NFR BLD AUTO: 89.3 % (ref 42.7–76)
NOROVIRUS GI+II RNA STL QL NAA+NON-PROBE: NOT DETECTED
NRBC BLD AUTO-RTO: 0 /100 WBC (ref 0–0.2)
P SHIGELLOIDES DNA STL QL NAA+NON-PROBE: NOT DETECTED
PLATELET # BLD AUTO: 56 10*3/MM3 (ref 140–450)
POTASSIUM SERPL-SCNC: 4.1 MMOL/L (ref 3.5–5.2)
PROT SERPL-MCNC: 6.3 G/DL (ref 6–8.5)
QT INTERVAL: 352 MS
RBC # BLD AUTO: 5.21 10*6/MM3 (ref 3.77–5.28)
RHINOVIRUS RNA SPEC NAA+PROBE: NOT DETECTED
RSV RNA NPH QL NAA+NON-PROBE: NOT DETECTED
RVA RNA STL QL NAA+NON-PROBE: NOT DETECTED
S ENT+BONG DNA STL QL NAA+NON-PROBE: NOT DETECTED
SAPO I+II+IV+V RNA STL QL NAA+NON-PROBE: NOT DETECTED
SHIGELLA SP+EIEC IPAH ST NAA+NON-PROBE: NOT DETECTED
SODIUM SERPL-SCNC: 137 MMOL/L (ref 136–145)
V CHOL+PARA+VUL DNA STL QL NAA+NON-PROBE: NOT DETECTED
V CHOLERAE DNA STL QL NAA+NON-PROBE: NOT DETECTED
WBC # BLD AUTO: 32.65 10*3/MM3 (ref 3.4–10.8)
Y ENTEROCOL DNA STL QL NAA+NON-PROBE: NOT DETECTED

## 2021-10-24 PROCEDURE — 97161 PT EVAL LOW COMPLEX 20 MIN: CPT | Performed by: PHYSICAL THERAPIST

## 2021-10-24 PROCEDURE — A9537 TC99M MEBROFENIN: HCPCS | Performed by: INTERNAL MEDICINE

## 2021-10-24 PROCEDURE — 85025 COMPLETE CBC W/AUTO DIFF WBC: CPT | Performed by: NURSE PRACTITIONER

## 2021-10-24 PROCEDURE — 76705 ECHO EXAM OF ABDOMEN: CPT

## 2021-10-24 PROCEDURE — 25010000002 METHYLPREDNISOLONE PER 125 MG: Performed by: PSYCHIATRY & NEUROLOGY

## 2021-10-24 PROCEDURE — 0 TECHNETIUM TC 99M MEBROFENIN KIT: Performed by: INTERNAL MEDICINE

## 2021-10-24 PROCEDURE — 99233 SBSQ HOSP IP/OBS HIGH 50: CPT | Performed by: INTERNAL MEDICINE

## 2021-10-24 PROCEDURE — 0097U HC BIOFIRE FILMARRAY GI PANEL: CPT | Performed by: NURSE PRACTITIONER

## 2021-10-24 PROCEDURE — 80053 COMPREHEN METABOLIC PANEL: CPT | Performed by: NURSE PRACTITIONER

## 2021-10-24 PROCEDURE — 25010000002 MEROPENEM PER 100 MG

## 2021-10-24 PROCEDURE — 83735 ASSAY OF MAGNESIUM: CPT | Performed by: INTERNAL MEDICINE

## 2021-10-24 PROCEDURE — 93005 ELECTROCARDIOGRAM TRACING: CPT | Performed by: SURGERY

## 2021-10-24 PROCEDURE — 78226 HEPATOBILIARY SYSTEM IMAGING: CPT

## 2021-10-24 PROCEDURE — 94799 UNLISTED PULMONARY SVC/PX: CPT

## 2021-10-24 PROCEDURE — 25010000002 MEROPENEM PER 100 MG: Performed by: INTERNAL MEDICINE

## 2021-10-24 PROCEDURE — 99223 1ST HOSP IP/OBS HIGH 75: CPT | Performed by: INTERNAL MEDICINE

## 2021-10-24 PROCEDURE — 93010 ELECTROCARDIOGRAM REPORT: CPT | Performed by: INTERNAL MEDICINE

## 2021-10-24 PROCEDURE — 99222 1ST HOSP IP/OBS MODERATE 55: CPT | Performed by: SURGERY

## 2021-10-24 RX ORDER — MEROPENEM 1 G/1
INJECTION, POWDER, FOR SOLUTION INTRAVENOUS
Status: COMPLETED
Start: 2021-10-24 | End: 2021-10-24

## 2021-10-24 RX ORDER — KIT FOR THE PREPARATION OF TECHNETIUM TC 99M MEBROFENIN 45 MG/10ML
1 INJECTION, POWDER, LYOPHILIZED, FOR SOLUTION INTRAVENOUS
Status: COMPLETED | OUTPATIENT
Start: 2021-10-24 | End: 2021-10-24

## 2021-10-24 RX ORDER — SODIUM CHLORIDE 9 MG/ML
INJECTION, SOLUTION INTRAVENOUS
Status: DISPENSED
Start: 2021-10-24 | End: 2021-10-24

## 2021-10-24 RX ADMIN — METOPROLOL TARTRATE 5 MG: 5 INJECTION INTRAVENOUS at 17:27

## 2021-10-24 RX ADMIN — SODIUM CHLORIDE 125 ML/HR: 9 INJECTION, SOLUTION INTRAVENOUS at 08:47

## 2021-10-24 RX ADMIN — CLONAZEPAM 0.25 MG: 0.5 TABLET ORAL at 22:04

## 2021-10-24 RX ADMIN — MEBROFENIN 1 DOSE: 45 INJECTION, POWDER, LYOPHILIZED, FOR SOLUTION INTRAVENOUS at 16:25

## 2021-10-24 RX ADMIN — SODIUM CHLORIDE, PRESERVATIVE FREE 10 ML: 5 INJECTION INTRAVENOUS at 22:04

## 2021-10-24 RX ADMIN — SODIUM CHLORIDE 125 ML/HR: 9 INJECTION, SOLUTION INTRAVENOUS at 17:39

## 2021-10-24 RX ADMIN — MEROPENEM 1 G: 1 INJECTION, POWDER, FOR SOLUTION INTRAVENOUS at 08:48

## 2021-10-24 RX ADMIN — METOPROLOL TARTRATE 5 MG: 5 INJECTION INTRAVENOUS at 22:04

## 2021-10-24 RX ADMIN — FAMOTIDINE 20 MG: 20 TABLET ORAL at 08:42

## 2021-10-24 RX ADMIN — FAMOTIDINE 20 MG: 20 TABLET ORAL at 18:03

## 2021-10-24 RX ADMIN — LEVETIRACETAM 500 MG: 500 TABLET ORAL at 08:42

## 2021-10-24 RX ADMIN — MEROPENEM 1000 MG: 1 INJECTION, POWDER, FOR SOLUTION INTRAVENOUS at 00:30

## 2021-10-24 RX ADMIN — LEVETIRACETAM 500 MG: 500 TABLET ORAL at 22:04

## 2021-10-24 RX ADMIN — CLONAZEPAM 0.25 MG: 0.5 TABLET ORAL at 08:42

## 2021-10-24 RX ADMIN — MEROPENEM 1 G: 1 INJECTION, POWDER, FOR SOLUTION INTRAVENOUS at 17:28

## 2021-10-24 RX ADMIN — METOPROLOL TARTRATE 5 MG: 5 INJECTION INTRAVENOUS at 06:37

## 2021-10-24 RX ADMIN — METHYLPREDNISOLONE SODIUM SUCCINATE 80 MG: 125 INJECTION, POWDER, FOR SOLUTION INTRAMUSCULAR; INTRAVENOUS at 08:43

## 2021-10-24 RX ADMIN — SODIUM CHLORIDE, PRESERVATIVE FREE 10 ML: 5 INJECTION INTRAVENOUS at 08:43

## 2021-10-24 RX ADMIN — ACETAMINOPHEN 650 MG: 325 TABLET ORAL at 00:36

## 2021-10-25 ENCOUNTER — ANESTHESIA EVENT (OUTPATIENT)
Dept: PERIOP | Facility: HOSPITAL | Age: 76
End: 2021-10-25

## 2021-10-25 ENCOUNTER — ANESTHESIA (OUTPATIENT)
Dept: PERIOP | Facility: HOSPITAL | Age: 76
End: 2021-10-25

## 2021-10-25 ENCOUNTER — HOSPITAL ENCOUNTER (OUTPATIENT)
Dept: INFUSION THERAPY | Facility: HOSPITAL | Age: 76
Discharge: HOME | End: 2021-10-25

## 2021-10-25 DIAGNOSIS — M81.0 AGE-RELATED OSTEOPOROSIS WITHOUT CURRENT PATHOLOGICAL FRACTURE: Primary | ICD-10-CM

## 2021-10-25 LAB
ALBUMIN SERPL-MCNC: 3 G/DL (ref 3.5–5.2)
ALBUMIN/GLOB SERPL: 1 G/DL
ALP SERPL-CCNC: 62 U/L (ref 39–117)
ALT SERPL W P-5'-P-CCNC: 28 U/L (ref 1–33)
ANION GAP SERPL CALCULATED.3IONS-SCNC: 7 MMOL/L (ref 5–15)
AST SERPL-CCNC: 50 U/L (ref 1–32)
BASOPHILS # BLD AUTO: 0.05 10*3/MM3 (ref 0–0.2)
BASOPHILS NFR BLD AUTO: 0.2 % (ref 0–1.5)
BILIRUB SERPL-MCNC: 0.6 MG/DL (ref 0–1.2)
BUN SERPL-MCNC: 10 MG/DL (ref 8–23)
BUN/CREAT SERPL: 15.2 (ref 7–25)
CALCIUM SPEC-SCNC: 7.7 MG/DL (ref 8.6–10.5)
CANCER AG15-3 SERPL-ACNC: 15.1 U/ML
CHLORIDE SERPL-SCNC: 111 MMOL/L (ref 98–107)
CO2 SERPL-SCNC: 22 MMOL/L (ref 22–29)
CREAT SERPL-MCNC: 0.66 MG/DL (ref 0.57–1)
DEPRECATED RDW RBC AUTO: 40.5 FL (ref 37–54)
EOSINOPHIL # BLD AUTO: 0 10*3/MM3 (ref 0–0.4)
EOSINOPHIL NFR BLD AUTO: 0 % (ref 0.3–6.2)
ERYTHROCYTE [DISTWIDTH] IN BLOOD BY AUTOMATED COUNT: 15.2 % (ref 12.3–15.4)
GFR SERPL CREATININE-BSD FRML MDRD: 87 ML/MIN/1.73
GLOBULIN UR ELPH-MCNC: 3 GM/DL
GLUCOSE SERPL-MCNC: 133 MG/DL (ref 65–99)
HCT VFR BLD AUTO: 34.9 % (ref 34–46.6)
HGB BLD-MCNC: 10.7 G/DL (ref 12–15.9)
IMM GRANULOCYTES # BLD AUTO: 0.57 10*3/MM3 (ref 0–0.05)
IMM GRANULOCYTES NFR BLD AUTO: 2.3 % (ref 0–0.5)
LDH SERPL-CCNC: 347 U/L (ref 135–214)
LYMPHOCYTES # BLD AUTO: 1 10*3/MM3 (ref 0.7–3.1)
LYMPHOCYTES NFR BLD AUTO: 4.1 % (ref 19.6–45.3)
MCH RBC QN AUTO: 22.6 PG (ref 26.6–33)
MCHC RBC AUTO-ENTMCNC: 30.7 G/DL (ref 31.5–35.7)
MCV RBC AUTO: 73.8 FL (ref 79–97)
MONOCYTES # BLD AUTO: 1.09 10*3/MM3 (ref 0.1–0.9)
MONOCYTES NFR BLD AUTO: 4.5 % (ref 5–12)
NEUTROPHILS NFR BLD AUTO: 21.58 10*3/MM3 (ref 1.7–7)
NEUTROPHILS NFR BLD AUTO: 88.9 % (ref 42.7–76)
NRBC BLD AUTO-RTO: 0.2 /100 WBC (ref 0–0.2)
PLATELET # BLD AUTO: 59 10*3/MM3 (ref 140–450)
PLATELETS.RETICULATED NFR BLD AUTO: 32 % (ref 0.9–6.5)
PMV BLD AUTO: ABNORMAL FL
POTASSIUM SERPL-SCNC: 3.7 MMOL/L (ref 3.5–5.2)
PROT SERPL-MCNC: 6 G/DL (ref 6–8.5)
RBC # BLD AUTO: 4.73 10*6/MM3 (ref 3.77–5.28)
SODIUM SERPL-SCNC: 140 MMOL/L (ref 136–145)
WBC # BLD AUTO: 24.29 10*3/MM3 (ref 3.4–10.8)

## 2021-10-25 PROCEDURE — 25010000002 DEXAMETHASONE PER 1 MG: Performed by: REGISTERED NURSE

## 2021-10-25 PROCEDURE — 83615 LACTATE (LD) (LDH) ENZYME: CPT | Performed by: INTERNAL MEDICINE

## 2021-10-25 PROCEDURE — 88304 TISSUE EXAM BY PATHOLOGIST: CPT | Performed by: SURGERY

## 2021-10-25 PROCEDURE — 25010000002 FENTANYL CITRATE (PF) 50 MCG/ML SOLUTION: Performed by: REGISTERED NURSE

## 2021-10-25 PROCEDURE — 25010000002 PROPOFOL 10 MG/ML EMULSION: Performed by: REGISTERED NURSE

## 2021-10-25 PROCEDURE — 80053 COMPREHEN METABOLIC PANEL: CPT | Performed by: NURSE PRACTITIONER

## 2021-10-25 PROCEDURE — 84165 PROTEIN E-PHORESIS SERUM: CPT | Performed by: INTERNAL MEDICINE

## 2021-10-25 PROCEDURE — 83883 ASSAY NEPHELOMETRY NOT SPEC: CPT | Performed by: INTERNAL MEDICINE

## 2021-10-25 PROCEDURE — 94640 AIRWAY INHALATION TREATMENT: CPT

## 2021-10-25 PROCEDURE — 47562 LAPAROSCOPIC CHOLECYSTECTOMY: CPT | Performed by: SURGERY

## 2021-10-25 PROCEDURE — P9100 PATHOGEN TEST FOR PLATELETS: HCPCS

## 2021-10-25 PROCEDURE — 86300 IMMUNOASSAY TUMOR CA 15-3: CPT | Performed by: INTERNAL MEDICINE

## 2021-10-25 PROCEDURE — 25010000002 SUCCINYLCHOLINE PER 20 MG: Performed by: REGISTERED NURSE

## 2021-10-25 PROCEDURE — 0FT44ZZ RESECTION OF GALLBLADDER, PERCUTANEOUS ENDOSCOPIC APPROACH: ICD-10-PCS | Performed by: SURGERY

## 2021-10-25 PROCEDURE — 85055 RETICULATED PLATELET ASSAY: CPT | Performed by: INTERNAL MEDICINE

## 2021-10-25 PROCEDURE — 82784 ASSAY IGA/IGD/IGG/IGM EACH: CPT | Performed by: INTERNAL MEDICINE

## 2021-10-25 PROCEDURE — 25010000002 NEOSTIGMINE 10 MG/10ML SOLUTION: Performed by: REGISTERED NURSE

## 2021-10-25 PROCEDURE — C1889 IMPLANT/INSERT DEVICE, NOC: HCPCS | Performed by: SURGERY

## 2021-10-25 PROCEDURE — 99232 SBSQ HOSP IP/OBS MODERATE 35: CPT | Performed by: NURSE PRACTITIONER

## 2021-10-25 PROCEDURE — 85025 COMPLETE CBC W/AUTO DIFF WBC: CPT | Performed by: NURSE PRACTITIONER

## 2021-10-25 PROCEDURE — 99024 POSTOP FOLLOW-UP VISIT: CPT | Performed by: SURGERY

## 2021-10-25 PROCEDURE — 99233 SBSQ HOSP IP/OBS HIGH 50: CPT | Performed by: INTERNAL MEDICINE

## 2021-10-25 PROCEDURE — 25010000002 ONDANSETRON PER 1 MG: Performed by: REGISTERED NURSE

## 2021-10-25 PROCEDURE — 25010000002 PHENYLEPHRINE 10 MG/ML SOLUTION: Performed by: REGISTERED NURSE

## 2021-10-25 PROCEDURE — 86334 IMMUNOFIX E-PHORESIS SERUM: CPT | Performed by: INTERNAL MEDICINE

## 2021-10-25 PROCEDURE — P9035 PLATELET PHERES LEUKOREDUCED: HCPCS

## 2021-10-25 PROCEDURE — 25010000002 MEROPENEM PER 100 MG: Performed by: SURGERY

## 2021-10-25 PROCEDURE — 36430 TRANSFUSION BLD/BLD COMPNT: CPT

## 2021-10-25 PROCEDURE — 25010000002 MEROPENEM PER 100 MG: Performed by: INTERNAL MEDICINE

## 2021-10-25 DEVICE — LIGAMAX 5 MM ENDOSCOPIC MULTIPLE CLIP APPLIER
Type: IMPLANTABLE DEVICE | Site: ABDOMEN | Status: FUNCTIONAL
Brand: LIGAMAX

## 2021-10-25 DEVICE — FLOSEAL HEMOSTATIC MATRIX, 5ML
Type: IMPLANTABLE DEVICE | Site: ABDOMEN | Status: FUNCTIONAL
Brand: FLOSEAL HEMOSTATIC MATRIX

## 2021-10-25 RX ORDER — SODIUM CHLORIDE, SODIUM LACTATE, POTASSIUM CHLORIDE, CALCIUM CHLORIDE 600; 310; 30; 20 MG/100ML; MG/100ML; MG/100ML; MG/100ML
100 INJECTION, SOLUTION INTRAVENOUS CONTINUOUS
Status: DISCONTINUED | OUTPATIENT
Start: 2021-10-25 | End: 2021-10-25

## 2021-10-25 RX ORDER — ONDANSETRON 2 MG/ML
4 INJECTION INTRAMUSCULAR; INTRAVENOUS ONCE AS NEEDED
Status: DISCONTINUED | OUTPATIENT
Start: 2021-10-25 | End: 2021-11-02 | Stop reason: HOSPADM

## 2021-10-25 RX ORDER — IPRATROPIUM BROMIDE AND ALBUTEROL SULFATE 2.5; .5 MG/3ML; MG/3ML
3 SOLUTION RESPIRATORY (INHALATION) ONCE
Status: COMPLETED | OUTPATIENT
Start: 2021-10-25 | End: 2021-10-25

## 2021-10-25 RX ORDER — LIDOCAINE HYDROCHLORIDE AND EPINEPHRINE 10; 10 MG/ML; UG/ML
INJECTION, SOLUTION INFILTRATION; PERINEURAL AS NEEDED
Status: DISCONTINUED | OUTPATIENT
Start: 2021-10-25 | End: 2021-10-25 | Stop reason: HOSPADM

## 2021-10-25 RX ORDER — LIDOCAINE HYDROCHLORIDE 20 MG/ML
INJECTION, SOLUTION INFILTRATION; PERINEURAL AS NEEDED
Status: DISCONTINUED | OUTPATIENT
Start: 2021-10-25 | End: 2021-10-25 | Stop reason: SURG

## 2021-10-25 RX ORDER — MORPHINE SULFATE 2 MG/ML
2 INJECTION, SOLUTION INTRAMUSCULAR; INTRAVENOUS
Status: DISCONTINUED | OUTPATIENT
Start: 2021-10-25 | End: 2021-10-27

## 2021-10-25 RX ORDER — SODIUM CHLORIDE 0.9 % (FLUSH) 0.9 %
10 SYRINGE (ML) INJECTION AS NEEDED
Status: DISCONTINUED | OUTPATIENT
Start: 2021-10-25 | End: 2021-10-25 | Stop reason: HOSPADM

## 2021-10-25 RX ORDER — SODIUM CHLORIDE, SODIUM LACTATE, POTASSIUM CHLORIDE, CALCIUM CHLORIDE 600; 310; 30; 20 MG/100ML; MG/100ML; MG/100ML; MG/100ML
9 INJECTION, SOLUTION INTRAVENOUS CONTINUOUS
Status: DISCONTINUED | OUTPATIENT
Start: 2021-10-25 | End: 2021-10-25

## 2021-10-25 RX ORDER — DEXMEDETOMIDINE HYDROCHLORIDE 100 UG/ML
INJECTION, SOLUTION INTRAVENOUS AS NEEDED
Status: DISCONTINUED | OUTPATIENT
Start: 2021-10-25 | End: 2021-10-25 | Stop reason: SURG

## 2021-10-25 RX ORDER — HYDROCODONE BITARTRATE AND ACETAMINOPHEN 5; 325 MG/1; MG/1
1 TABLET ORAL ONCE AS NEEDED
Status: DISCONTINUED | OUTPATIENT
Start: 2021-10-25 | End: 2021-10-25

## 2021-10-25 RX ORDER — SUCCINYLCHOLINE CHLORIDE 20 MG/ML
INJECTION INTRAMUSCULAR; INTRAVENOUS AS NEEDED
Status: DISCONTINUED | OUTPATIENT
Start: 2021-10-25 | End: 2021-10-25 | Stop reason: SURG

## 2021-10-25 RX ORDER — ONDANSETRON 2 MG/ML
4 INJECTION INTRAMUSCULAR; INTRAVENOUS ONCE AS NEEDED
Status: COMPLETED | OUTPATIENT
Start: 2021-10-25 | End: 2021-10-25

## 2021-10-25 RX ORDER — ROCURONIUM BROMIDE 10 MG/ML
INJECTION, SOLUTION INTRAVENOUS AS NEEDED
Status: DISCONTINUED | OUTPATIENT
Start: 2021-10-25 | End: 2021-10-25 | Stop reason: SURG

## 2021-10-25 RX ORDER — OXYCODONE HYDROCHLORIDE AND ACETAMINOPHEN 5; 325 MG/1; MG/1
1 TABLET ORAL EVERY 4 HOURS PRN
Status: DISCONTINUED | OUTPATIENT
Start: 2021-10-25 | End: 2021-10-27

## 2021-10-25 RX ORDER — MAGNESIUM HYDROXIDE 1200 MG/15ML
LIQUID ORAL AS NEEDED
Status: DISCONTINUED | OUTPATIENT
Start: 2021-10-25 | End: 2021-10-25 | Stop reason: HOSPADM

## 2021-10-25 RX ORDER — SODIUM CHLORIDE 0.9 % (FLUSH) 0.9 %
10 SYRINGE (ML) INJECTION EVERY 12 HOURS SCHEDULED
Status: DISCONTINUED | OUTPATIENT
Start: 2021-10-25 | End: 2021-10-25 | Stop reason: HOSPADM

## 2021-10-25 RX ORDER — LIDOCAINE HYDROCHLORIDE 10 MG/ML
0.5 INJECTION, SOLUTION EPIDURAL; INFILTRATION; INTRACAUDAL; PERINEURAL ONCE AS NEEDED
Status: DISCONTINUED | OUTPATIENT
Start: 2021-10-25 | End: 2021-10-25 | Stop reason: HOSPADM

## 2021-10-25 RX ORDER — FENTANYL CITRATE 50 UG/ML
25 INJECTION, SOLUTION INTRAMUSCULAR; INTRAVENOUS
Status: DISCONTINUED | OUTPATIENT
Start: 2021-10-25 | End: 2021-10-25

## 2021-10-25 RX ORDER — PROPOFOL 10 MG/ML
VIAL (ML) INTRAVENOUS AS NEEDED
Status: DISCONTINUED | OUTPATIENT
Start: 2021-10-25 | End: 2021-10-25 | Stop reason: SURG

## 2021-10-25 RX ORDER — MORPHINE SULFATE 2 MG/ML
1 INJECTION, SOLUTION INTRAMUSCULAR; INTRAVENOUS
Status: DISCONTINUED | OUTPATIENT
Start: 2021-10-25 | End: 2021-11-02 | Stop reason: HOSPADM

## 2021-10-25 RX ORDER — DEXAMETHASONE SODIUM PHOSPHATE 4 MG/ML
8 INJECTION, SOLUTION INTRA-ARTICULAR; INTRALESIONAL; INTRAMUSCULAR; INTRAVENOUS; SOFT TISSUE ONCE AS NEEDED
Status: COMPLETED | OUTPATIENT
Start: 2021-10-25 | End: 2021-10-25

## 2021-10-25 RX ORDER — PHENYLEPHRINE HYDROCHLORIDE 10 MG/ML
INJECTION INTRAVENOUS AS NEEDED
Status: DISCONTINUED | OUTPATIENT
Start: 2021-10-25 | End: 2021-10-25 | Stop reason: SURG

## 2021-10-25 RX ORDER — FAMOTIDINE 10 MG/ML
20 INJECTION, SOLUTION INTRAVENOUS
Status: COMPLETED | OUTPATIENT
Start: 2021-10-25 | End: 2021-10-25

## 2021-10-25 RX ORDER — NEOSTIGMINE METHYLSULFATE 1 MG/ML
INJECTION, SOLUTION INTRAVENOUS AS NEEDED
Status: DISCONTINUED | OUTPATIENT
Start: 2021-10-25 | End: 2021-10-25 | Stop reason: SURG

## 2021-10-25 RX ORDER — MORPHINE SULFATE 1 MG/ML
1 INJECTION, SOLUTION EPIDURAL; INTRATHECAL; INTRAVENOUS
Status: DISCONTINUED | OUTPATIENT
Start: 2021-10-25 | End: 2021-10-25 | Stop reason: CLARIF

## 2021-10-25 RX ORDER — SODIUM CHLORIDE, SODIUM LACTATE, POTASSIUM CHLORIDE, CALCIUM CHLORIDE 600; 310; 30; 20 MG/100ML; MG/100ML; MG/100ML; MG/100ML
75 INJECTION, SOLUTION INTRAVENOUS CONTINUOUS
Status: DISCONTINUED | OUTPATIENT
Start: 2021-10-25 | End: 2021-10-27

## 2021-10-25 RX ORDER — FENTANYL CITRATE 50 UG/ML
INJECTION, SOLUTION INTRAMUSCULAR; INTRAVENOUS AS NEEDED
Status: DISCONTINUED | OUTPATIENT
Start: 2021-10-25 | End: 2021-10-25 | Stop reason: SURG

## 2021-10-25 RX ORDER — SODIUM CHLORIDE 9 MG/ML
40 INJECTION, SOLUTION INTRAVENOUS AS NEEDED
Status: DISCONTINUED | OUTPATIENT
Start: 2021-10-25 | End: 2021-10-25 | Stop reason: HOSPADM

## 2021-10-25 RX ORDER — GLYCOPYRROLATE 0.2 MG/ML
INJECTION INTRAMUSCULAR; INTRAVENOUS AS NEEDED
Status: DISCONTINUED | OUTPATIENT
Start: 2021-10-25 | End: 2021-10-25 | Stop reason: SURG

## 2021-10-25 RX ORDER — KETAMINE HYDROCHLORIDE 100 MG/ML
INJECTION INTRAMUSCULAR; INTRAVENOUS AS NEEDED
Status: DISCONTINUED | OUTPATIENT
Start: 2021-10-25 | End: 2021-10-25 | Stop reason: SURG

## 2021-10-25 RX ADMIN — OXYCODONE HYDROCHLORIDE AND ACETAMINOPHEN 1 TABLET: 5; 325 TABLET ORAL at 16:27

## 2021-10-25 RX ADMIN — FENTANYL CITRATE 50 MCG: 50 INJECTION INTRAMUSCULAR; INTRAVENOUS at 10:16

## 2021-10-25 RX ADMIN — SODIUM CHLORIDE, POTASSIUM CHLORIDE, SODIUM LACTATE AND CALCIUM CHLORIDE 100 ML/HR: 600; 310; 30; 20 INJECTION, SOLUTION INTRAVENOUS at 13:55

## 2021-10-25 RX ADMIN — KETAMINE HYDROCHLORIDE 10 MG: 100 INJECTION INTRAMUSCULAR; INTRAVENOUS at 09:27

## 2021-10-25 RX ADMIN — NEOSTIGMINE METHYLSULFATE 5 MG: 1 INJECTION INTRAVENOUS at 11:06

## 2021-10-25 RX ADMIN — LIDOCAINE HYDROCHLORIDE 50 MG: 20 INJECTION, SOLUTION INFILTRATION; PERINEURAL at 09:27

## 2021-10-25 RX ADMIN — FAMOTIDINE 20 MG: 10 INJECTION, SOLUTION INTRAVENOUS at 09:03

## 2021-10-25 RX ADMIN — LEVETIRACETAM 500 MG: 500 TABLET ORAL at 20:40

## 2021-10-25 RX ADMIN — CLONAZEPAM 0.25 MG: 0.5 TABLET ORAL at 20:40

## 2021-10-25 RX ADMIN — KETAMINE HYDROCHLORIDE 10 MG: 100 INJECTION INTRAMUSCULAR; INTRAVENOUS at 10:16

## 2021-10-25 RX ADMIN — ONDANSETRON 4 MG: 2 INJECTION INTRAMUSCULAR; INTRAVENOUS at 09:02

## 2021-10-25 RX ADMIN — MEROPENEM 1 G: 1 INJECTION, POWDER, FOR SOLUTION INTRAVENOUS at 15:51

## 2021-10-25 RX ADMIN — SUCCINYLCHOLINE CHLORIDE 100 MG: 20 INJECTION, SOLUTION INTRAMUSCULAR; INTRAVENOUS at 09:27

## 2021-10-25 RX ADMIN — LEVETIRACETAM 500 MG: 500 TABLET ORAL at 13:55

## 2021-10-25 RX ADMIN — DEXMEDETOMIDINE 4 MCG: 100 INJECTION, SOLUTION, CONCENTRATE INTRAVENOUS at 09:30

## 2021-10-25 RX ADMIN — METOPROLOL TARTRATE 5 MG: 5 INJECTION INTRAVENOUS at 15:51

## 2021-10-25 RX ADMIN — PROPOFOL 150 MG: 10 INJECTION, EMULSION INTRAVENOUS at 09:27

## 2021-10-25 RX ADMIN — FENTANYL CITRATE 50 MCG: 50 INJECTION INTRAMUSCULAR; INTRAVENOUS at 09:27

## 2021-10-25 RX ADMIN — FAMOTIDINE 20 MG: 20 TABLET ORAL at 16:32

## 2021-10-25 RX ADMIN — GLYCOPYRROLATE 0.8 MG: 0.2 INJECTION INTRAMUSCULAR; INTRAVENOUS at 11:06

## 2021-10-25 RX ADMIN — DEXMEDETOMIDINE 4 MCG: 100 INJECTION, SOLUTION, CONCENTRATE INTRAVENOUS at 10:00

## 2021-10-25 RX ADMIN — MEROPENEM 1 G: 1 INJECTION, POWDER, FOR SOLUTION INTRAVENOUS at 01:15

## 2021-10-25 RX ADMIN — ROCURONIUM BROMIDE 40 MG: 10 INJECTION INTRAVENOUS at 09:30

## 2021-10-25 RX ADMIN — PHENYLEPHRINE HYDROCHLORIDE 100 MCG: 10 INJECTION INTRAVENOUS at 09:48

## 2021-10-25 RX ADMIN — MEROPENEM 1 G: 1 INJECTION, POWDER, FOR SOLUTION INTRAVENOUS at 09:26

## 2021-10-25 RX ADMIN — METOPROLOL TARTRATE 5 MG: 5 INJECTION INTRAVENOUS at 22:54

## 2021-10-25 RX ADMIN — SODIUM CHLORIDE, PRESERVATIVE FREE 10 ML: 5 INJECTION INTRAVENOUS at 20:40

## 2021-10-25 RX ADMIN — SODIUM CHLORIDE 125 ML/HR: 9 INJECTION, SOLUTION INTRAVENOUS at 03:08

## 2021-10-25 RX ADMIN — SODIUM CHLORIDE, POTASSIUM CHLORIDE, SODIUM LACTATE AND CALCIUM CHLORIDE 9 ML/HR: 600; 310; 30; 20 INJECTION, SOLUTION INTRAVENOUS at 09:03

## 2021-10-25 RX ADMIN — IPRATROPIUM BROMIDE AND ALBUTEROL SULFATE 3 ML: .5; 3 SOLUTION RESPIRATORY (INHALATION) at 09:10

## 2021-10-25 RX ADMIN — DEXAMETHASONE SODIUM PHOSPHATE 8 MG: 4 INJECTION, SOLUTION INTRAMUSCULAR; INTRAVENOUS at 09:03

## 2021-10-25 RX ADMIN — DEXMEDETOMIDINE 4 MCG: 100 INJECTION, SOLUTION, CONCENTRATE INTRAVENOUS at 10:16

## 2021-10-25 NOTE — ANESTHESIA PREPROCEDURE EVALUATION
Anesthesia Evaluation     Patient summary reviewed and Nursing notes reviewed   history of anesthetic complications: prolonged sedation  NPO Solid Status: > 8 hours  NPO Liquid Status: > 8 hours           Airway   Mallampati: II  TM distance: <3 FB  Neck ROM: full  Possible difficult intubation  Dental - normal exam     Pulmonary - negative pulmonary ROS   (+) wheezes,   Cardiovascular - normal exam    ECG reviewed    (+) hypertension poorly controlled, hyperlipidemia,     ROS comment: BORDERLINE ECG -  Sinus rhythm  Borderline T abnormalities, inferior leads  NO PRIOR TRACING AVAILABLE FOR COMPARISON  Electronically Signed By: Caesar Martinez ([facility]) 24-Oct-2021 21:46:58  Date and Time of Study: 2021-10-24 18:14:30    Neuro/Psych  (+) seizures well controlled, psychiatric history Anxiety and Depression,     GI/Hepatic/Renal/Endo    (+) obesity,       Musculoskeletal (-) negative ROS    Abdominal  - normal exam   Substance History      OB/GYN negative ob/gyn ROS         Other   blood dyscrasia thrombocytopenia,   history of cancer remission                    Anesthesia Plan    ASA 3     general     intravenous induction     Anesthetic plan, all risks, benefits, and alternatives have been provided, discussed and informed consent has been obtained with: patient.  Use of blood products discussed with patient  Consented to blood products.

## 2021-10-25 NOTE — ANESTHESIA PROCEDURE NOTES
Airway  Urgency: elective    Date/Time: 10/25/2021 9:27 AM  Airway not difficult    General Information and Staff    Patient location during procedure: OR  CRNA: Yossi Rice CRNA    Indications and Patient Condition  Indications for airway management: airway protection    Preoxygenated: yes  MILS maintained throughout  Mask difficulty assessment: 0 - not attempted    Final Airway Details  Final airway type: endotracheal airway      Successful airway: ETT  Cuffed: yes   Successful intubation technique: direct laryngoscopy  Facilitating devices/methods: intubating stylet  Endotracheal tube insertion site: oral  Blade: Broderick  Blade size: 3  ETT size (mm): 7.5  Cormack-Lehane Classification: grade IIb - view of arytenoids or posterior of glottis only  Placement verified by: chest auscultation and capnometry   Cuff volume (mL): 5  Measured from: lips  ETT/EBT  to lips (cm): 24  Number of attempts at approach: 1  Assessment: lips, teeth, and gum same as pre-op and atraumatic intubation

## 2021-10-25 NOTE — ANESTHESIA POSTPROCEDURE EVALUATION
Patient: Shyann Davis    Procedure Summary     Date: 10/25/21 Room / Location:  [facility] OR 3 /  [facility]    Anesthesia Start: 0921 Anesthesia Stop: 1130    Procedure: CHOLECYSTECTOMY LAPAROSCOPIC POSSIBLE OPEN CHOLECYSTECTOMY (N/A Abdomen) Diagnosis:       Acute cholecystitis      (Acute cholecystitis [K81.0])    Surgeons: Gera Kay MD Provider: Yossi Rice CRNA    Anesthesia Type: general ASA Status: 3          Anesthesia Type: general    Vitals  Vitals Value Taken Time   /107 10/25/21 1250   Temp 98.7 °F (37.1 °C) 10/25/21 1127   Pulse 98 10/25/21 1251   Resp 18 10/25/21 1250   SpO2 97 % 10/25/21 1251   Vitals shown include unvalidated device data.        Post Anesthesia Care and Evaluation    Patient location during evaluation: PACU  Patient participation: complete - patient participated  Level of consciousness: awake  Pain score: 0  Pain management: adequate  Airway patency: patent  Anesthetic complications: No anesthetic complications  PONV Status: none  Cardiovascular status: acceptable  Respiratory status: acceptable  Hydration status: acceptable    Comments: Patient evaluated by anesthesia prior to discharge.  Note entered later.

## 2021-10-26 LAB
ANION GAP SERPL CALCULATED.3IONS-SCNC: 8.5 MMOL/L (ref 5–15)
BASOPHILS # BLD AUTO: 0.02 10*3/MM3 (ref 0–0.2)
BASOPHILS NFR BLD AUTO: 0.1 % (ref 0–1.5)
BH BB BLOOD EXPIRATION DATE: NORMAL
BH BB BLOOD EXPIRATION DATE: NORMAL
BH BB BLOOD TYPE BARCODE: 5100
BH BB BLOOD TYPE BARCODE: 5100
BH BB DISPENSE STATUS: NORMAL
BH BB DISPENSE STATUS: NORMAL
BH BB PRODUCT CODE: NORMAL
BH BB PRODUCT CODE: NORMAL
BH BB UNIT NUMBER: NORMAL
BH BB UNIT NUMBER: NORMAL
BUN SERPL-MCNC: 16 MG/DL (ref 8–23)
BUN/CREAT SERPL: 19.5 (ref 7–25)
CALCIUM SPEC-SCNC: 8 MG/DL (ref 8.6–10.5)
CHLORIDE SERPL-SCNC: 107 MMOL/L (ref 98–107)
CO2 SERPL-SCNC: 22.5 MMOL/L (ref 22–29)
CREAT SERPL-MCNC: 0.82 MG/DL (ref 0.57–1)
DEPRECATED RDW RBC AUTO: 41.4 FL (ref 37–54)
EOSINOPHIL # BLD AUTO: 0 10*3/MM3 (ref 0–0.4)
EOSINOPHIL NFR BLD AUTO: 0 % (ref 0.3–6.2)
ERYTHROCYTE [DISTWIDTH] IN BLOOD BY AUTOMATED COUNT: 15.3 % (ref 12.3–15.4)
GFR SERPL CREATININE-BSD FRML MDRD: 68 ML/MIN/1.73
GLUCOSE SERPL-MCNC: 138 MG/DL (ref 65–99)
HCT VFR BLD AUTO: 32.9 % (ref 34–46.6)
HGB BLD-MCNC: 10.1 G/DL (ref 12–15.9)
IMM GRANULOCYTES # BLD AUTO: 0.25 10*3/MM3 (ref 0–0.05)
IMM GRANULOCYTES NFR BLD AUTO: 1.4 % (ref 0–0.5)
LAB AP CASE REPORT: NORMAL
LYMPHOCYTES # BLD AUTO: 0.75 10*3/MM3 (ref 0.7–3.1)
LYMPHOCYTES NFR BLD AUTO: 4.3 % (ref 19.6–45.3)
MAGNESIUM SERPL-MCNC: 2 MG/DL (ref 1.6–2.4)
MCH RBC QN AUTO: 23 PG (ref 26.6–33)
MCHC RBC AUTO-ENTMCNC: 30.7 G/DL (ref 31.5–35.7)
MCV RBC AUTO: 74.8 FL (ref 79–97)
MONOCYTES # BLD AUTO: 0.82 10*3/MM3 (ref 0.1–0.9)
MONOCYTES NFR BLD AUTO: 4.7 % (ref 5–12)
NEUTROPHILS NFR BLD AUTO: 15.77 10*3/MM3 (ref 1.7–7)
NEUTROPHILS NFR BLD AUTO: 89.5 % (ref 42.7–76)
NRBC BLD AUTO-RTO: 0.2 /100 WBC (ref 0–0.2)
PATH REPORT.FINAL DX SPEC: NORMAL
PATH REPORT.GROSS SPEC: NORMAL
PLATELET # BLD AUTO: 118 10*3/MM3 (ref 140–450)
PMV BLD AUTO: 11.3 FL (ref 6–12)
POTASSIUM SERPL-SCNC: 3.9 MMOL/L (ref 3.5–5.2)
PROCALCITONIN SERPL-MCNC: 0.27 NG/ML (ref 0–0.25)
RBC # BLD AUTO: 4.4 10*6/MM3 (ref 3.77–5.28)
SODIUM SERPL-SCNC: 138 MMOL/L (ref 136–145)
UNIT  ABO: NORMAL
UNIT  ABO: NORMAL
UNIT  RH: NORMAL
UNIT  RH: NORMAL
WBC # BLD AUTO: 17.61 10*3/MM3 (ref 3.4–10.8)

## 2021-10-26 PROCEDURE — 80048 BASIC METABOLIC PNL TOTAL CA: CPT | Performed by: SURGERY

## 2021-10-26 PROCEDURE — 25010000002 MEROPENEM PER 100 MG: Performed by: SURGERY

## 2021-10-26 PROCEDURE — 36410 VNPNXR 3YR/> PHY/QHP DX/THER: CPT

## 2021-10-26 PROCEDURE — 85025 COMPLETE CBC W/AUTO DIFF WBC: CPT | Performed by: SURGERY

## 2021-10-26 PROCEDURE — 99024 POSTOP FOLLOW-UP VISIT: CPT | Performed by: SURGERY

## 2021-10-26 PROCEDURE — 99232 SBSQ HOSP IP/OBS MODERATE 35: CPT | Performed by: NURSE PRACTITIONER

## 2021-10-26 PROCEDURE — 36410 VNPNXR 3YR/> PHY/QHP DX/THER: CPT | Performed by: NURSE PRACTITIONER

## 2021-10-26 PROCEDURE — 25010000002 MEROPENEM PER 100 MG: Performed by: INTERNAL MEDICINE

## 2021-10-26 PROCEDURE — 99232 SBSQ HOSP IP/OBS MODERATE 35: CPT | Performed by: INTERNAL MEDICINE

## 2021-10-26 PROCEDURE — 83735 ASSAY OF MAGNESIUM: CPT | Performed by: INTERNAL MEDICINE

## 2021-10-26 PROCEDURE — C1751 CATH, INF, PER/CENT/MIDLINE: HCPCS

## 2021-10-26 PROCEDURE — 84145 PROCALCITONIN (PCT): CPT | Performed by: NURSE PRACTITIONER

## 2021-10-26 RX ORDER — SODIUM CHLORIDE 0.9 % (FLUSH) 0.9 %
20 SYRINGE (ML) INJECTION AS NEEDED
Status: DISCONTINUED | OUTPATIENT
Start: 2021-10-26 | End: 2021-11-02 | Stop reason: HOSPADM

## 2021-10-26 RX ORDER — SODIUM CHLORIDE 0.9 % (FLUSH) 0.9 %
10 SYRINGE (ML) INJECTION EVERY 12 HOURS SCHEDULED
Status: DISCONTINUED | OUTPATIENT
Start: 2021-10-26 | End: 2021-11-02 | Stop reason: HOSPADM

## 2021-10-26 RX ORDER — SODIUM CHLORIDE 0.9 % (FLUSH) 0.9 %
10 SYRINGE (ML) INJECTION AS NEEDED
Status: DISCONTINUED | OUTPATIENT
Start: 2021-10-26 | End: 2021-11-02 | Stop reason: HOSPADM

## 2021-10-26 RX ADMIN — SODIUM CHLORIDE, PRESERVATIVE FREE 10 ML: 5 INJECTION INTRAVENOUS at 09:25

## 2021-10-26 RX ADMIN — MEROPENEM 1 G: 1 INJECTION, POWDER, FOR SOLUTION INTRAVENOUS at 00:48

## 2021-10-26 RX ADMIN — OXYCODONE HYDROCHLORIDE AND ACETAMINOPHEN 1 TABLET: 5; 325 TABLET ORAL at 16:41

## 2021-10-26 RX ADMIN — CLONAZEPAM 0.25 MG: 0.5 TABLET ORAL at 09:25

## 2021-10-26 RX ADMIN — METOPROLOL TARTRATE 5 MG: 5 INJECTION INTRAVENOUS at 16:41

## 2021-10-26 RX ADMIN — MEROPENEM 1 G: 1 INJECTION, POWDER, FOR SOLUTION INTRAVENOUS at 16:40

## 2021-10-26 RX ADMIN — METOPROLOL TARTRATE 5 MG: 5 INJECTION INTRAVENOUS at 06:44

## 2021-10-26 RX ADMIN — FAMOTIDINE 20 MG: 20 TABLET ORAL at 16:41

## 2021-10-26 RX ADMIN — SODIUM CHLORIDE, PRESERVATIVE FREE 10 ML: 5 INJECTION INTRAVENOUS at 21:58

## 2021-10-26 RX ADMIN — FAMOTIDINE 20 MG: 20 TABLET ORAL at 06:44

## 2021-10-26 RX ADMIN — LEVETIRACETAM 500 MG: 500 TABLET ORAL at 09:25

## 2021-10-26 RX ADMIN — LEVETIRACETAM 500 MG: 500 TABLET ORAL at 21:53

## 2021-10-26 RX ADMIN — OXYCODONE HYDROCHLORIDE AND ACETAMINOPHEN 1 TABLET: 5; 325 TABLET ORAL at 02:53

## 2021-10-27 LAB
ALBUMIN SERPL ELPH-MCNC: 2.9 G/DL (ref 2.9–4.4)
ALBUMIN/GLOB SERPL: 1.1 {RATIO} (ref 0.7–1.7)
ALPHA1 GLOB SERPL ELPH-MCNC: 0.3 G/DL (ref 0–0.4)
ALPHA2 GLOB SERPL ELPH-MCNC: 0.8 G/DL (ref 0.4–1)
ANION GAP SERPL CALCULATED.3IONS-SCNC: 11.8 MMOL/L (ref 5–15)
B-GLOBULIN SERPL ELPH-MCNC: 0.8 G/DL (ref 0.7–1.3)
BUN SERPL-MCNC: 25 MG/DL (ref 8–23)
BUN/CREAT SERPL: 25 (ref 7–25)
CALCIUM SPEC-SCNC: 8.3 MG/DL (ref 8.6–10.5)
CHLORIDE SERPL-SCNC: 106 MMOL/L (ref 98–107)
CO2 SERPL-SCNC: 18.2 MMOL/L (ref 22–29)
CREAT SERPL-MCNC: 1 MG/DL (ref 0.57–1)
DEPRECATED RDW RBC AUTO: 44.2 FL (ref 37–54)
ERYTHROCYTE [DISTWIDTH] IN BLOOD BY AUTOMATED COUNT: 15.3 % (ref 12.3–15.4)
GAMMA GLOB SERPL ELPH-MCNC: 0.9 G/DL (ref 0.4–1.8)
GFR SERPL CREATININE-BSD FRML MDRD: 54 ML/MIN/1.73
GLOBULIN SER-MCNC: 2.9 G/DL (ref 2.2–3.9)
GLUCOSE SERPL-MCNC: 99 MG/DL (ref 65–99)
HCT VFR BLD AUTO: 35.1 % (ref 34–46.6)
HGB BLD-MCNC: 10.1 G/DL (ref 12–15.9)
IGA SERPL-MCNC: 315 MG/DL (ref 64–422)
IGG SERPL-MCNC: 863 MG/DL (ref 586–1602)
IGM SERPL-MCNC: 111 MG/DL (ref 26–217)
INTERPRETATION SERPL IEP-IMP: ABNORMAL
KAPPA LC FREE SER-MCNC: 21.2 MG/L (ref 3.3–19.4)
KAPPA LC FREE/LAMBDA FREE SER: 1.05 {RATIO} (ref 0.26–1.65)
LABORATORY COMMENT REPORT: ABNORMAL
LAMBDA LC FREE SERPL-MCNC: 20.1 MG/L (ref 5.7–26.3)
M PROTEIN SERPL ELPH-MCNC: ABNORMAL G/DL
MCH RBC QN AUTO: 23 PG (ref 26.6–33)
MCHC RBC AUTO-ENTMCNC: 28.8 G/DL (ref 31.5–35.7)
MCV RBC AUTO: 79.8 FL (ref 79–97)
PLATELET # BLD AUTO: 81 10*3/MM3 (ref 140–450)
PMV BLD AUTO: 12.8 FL (ref 6–12)
POTASSIUM SERPL-SCNC: 3.9 MMOL/L (ref 3.5–5.2)
PROT SERPL-MCNC: 5.8 G/DL (ref 6–8.5)
RBC # BLD AUTO: 4.4 10*6/MM3 (ref 3.77–5.28)
SODIUM SERPL-SCNC: 136 MMOL/L (ref 136–145)
WBC # BLD AUTO: 15.97 10*3/MM3 (ref 3.4–10.8)

## 2021-10-27 PROCEDURE — 94799 UNLISTED PULMONARY SVC/PX: CPT

## 2021-10-27 PROCEDURE — 99024 POSTOP FOLLOW-UP VISIT: CPT | Performed by: SURGERY

## 2021-10-27 PROCEDURE — 99232 SBSQ HOSP IP/OBS MODERATE 35: CPT | Performed by: NURSE PRACTITIONER

## 2021-10-27 PROCEDURE — 80048 BASIC METABOLIC PNL TOTAL CA: CPT | Performed by: SURGERY

## 2021-10-27 PROCEDURE — 85027 COMPLETE CBC AUTOMATED: CPT | Performed by: SURGERY

## 2021-10-27 PROCEDURE — 25010000002 MEROPENEM PER 100 MG: Performed by: INTERNAL MEDICINE

## 2021-10-27 PROCEDURE — 99232 SBSQ HOSP IP/OBS MODERATE 35: CPT | Performed by: INTERNAL MEDICINE

## 2021-10-27 RX ORDER — METOPROLOL SUCCINATE 25 MG/1
25 TABLET, EXTENDED RELEASE ORAL
Status: DISCONTINUED | OUTPATIENT
Start: 2021-10-28 | End: 2021-11-01

## 2021-10-27 RX ORDER — TRAMADOL HYDROCHLORIDE 50 MG/1
50 TABLET ORAL EVERY 6 HOURS PRN
Status: DISCONTINUED | OUTPATIENT
Start: 2021-10-27 | End: 2021-11-02 | Stop reason: HOSPADM

## 2021-10-27 RX ADMIN — SODIUM CHLORIDE, PRESERVATIVE FREE 10 ML: 5 INJECTION INTRAVENOUS at 20:59

## 2021-10-27 RX ADMIN — TRAMADOL HYDROCHLORIDE 50 MG: 50 TABLET, FILM COATED ORAL at 16:08

## 2021-10-27 RX ADMIN — SODIUM CHLORIDE, PRESERVATIVE FREE 10 ML: 5 INJECTION INTRAVENOUS at 20:58

## 2021-10-27 RX ADMIN — SODIUM CHLORIDE, PRESERVATIVE FREE 10 ML: 5 INJECTION INTRAVENOUS at 09:19

## 2021-10-27 RX ADMIN — LEVETIRACETAM 500 MG: 500 TABLET ORAL at 20:59

## 2021-10-27 RX ADMIN — MEROPENEM 1 G: 1 INJECTION, POWDER, FOR SOLUTION INTRAVENOUS at 00:34

## 2021-10-27 RX ADMIN — FAMOTIDINE 20 MG: 20 TABLET ORAL at 17:23

## 2021-10-27 RX ADMIN — METOPROLOL TARTRATE 5 MG: 5 INJECTION INTRAVENOUS at 00:35

## 2021-10-27 RX ADMIN — LEVETIRACETAM 500 MG: 500 TABLET ORAL at 09:16

## 2021-10-27 RX ADMIN — FAMOTIDINE 20 MG: 20 TABLET ORAL at 09:16

## 2021-10-27 RX ADMIN — MEROPENEM 1 G: 1 INJECTION, POWDER, FOR SOLUTION INTRAVENOUS at 09:17

## 2021-10-27 RX ADMIN — METOPROLOL TARTRATE 5 MG: 5 INJECTION INTRAVENOUS at 17:23

## 2021-10-27 RX ADMIN — MEROPENEM 1 G: 1 INJECTION, POWDER, FOR SOLUTION INTRAVENOUS at 17:25

## 2021-10-27 RX ADMIN — METOPROLOL TARTRATE 5 MG: 5 INJECTION INTRAVENOUS at 09:17

## 2021-10-28 ENCOUNTER — HOME HEALTH ADMISSION (OUTPATIENT)
Dept: HOME HEALTH SERVICES | Facility: HOME HEALTHCARE | Age: 76
End: 2021-10-28

## 2021-10-28 ENCOUNTER — TELEPHONE (OUTPATIENT)
Dept: FAMILY MEDICINE CLINIC | Facility: CLINIC | Age: 76
End: 2021-10-28

## 2021-10-28 ENCOUNTER — TRANSCRIBE ORDERS (OUTPATIENT)
Dept: HOME HEALTH SERVICES | Facility: HOME HEALTHCARE | Age: 76
End: 2021-10-28

## 2021-10-28 DIAGNOSIS — K81.0 ACUTE CHOLECYSTITIS: Primary | ICD-10-CM

## 2021-10-28 DIAGNOSIS — A41.9 SEPSIS, DUE TO UNSPECIFIED ORGANISM, UNSPECIFIED WHETHER ACUTE ORGAN DYSFUNCTION PRESENT (HCC): ICD-10-CM

## 2021-10-28 DIAGNOSIS — K82.A1 GANGRENE OF GALLBLADDER IN CHOLECYSTITIS: ICD-10-CM

## 2021-10-28 DIAGNOSIS — I10 HYPERTENSION, UNCONTROLLED: ICD-10-CM

## 2021-10-28 LAB
ANISOCYTOSIS BLD QL: NORMAL
BACTERIA SPEC AEROBE CULT: NORMAL
BACTERIA SPEC AEROBE CULT: NORMAL
BASOPHILS # BLD AUTO: 0.05 10*3/MM3 (ref 0–0.2)
BASOPHILS NFR BLD AUTO: 0.3 % (ref 0–1.5)
DEPRECATED RDW RBC AUTO: 39.3 FL (ref 37–54)
EOSINOPHIL # BLD AUTO: 0.04 10*3/MM3 (ref 0–0.4)
EOSINOPHIL NFR BLD AUTO: 0.3 % (ref 0.3–6.2)
ERYTHROCYTE [DISTWIDTH] IN BLOOD BY AUTOMATED COUNT: 15.1 % (ref 12.3–15.4)
HCT VFR BLD AUTO: 35.6 % (ref 34–46.6)
HGB BLD-MCNC: 11.2 G/DL (ref 12–15.9)
IMM GRANULOCYTES # BLD AUTO: 0.5 10*3/MM3 (ref 0–0.05)
IMM GRANULOCYTES NFR BLD AUTO: 3.2 % (ref 0–0.5)
LARGE PLATELETS: NORMAL
LYMPHOCYTES # BLD AUTO: 1.51 10*3/MM3 (ref 0.7–3.1)
LYMPHOCYTES NFR BLD AUTO: 9.7 % (ref 19.6–45.3)
MAGNESIUM SERPL-MCNC: 1.8 MG/DL (ref 1.6–2.4)
MCH RBC QN AUTO: 22.8 PG (ref 26.6–33)
MCHC RBC AUTO-ENTMCNC: 31.5 G/DL (ref 31.5–35.7)
MCV RBC AUTO: 72.4 FL (ref 79–97)
MONOCYTES # BLD AUTO: 0.99 10*3/MM3 (ref 0.1–0.9)
MONOCYTES NFR BLD AUTO: 6.4 % (ref 5–12)
NEUTROPHILS NFR BLD AUTO: 12.42 10*3/MM3 (ref 1.7–7)
NEUTROPHILS NFR BLD AUTO: 80.1 % (ref 42.7–76)
NRBC BLD AUTO-RTO: 0 /100 WBC (ref 0–0.2)
PLATELET # BLD AUTO: 102 10*3/MM3 (ref 140–450)
PMV BLD AUTO: ABNORMAL FL
POTASSIUM SERPL-SCNC: 3.3 MMOL/L (ref 3.5–5.2)
POTASSIUM SERPL-SCNC: 3.9 MMOL/L (ref 3.5–5.2)
RBC # BLD AUTO: 4.92 10*6/MM3 (ref 3.77–5.28)
WBC # BLD AUTO: 15.51 10*3/MM3 (ref 3.4–10.8)
WBC MORPH BLD: NORMAL

## 2021-10-28 PROCEDURE — 25010000002 MEROPENEM PER 100 MG: Performed by: INTERNAL MEDICINE

## 2021-10-28 PROCEDURE — 94799 UNLISTED PULMONARY SVC/PX: CPT

## 2021-10-28 PROCEDURE — 84132 ASSAY OF SERUM POTASSIUM: CPT | Performed by: SURGERY

## 2021-10-28 PROCEDURE — 99024 POSTOP FOLLOW-UP VISIT: CPT | Performed by: SURGERY

## 2021-10-28 PROCEDURE — 99232 SBSQ HOSP IP/OBS MODERATE 35: CPT | Performed by: NURSE PRACTITIONER

## 2021-10-28 PROCEDURE — 83735 ASSAY OF MAGNESIUM: CPT | Performed by: SURGERY

## 2021-10-28 PROCEDURE — 85025 COMPLETE CBC W/AUTO DIFF WBC: CPT | Performed by: INTERNAL MEDICINE

## 2021-10-28 PROCEDURE — 97164 PT RE-EVAL EST PLAN CARE: CPT

## 2021-10-28 PROCEDURE — 99232 SBSQ HOSP IP/OBS MODERATE 35: CPT | Performed by: INTERNAL MEDICINE

## 2021-10-28 PROCEDURE — 84132 ASSAY OF SERUM POTASSIUM: CPT | Performed by: INTERNAL MEDICINE

## 2021-10-28 PROCEDURE — 25010000002 MAGNESIUM SULFATE 2 GM/50ML SOLUTION: Performed by: SURGERY

## 2021-10-28 PROCEDURE — 97161 PT EVAL LOW COMPLEX 20 MIN: CPT

## 2021-10-28 PROCEDURE — 85007 BL SMEAR W/DIFF WBC COUNT: CPT | Performed by: INTERNAL MEDICINE

## 2021-10-28 RX ADMIN — LEVETIRACETAM 500 MG: 500 TABLET ORAL at 21:31

## 2021-10-28 RX ADMIN — TRAMADOL HYDROCHLORIDE 50 MG: 50 TABLET, FILM COATED ORAL at 15:05

## 2021-10-28 RX ADMIN — POTASSIUM CHLORIDE 40 MEQ: 1500 TABLET, EXTENDED RELEASE ORAL at 09:50

## 2021-10-28 RX ADMIN — MAGNESIUM SULFATE HEPTAHYDRATE 2 G: 40 INJECTION, SOLUTION INTRAVENOUS at 11:06

## 2021-10-28 RX ADMIN — MEROPENEM 1 G: 1 INJECTION, POWDER, FOR SOLUTION INTRAVENOUS at 17:22

## 2021-10-28 RX ADMIN — FAMOTIDINE 20 MG: 20 TABLET ORAL at 17:19

## 2021-10-28 RX ADMIN — SODIUM CHLORIDE, PRESERVATIVE FREE 10 ML: 5 INJECTION INTRAVENOUS at 08:23

## 2021-10-28 RX ADMIN — MEROPENEM 1 G: 1 INJECTION, POWDER, FOR SOLUTION INTRAVENOUS at 08:23

## 2021-10-28 RX ADMIN — SODIUM CHLORIDE, PRESERVATIVE FREE 10 ML: 5 INJECTION INTRAVENOUS at 20:39

## 2021-10-28 RX ADMIN — MEROPENEM 1 G: 1 INJECTION, POWDER, FOR SOLUTION INTRAVENOUS at 23:35

## 2021-10-28 RX ADMIN — METOPROLOL SUCCINATE 25 MG: 25 TABLET, EXTENDED RELEASE ORAL at 08:20

## 2021-10-28 RX ADMIN — LEVETIRACETAM 500 MG: 500 TABLET ORAL at 08:20

## 2021-10-28 RX ADMIN — POTASSIUM CHLORIDE 40 MEQ: 1500 TABLET, EXTENDED RELEASE ORAL at 15:05

## 2021-10-28 RX ADMIN — FAMOTIDINE 20 MG: 20 TABLET ORAL at 06:47

## 2021-10-28 RX ADMIN — MEROPENEM 1 G: 1 INJECTION, POWDER, FOR SOLUTION INTRAVENOUS at 00:03

## 2021-10-28 NOTE — TELEPHONE ENCOUNTER
FEDERICO WITH [facility] NEEDS VERBAL ORDERS FOR PATIENT TO HAVE NURSING AND THERAPY FOR GANGRENOUS SEPTIC GALLBLADDER.      [phone]

## 2021-10-28 NOTE — TELEPHONE ENCOUNTER
Advised Brenda Guerra for Dr Underwood.  They are directed to send all messages back to Dr Underwood

## 2021-10-29 DIAGNOSIS — D69.6 THROMBOCYTOPENIA (HCC): ICD-10-CM

## 2021-10-29 DIAGNOSIS — D69.3 CHRONIC ITP (IDIOPATHIC THROMBOCYTOPENIC PURPURA) (HCC): Primary | ICD-10-CM

## 2021-10-29 LAB
ANION GAP SERPL CALCULATED.3IONS-SCNC: 6.1 MMOL/L (ref 5–15)
ANISOCYTOSIS BLD QL: NORMAL
BASOPHILS # BLD AUTO: 0.05 10*3/MM3 (ref 0–0.2)
BASOPHILS NFR BLD AUTO: 0.3 % (ref 0–1.5)
BUN SERPL-MCNC: 15 MG/DL (ref 8–23)
BUN/CREAT SERPL: 20 (ref 7–25)
CALCIUM SPEC-SCNC: 9.6 MG/DL (ref 8.6–10.5)
CHLORIDE SERPL-SCNC: 103 MMOL/L (ref 98–107)
CO2 SERPL-SCNC: 29.9 MMOL/L (ref 22–29)
CREAT SERPL-MCNC: 0.75 MG/DL (ref 0.57–1)
DEPRECATED RDW RBC AUTO: 38.4 FL (ref 37–54)
EOSINOPHIL # BLD AUTO: 0.11 10*3/MM3 (ref 0–0.4)
EOSINOPHIL NFR BLD AUTO: 0.7 % (ref 0.3–6.2)
ERYTHROCYTE [DISTWIDTH] IN BLOOD BY AUTOMATED COUNT: 15 % (ref 12.3–15.4)
GFR SERPL CREATININE-BSD FRML MDRD: 75 ML/MIN/1.73
GLUCOSE SERPL-MCNC: 96 MG/DL (ref 65–99)
HCT VFR BLD AUTO: 36.8 % (ref 34–46.6)
HGB BLD-MCNC: 11.4 G/DL (ref 12–15.9)
IMM GRANULOCYTES # BLD AUTO: 0.56 10*3/MM3 (ref 0–0.05)
IMM GRANULOCYTES NFR BLD AUTO: 3.6 % (ref 0–0.5)
LARGE PLATELETS: NORMAL
LYMPHOCYTES # BLD AUTO: 2.27 10*3/MM3 (ref 0.7–3.1)
LYMPHOCYTES NFR BLD AUTO: 14.8 % (ref 19.6–45.3)
MAGNESIUM SERPL-MCNC: 1.9 MG/DL (ref 1.6–2.4)
MCH RBC QN AUTO: 22.4 PG (ref 26.6–33)
MCHC RBC AUTO-ENTMCNC: 31 G/DL (ref 31.5–35.7)
MCV RBC AUTO: 72.4 FL (ref 79–97)
MONOCYTES # BLD AUTO: 0.99 10*3/MM3 (ref 0.1–0.9)
MONOCYTES NFR BLD AUTO: 6.4 % (ref 5–12)
NEUTROPHILS NFR BLD AUTO: 11.39 10*3/MM3 (ref 1.7–7)
NEUTROPHILS NFR BLD AUTO: 74.2 % (ref 42.7–76)
NRBC BLD AUTO-RTO: 0 /100 WBC (ref 0–0.2)
PLATELET # BLD AUTO: 112 10*3/MM3 (ref 140–450)
PMV BLD AUTO: 12.2 FL (ref 6–12)
POTASSIUM SERPL-SCNC: 3.7 MMOL/L (ref 3.5–5.2)
RBC # BLD AUTO: 5.08 10*6/MM3 (ref 3.77–5.28)
SODIUM SERPL-SCNC: 139 MMOL/L (ref 136–145)
WBC # BLD AUTO: 15.37 10*3/MM3 (ref 3.4–10.8)
WBC MORPH BLD: NORMAL

## 2021-10-29 PROCEDURE — 83735 ASSAY OF MAGNESIUM: CPT | Performed by: SURGERY

## 2021-10-29 PROCEDURE — 99024 POSTOP FOLLOW-UP VISIT: CPT | Performed by: SURGERY

## 2021-10-29 PROCEDURE — 25010000002 MEROPENEM PER 100 MG: Performed by: INTERNAL MEDICINE

## 2021-10-29 PROCEDURE — 94799 UNLISTED PULMONARY SVC/PX: CPT

## 2021-10-29 PROCEDURE — 97116 GAIT TRAINING THERAPY: CPT

## 2021-10-29 PROCEDURE — 85007 BL SMEAR W/DIFF WBC COUNT: CPT | Performed by: SURGERY

## 2021-10-29 PROCEDURE — 99232 SBSQ HOSP IP/OBS MODERATE 35: CPT | Performed by: INTERNAL MEDICINE

## 2021-10-29 PROCEDURE — 80048 BASIC METABOLIC PNL TOTAL CA: CPT | Performed by: NURSE PRACTITIONER

## 2021-10-29 PROCEDURE — 85025 COMPLETE CBC W/AUTO DIFF WBC: CPT | Performed by: SURGERY

## 2021-10-29 PROCEDURE — 25010000002 MEROPENEM PER 100 MG: Performed by: SURGERY

## 2021-10-29 RX ADMIN — SODIUM CHLORIDE, PRESERVATIVE FREE 10 ML: 5 INJECTION INTRAVENOUS at 20:19

## 2021-10-29 RX ADMIN — SODIUM CHLORIDE, PRESERVATIVE FREE 10 ML: 5 INJECTION INTRAVENOUS at 20:20

## 2021-10-29 RX ADMIN — TRAMADOL HYDROCHLORIDE 50 MG: 50 TABLET, FILM COATED ORAL at 14:54

## 2021-10-29 RX ADMIN — METOPROLOL SUCCINATE 25 MG: 25 TABLET, EXTENDED RELEASE ORAL at 09:45

## 2021-10-29 RX ADMIN — SODIUM CHLORIDE, PRESERVATIVE FREE 10 ML: 5 INJECTION INTRAVENOUS at 09:45

## 2021-10-29 RX ADMIN — TRAMADOL HYDROCHLORIDE 50 MG: 50 TABLET, FILM COATED ORAL at 04:12

## 2021-10-29 RX ADMIN — FAMOTIDINE 20 MG: 20 TABLET ORAL at 16:32

## 2021-10-29 RX ADMIN — TRAMADOL HYDROCHLORIDE 50 MG: 50 TABLET, FILM COATED ORAL at 20:20

## 2021-10-29 RX ADMIN — LEVETIRACETAM 500 MG: 500 TABLET ORAL at 09:45

## 2021-10-29 RX ADMIN — MEROPENEM 1 G: 1 INJECTION, POWDER, FOR SOLUTION INTRAVENOUS at 16:32

## 2021-10-29 RX ADMIN — MEROPENEM 1 G: 1 INJECTION, POWDER, FOR SOLUTION INTRAVENOUS at 09:45

## 2021-10-29 RX ADMIN — SODIUM CHLORIDE, PRESERVATIVE FREE 10 ML: 5 INJECTION INTRAVENOUS at 09:30

## 2021-10-29 RX ADMIN — LEVETIRACETAM 500 MG: 500 TABLET ORAL at 20:20

## 2021-10-29 RX ADMIN — FAMOTIDINE 20 MG: 20 TABLET ORAL at 05:24

## 2021-10-30 LAB
ANION GAP SERPL CALCULATED.3IONS-SCNC: 4.6 MMOL/L (ref 5–15)
BUN SERPL-MCNC: 13 MG/DL (ref 8–23)
BUN/CREAT SERPL: 16.7 (ref 7–25)
CALCIUM SPEC-SCNC: 9.6 MG/DL (ref 8.6–10.5)
CHLORIDE SERPL-SCNC: 99 MMOL/L (ref 98–107)
CO2 SERPL-SCNC: 35.4 MMOL/L (ref 22–29)
CREAT SERPL-MCNC: 0.78 MG/DL (ref 0.57–1)
DEPRECATED RDW RBC AUTO: 38.2 FL (ref 37–54)
ERYTHROCYTE [DISTWIDTH] IN BLOOD BY AUTOMATED COUNT: 14.9 % (ref 12.3–15.4)
GFR SERPL CREATININE-BSD FRML MDRD: 72 ML/MIN/1.73
GLUCOSE SERPL-MCNC: 100 MG/DL (ref 65–99)
HCT VFR BLD AUTO: 35.5 % (ref 34–46.6)
HGB BLD-MCNC: 11 G/DL (ref 12–15.9)
MAGNESIUM SERPL-MCNC: 1.8 MG/DL (ref 1.6–2.4)
MCH RBC QN AUTO: 22.4 PG (ref 26.6–33)
MCHC RBC AUTO-ENTMCNC: 31 G/DL (ref 31.5–35.7)
MCV RBC AUTO: 72.4 FL (ref 79–97)
PLATELET # BLD AUTO: 140 10*3/MM3 (ref 140–450)
PMV BLD AUTO: 12.9 FL (ref 6–12)
POTASSIUM SERPL-SCNC: 3.3 MMOL/L (ref 3.5–5.2)
RBC # BLD AUTO: 4.9 10*6/MM3 (ref 3.77–5.28)
SODIUM SERPL-SCNC: 139 MMOL/L (ref 136–145)
WBC # BLD AUTO: 16.1 10*3/MM3 (ref 3.4–10.8)

## 2021-10-30 PROCEDURE — 97116 GAIT TRAINING THERAPY: CPT

## 2021-10-30 PROCEDURE — 99024 POSTOP FOLLOW-UP VISIT: CPT | Performed by: SURGERY

## 2021-10-30 PROCEDURE — 25010000002 MAGNESIUM SULFATE 2 GM/50ML SOLUTION: Performed by: SURGERY

## 2021-10-30 PROCEDURE — 80048 BASIC METABOLIC PNL TOTAL CA: CPT | Performed by: INTERNAL MEDICINE

## 2021-10-30 PROCEDURE — 25010000002 MAGNESIUM SULFATE 2 GM/50ML SOLUTION

## 2021-10-30 PROCEDURE — 99232 SBSQ HOSP IP/OBS MODERATE 35: CPT | Performed by: INTERNAL MEDICINE

## 2021-10-30 PROCEDURE — 85027 COMPLETE CBC AUTOMATED: CPT | Performed by: INTERNAL MEDICINE

## 2021-10-30 PROCEDURE — 25010000002 MEROPENEM PER 100 MG: Performed by: SURGERY

## 2021-10-30 PROCEDURE — 94799 UNLISTED PULMONARY SVC/PX: CPT

## 2021-10-30 PROCEDURE — 83735 ASSAY OF MAGNESIUM: CPT | Performed by: SURGERY

## 2021-10-30 RX ORDER — MAGNESIUM SULFATE HEPTAHYDRATE 40 MG/ML
INJECTION, SOLUTION INTRAVENOUS
Status: COMPLETED
Start: 2021-10-30 | End: 2021-10-30

## 2021-10-30 RX ADMIN — TRAMADOL HYDROCHLORIDE 50 MG: 50 TABLET, FILM COATED ORAL at 09:18

## 2021-10-30 RX ADMIN — LEVETIRACETAM 500 MG: 500 TABLET ORAL at 09:12

## 2021-10-30 RX ADMIN — MEROPENEM 1 G: 1 INJECTION, POWDER, FOR SOLUTION INTRAVENOUS at 09:13

## 2021-10-30 RX ADMIN — POTASSIUM CHLORIDE 40 MEQ: 1500 TABLET, EXTENDED RELEASE ORAL at 06:16

## 2021-10-30 RX ADMIN — MEROPENEM 1 G: 1 INJECTION, POWDER, FOR SOLUTION INTRAVENOUS at 00:42

## 2021-10-30 RX ADMIN — SODIUM CHLORIDE, PRESERVATIVE FREE 10 ML: 5 INJECTION INTRAVENOUS at 20:48

## 2021-10-30 RX ADMIN — FAMOTIDINE 20 MG: 20 TABLET ORAL at 06:29

## 2021-10-30 RX ADMIN — MAGNESIUM SULFATE HEPTAHYDRATE 2 G: 40 INJECTION, SOLUTION INTRAVENOUS at 02:25

## 2021-10-30 RX ADMIN — MEROPENEM 1 G: 1 INJECTION, POWDER, FOR SOLUTION INTRAVENOUS at 16:50

## 2021-10-30 RX ADMIN — MAGNESIUM SULFATE HEPTAHYDRATE 2 G: 40 INJECTION, SOLUTION INTRAVENOUS at 11:40

## 2021-10-30 RX ADMIN — FAMOTIDINE 20 MG: 20 TABLET ORAL at 16:50

## 2021-10-30 RX ADMIN — METOPROLOL SUCCINATE 25 MG: 25 TABLET, EXTENDED RELEASE ORAL at 09:12

## 2021-10-30 RX ADMIN — SODIUM CHLORIDE, PRESERVATIVE FREE 10 ML: 5 INJECTION INTRAVENOUS at 09:12

## 2021-10-30 RX ADMIN — SODIUM CHLORIDE, PRESERVATIVE FREE 10 ML: 5 INJECTION INTRAVENOUS at 09:15

## 2021-10-30 RX ADMIN — LEVETIRACETAM 500 MG: 500 TABLET ORAL at 20:48

## 2021-10-31 LAB
ANION GAP SERPL CALCULATED.3IONS-SCNC: 5.9 MMOL/L (ref 5–15)
BUN SERPL-MCNC: 13 MG/DL (ref 8–23)
BUN/CREAT SERPL: 16.5 (ref 7–25)
CALCIUM SPEC-SCNC: 9.9 MG/DL (ref 8.6–10.5)
CHLORIDE SERPL-SCNC: 96 MMOL/L (ref 98–107)
CO2 SERPL-SCNC: 34.1 MMOL/L (ref 22–29)
CREAT SERPL-MCNC: 0.79 MG/DL (ref 0.57–1)
DEPRECATED RDW RBC AUTO: 38.5 FL (ref 37–54)
ERYTHROCYTE [DISTWIDTH] IN BLOOD BY AUTOMATED COUNT: 15 % (ref 12.3–15.4)
GFR SERPL CREATININE-BSD FRML MDRD: 71 ML/MIN/1.73
GLUCOSE SERPL-MCNC: 104 MG/DL (ref 65–99)
HCT VFR BLD AUTO: 35.6 % (ref 34–46.6)
HGB BLD-MCNC: 11 G/DL (ref 12–15.9)
MAGNESIUM SERPL-MCNC: 1.8 MG/DL (ref 1.6–2.4)
MCH RBC QN AUTO: 22.4 PG (ref 26.6–33)
MCHC RBC AUTO-ENTMCNC: 30.9 G/DL (ref 31.5–35.7)
MCV RBC AUTO: 72.5 FL (ref 79–97)
PLATELET # BLD AUTO: 132 10*3/MM3 (ref 140–450)
PMV BLD AUTO: ABNORMAL FL
POTASSIUM SERPL-SCNC: 3.7 MMOL/L (ref 3.5–5.2)
RBC # BLD AUTO: 4.91 10*6/MM3 (ref 3.77–5.28)
SODIUM SERPL-SCNC: 136 MMOL/L (ref 136–145)
WBC # BLD AUTO: 14.85 10*3/MM3 (ref 3.4–10.8)

## 2021-10-31 PROCEDURE — 99232 SBSQ HOSP IP/OBS MODERATE 35: CPT | Performed by: INTERNAL MEDICINE

## 2021-10-31 PROCEDURE — 85027 COMPLETE CBC AUTOMATED: CPT | Performed by: INTERNAL MEDICINE

## 2021-10-31 PROCEDURE — 94799 UNLISTED PULMONARY SVC/PX: CPT

## 2021-10-31 PROCEDURE — 99024 POSTOP FOLLOW-UP VISIT: CPT | Performed by: SURGERY

## 2021-10-31 PROCEDURE — 80048 BASIC METABOLIC PNL TOTAL CA: CPT | Performed by: INTERNAL MEDICINE

## 2021-10-31 PROCEDURE — 25010000002 MAGNESIUM SULFATE 2 GM/50ML SOLUTION

## 2021-10-31 PROCEDURE — 83735 ASSAY OF MAGNESIUM: CPT | Performed by: INTERNAL MEDICINE

## 2021-10-31 PROCEDURE — 25010000002 MEROPENEM PER 100 MG: Performed by: SURGERY

## 2021-10-31 RX ORDER — MAGNESIUM SULFATE HEPTAHYDRATE 40 MG/ML
INJECTION, SOLUTION INTRAVENOUS
Status: COMPLETED
Start: 2021-10-31 | End: 2021-10-31

## 2021-10-31 RX ADMIN — TRAMADOL HYDROCHLORIDE 50 MG: 50 TABLET, FILM COATED ORAL at 04:39

## 2021-10-31 RX ADMIN — LEVETIRACETAM 500 MG: 500 TABLET ORAL at 20:01

## 2021-10-31 RX ADMIN — MEROPENEM 1 G: 1 INJECTION, POWDER, FOR SOLUTION INTRAVENOUS at 00:34

## 2021-10-31 RX ADMIN — LEVETIRACETAM 500 MG: 500 TABLET ORAL at 09:18

## 2021-10-31 RX ADMIN — FAMOTIDINE 20 MG: 20 TABLET ORAL at 06:37

## 2021-10-31 RX ADMIN — METOPROLOL SUCCINATE 25 MG: 25 TABLET, EXTENDED RELEASE ORAL at 09:18

## 2021-10-31 RX ADMIN — SODIUM CHLORIDE, PRESERVATIVE FREE 10 ML: 5 INJECTION INTRAVENOUS at 09:18

## 2021-10-31 RX ADMIN — FAMOTIDINE 20 MG: 20 TABLET ORAL at 17:29

## 2021-10-31 RX ADMIN — MEROPENEM 1 G: 1 INJECTION, POWDER, FOR SOLUTION INTRAVENOUS at 09:19

## 2021-10-31 RX ADMIN — MAGNESIUM SULFATE HEPTAHYDRATE 2 G: 40 INJECTION, SOLUTION INTRAVENOUS at 12:38

## 2021-10-31 RX ADMIN — MEROPENEM 1 G: 1 INJECTION, POWDER, FOR SOLUTION INTRAVENOUS at 17:28

## 2021-11-01 PROBLEM — R93.7 ABNORMAL X-RAY OF LUMBAR SPINE: Status: ACTIVE | Noted: 2021-11-01

## 2021-11-01 PROBLEM — E07.9 THYROID MASS: Status: ACTIVE | Noted: 2021-11-01

## 2021-11-01 LAB
ANION GAP SERPL CALCULATED.3IONS-SCNC: 6 MMOL/L (ref 5–15)
BUN SERPL-MCNC: 13 MG/DL (ref 8–23)
BUN/CREAT SERPL: 14.1 (ref 7–25)
CALCIUM SPEC-SCNC: 10.4 MG/DL (ref 8.6–10.5)
CHLORIDE SERPL-SCNC: 97 MMOL/L (ref 98–107)
CO2 SERPL-SCNC: 34 MMOL/L (ref 22–29)
CREAT SERPL-MCNC: 0.92 MG/DL (ref 0.57–1)
DEPRECATED RDW RBC AUTO: 38.7 FL (ref 37–54)
ERYTHROCYTE [DISTWIDTH] IN BLOOD BY AUTOMATED COUNT: 15 % (ref 12.3–15.4)
GFR SERPL CREATININE-BSD FRML MDRD: 59 ML/MIN/1.73
GLUCOSE SERPL-MCNC: 111 MG/DL (ref 65–99)
HCT VFR BLD AUTO: 36.1 % (ref 34–46.6)
HGB BLD-MCNC: 11.5 G/DL (ref 12–15.9)
MAGNESIUM SERPL-MCNC: 1.9 MG/DL (ref 1.6–2.4)
MCH RBC QN AUTO: 23.2 PG (ref 26.6–33)
MCHC RBC AUTO-ENTMCNC: 31.9 G/DL (ref 31.5–35.7)
MCV RBC AUTO: 72.9 FL (ref 79–97)
PLATELET # BLD AUTO: 129 10*3/MM3 (ref 140–450)
PMV BLD AUTO: ABNORMAL FL
POTASSIUM SERPL-SCNC: 3.8 MMOL/L (ref 3.5–5.2)
RBC # BLD AUTO: 4.95 10*6/MM3 (ref 3.77–5.28)
SODIUM SERPL-SCNC: 137 MMOL/L (ref 136–145)
WBC # BLD AUTO: 12.19 10*3/MM3 (ref 3.4–10.8)

## 2021-11-01 PROCEDURE — 99024 POSTOP FOLLOW-UP VISIT: CPT | Performed by: SURGERY

## 2021-11-01 PROCEDURE — 25010000002 MAGNESIUM SULFATE 2 GM/50ML SOLUTION: Performed by: SURGERY

## 2021-11-01 PROCEDURE — 94799 UNLISTED PULMONARY SVC/PX: CPT

## 2021-11-01 PROCEDURE — 25010000002 MEROPENEM PER 100 MG: Performed by: SURGERY

## 2021-11-01 PROCEDURE — 83735 ASSAY OF MAGNESIUM: CPT | Performed by: INTERNAL MEDICINE

## 2021-11-01 PROCEDURE — 99232 SBSQ HOSP IP/OBS MODERATE 35: CPT | Performed by: NURSE PRACTITIONER

## 2021-11-01 PROCEDURE — 85027 COMPLETE CBC AUTOMATED: CPT | Performed by: INTERNAL MEDICINE

## 2021-11-01 PROCEDURE — 80048 BASIC METABOLIC PNL TOTAL CA: CPT | Performed by: INTERNAL MEDICINE

## 2021-11-01 PROCEDURE — 97116 GAIT TRAINING THERAPY: CPT

## 2021-11-01 RX ORDER — FLUCONAZOLE 100 MG/1
150 TABLET ORAL ONCE
Status: COMPLETED | OUTPATIENT
Start: 2021-11-01 | End: 2021-11-01

## 2021-11-01 RX ORDER — METOPROLOL SUCCINATE 50 MG/1
50 TABLET, EXTENDED RELEASE ORAL
Status: DISCONTINUED | OUTPATIENT
Start: 2021-11-01 | End: 2021-11-02 | Stop reason: HOSPADM

## 2021-11-01 RX ADMIN — SODIUM CHLORIDE, PRESERVATIVE FREE 10 ML: 5 INJECTION INTRAVENOUS at 08:30

## 2021-11-01 RX ADMIN — LEVETIRACETAM 500 MG: 500 TABLET ORAL at 08:24

## 2021-11-01 RX ADMIN — METOPROLOL SUCCINATE 50 MG: 50 TABLET, EXTENDED RELEASE ORAL at 08:28

## 2021-11-01 RX ADMIN — SODIUM CHLORIDE, PRESERVATIVE FREE 10 ML: 5 INJECTION INTRAVENOUS at 00:08

## 2021-11-01 RX ADMIN — FAMOTIDINE 20 MG: 20 TABLET ORAL at 17:45

## 2021-11-01 RX ADMIN — MEROPENEM 1 G: 1 INJECTION, POWDER, FOR SOLUTION INTRAVENOUS at 00:05

## 2021-11-01 RX ADMIN — MEROPENEM 1 G: 1 INJECTION, POWDER, FOR SOLUTION INTRAVENOUS at 08:25

## 2021-11-01 RX ADMIN — FLUCONAZOLE 150 MG: 100 TABLET ORAL at 18:30

## 2021-11-01 RX ADMIN — SODIUM CHLORIDE, PRESERVATIVE FREE 10 ML: 5 INJECTION INTRAVENOUS at 20:02

## 2021-11-01 RX ADMIN — MEROPENEM 1 G: 1 INJECTION, POWDER, FOR SOLUTION INTRAVENOUS at 17:45

## 2021-11-01 RX ADMIN — MAGNESIUM SULFATE HEPTAHYDRATE 2 G: 40 INJECTION, SOLUTION INTRAVENOUS at 11:19

## 2021-11-01 RX ADMIN — LEVETIRACETAM 500 MG: 500 TABLET ORAL at 20:02

## 2021-11-01 RX ADMIN — FAMOTIDINE 20 MG: 20 TABLET ORAL at 08:24

## 2021-11-01 NOTE — DISCHARGE SUMMARY
"Shyann Davis  1945  [id number]    Hospitalists Discharge Summary    Date of Admission: 10/22/2021  Date of Discharge:  11/2/2021    History of Present Illness from [facility] on admit:   \"The patient is a pleasant 76-year-old female who presented to the emergency department secondary to sudden onset abdominal pain, nausea, vomiting, diarrhea that began after eating two muffins and coffee for breakfast ( had same without issues).  She notes near immediate abdominal pain with nausea and vomiting approximately 8-10 times at home with 2-3 episodes of diarrhea.  She vomited once or twice more after coming to ER.  Since being given Reglan and Zofran in ER she notes that her nausea has resolved and abdominal pain is much less.     The patient also reports intermittent dizziness, gait instability over the past several weeks.  She notes 2 weeks ago had multiple days of dizziness and gait instability that resolved until 2 nights ago when it returned.  She is not having symptoms at this time during exam.  She denies headache, change in vision, change in sensation during these episodes.  She is followed by a neurologist for the seizures since AVM removal.     Diagnostics in ER revealed lactate 2.9, WBC 27.24 with left shift, platelets 101,000, anion gap 17.3, calcium 10.6, glucose 188.  CT abdomen pelvis with contrast showed possible gallbladder distention and gallbladder wall thickening among other findings, see CT report in epic     She has a history of chronic ITP, seizure disorder s/p AVM rupture and repair 1992, breast cancer, status post lumpectomy/chemo/radiation 2002, hypertension, hyperlipidemia, anxiety/depression, impaired fasting glucose.     She otherwise denies f/c/headache/rhinorrhea/nasal congestion/syncopal sensation/cough/soa/chest pain/recent illness/sick exposures/change in bladder habits/no weight change/bloody emesis or bloody stools/change in medications or any other new concerns.\"    Primary " "Discharge diagnoses:  Sepsis secondary to acute gangrenous cholecystitis  Leukocytosis  Lactic acidosis  Mild NAGMA   Acute metabolic encephalopathy   Uncontrolled hypertension   Acute on chronic ITP  Microcytic anemia   Leukocytosis, neutrophilia, monocytosis    Secondary Discharge Diagnoses:    Vertigo  Multiple AVMs   Lucent lesions on T-L spine   H/O breast cancer 2002, s/p lumpectomy, radiation, chemotherapy   Left thyroid mass   Hypercalcemia   Hyperlipidemia   Seizure disorder s/p AVM repair 1992   Depression/anxiety   Overactive bladder   Osteoporosis   Moles on back   Other Incidental findings on CT   Chronic L1/L2 compression deformities    Hospital Course Summary:   Patient was followed in consultation by surgery, hematology, neurology.  Initial complaint of dizziness was worked up by neurology followed and placed patient on Klonopin and Solu-Medrol with resolution of dizziness per neurology AVMs are stable. She was confused post-operatively due to medications/illness, now back to baseline.    Hematology followed, recommended to unit platelets prior to surgery which was completed, since that time.  Platelets have been stable.  CT showed lucent lesions on T/L-spine as well as thyroid mass.  Patient preferred to follow-up outpatient for further studies per hematology.    Surgery followed, gallbladder was gangrenous, she has received IV antibiotics since admission due to leukocytosis.  She underwent laparoscopic cholecystectomy on 10/25/2021.  WBC has trended down on Merrem.  LAUREL drain was removed yesterday.  Gallbladder pathology showed \"Severe acute necrotizing calculus cholecystitis\".    PCP  Patient Care Team:  Reese Underwood DO as PCP - General (Family Medicine)  Reese Underwood DO as Referring Physician (Family Medicine)  Henry Conde MD as Consulting Physician (Hematology and Oncology)    Consults:   Consults     Date and Time Order Name Status Description    10/24/2021  1:49 PM Hematology & " Oncology Inpatient Consult Completed     10/24/2021  1:25 PM Hematology & Oncology Inpatient Consult      10/24/2021 12:40 PM Inpatient General Surgery Consult Completed     10/23/2021  6:21 AM Inpatient Neurology Consult General Completed         Operations and Procedures Performed:  Procedure(s):  CHOLECYSTECTOMY LAPAROSCOPIC     XR Chest 2 View    Result Date: 10/23/2021  Narrative: CR Chest 2 Vws INDICATION:  Cough and leukocytosis today. General weakness. COMPARISON:  None. FINDINGS: PA and lateral views of the chest.  Borderline heart size. Mediastinal contours are normal. The lungs are clear. No pneumothorax or pleural effusion.      Impression: No acute cardiopulmonary findings. Signer Name: Jose De Jesus Powell MD  Signed: 10/23/2021 1:56 AM  Workstation Name: [email or web address]-  [facility]    CT Head Without Contrast    Result Date: 10/23/2021  Narrative: CT Head WO HISTORY: Dizziness and gait instability for one month. Fall 4 weeks ago. History of prior AVM rupture and repair. TECHNIQUE: Routine noncontrast head CT. Coronal and sagittal reformatted images. Radiation dose reduction techniques included automated exposure control or exposure modulation based on body size. Count of known CT and cardiac nuc med studies performed in previous 12 months: 0. COMPARISON: None. FINDINGS: No hemorrhage, acute infarction, mass lesion, or abnormal extra-axial fluid collection. No midline shift or focal mass effect. Ventricular system is normal in size and configuration. There is chronic encephalomalacia involving the right occipital lobe and right posterior parietal and temporal lobes. There is a focal 5 mm dilatation at the junction of the M1 and M2 segments of the right middle cerebral artery, concerning for aneurysm. Mild generalized atrophy. Mild chronic small vessel disease. Postsurgical changes of the right calvarium are noted. No acute osseous abnormality. Visualized paranasal sinuses are clear.  Visualized mastoid air cells are clear.     Impression: 1. A focal 5 mm dilatation at the junction of the M1 and M2 segments of the right middle cerebral artery, concerning for aneurysm. Further characterization with CTA or MRA of the head and neck recommended. 2. No acute infarction or intracranial hemorrhage. 3. Chronic encephalomalacia of the right occipital lobe and right posterior parietal and temporal lobes. 4. Age-related senescent changes. Signer Name: Jose De Jesus Powell MD  Signed: 10/23/2021 6:17 AM  Workstation Name: [email or web address]  [facility] [place]    CT Angiogram Neck    Result Date: 10/23/2021  Narrative: INDICATION: Dizziness. Gait instability off-and-on for a month. History of AVM repair after rupture. Fell 4 weeks ago striking the right side of her head. Abnormal earlier head CT concern for aneurysm. Leukocytosis. TECHNIQUE: CT angiogram of the head and neck with IV contrast. Contrast dose recorded in the patient's chart 3-D MIP and curved reformatted images were acquired and reviewed. Evaluation for a significant carotid arterial stenosis is based on the NASCET criteria. Radiation dose reduction techniques included automated exposure control or exposure modulation based on body size. Radiation audit for number of CT and nuclear cardiology exams performed in the last year:  2.  COMPARISON: Earlier noncontrast head CT FINDINGS: CTA neck:  The aortic arch branch pattern is normal. There is no hemodynamically significant stenosis of great vessel origins from the arch. The right carotid system is widely patent. By NASCET criteria, there is 0% stenosis of the right carotid bifurcation. The left carotid system is widely patent. By NASA criteria, there is 0% stenosis at the left carotid bifurcation. Both vertebral arteries are patent and codominant. Both supply the basilar. There is a large mass extending inferiorly from the left lobe thyroid gland that is heterogeneous and measures at least  4.8 x 4.1 cm axial plane. This should be further characterized with a nonemergent thyroid ultrasound. It extends into the left upper anterior mediastinum CTA head:  There are atherosclerotic vascular calcifications involving the bilateral intracranial vertebral arteries with mild narrowing on the right and about 30% diameter stenosis on the left over short segment. There are mild vascular calcifications at the carotid siphons with likely only mild narrowing. 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 It is directed inferomedially from the supraclinoid ICA. 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 Its fusiform in configuration measuring up to about 4.7 x 5.1 mm in diameter and extending about 6 mm in length. There is also ectasia extending into the proximal inferior right M2 vessel over about 2 cm in length with a subsequent short segment occlusion. More distal right MCA vessels are irregular. The patient has a large area of encephalomalacia in the right posterior temporal and right occipital lobe with small surgical clips and this is presumably at site of previously resected AVM. There is an overlying craniotomy. It is likely that the right MCA territory previously fed the AVM. Comparison to outside imaging recommended to determine stability of the fusiform aneurysmal dilatation described. There is also attenuated appearance to the proximal right posterior cerebral artery to the point of proximal cut off again likely postoperative. There is essentially a broad anterior communicating artery. There is an irregular appearance to the posterior circulation including the basilar with fullness of the tip of the basilar. No discrete saccular aneurysm is appreciated. No significant sized posterior communicating artery is seen on either side. There is patchy  airspace disease in the visualized upper lungs. There are degenerative changes in the cervical spine.     Impression: 1. This patient has a known previously treated AVM. There is a large area of encephalomalacia involving the right posterior temporal and right occipital lobe regions. There is an irregular vascular cut off from the proximal right posterior cerebral artery distribution and there is vascular cut off and attenuation of vessels more distal right MCA distribution all probably post therapeutic. Correlation with outside angiographic imaging would be most helpful. The patient was not treated at this facility. 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 Comparison to outside studies recommended to determine stability. 2. By NASCET criteria 0% stenosis at either carotid bifurcation. Both vertebral arteries are patent and codominant. 3. Large mass lesion extending inferiorly from left lobe of the thyroid gland into the upper mediastinum. If not previously evaluated, recommend thyroid ultrasound on a nonemergent basis to determine TI-RADS score. Signer Name: Giovanna Qureshi MD  Signed: 10/23/2021 8:59 AM  Workstation Name: [email or web address]  [facility] Hepatobiliary Without CCK    Result Date: 10/24/2021  Narrative: HEPATOBILIARY SCAN WITH GALLBLADDER EF MEASUREMENT HISTORY: Right upper quadrant pain and abdominal pain sepsis unspecified organism, abdominal diagnostic imaging abnormality. Patient states abdominal pain for 2 days history of abnormal ultrasound showing gallstone and gallbladder wall thickening DOSE: All given intravenously *  5.6 millicuries technetium labeled Choletec. COMPARISON: Ultrasound abdomen 10/24/2021 FINDINGS: There  is normal, prompt visualization of biodistribution throughout the liver on immediate and 15 minute images. There is increasing radiotracer activity seen centrally in the liver that is persistent up to 60 minutes post injection of radiotracer. At 30 minutes, small bowel activity identified suggesting common bile duct patency. A lateral view was attempted at 60 minutes within it are a overview at 70 minutes but the gallbladder is not adequately filled on these 2 additional images after 60 minutes. On ultrasound, large gallstone was seen in the gallbladder neck and cystic duct obstruction cannot be excluded. Examination order did not include request for ejection fraction calculation.     Impression: Abnormal hepatobiliary scan with lack of adequate demonstration of the gallbladder duct suggesting cystic duct obstruction. Correlation with MRCP with [facility] may be of value to corroborate findings if clinically desired. Signer Name: Lindsey Briones MD  Signed: 10/24/2021 5:33 PM  Workstation Name: [email or web address]  [facility]    CT Abdomen Pelvis With Contrast    Result Date: 10/23/2021  Narrative: CT Abdomen Pelvis W INDICATION: Diffuse abdominal pain and cramping today. Generalized weakness. Leukocytosis. Previous history of right-sided breast cancer. TECHNIQUE: CT of the abdomen and pelvis with IV contrast. Coronal and sagittal reconstructions were obtained.  Radiation dose reduction techniques included automated exposure control or exposure modulation based on body size. Count of known CT and cardiac nuc med studies performed in previous 12 months: 0. COMPARISON: None available. FINDINGS: Visualized lung bases are unremarkable. Abdomen: The liver is within normal limits. The spleen and pancreas are normal. The gallbladder is borderline distended with equivocal wall thickening. Both adrenal glands are normal. Multiple right renal cysts are noted, the largest in the upper pole region measuring 7.5 cm.  Left kidney is unremarkable. No hydronephrosis. Abdominal aorta normal in course and caliber. Small bowel is unremarkable without obstruction. Appendectomy. The colon is unremarkable. No free fluid or free air. Pelvis: The urinary bladder is unremarkable.  Hysterectomy. No free pelvic fluid. There are chronic appearing compression deformities of the L1 and L2 vertebral bodies. There are also several questionable lucent lesions noted throughout the visualized thoracolumbar spine. Some of these may represent benign hemangiomas. However, given the history of previous breast cancer, metastatic disease or myeloma is not completely excluded.     Impression: 1. Borderline gallbladder distention with some equivocal gallbladder wall thickening. No visible cholelithiasis by CT. Correlation with clinical symptoms recommended to exclude acute cholecystitis. 2. Right-sided renal cysts. No hydronephrosis or visible obstructing urinary tract stones. 3. Appendectomy and hysterectomy. 4. Multiple questionable lucent lesions noted in the visualized thoracolumbar spine. Some of these likely represent benign hemangiomas, but given the history of previous breast cancer, metastatic disease is not completely excluded. Consider follow-up with nonemergent bone scan. Signer Name: Jose De Jesus Powell MD  Signed: 10/23/2021 2:27 AM  Workstation Name: [email or web address]  [facility] Gallbladder    Result Date: 10/24/2021  Narrative: US Abdomen Ltd INDICATION: Increasing white blood cell count. Abnormal gallbladder wall thickening on earlier CT with sepsis. Please evaluate for gallstones. Upper abdominal pain for 2 days COMPARISON: Abdomen pelvis CT from 10/23/2021. FINDINGS: PANCREAS: Proximal pancreas unremarkable. Most of the pancreas is obscured by bowel gas LIVER: No mass appreciated on provided images. Main portal vein is patent with hepatopedal flow. Intrahepatic inferior vena cava is grossly patent. No intrahepatic  biliary ductal dilatation appreciated. GALLBLADDER: Gallbladder wall is thickened to about 4.5 mm. There is a large stone dependently in the gallbladder near the gallbladder neck area. It is likely about 2 cm in diameter. At this time there is no positive sonographic Emanuel's sign. Common duct measures 3.8 mm which is within the range of normal. No pericholecystic fluid is seen. RIGHT KIDNEY: The right kidney measures the right kidney measures about 10 cm in length by 6.4 cm x 5.8 cm and is otherwise unremarkable. There are 2 large exophytic right upper pole renal cysts. They are better seen on the CT in their entirety.     Impression: There is a large gallstone at the gallbladder neck about 2 cm in diameter with mild gallbladder wall thickening. No obvious pericholecystic fluid or sonographic Emanuel sign. Please correlate for clinical evidence of acute cholecystitis. No biliary ductal dilatation Signer Name: Giovanna Qureshi MD  Signed: 10/24/2021 11:31 AM  Workstation Name: [email or web address]  [facility] [place]    CT Angiogram Head    Result Date: 10/23/2021  Narrative: INDICATION: Dizziness. Gait instability off-and-on for a month. History of AVM repair after rupture. Fell 4 weeks ago striking the right side of her head. Abnormal earlier head CT concern for aneurysm. Leukocytosis. TECHNIQUE: CT angiogram of the head and neck with IV contrast. Contrast dose recorded in the patient's chart 3-D MIP and curved reformatted images were acquired and reviewed. Evaluation for a significant carotid arterial stenosis is based on the NASCET criteria. Radiation dose reduction techniques included automated exposure control or exposure modulation based on body size. Radiation audit for number of CT and nuclear cardiology exams performed in the last year:  2.  COMPARISON: Earlier noncontrast head CT FINDINGS: CTA neck:  The aortic arch branch pattern is normal. There is no hemodynamically significant stenosis of great  vessel origins from the arch. The right carotid system is widely patent. By NASCET criteria, there is 0% stenosis of the right carotid bifurcation. The left carotid system is widely patent. By NASA criteria, there is 0% stenosis at the left carotid bifurcation. Both vertebral arteries are patent and codominant. Both supply the basilar. 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 This should be further characterized with a nonemergent thyroid ultrasound. It extends into the left upper anterior mediastinum CTA head:  There are atherosclerotic vascular calcifications involving the bilateral intracranial vertebral arteries with mild narrowing on the right and about 30% diameter stenosis on the left over short segment. There are mild vascular calcifications at the carotid siphons with likely only mild narrowing. 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 It is directed inferomedially from the supraclinoid ICA. 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 Its fusiform in configuration measuring up to about 4.7 x 5.1 mm in diameter and extending about 6 mm in length. There is also ectasia extending into the proximal inferior right M2 vessel over about 2 cm in length with a subsequent short segment occlusion. More distal right MCA vessels are irregular. The patient has a large area of encephalomalacia in the right posterior temporal and right occipital lobe with small surgical clips and this is presumably at site of previously resected AVM. There is an overlying craniotomy. It is likely that the right MCA territory previously fed the AVM. Comparison to outside imaging recommended to determine stability of the fusiform aneurysmal dilatation described. There is also  attenuated appearance to the proximal right posterior cerebral artery to the point of proximal cut off again likely postoperative. There is essentially a broad anterior communicating artery. There is an irregular appearance to the posterior circulation including the basilar with fullness of the tip of the basilar. No discrete saccular aneurysm is appreciated. No significant sized posterior communicating artery is seen on either side. There is patchy airspace disease in the visualized upper lungs. There are degenerative changes in the cervical spine.     Impression: 1. This patient has a known previously treated AVM. There is a large area of encephalomalacia involving the right posterior temporal and right occipital lobe regions. There is an irregular vascular cut off from the proximal right posterior cerebral artery distribution and there is vascular cut off and attenuation of vessels more distal right MCA distribution all probably post therapeutic. Correlation with outside angiographic imaging would be most helpful. The patient was not treated at this facility. 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 Comparison to outside studies recommended to determine stability. 2. By NASCET criteria 0% stenosis at either carotid bifurcation. Both vertebral arteries are patent and codominant. 3. Large mass lesion extending inferiorly from left lobe of the thyroid gland into the upper mediastinum. If not previously evaluated, recommend thyroid ultrasound on a nonemergent basis to determine TI-RADS score. Signer Name: Giovanna Qureshi MD  Signed: 10/23/2021 8:59 AM  Workstation Name: [email or web address]  [facility] [place]    XR Abdomen KUB    Result Date:  10/23/2021  Narrative: CR Abdomen 1 Vw INDICATION: Right sided abdominal pain and leukocytosis, sepsis, upper abdominal pain patient has right-sided abdominal pain and tenderness COMPARISON: CT scan 10/23/2021 TECHNIQUE:  Single frontal view abdomen. FINDINGS:  Enteric contrast seen throughout the colon and IV contrast noted in the bladder and kidneys. Bowel gas pattern is nonobstructive. Assessment for free air limited on supine image. No radiopaque foreign body. No acute osseous abnormality noted.     Impression: Normal bowel gas pattern. Signer Name: Lindsey Briones MD  Signed: 10/23/2021 6:04 PM  Workstation Name: [email or web address]  [facility] [place]    Allergies:  is allergic to cephalexin, sulfa antibiotics, erythromycin, and neomycin-bacitracin zn-polymyx.    Jean-Paul  No medications noted per report, reviewed by me    Discharge Medications:     Discharge Medications      New Medications      Instructions Start Date   cefuroxime 500 MG tablet  Commonly known as: CEFTIN   500 mg, Oral, Every 12 Hours Scheduled      clindamycin 300 MG capsule  Commonly known as: Cleocin   300 mg, Oral, 3 Times Daily      fluconazole 150 MG tablet  Commonly known as: Diflucan   150 mg, Oral, Every 72 Hours      saccharomyces boulardii 250 MG capsule  Commonly known as: FLORASTOR   500 mg, Oral, 2 Times Daily      traMADol 50 MG tablet  Commonly known as: ULTRAM   50 mg, Oral, Every 6 Hours PRN         Changes to Medications      Instructions Start Date   metoprolol succinate XL 50 MG 24 hr tablet  Commonly known as: TOPROL-XL  What changed:   · medication strength  · how much to take  · when to take this   50 mg, Oral, Every 24 Hours Scheduled         Continue These Medications      Instructions Start Date   atorvastatin 40 MG tablet  Commonly known as: LIPITOR   TAKE 1 TABLET EVERY DAY      Calcium 600-200 MG-UNIT per tablet   1 tablet, Oral, 2 Times Daily      levETIRAcetam 500 MG tablet  Commonly known as: KEPPRA    TAKE 1/2 TAB TWICE A DAY FOR A WEEK THEN 1 TAB TWICE A DAY      multivitamin tablet tablet  Commonly known as: THERAGRAN   Oral      Myrbetriq 25 MG tablet sustained-release 24 hour 24 hr tablet  Generic drug: Mirabegron ER   25 mg, Oral, Daily      PARoxetine 20 MG tablet  Commonly known as: PAXIL   TAKE 1 TABLET EVERY MORNING      Prolia 60 MG/ML solution syringe  Generic drug: denosumab   FOR  BY YOUR PHYSICIAN TO INJECT 60MG (1ML) SUBCUTANEOUSLY EVERY 6 MONTHS             Last Lab Results:   Lab Results (most recent)     Procedure Component Value Units Date/Time    Magnesium [[id number]]  (Normal) Collected: 11/01/21 0606    Specimen: Blood Updated: 11/01/21 0928     Magnesium 1.9 mg/dL     Basic Metabolic Panel [[id number]]  (Abnormal) Collected: 11/01/21 0606    Specimen: Blood Updated: 11/01/21 0642     Glucose 111 mg/dL      BUN 13 mg/dL      Creatinine 0.92 mg/dL      Sodium 137 mmol/L      Potassium 3.8 mmol/L      Chloride 97 mmol/L      CO2 34.0 mmol/L      Calcium 10.4 mg/dL      eGFR Non African Amer 59 mL/min/1.73      BUN/Creatinine Ratio 14.1     Anion Gap 6.0 mmol/L     Narrative:      GFR Normal >60  Chronic Kidney Disease <60  Kidney Failure <15      CBC (No Diff) [[id number]]  (Abnormal) Collected: 11/01/21 0600    Specimen: Blood Updated: 11/01/21 0631     WBC 12.19 10*3/mm3      RBC 4.95 10*6/mm3      Hemoglobin 11.5 g/dL      Hematocrit 36.1 %      MCV 72.9 fL      MCH 23.2 pg      MCHC 31.9 g/dL      RDW 15.0 %      RDW-SD 38.7 fl      MPV --     Comment: TNP        Platelets 129 10*3/mm3     Magnesium [[id number]]  (Normal) Collected: 10/31/21 0504    Specimen: Blood Updated: 10/31/21 1002     Magnesium 1.8 mg/dL     CBC (No Diff) [[id number]]  (Abnormal) Collected: 10/31/21 0504    Specimen: Blood Updated: 10/31/21 0705     WBC 14.85 10*3/mm3      RBC 4.91 10*6/mm3      Hemoglobin 11.0 g/dL      Hematocrit 35.6 %      MCV 72.5 fL      MCH 22.4 pg      MCHC 30.9 g/dL       RDW 15.0 %      RDW-SD 38.5 fl      MPV --     Comment: Unable to calculate        Platelets 132 10*3/mm3     Basic Metabolic Panel [[id number]]  (Abnormal) Collected: 10/31/21 0504    Specimen: Blood Updated: 10/31/21 0658     Glucose 104 mg/dL      BUN 13 mg/dL      Creatinine 0.79 mg/dL      Sodium 136 mmol/L      Potassium 3.7 mmol/L      Chloride 96 mmol/L      CO2 34.1 mmol/L      Calcium 9.9 mg/dL      eGFR Non African Amer 71 mL/min/1.73      BUN/Creatinine Ratio 16.5     Anion Gap 5.9 mmol/L     Narrative:      GFR Normal >60  Chronic Kidney Disease <60  Kidney Failure <15      CBC & Differential [[id number]]  (Abnormal) Collected: 10/29/21 0636    Specimen: Blood Updated: 10/29/21 0819    Narrative:      The following orders were created for panel order CBC & Differential.  Procedure                               Abnormality         Status                     ---------                               -----------         ------                     CBC Auto Differential[[id number]]        Abnormal            Final result               Scan Slide[[id number]]                                       Final result                 Please view results for these tests on the individual orders.    Scan Slide [[id number]] Collected: 10/29/21 0636    Specimen: Blood Updated: 10/29/21 0819     Anisocytosis Slight/1+     WBC Morphology Normal     Large Platelets Slight/1+    CBC Auto Differential [[id number]]  (Abnormal) Collected: 10/29/21 0636    Specimen: Blood Updated: 10/29/21 0719     WBC 15.37 10*3/mm3      RBC 5.08 10*6/mm3      Hemoglobin 11.4 g/dL      Hematocrit 36.8 %      MCV 72.4 fL      MCH 22.4 pg      MCHC 31.0 g/dL      RDW 15.0 %      RDW-SD 38.4 fl      MPV 12.2 fL      Platelets 112 10*3/mm3      Neutrophil % 74.2 %      Lymphocyte % 14.8 %      Monocyte % 6.4 %      Eosinophil % 0.7 %      Basophil % 0.3 %      Immature Grans % 3.6 %      Neutrophils, Absolute 11.39 10*3/mm3      Lymphocytes, Absolute 2.27  10*3/mm3      Monocytes, Absolute 0.99 10*3/mm3      Eosinophils, Absolute 0.11 10*3/mm3      Basophils, Absolute 0.05 10*3/mm3      Immature Grans, Absolute 0.56 10*3/mm3      nRBC 0.0 /100 WBC     Potassium [[id number]]  (Normal) Collected: 10/28/21 1935    Specimen: Blood Updated: 10/28/21 1952     Potassium 3.9 mmol/L     CBC & Differential [[id number]]  (Abnormal) Collected: 10/28/21 0724    Specimen: Blood Updated: 10/28/21 0806    Narrative:      The following orders were created for panel order CBC & Differential.  Procedure                               Abnormality         Status                     ---------                               -----------         ------                     CBC Auto Differential[[id number]]        Abnormal            Final result               Scan Slide[[id number]]                                       Final result                 Please view results for these tests on the individual orders.    Scan Slide [[id number]] Collected: 10/28/21 0724    Specimen: Blood Updated: 10/28/21 0806     Anisocytosis Slight/1+     WBC Morphology Normal     Large Platelets Slight/1+    Potassium [[id number]]  (Abnormal) Collected: 10/28/21 0724    Specimen: Blood Updated: 10/28/21 0751     Potassium 3.3 mmol/L     CBC Auto Differential [[id number]]  (Abnormal) Collected: 10/28/21 0724    Specimen: Blood Updated: 10/28/21 0737     WBC 15.51 10*3/mm3      RBC 4.92 10*6/mm3      Hemoglobin 11.2 g/dL      Hematocrit 35.6 %      MCV 72.4 fL      MCH 22.8 pg      MCHC 31.5 g/dL      RDW 15.1 %      RDW-SD 39.3 fl      MPV --     Comment: Unable to calculate        Platelets 102 10*3/mm3      Neutrophil % 80.1 %      Lymphocyte % 9.7 %      Monocyte % 6.4 %      Eosinophil % 0.3 %      Basophil % 0.3 %      Immature Grans % 3.2 %      Neutrophils, Absolute 12.42 10*3/mm3      Lymphocytes, Absolute 1.51 10*3/mm3      Monocytes, Absolute 0.99 10*3/mm3      Eosinophils, Absolute 0.04 10*3/mm3      Basophils,  Absolute 0.05 10*3/mm3      Immature Grans, Absolute 0.50 10*3/mm3      nRBC 0.0 /100 WBC     Blood Culture - Blood, Arm, Right [[id number]]  (Normal) Collected: 10/23/21 0013    Specimen: Blood from Arm, Right Updated: 10/28/21 0015     Blood Culture No growth at 5 days    Blood Culture - Blood, Arm, Left [[id number]]  (Normal) Collected: 10/23/21 0012    Specimen: Blood from Arm, Left Updated: 10/28/21 0015     Blood Culture No growth at 5 days    ERIK,PE and FLC, Serum [[id number]]  (Abnormal) Collected: 10/25/21 0435    Specimen: Blood Updated: 10/27/21 1511     IgG 863 mg/dL      IgA 315 mg/dL      IgM 111 mg/dL      Total Protein 5.8 g/dL      Albumin 2.9 g/dL      Alpha-1-Globulin 0.3 g/dL      Alpha-2-Globulin 0.8 g/dL      Beta Globulin 0.8 g/dL      Gamma Globulin 0.9 g/dL      M-Lopez Not Observed g/dL      Globulin 2.9 g/dL      A/G Ratio 1.1     Immunofixation Reflex, Serum Comment     Comment: No monoclonality detected.        Please note Comment     Comment: Protein electrophoresis scan will follow via computer, mail, or   delivery.        Free Light Chain, Kappa 21.2 mg/L      Free Lambda Light Chains 20.1 mg/L      Kappa/Lambda Ratio 1.05    Narrative:      Performed at:  [address] [place] [address]  [id number]  : Scott Mckeon PhD, Phone:  [phone]    Procalcitonin [[id number]]  (Abnormal) Collected: 10/26/21 0337    Specimen: Blood Updated: 10/26/21 1624     Procalcitonin 0.27 ng/mL     Tissue Pathology Exam [[id number]] Collected: 10/25/21 1002    Specimen: Tissue from Gallbladder Updated: 10/26/21 1231     Case Report --     Surgical Pathology Report                         Case: [id number]                                  Authorizing Provider:  Gera Kay MD        Collected:           10/25/2021 10:02 AM          Ordering Location:     [facility]   Received:            10/25/2021 12:59 PM                                 OR              "                                                              Pathologist:           Ruben Rosenberg MD                                                          Specimen:    Gallbladder, GALLBLADDER AND CONTENTS                                                       Final Diagnosis --     1. Gallbladder:    A. Severe acute necrotizing calculus cholecystitis .    [facility]       Gross Description --     1. Received in formalin labeled \"gallbladder\" is a 9.0 x 4.0 x 3.0 cm gallbladder with a markedly hemorrhagic cystic duct. The cystic duct appears to be disrupted due to surgical intervention and a lumen cannot be discerned. The serosa is smooth variegated tan red and focally necrotic. Contents include orange red viscous bile and a single 1.6 x 1.5 x 1.3 cm yellow hard cholelith. The mucosa is tan red and markedly necrotic. The wall thickness ranges from 0.2 cm to 0.4 cm.  Representative sections are submitted to include the cystic duct margin (smallest fragment), fundus, body and neck as 1A.     jap/uso/jab/pkm       Cancer Antigen 15-3 [[id number]]  (Normal) Collected: 10/25/21 0435    Specimen: Blood Updated: 10/25/21 1127     CA 15-3 15.1 U/mL     Narrative:      Results may be falsely decreased if patient taking Biotin.     Comprehensive Metabolic Panel [[id number]]  (Abnormal) Collected: 10/25/21 0435    Specimen: Blood Updated: 10/25/21 0512     Glucose 133 mg/dL      BUN 10 mg/dL      Creatinine 0.66 mg/dL      Sodium 140 mmol/L      Potassium 3.7 mmol/L      Chloride 111 mmol/L      CO2 22.0 mmol/L      Calcium 7.7 mg/dL      Total Protein 6.0 g/dL      Albumin 3.00 g/dL      ALT (SGPT) 28 U/L      AST (SGOT) 50 U/L      Alkaline Phosphatase 62 U/L      Total Bilirubin 0.6 mg/dL      eGFR Non African Amer 87 mL/min/1.73      Globulin 3.0 gm/dL      A/G Ratio 1.0 g/dL      BUN/Creatinine Ratio 15.2     Anion Gap 7.0 mmol/L     Narrative:      GFR Normal >60  Chronic Kidney Disease <60  Kidney Failure <15      " Lactate Dehydrogenase [[id number]]  (Abnormal) Collected: 10/25/21 0435    Specimen: Blood Updated: 10/25/21 0512      U/L     Immature Platelet Fraction [[id number]]  (Abnormal) Collected: 10/25/21 0435    Specimen: Blood Updated: 10/25/21 0451     IPF 32.00 %     Gastrointestinal Panel, PCR - Stool, Per Rectum [[id number]]  (Normal) Collected: 10/24/21 1636    Specimen: Stool from Per Rectum Updated: 10/24/21 2150     Campylobacter Not Detected     Plesiomonas shigelloides Not Detected     Salmonella Not Detected     Vibrio Not Detected     Vibrio cholerae Not Detected     Yersinia enterocolitica Not Detected     Enteroaggregative E. coli (EAEC) Not Detected     Enteropathogenic E. coli (EPEC) Not Detected     Enterotoxigenic E. coli (ETEC) lt/st Not Detected     Shiga-like toxin-producing E. coli (STEC) stx1/stx2 Not Detected     Shigella/Enteroinvasive E. coli (EIEC) Not Detected     Cryptosporidium Not Detected     Cyclospora cayetanensis Not Detected     Entamoeba histolytica Not Detected     Giardia lamblia Not Detected     Adenovirus F40/41 Not Detected     Astrovirus Not Detected     Norovirus GI/GII Not Detected     Rotavirus A Not Detected     Sapovirus (I, II, IV or V) Not Detected    Narrative:      If Aeromonas, Staphylococcus aureus or Bacillus cereus are suspected, please order [id number]: Stool Culture, Aeromonas, S aureus, B Cereus.    Respiratory Panel, PCR (WITHOUT COVID) - Swab, Nasopharynx [[id number]]  (Normal) Collected: 10/23/21 1747    Specimen: Swab from Nasopharynx Updated: 10/24/21 1252     ADENOVIRUS, PCR Not Detected     Coronavirus 229E Not Detected     Coronavirus HKU1 Not Detected     Coronavirus NL63 Not Detected     Coronavirus OC43 Not Detected     Human Metapneumovirus Not Detected     Human Rhinovirus/Enterovirus Not Detected     Influenza B PCR Not Detected     Parainfluenza Virus 1 Not Detected     Parainfluenza Virus 2 Not Detected     Parainfluenza Virus 3 Not Detected      Parainfluenza Virus 4 Not Detected     Bordetella pertussis pcr Not Detected     Chlamydophila pneumoniae PCR Not Detected     Mycoplasma pneumo by PCR Not Detected     Influenza A PCR Not Detected     RSV, PCR Not Detected     Bordetella parapertussis PCR Not Detected    Narrative:      The coronavirus on the RVP is NOT COVID-19 and is NOT indicative of infection with COVID-19.    In the setting of a positive respiratory panel with a viral infection PLUS a negative procalcitonin without other underlying concern for bacterial infection, consider observing off antibiotics or discontinuation of antibiotics and continue supportive care. If the respiratory panel is positive for atypical bacterial infection (Bordetella pertussis, Chlamydophila pneumoniae, or Mycoplasma pneumoniae), consider antibiotic de-escalation to target atypical bacterial infection.    Comprehensive Metabolic Panel [[id number]]  (Abnormal) Collected: 10/24/21 0423    Specimen: Blood Updated: 10/24/21 0656     Glucose 120 mg/dL      BUN 10 mg/dL      Creatinine 0.79 mg/dL      Sodium 137 mmol/L      Potassium 4.1 mmol/L      Chloride 107 mmol/L      CO2 22.2 mmol/L      Calcium 7.8 mg/dL      Total Protein 6.3 g/dL      Albumin 3.10 g/dL      ALT (SGPT) 23 U/L      AST (SGOT) 37 U/L      Alkaline Phosphatase 59 U/L      Total Bilirubin 0.6 mg/dL      eGFR Non African Amer 71 mL/min/1.73      Globulin 3.2 gm/dL      A/G Ratio 1.0 g/dL      BUN/Creatinine Ratio 12.7     Anion Gap 7.8 mmol/L     Narrative:      GFR Normal >60  Chronic Kidney Disease <60  Kidney Failure <15      Lactic Acid, Plasma [[id number]]  (Abnormal) Collected: 10/23/21 1621    Specimen: Blood Updated: 10/23/21 1654     Lactate 2.9 mmol/L     C-reactive Protein [[id number]]  (Normal) Collected: 10/23/21 0426    Specimen: Blood Updated: 10/23/21 1234     C-Reactive Protein 0.49 mg/dL     Lactic Acid, Plasma [[id number]]  (Abnormal) Collected: 10/23/21 1124    Specimen: Blood Updated:  10/23/21 1151     Lactate 2.7 mmol/L     STAT Lactic Acid, Reflex [[id number]]  (Abnormal) Collected: 10/23/21 0423    Specimen: Blood Updated: 10/23/21 0455     Lactate 4.3 mmol/L     Troponin [[id number]]  (Normal) Collected: 10/22/21 2250    Specimen: Blood Updated: 10/23/21 0449     Troponin T <0.010 ng/mL     Narrative:      Troponin T Reference Range:  <= 0.03 ng/mL-   Negative for AMI  >0.03 ng/mL-     Abnormal for myocardial necrosis.  Clinicians would have to utilize clinical acumen, EKG, Troponin and serial changes to determine if it is an Acute Myocardial Infarction or myocardial injury due to an underlying chronic condition.       Results may be falsely decreased if patient taking Biotin.      TSH [[id number]]  (Normal) Collected: 10/22/21 2250    Specimen: Blood Updated: 10/23/21 0449     TSH 1.210 uIU/mL     Hemoglobin A1c [[id number]]  (Normal) Collected: 10/22/21 2250    Specimen: Blood Updated: 10/23/21 0438     Hemoglobin A1C 5.40 %     Narrative:      Hemoglobin A1C Ranges:    Increased Risk for Diabetes  5.7% to 6.4%  Diabetes                     >= 6.5%  Diabetic Goal                < 7.0%    Amylase [[id number]]  (Normal) Collected: 10/22/21 2250    Specimen: Blood Updated: 10/23/21 0436     Amylase 32 U/L     Lipase [[id number]]  (Normal) Collected: 10/22/21 2250    Specimen: Blood Updated: 10/23/21 0436     Lipase 17 U/L     Urinalysis, Microscopic Only - Urine, Clean Catch [[id number]]  (Abnormal) Collected: 10/23/21 0058    Specimen: Urine, Clean Catch Updated: 10/23/21 0115     RBC, UA 3-5 /HPF      WBC, UA 0-2 /HPF      Bacteria, UA Trace /HPF      Squamous Epithelial Cells, UA 0-2 /HPF      Hyaline Casts, UA 0-2 /LPF      Coarse Granular Casts, UA 0-2 /LPF      Methodology Manual Light Microscopy    Urinalysis With Microscopic If Indicated (No Culture) - Urine, Clean Catch [[id number]]  (Abnormal) Collected: 10/23/21 0058    Specimen: Urine, Clean Catch Updated: 10/23/21 0104     Color, UA  Yellow     Appearance, UA Clear     pH, UA 7.0     Specific Gravity, UA 1.025     Glucose,  mg/dL (Trace)     Ketones, UA Negative     Bilirubin, UA Negative     Blood, UA Small (1+)     Protein, UA >=300 mg/dL (3+)     Leuk Esterase, UA Negative     Nitrite, UA Negative     Urobilinogen, UA 0.2 E.U./dL    Procalcitonin [[id number]]  (Normal) Collected: 10/22/21 2250    Specimen: Blood Updated: 10/23/21 0028     Procalcitonin 0.07 ng/mL     COVID-19 and FLU A/B PCR - Swab, Nasopharynx [[id number]]  (Normal) Collected: 10/22/21 2319    Specimen: Swab from Nasopharynx Updated: 10/22/21 2349     COVID19 Not Detected     Influenza A PCR Not Detected     Influenza B PCR Not Detected    Narrative:      Fact sheet for providers: https://www.fda.gov/media/535022/download    Fact sheet for patients: https://www.fda.gov/media/651489/download    Test performed by PCR.    Manual Differential [[id number]]  (Abnormal) Collected: 10/22/21 2250    Specimen: Blood Updated: 10/22/21 2335     Neutrophil % 95.0 %      Lymphocyte % 2.0 %      Monocyte % 1.0 %      Bands %  2.0 %      Neutrophils Absolute 26.42 10*3/mm3      Lymphocytes Absolute 0.54 10*3/mm3      Monocytes Absolute 0.27 10*3/mm3      RBC Morphology Normal     WBC Morphology Normal     Giant Platelets Slight/1+        Imaging Results (Most Recent)     Procedure Component Value Units Date/Time    NM Hepatobiliary Without CCK [[id number]] Collected: 10/24/21 1733     Updated: 10/24/21 1735    Narrative:      HEPATOBILIARY SCAN WITH GALLBLADDER EF MEASUREMENT     HISTORY:  Right upper quadrant pain and abdominal pain sepsis unspecified organism, abdominal diagnostic imaging abnormality. Patient states abdominal pain for 2 days history of abnormal ultrasound showing gallstone and gallbladder wall thickening    DOSE: All given intravenously  *  5.6 millicuries technetium labeled Choletec.    COMPARISON:  Ultrasound abdomen 10/24/2021    FINDINGS:  There is normal, prompt  visualization of biodistribution throughout the liver on immediate and 15 minute images. There is increasing radiotracer activity seen centrally in the liver that is persistent up to 60 minutes post injection of radiotracer. At 30  minutes, small bowel activity identified suggesting common bile duct patency. A lateral view was attempted at 60 minutes within it are a overview at 70 minutes but the gallbladder is not adequately filled on these 2 additional images after 60 minutes. On  ultrasound, large gallstone was seen in the gallbladder neck and cystic duct obstruction cannot be excluded.    Examination order did not include request for ejection fraction calculation.      Impression:      Abnormal hepatobiliary scan with lack of adequate demonstration of the gallbladder duct suggesting cystic duct obstruction. Correlation with MRCP with Eovist may be of value to corroborate findings if clinically desired.    Signer Name: Lindsey Briones MD   Signed: 10/24/2021 5:33 PM   Workstation Name: [id number]    [facility] Gallbladder [[id number]] Collected: 10/24/21 1131     Updated: 10/24/21 1133    Narrative:      US Abdomen Ltd    INDICATION:   Increasing white blood cell count. Abnormal gallbladder wall thickening on earlier CT with sepsis. Please evaluate for gallstones. Upper abdominal pain for 2 days    COMPARISON:   Abdomen pelvis CT from 10/23/2021.    FINDINGS:  PANCREAS: Proximal pancreas unremarkable. Most of the pancreas is obscured by bowel gas    LIVER: No mass appreciated on provided images. Main portal vein is patent with hepatopedal flow. Intrahepatic inferior vena cava is grossly patent. No intrahepatic biliary ductal dilatation appreciated.    GALLBLADDER: Gallbladder wall is thickened to about 4.5 mm. There is a large stone dependently in the gallbladder near the gallbladder neck area. It is likely about 2 cm in diameter. At this time there is no positive sonographic  Emanuel's sign. Common  duct measures 3.8 mm which is within the range of normal. No pericholecystic fluid is seen.    RIGHT KIDNEY: The right kidney measures the right kidney measures about 10 cm in length by 6.4 cm x 5.8 cm and is otherwise unremarkable. There are 2 large exophytic right upper pole renal cysts. They are better seen on the CT in their entirety.          Impression:      There is a large gallstone at the gallbladder neck about 2 cm in diameter with mild gallbladder wall thickening. No obvious pericholecystic fluid or sonographic Emanuel sign. Please correlate for clinical evidence of acute cholecystitis. No biliary ductal  dilatation    Signer Name: Giovanna Qureshi MD   Signed: 10/24/2021 11:31 AM   Workstation Name: [facility]    [facility]    XR Abdomen KUB [[id number]] Collected: 10/23/21 1804     Updated: 10/23/21 1806    Narrative:      CR Abdomen 1 Vw    INDICATION:   Right sided abdominal pain and leukocytosis, sepsis, upper abdominal pain patient has right-sided abdominal pain and tenderness    COMPARISON:   CT scan 10/23/2021    TECHNIQUE:  Single frontal view abdomen.    FINDINGS:    Enteric contrast seen throughout the colon and IV contrast noted in the bladder and kidneys. Bowel gas pattern is nonobstructive. Assessment for free air limited on supine image. No radiopaque foreign body.  No acute osseous abnormality noted.       Impression:      Normal bowel gas pattern.    Signer Name: Lindsey Briones MD   Signed: 10/23/2021 6:04 PM   Workstation Name: [email or web address]    [facility]    CT Angiogram Head [[id number]] Collected: 10/23/21 0859     Updated: 10/23/21 0902    Narrative:      INDICATION:   Dizziness. Gait instability off-and-on for a month. History of AVM repair after rupture. Fell 4 weeks ago striking the right side of her head. Abnormal earlier head CT concern for aneurysm. Leukocytosis.    TECHNIQUE:   CT angiogram of the head and neck  with IV contrast. Contrast dose recorded in the patient's chart 3-D MIP and curved reformatted images were acquired and reviewed. Evaluation for a significant carotid arterial stenosis is based on the NASCET criteria.  Radiation dose reduction techniques included automated exposure control or exposure modulation based on body size. Radiation audit for number of CT and nuclear cardiology exams performed in the last year:  2.      COMPARISON:   Earlier noncontrast head CT    FINDINGS:   CTA neck:  The aortic arch branch pattern is normal. There is no hemodynamically significant stenosis of great vessel origins from the arch.    The right carotid system is widely patent. By NASCET criteria, there is 0% stenosis of the right carotid bifurcation.    The left carotid system is widely patent. By NASA criteria, there is 0% stenosis at the left carotid bifurcation.    Both vertebral arteries are patent and codominant. Both supply the basilar.    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 This should be further characterized with a nonemergent thyroid ultrasound. It extends into the left upper  anterior mediastinum    CTA head:  There are atherosclerotic vascular calcifications involving the bilateral intracranial vertebral arteries with mild narrowing on the right and about 30% diameter stenosis on the left over short segment. There are mild vascular calcifications  at the carotid siphons with likely only mild narrowing. 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 It is directed inferomedially from the supraclinoid ICA.    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 Its fusiform in configuration measuring up to about 4.7 x 5.1 mm in diameter and extending about 6  mm in length. There is also  ectasia extending into the proximal inferior right M2 vessel over about 2 cm in length with a subsequent short segment occlusion. More distal right MCA vessels are irregular. The patient has a large area of encephalomalacia in the right posterior  temporal and right occipital lobe with small surgical clips and this is presumably at site of previously resected AVM. There is an overlying craniotomy. It is likely that the right MCA territory previously fed the AVM. Comparison to outside imaging  recommended to determine stability of the fusiform aneurysmal dilatation described. There is also attenuated appearance to the proximal right posterior cerebral artery to the point of proximal cut off again likely postoperative. There is essentially a  broad anterior communicating artery. There is an irregular appearance to the posterior circulation including the basilar with fullness of the tip of the basilar. No discrete saccular aneurysm is appreciated. No significant sized posterior communicating  artery is seen on either side.    There is patchy airspace disease in the visualized upper lungs. There are degenerative changes in the cervical spine.      Impression:        1. This patient has a known previously treated AVM. There is a large area of encephalomalacia involving the right posterior temporal and right occipital lobe regions. There is an irregular vascular cut off from the proximal right posterior cerebral  artery distribution and there is vascular cut off and attenuation of vessels more distal right MCA distribution all probably post therapeutic. Correlation with outside angiographic imaging would be most helpful. The patient was not treated at this  facility. 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 Comparison to outside studies recommended to determine stability.  2. By NASCET criteria 0% stenosis at either carotid bifurcation. Both vertebral arteries are patent and codominant.  3. Large mass lesion extending inferiorly from left lobe of the thyroid gland into the upper mediastinum. If not previously evaluated, recommend thyroid ultrasound on a nonemergent basis to determine TI-RADS score.    Signer Name: Giovanna Qureshi MD   Signed: 10/23/2021 8:59 AM   Workstation Name: [email or web address]    [facility]    CT Angiogram Neck [[id number]] Collected: 10/23/21 0859     Updated: 10/23/21 0902    Narrative:      INDICATION:   Dizziness. Gait instability off-and-on for a month. History of AVM repair after rupture. Fell 4 weeks ago striking the right side of her head. Abnormal earlier head CT concern for aneurysm. Leukocytosis.    TECHNIQUE:   CT angiogram of the head and neck with IV contrast. Contrast dose recorded in the patient's chart 3-D MIP and curved reformatted images were acquired and reviewed. Evaluation for a significant carotid arterial stenosis is based on the NASCET criteria.  Radiation dose reduction techniques included automated exposure control or exposure modulation based on body size. Radiation audit for number of CT and nuclear cardiology exams performed in the last year:  2.      COMPARISON:   Earlier noncontrast head CT    FINDINGS:   CTA neck:  The aortic arch branch pattern is normal. There is no hemodynamically significant stenosis of great vessel origins from the arch.    The right carotid system is widely patent. By NASCET criteria, there is 0% stenosis of the right carotid bifurcation.    The left carotid system is widely patent. By NASA criteria, there is 0% stenosis at the left carotid bifurcation.    Both vertebral arteries are patent and codominant. Both supply the basilar.    There is a large mass  extending inferiorly from the left lobe thyroid gland that is heterogeneous and measures at least 4.8 x 4.1 cm axial plane. This should be further characterized with a nonemergent thyroid ultrasound. It extends into the left upper  anterior mediastinum    CTA head:  There are atherosclerotic vascular calcifications involving the bilateral intracranial vertebral arteries with mild narrowing on the right and about 30% diameter stenosis on the left over short segment. There are mild vascular calcifications  at the carotid siphons with likely only mild narrowing. 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 It is directed inferomedially from the supraclinoid ICA.    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 Its fusiform in configuration measuring up to about 4.7 x 5.1 mm in diameter and extending about 6 mm in length. There is also  ectasia extending into the proximal inferior right M2 vessel over about 2 cm in length with a subsequent short segment occlusion. More distal right MCA vessels are irregular. The patient has a large area of encephalomalacia in the right posterior  temporal and right occipital lobe with small surgical clips and this is presumably at site of previously resected AVM. There is an overlying craniotomy. It is likely that the right MCA territory previously fed the AVM. Comparison to outside imaging  recommended to determine stability of the fusiform aneurysmal dilatation described. There is also attenuated appearance to the proximal right posterior cerebral artery to the point of proximal cut off again likely postoperative. There is essentially a  broad anterior communicating artery. There is an irregular appearance to the posterior circulation including the basilar with fullness of the tip of the basilar. No discrete saccular aneurysm  is appreciated. No significant sized posterior communicating  artery is seen on either side.    There is patchy airspace disease in the visualized upper lungs. There are degenerative changes in the cervical spine.      Impression:        1. This patient has a known previously treated AVM. There is a large area of encephalomalacia involving the right posterior temporal and right occipital lobe regions. There is an irregular vascular cut off from the proximal right posterior cerebral  artery distribution and there is vascular cut off and attenuation of vessels more distal right MCA distribution all probably post therapeutic. Correlation with outside angiographic imaging would be most helpful. The patient was not treated at this  facility. 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 Comparison to outside studies recommended to determine stability.  2. By NASCET criteria 0% stenosis at either carotid bifurcation. Both vertebral arteries are patent and codominant.  3. Large mass lesion extending inferiorly from left lobe of the thyroid gland into the upper mediastinum. If not previously evaluated, recommend thyroid ultrasound on a nonemergent basis to determine TI-RADS score.    Signer Name: Giovanna Qureshi MD   Signed: 10/23/2021 8:59 AM   Workstation Name: [email or web address]    [facility]    CT Head Without Contrast [[id number]] Collected: 10/23/21 0617     Updated: 10/23/21 0619    Narrative:      CT Head WO    HISTORY:   Dizziness and gait instability for one month. Fall 4 weeks ago. History of prior AVM rupture and repair.    TECHNIQUE:   Routine noncontrast head CT. Coronal and sagittal reformatted images. Radiation dose reduction techniques included  automated exposure control or exposure modulation based on body size. Count of known CT and cardiac nuc med studies performed in previous  12 months: 0.     COMPARISON:   None.    FINDINGS:   No hemorrhage, acute infarction, mass lesion, or abnormal extra-axial fluid collection. No midline shift or focal mass effect. Ventricular system is normal in size and configuration. There is chronic encephalomalacia involving the right occipital lobe  and right posterior parietal and temporal lobes. There is a focal 5 mm dilatation at the junction of the M1 and M2 segments of the right middle cerebral artery, concerning for aneurysm. Mild generalized atrophy. Mild chronic small vessel disease.  Postsurgical changes of the right calvarium are noted. No acute osseous abnormality. Visualized paranasal sinuses are clear. Visualized mastoid air cells are clear.      Impression:        1. A focal 5 mm dilatation at the junction of the M1 and M2 segments of the right middle cerebral artery, concerning for aneurysm. Further characterization with CTA or MRA of the head and neck recommended.  2. No acute infarction or intracranial hemorrhage.  3. Chronic encephalomalacia of the right occipital lobe and right posterior parietal and temporal lobes.  4. Age-related senescent changes.          Signer Name: Jose De Jesus Powell MD   Signed: 10/23/2021 6:17 AM   Workstation [facility]: [email or web address]    [facility]    CT Abdomen Pelvis With Contrast [[id number]] Collected: 10/23/21 0227     Updated: 10/23/21 0230    Narrative:      CT Abdomen Pelvis W    INDICATION:   Diffuse abdominal pain and cramping today. Generalized weakness. Leukocytosis. Previous history of right-sided breast cancer.    TECHNIQUE:   CT of the abdomen and pelvis with IV contrast. Coronal and sagittal reconstructions were obtained.  Radiation dose reduction techniques included automated exposure control or exposure modulation based on body size. Count of known  CT and cardiac nuc med  studies performed in previous 12 months: 0.     COMPARISON:   None available.    FINDINGS:  Visualized lung bases are unremarkable.    Abdomen:   The liver is within normal limits. The spleen and pancreas are normal. The gallbladder is borderline distended with equivocal wall thickening. Both adrenal glands are normal. Multiple right renal cysts are noted, the largest in the upper pole region  measuring 7.5 cm. Left kidney is unremarkable. No hydronephrosis. Abdominal aorta normal in course and caliber. Small bowel is unremarkable without obstruction. Appendectomy. The colon is unremarkable. No free fluid or free air.    Pelvis:   The urinary bladder is unremarkable.  Hysterectomy. No free pelvic fluid.    There are chronic appearing compression deformities of the L1 and L2 vertebral bodies. There are also several questionable lucent lesions noted throughout the visualized thoracolumbar spine. Some of these may represent benign hemangiomas. However, given  the history of previous breast cancer, metastatic disease or myeloma is not completely excluded.      Impression:        1. Borderline gallbladder distention with some equivocal gallbladder wall thickening. No visible cholelithiasis by CT. Correlation with clinical symptoms recommended to exclude acute cholecystitis.  2. Right-sided renal cysts. No hydronephrosis or visible obstructing urinary tract stones.  3. Appendectomy and hysterectomy.  4. Multiple questionable lucent lesions noted in the visualized thoracolumbar spine. Some of these likely represent benign hemangiomas, but given the history of previous breast cancer, metastatic disease is not completely excluded. Consider follow-up  with nonemergent bone scan.          Signer Name: Jose De Jesus Powell MD   Signed: 10/23/2021 2:27 AM   Workstation Name: [email or web address]-    [facility]    XR Chest 2 View [[id number]] Collected: 10/23/21 0156     Updated: 10/23/21 0204     Narrative:      CR Chest 2 Vws    INDICATION:    Cough and leukocytosis today. General weakness.    COMPARISON:    None.    FINDINGS:   PA and lateral views of the chest.  Borderline heart size. Mediastinal contours are normal. The lungs are clear. No pneumothorax or pleural effusion.        Impression:      No acute cardiopulmonary findings.    Signer Name: Jose De Jesus Powell MD   Signed: 10/23/2021 1:56 AM   Workstation Name: [email or web address]-    [facility] [place]        PROCEDURES  Procedure(s):  CHOLECYSTECTOMY LAPAROSCOPIC    Condition on Discharge:  stable    Physical Exam at Discharge  Vital Signs  Temp:  [96.9 °F (36.1 °C)-97.6 °F (36.4 °C)] 97.6 °F (36.4 °C)  Heart Rate:  [73-84] 77  Resp:  [17-18] 18  BP: (128-163)/(62-81) 128/69   Body mass index is 29.94 kg/m².      Physical Exam  Vitals reviewed.   Constitutional:       General: She is not in acute distress.     Appearance: She is obese. She is not ill-appearing.   HENT:      Head: Normocephalic and atraumatic.      Mouth/Throat:      Mouth: Mucous membranes are moist.   Eyes:      Extraocular Movements: Extraocular movements intact.      Pupils: Pupils are equal, round, and reactive to light.   Cardiovascular:      Rate and Rhythm: Normal rate and regular rhythm.   Pulmonary:      Effort: Pulmonary effort is normal. No respiratory distress.      Breath sounds: Normal breath sounds. No wheezing or rales.   Abdominal:      General: Abdomen is flat. Bowel sounds are normal. There is no distension.      Palpations: Abdomen is soft.      Tenderness: There is no abdominal tenderness. There is no guarding.      Comments: Abdominal incisions clean and dry   Musculoskeletal:         General: No swelling.   Skin:     General: Skin is warm and dry.      Capillary Refill: Capillary refill takes less than 2 seconds.      Findings: No erythema.   Neurological:      General: No focal deficit present.      Mental Status: She is alert.      Comments: Oriented  to self, spouse, , not exact date/place/event   Psychiatric:         Mood and Affect: Mood normal.         Behavior: Behavior normal.     Discharge Disposition  Home    Visiting Nurse:    Yes     Home PT/OT:  Yes     Home Safety Evaluation:  Yes     DME  TBD    Discharge Diet:      Dietary Orders (From admission, onward)     Start     Ordered    10/27/21 0813  Diet Regular  Diet Effective Now        Question:  Diet Texture / Consistency  Answer:  Regular    10/27/21 0813                Activity at Discharge:  As tolerated    Pre-discharge education  Wound Care, medications, follow-up      Follow-up Appointments  Future Appointments   Date Time Provider Department Center   11/10/2021  1:20 PM Gera Kay MD [facility] None   2021  3:00 PM ROOM 2-B [facility] ACU [facility]   2021  1:00 PM LAB CHAIR [facility]  [facility]   2021  1:30 PM RN REVIEW [facility] INFUS EP [facility]   3/10/2022 10:00 AM [facility]   3/17/2022  3:30 PM Reese Underwood DO [facility]   3/18/2022  1:00 PM LAB CHAIR [facility]  [facility]   3/18/2022  1:40 PM Henry Conde MD [facility]     Additional Instructions for the Follow-ups that You Need to Schedule     Discharge Follow-up with PCP   As directed       Currently Documented PCP:    Reese Underwood DO    PCP Phone Number:    [phone]     Follow Up Details: 1 week         Discharge Follow-up with Specialty: Dr. Kay; 1 Week   As directed      Specialty: Dr. Kay    Follow Up: 1 Week         Discharge Follow-up with Specified Provider: Hematology; 1 Week   As directed      To: Hematology    Follow Up: 1 Week           Test Results Pending at Discharge: None     FACUNDO Sheridan  21  09:46 EDT    Time: Discharge Over 30 min (if over 30 minutes give explanation as to why it took greater than 30 minutes)  Secondary to:   Coordination of care/follow up  Medication reconciliation  D/W  patient and family

## 2021-11-01 NOTE — PLAN OF CARE
Goal Outcome Evaluation:  Plan of Care Reviewed With: patient        Progress: improving  Outcome Summary: vss. pt alert to self only, family at bedside most of day. pt remains with chair/bed alarm in place. ambulates in room with walker/gait belt to bathroom, brief in place for urinary incontinence. tele in place, nsr. pt has no complaints of pain. iv antibiotics in place. lap sites in place, open to air. no other complaints at this time.

## 2021-11-01 NOTE — PLAN OF CARE
Goal Outcome Evaluation:  Plan of Care Reviewed With: patient, spouse           Outcome Summary: Pt did well over night, denies pain or discomfort. Stand by assist to BR. Awaiting AM lab results. Last dose of IV Merrem scheduled for 1700 today.

## 2021-11-01 NOTE — PROGRESS NOTES
Adult Nutrition  Assessment/PES    Patient Name:  Shyann Davis  YOB: 1945  MRN: [id number]  Admit Date:  10/22/2021    Assessment Date:  11/1/2021    Comments:  Agree with diet, pt with good po.    Will cont to follow.     Reason for Assessment     Row Name 11/01/21 1325          Reason for Assessment    Reason For Assessment follow-up protocol     Diagnosis gastrointestinal disease; infection/sepsis  s/p lap CCY, Sepsis                Nutrition/Diet History     Row Name 11/01/21 1326          Nutrition/Diet History    Typical Food/Fluid Intake Spoke w pt & spouse during am meal. No needs voiced.                  Labs/Tests/Procedures/Meds     Row Name 11/01/21 1326          Labs/Procedures/Meds    Lab Results Reviewed reviewed            Diagnostic Tests/Procedures    Diagnostic Test/Procedure Reviewed reviewed            Medications    Pertinent Medications Reviewed reviewed                    Nutrition Prescription Ordered     Row Name 11/01/21 1326          Nutrition Prescription PO    Current PO Diet Regular                Evaluation of Received Nutrient/Fluid Intake     Row Name 11/01/21 1326          Fluid Intake Evaluation    Oral Fluid (mL) 1120  ave x 3, 76%            PO Evaluation    Number of Meals 8     % PO Intake 69                     Problem/Interventions:   Problem 1     Row Name 11/01/21 1329          Nutrition Diagnoses Problem 1    Problem 1 Nutrition Appropriate for Condition at this Time                      Intervention Goal     Row Name 11/01/21 1329          Intervention Goal    General Meet nutritional needs for age/condition                Nutrition Intervention     Row Name 11/01/21 1331          Nutrition Intervention    RD/Tech Action Follow Tx progress                  Education/Evaluation     Row Name 11/01/21 1331          Education    Education No discharge needs identified at this time            Monitor/Evaluation    Monitor Per protocol; I&O; PO intake;  Pertinent labs; Weight; Symptoms                 Electronically signed by:  Janet Brown RD  11/01/21 13:32 EDT

## 2021-11-01 NOTE — PROGRESS NOTES
"SERVICE: [facility] HOSPITALIST    CONSULTANTS: surgery, hematology, neurology    CHIEF COMPLAINT: f/u acute gangrenous cholecystitis    SUBJECTIVE: The patient reports that she is feeling well, no pain concerns.  She reports that her left arm and midline \"fell out\".  She also notes that her LAUREL drain was removed yesterday without any increasing pain, nausea.  She notes that she has a good appetite and has been eating and drinking well as well as having a bowel movements.  No other new concerns.  Anxious to go home.  at bedside    OBJECTIVE:    /88 (BP Location: Left arm)   Pulse 79   Temp 98.2 °F (36.8 °C) (Oral)   Resp 18   Ht 160 cm (63\")   Wt 76.7 kg (169 lb)   SpO2 97%   BMI 29.94 kg/m²     MEDS/LABS REVIEWED AND ORDERED    famotidine, 20 mg, Oral, BID AC  levETIRAcetam, 500 mg, Oral, Q12H  meropenem, 1 g, Intravenous, Q8H  metoprolol succinate XL, 50 mg, Oral, Q24H  sodium chloride, 10 mL, Intravenous, Q12H  sodium chloride, 10 mL, Intravenous, Q12H      Physical Exam  Vitals reviewed.   Constitutional:       General: She is not in acute distress.     Appearance: She is obese. She is not ill-appearing.      Comments: Pale   HENT:      Head: Normocephalic and atraumatic.      Mouth/Throat:      Mouth: Mucous membranes are moist.   Eyes:      Extraocular Movements: Extraocular movements intact.      Pupils: Pupils are equal, round, and reactive to light.   Cardiovascular:      Rate and Rhythm: Normal rate and regular rhythm.   Pulmonary:      Effort: Pulmonary effort is normal. No respiratory distress.      Breath sounds: Normal breath sounds. No wheezing or rales.   Abdominal:      General: Abdomen is flat. Bowel sounds are normal. There is no distension.      Palpations: Abdomen is soft.      Tenderness: There is no abdominal tenderness. There is no guarding.      Comments: Abdominal incisions, well-healing without erythema   Musculoskeletal:         General: No swelling. "   Skin:     General: Skin is warm and dry.      Capillary Refill: Capillary refill takes less than 2 seconds.      Coloration: Skin is pale.      Findings: No erythema.   Neurological:      General: No focal deficit present.      Mental Status: She is alert and oriented to person, place, and time.   Psychiatric:         Mood and Affect: Mood normal.         Behavior: Behavior normal.       LAB/DIAGNOSTICS:    Lab Results (last 24 hours)     Procedure Component Value Units Date/Time    Basic Metabolic Panel [[id number]]  (Abnormal) Collected: 11/01/21 0606    Specimen: Blood Updated: 11/01/21 0642     Glucose 111 mg/dL      BUN 13 mg/dL      Creatinine 0.92 mg/dL      Sodium 137 mmol/L      Potassium 3.8 mmol/L      Chloride 97 mmol/L      CO2 34.0 mmol/L      Calcium 10.4 mg/dL      eGFR Non African Amer 59 mL/min/1.73      BUN/Creatinine Ratio 14.1     Anion Gap 6.0 mmol/L     Narrative:      GFR Normal >60  Chronic Kidney Disease <60  Kidney Failure <15      CBC (No Diff) [[id number]]  (Abnormal) Collected: 11/01/21 0600    Specimen: Blood Updated: 11/01/21 0631     WBC 12.19 10*3/mm3      RBC 4.95 10*6/mm3      Hemoglobin 11.5 g/dL      Hematocrit 36.1 %      MCV 72.9 fL      MCH 23.2 pg      MCHC 31.9 g/dL      RDW 15.0 %      RDW-SD 38.7 fl      MPV --     Comment: TNP        Platelets 129 10*3/mm3     Magnesium [[id number]]  (Normal) Collected: 10/31/21 0504    Specimen: Blood Updated: 10/31/21 1002     Magnesium 1.8 mg/dL         ECG 12 Lead   Final Result   HEART RATE= 92  bpm   RR Interval= 656  ms   MI Interval= 172  ms   P Horizontal Axis= 26  deg   P Front Axis= 29  deg   QRSD Interval= 97  ms   QT Interval= 352  ms   QRS Axis= -4  deg   T Wave Axis= 1  deg   - BORDERLINE ECG -   Sinus rhythm   Borderline T abnormalities, inferior leads   NO PRIOR TRACING AVAILABLE FOR COMPARISON   Electronically Signed By: Caesar Martinez ([facility]) 24-Oct-2021 21:46:58   Date and Time of Study: 2021-10-24 18:14:30          No  radiology results for the last day    ASSESSMENT/PLAN:  Sepsis secondary to acute gangrenous cholecystitis: Surgery following  Leukocytosis: Resolving  Lactic acidosis: Resolved  Mild NAGMA: resolved with hydration  (SOFA score 2 for platelets)  Midline IV out, Merrem nearly complete, discussed with surgery regarding plan for change to oral ASAP  WBC finally down at 12.19 today  Anticipate discharge home soon     Acute metabolic encephalopathy: neurology followed, signed off, see below  Resolved, suspected secondary to surgery, illness, medications  Continue with falls precautions and bed alarm    Uncontrolled hypertension:   BP remains elevated, increase metoprolol succinate to 50 mg daily, monitor     Acute on chronic ITP:  Microcytic anemia:   Leukocytosis, neutrophilia, monocytosis: Hematology following  WBC, neutrophilia suspected reactive secondary to illness  Close with Dr. Conde with [facility] group outpatient  s/p 2 units platelets during surgery  Hemoglobin stable at 11.5, platelets stable at 129,000 today  Monitor    Stable chronic diagnoses:     Vertigo: resolved with solumedrol/clonipin  Multiple AVMs: neurology followed, signed off  Per neurology AVMs are stable, no further work-up per neurology      Lucent lesions on T-L spine:   H/O breast cancer 2002, s/p lumpectomy, radiation, chemotherapy: hematology following  Nuclear bone scan planned outpatient     Left thyroid mass: Outpatient thyroid ultrasound planned after discharge per hematology     Hypercalcemia-resolved after IV hydration     Hyperlipidemia-resume home Lipitor after resolution of symptoms     Seizure disorder s/p AVM repair 1992:  No acute issues on Keppra 500 mg every 12 hours     Depression/anxiety: No acute issues, resume home paxil after discharge     Overactive bladder: No acute issues, resume home myrbetriq after discharge     Osteoporosis: nothing acute here     Moles on back: monitored by dermatology outpatient, nothing acute     Other  "Incidental findings on CT:  Right renal cysts     Chronic L1/L2 compression deformities: No current complaints    PLAN FOR DISPOSITION: discuss with surgery, home soon    FACUNDO Colindres  Hospitalist, [facility]  11/01/21  06:52 EDT    \"Dictated utilizing Dragon dictation\"    "

## 2021-11-01 NOTE — PROGRESS NOTES
Chief Complaint: POD # 7    Subjective   Pt doing well, tolerating diet  Objective     Vital signs in last 24 hours:  Temp:  [96.9 °F (36.1 °C)-98.2 °F (36.8 °C)] 96.9 °F (36.1 °C)  Heart Rate:  [76-84] 78  Resp:  [17-18] 17  BP: (137-169)/(74-88) 143/74    Intake/Output last 3 shifts:  I/O last 3 completed shifts:  In: 1320 [P.O.:1320]  Out: 1295 [Urine:1250; Drains:45]    Intake/Output this shift:  I/O this shift:  In: 800 [P.O.:800]  Out: 400 [Urine:400]    Physical Exam:  Respiratory: CTA, good inspiratory effort  CV: RRR  Abd: + BS, soft, ND, usual post-op tenderness, no rebound, no guarding, incision C/D/I    Results from last 7 days   Lab Units 11/01/21  0600   WBC 10*3/mm3 12.19*   HEMOGLOBIN g/dL 11.5*   HEMATOCRIT % 36.1   PLATELETS 10*3/mm3 129*     Results from last 7 days   Lab Units 11/01/21  0606   SODIUM mmol/L 137   POTASSIUM mmol/L 3.8   CHLORIDE mmol/L 97*   CO2 mmol/L 34.0*   BUN mg/dL 13   CREATININE mg/dL 0.92   GLUCOSE mg/dL 111*   CALCIUM mg/dL 10.4     Assessment/Plan   S/P Lap misa for gangrenous gallbladder   Leukocytosis - improving down to 12.19  Thrombocytopenia - continues to remain above 100   Change patient to po antibiotics and possible d/c in am      Nicki Mg MD  General, Minimally Invasive and Endoscopic Surgery  [facility]    [address] [facility] [address] [place] [address]    [address]  [place], [place] [id number] [place] [id number]    P: [phone]  F: [phone]    Cc:  Reese Underwood DO

## 2021-11-01 NOTE — CASE MANAGEMENT/SOCIAL WORK
"Continued Stay Note   Barbara Loving     Patient Name: Shyann Davis  MRN: [id number]  Today's Date: 11/1/2021    Admit Date: 10/22/2021     Discharge Plan     Row Name 11/01/21 1108       Plan    Plan Home with [facility]    Patient/Family in Agreement with Plan yes    Plan Comments Followed up with patient and  regarding discharge plans. Patient is currently sitting up in the chair and states \"I hope they let me go home today\". Her plan is home with  and [facility] , she voiced no other discharge needs at this time. Updated IMM with patient and her  and they verbalized understanding. CM will continue to follow for needs.               Discharge Codes    No documentation.                     Carola Mai RN    "

## 2021-11-01 NOTE — PLAN OF CARE
Goal Outcome Evaluation:  Plan of Care Reviewed With: patient           Outcome Summary: PT: Patient performs sit to stand with CGA and verbal cues for hand placement.  Patient performs gait x175 feet with rolling walker, SBA/CGA with verbal cues for proper use of device and avoidance of obstacles.  Patient continues to do well with transfers/balance, however displays difficulty navigating walker around obstacles at times, will continue to follow.

## 2021-11-01 NOTE — THERAPY TREATMENT NOTE
Acute Care - Physical Therapy Treatment Note  REED Werner     Patient Name: Shyann Davis  : 1945  MRN: [id number]  Today's Date: 2021      Visit Dx:     ICD-10-CM ICD-9-CM   1. Sepsis without acute organ dysfunction, due to unspecified organism (HCC)  A41.9 038.9     995.91   2. Pain of upper abdomen  R10.10 789.09   3. Abnormal x-ray of thoracic spine  R93.7 793.7   4. Abnormal x-ray of lumbar spine  R93.7 793.7   5. Acute cholecystitis  K81.0 575.0     Patient Active Problem List   Diagnosis   • Impaired glucose tolerance   • Mixed hyperlipidemia   • Essential hypertension   • Seizure disorder (HCC)   • Vitamin D deficiency   • Arteriovenous malformation of gastrointestinal tract   • Exogenous obesity   • Depression with anxiety   • Thrombocytopenia (HCC)   • Renal insufficiency   • Overactive bladder   • Age-related osteoporosis without current pathological fracture   • Chronic ITP (idiopathic thrombocytopenic purpura) (HCC)   • Sepsis without acute organ dysfunction (HCC)   • Acute cholecystitis   • Abnormal x-ray of lumbar spine   • Thyroid mass     Past Medical History:   Diagnosis Date   • Age-related osteoporosis without current pathological fracture 2019   • Anxiety    • Breast cancer (HCC)     Right   • Depression    • History of thrombocytopenia    • Hyperlipidemia    • Hypertension    • Osteoporosis    • Seizure (HCC)     after brain surgery only     Past Surgical History:   Procedure Laterality Date   • APPENDECTOMY     • BRAIN AVM REPAIR  1992   • BREAST LUMPECTOMY Right    • BREAST SURGERY     • CHOLECYSTECTOMY N/A 10/25/2021    Procedure: CHOLECYSTECTOMY LAPAROSCOPIC;  Surgeon: Gera Kay MD;  Location: [facility];  Service: General;  Laterality: N/A;   • HYSTERECTOMY       PT Assessment (last 12 hours)     PT Evaluation and Treatment     Row Name 21 1400          Physical Therapy Time and Intention    Subjective Information complains of; fatigue  -JW      Document Type therapy note (daily note)  -JW     Mode of Treatment physical therapy  -JW     Patient Effort good  -JW     Symptoms Noted During/After Treatment none  -     Row Name 11/01/21 1400          General Information    Patient Observations alert; cooperative; agree to therapy  -     Patient/Family/Caregiver Comments/Observations pt in recliner, agrees to therapy  -JW     Existing Precautions/Restrictions fall  -     Row Name 11/01/21 1400          Cognition    Personal Safety Interventions gait belt; nonskid shoes/slippers when out of bed  -     Row Name 11/01/21 1400          Pain Scale: Word Pre/Post-Treatment    Pre/Posttreatment Pain Comment no c/o pain  -     Row Name 11/01/21 1400          Bed Mobility    Comment (Bed Mobility) deferred up in chair  -     Row Name 11/01/21 1400          Transfers    Transfers sit-stand transfer; stand-sit transfer  -     Comment (Transfers) verbal cues for hand placement  -     Sit-Stand Hooker (Transfers) contact guard; verbal cues  -     Stand-Sit Hooker (Transfers) contact guard; verbal cues  -     Row Name 11/01/21 1400          Sit-Stand Transfer    Assistive Device (Sit-Stand Transfers) walker, front-wheeled  -     Row Name 11/01/21 1400          Stand-Sit Transfer    Assistive Device (Stand-Sit Transfers) walker, front-wheeled  -     Row Name 11/01/21 1400          Gait/Stairs (Locomotion)    Hooker Level (Gait) standby assist; contact guard  -     Assistive Device (Gait) walker, front-wheeled  -     Distance in Feet (Gait) 175  -JW     Pattern (Gait) swing-through  -     Deviations/Abnormal Patterns (Gait) base of support, narrow; renetta decreased  -     Bilateral Gait Deviations forward flexed posture  -     Comment (Gait/Stairs) patient requires verbal cues for proper distance from walker and safety with mobility, pt with multiple episodes of running into objects on left side with little awareness/effort to  correct  -JW     Row Name 11/01/21 1400          Safety Issues, Functional Mobility    Safety Issues Affecting Function (Mobility) safety precaution awareness; positioning of assistive device  -JW     Row Name             Wound 10/25/21 1004 umbilical area Incision    Wound - Properties Group Placement Date: 10/25/21  -LF Placement Time: 1004  -LF Location: umbilical area  -LF Primary Wound Type: Incision  -LF Wound Outcome: Converged  -LF     Retired Wound - Properties Group Date first assessed: 10/25/21  -LF Time first assessed: 1004  -LF Location: umbilical area  -LF Primary Wound Type: Incision  -LF Wound Outcome: Converged  -LF     Row Name             Wound 10/25/21 1004 Left upper abdomen Incision    Wound - Properties Group Placement Date: 10/25/21  -LF Placement Time: 1004  -LF Side: Left  -LF Orientation: upper  -LF Location: abdomen  -LF Primary Wound Type: Incision  -LF Wound Outcome: Converged  -LF     Retired Wound - Properties Group Date first assessed: 10/25/21  -LF Time first assessed: 1004  -LF Side: Left  -LF Location: abdomen  -LF Primary Wound Type: Incision  -LF Wound Outcome: Converged  -LF     Row Name             Wound 10/25/21 1004 Right anterior hip Incision    Wound - Properties Group Placement Date: 10/25/21  -LF Placement Time: 1004  -LF Side: Right  -LF Orientation: anterior  -LF Location: hip  -LF Primary Wound Type: Incision  -LF Wound Outcome: Converged  -LF     Retired Wound - Properties Group Date first assessed: 10/25/21  -LF Time first assessed: 1004  -LF Side: Right  -LF Location: hip  -LF Primary Wound Type: Incision  -LF Wound Outcome: Converged  -LF     Row Name             Wound 10/25/21 1005 Left anterior hip Incision    Wound - Properties Group Placement Date: 10/25/21  -LF Placement Time: 1005  -LF Side: Left  -LF Orientation: anterior  -LF Location: hip  -LF Primary Wound Type: Incision  -LF Wound Outcome: Converged  -LF     Retired Wound - Properties Group Date first  assessed: 10/25/21  - Time first assessed: 1005  -LF Side: Left  -LF Location: hip  -LF Primary Wound Type: Incision  -LF Wound Outcome: Converged  -     Row Name 11/01/21 1400          Plan of Care Review    Outcome Summary PT: Patient performs sit to stand with CGA and verbal cues for hand placement.  Patient performs gait x175 feet with rolling walker, SBA/CGA with verbal cues for proper use of device and avoidance of obstacles.  Patient continues to do well with transfers/balance, however displays difficulty navigating walker around obstacles at times, will continue to follow.  -     Row Name 11/01/21 1400          Positioning and Restraints    Pre-Treatment Position sitting in chair/recliner  -     Post Treatment Position chair  -     In Chair reclined; call light within reach; encouraged to call for assist; exit alarm on; with family/caregiver  -     Row Name 11/01/21 1400          Progress Summary (PT)    Progress Toward Functional Goals (PT) progress toward functional goals is gradual  -           User Key  (r) = Recorded By, (t) = Taken By, (c) = Cosigned By    Initials Name Provider Type    Araceli Berg, PT Physical Therapist     Nain Velasquez, RN Registered Nurse              Physical Therapy Education                 Title: PT OT SLP Therapies (Done)     Topic: Physical Therapy (Done)     Point: Mobility training (Done)     Learning Progress Summary           Patient Acceptance, E,TB, VU by  at 11/1/2021 1455   Significant Other Acceptance, E,TB, VU by  at 11/1/2021 1455      Show all documentation for this point (4)                             User Key     Initials Effective Dates Name Provider Type [facility] 06/16/21 -  Araceli Blancas, PT Physical Therapist PT              PT Recommendation and Plan  Anticipated Discharge Disposition (PT): home with 24/7 care, home with home health  Planned Therapy Interventions (PT): balance training, bed mobility training, gait  training, home exercise program, patient/family education, strengthening, transfer training  Therapy Frequency (PT): 6 times/wk  Progress Summary (PT)  Progress Toward Functional Goals (PT): progress toward functional goals is gradual  Plan of Care Reviewed With: patient  Outcome Summary: PT: Patient performs sit to stand with CGA and verbal cues for hand placement.  Patient performs gait x175 feet with rolling walker, SBA/CGA with verbal cues for proper use of device and avoidance of obstacles.  Patient continues to do well with transfers/balance, however displays difficulty navigating walker around obstacles at times, will continue to follow.   Outcome Measures     Row Name 11/01/21 1400 10/30/21 0915          How much help from another person do you currently need...    Turning from your back to your side while in flat bed without using bedrails? 3  - 3  -LN     Moving from lying on back to sitting on the side of a flat bed without bedrails? 3  - 3  -LN     Moving to and from a bed to a chair (including a wheelchair)? 3  - 3  -LN     Standing up from a chair using your arms (e.g., wheelchair, bedside chair)? 3  - 3  -LN     Climbing 3-5 steps with a railing? 2  - 2  -LN     To walk in hospital room? 3  - 3  -LN     AM-PAC 6 Clicks Score (PT) 17  - 17  -LN            Functional Assessment    Outcome Measure Options AM-PAC 6 Clicks Basic Mobility (PT)  - AM-PAC 6 Clicks Basic Mobility (PT)  -           User Key  (r) = Recorded By, (t) = Taken By, (c) = Cosigned By    Initials Name Provider Type    Vi Trevizo, PT Physical Therapist    Araceli Berg, PT Physical Therapist                 Time Calculation:    PT Charges     Row Name 11/01/21 5356             Time Calculation    Start Time 1400  -      Stop Time 1413  -      Time Calculation (min) 13 min  -JOHN      PT Received On 11/01/21  -JOHN      PT - Next Appointment 11/02/21  -JOHN              Timed Charges    89318 - Gait  Training Minutes  13  -JW              Total Minutes    Timed Charges Total Minutes 13  -JW       Total Minutes 13  -JW            User Key  (r) = Recorded By, (t) = Taken By, (c) = Cosigned By    Initials Name Provider Type    Araceli Berg, PT Physical Therapist              Therapy Charges for Today     Code Description Service Date Service Provider Modifiers Qty    32120852591 HC GAIT TRAINING EA 15 MIN 11/1/2021 Araceli Blancas, PT GP 1          PT G-Codes  Outcome Measure Options: AM-PAC 6 Clicks Basic Mobility (PT)  AM-PAC 6 Clicks Score (PT): 17    Araceli Blancas, GARDENIA  11/1/2021

## 2021-11-02 ENCOUNTER — TELEPHONE (OUTPATIENT)
Dept: ONCOLOGY | Facility: CLINIC | Age: 76
End: 2021-11-02

## 2021-11-02 ENCOUNTER — READMISSION MANAGEMENT (OUTPATIENT)
Dept: CALL CENTER | Facility: HOSPITAL | Age: 76
End: 2021-11-02

## 2021-11-02 ENCOUNTER — TRANSCRIBE ORDERS (OUTPATIENT)
Dept: HOME HEALTH SERVICES | Facility: HOME HEALTHCARE | Age: 76
End: 2021-11-02

## 2021-11-02 ENCOUNTER — HOME HEALTH ADMISSION (OUTPATIENT)
Dept: HOME HEALTH SERVICES | Facility: HOME HEALTHCARE | Age: 76
End: 2021-11-02

## 2021-11-02 VITALS
DIASTOLIC BLOOD PRESSURE: 69 MMHG | WEIGHT: 169 LBS | TEMPERATURE: 97.6 F | BODY MASS INDEX: 29.95 KG/M2 | RESPIRATION RATE: 18 BRPM | HEART RATE: 77 BPM | OXYGEN SATURATION: 97 % | HEIGHT: 63 IN | SYSTOLIC BLOOD PRESSURE: 128 MMHG

## 2021-11-02 DIAGNOSIS — K82.A1 CHOLECYSTITIS WITH GANGRENE OF GALLBLADDER: Primary | ICD-10-CM

## 2021-11-02 DIAGNOSIS — A41.9 SEPSIS WITHOUT ACUTE ORGAN DYSFUNCTION, DUE TO UNSPECIFIED ORGANISM (HCC): ICD-10-CM

## 2021-11-02 LAB
BASOPHILS # BLD AUTO: 0.06 10*3/MM3 (ref 0–0.2)
BASOPHILS NFR BLD AUTO: 0.5 % (ref 0–1.5)
DEPRECATED RDW RBC AUTO: 39.6 FL (ref 37–54)
EOSINOPHIL # BLD AUTO: 0.12 10*3/MM3 (ref 0–0.4)
EOSINOPHIL NFR BLD AUTO: 1 % (ref 0.3–6.2)
ERYTHROCYTE [DISTWIDTH] IN BLOOD BY AUTOMATED COUNT: 14.6 % (ref 12.3–15.4)
HCT VFR BLD AUTO: 35.6 % (ref 34–46.6)
HGB BLD-MCNC: 10.9 G/DL (ref 12–15.9)
IMM GRANULOCYTES # BLD AUTO: 0.21 10*3/MM3 (ref 0–0.05)
IMM GRANULOCYTES NFR BLD AUTO: 1.8 % (ref 0–0.5)
LYMPHOCYTES # BLD AUTO: 1.4 10*3/MM3 (ref 0.7–3.1)
LYMPHOCYTES NFR BLD AUTO: 11.9 % (ref 19.6–45.3)
MAGNESIUM SERPL-MCNC: 2 MG/DL (ref 1.6–2.4)
MCH RBC QN AUTO: 22.6 PG (ref 26.6–33)
MCHC RBC AUTO-ENTMCNC: 30.6 G/DL (ref 31.5–35.7)
MCV RBC AUTO: 73.9 FL (ref 79–97)
MONOCYTES # BLD AUTO: 0.96 10*3/MM3 (ref 0.1–0.9)
MONOCYTES NFR BLD AUTO: 8.1 % (ref 5–12)
NEUTROPHILS NFR BLD AUTO: 76.7 % (ref 42.7–76)
NEUTROPHILS NFR BLD AUTO: 9.03 10*3/MM3 (ref 1.7–7)
PLATELET # BLD AUTO: 119 10*3/MM3 (ref 140–450)
PMV BLD AUTO: ABNORMAL FL
RBC # BLD AUTO: 4.82 10*6/MM3 (ref 3.77–5.28)
WBC # BLD AUTO: 11.78 10*3/MM3 (ref 3.4–10.8)

## 2021-11-02 PROCEDURE — 85025 COMPLETE CBC W/AUTO DIFF WBC: CPT | Performed by: NURSE PRACTITIONER

## 2021-11-02 PROCEDURE — 83735 ASSAY OF MAGNESIUM: CPT | Performed by: INTERNAL MEDICINE

## 2021-11-02 PROCEDURE — 99024 POSTOP FOLLOW-UP VISIT: CPT | Performed by: SURGERY

## 2021-11-02 PROCEDURE — 99239 HOSP IP/OBS DSCHRG MGMT >30: CPT | Performed by: NURSE PRACTITIONER

## 2021-11-02 PROCEDURE — 97116 GAIT TRAINING THERAPY: CPT

## 2021-11-02 RX ORDER — METOPROLOL SUCCINATE 50 MG/1
50 TABLET, EXTENDED RELEASE ORAL
Qty: 30 TABLET | Refills: 1 | Status: SHIPPED | OUTPATIENT
Start: 2021-11-02 | End: 2022-04-01 | Stop reason: SDUPTHER

## 2021-11-02 RX ORDER — SACCHAROMYCES BOULARDII 250 MG
500 CAPSULE ORAL 2 TIMES DAILY
Qty: 20 CAPSULE | Refills: 0 | Status: SHIPPED | OUTPATIENT
Start: 2021-11-02 | End: 2022-03-17

## 2021-11-02 RX ORDER — CEFUROXIME AXETIL 500 MG/1
500 TABLET ORAL EVERY 12 HOURS SCHEDULED
Qty: 10 TABLET | Refills: 0 | Status: SHIPPED | OUTPATIENT
Start: 2021-11-02 | End: 2021-11-08

## 2021-11-02 RX ORDER — CEFUROXIME AXETIL 250 MG/1
500 TABLET ORAL EVERY 12 HOURS SCHEDULED
Status: DISCONTINUED | OUTPATIENT
Start: 2021-11-02 | End: 2021-11-02 | Stop reason: HOSPADM

## 2021-11-02 RX ORDER — FLUCONAZOLE 150 MG/1
150 TABLET ORAL
Qty: 2 TABLET | Refills: 0 | Status: SHIPPED | OUTPATIENT
Start: 2021-11-02 | End: 2021-11-06

## 2021-11-02 RX ORDER — CLINDAMYCIN HYDROCHLORIDE 300 MG/1
300 CAPSULE ORAL 3 TIMES DAILY
Qty: 9 CAPSULE | Refills: 0 | Status: SHIPPED | OUTPATIENT
Start: 2021-11-02 | End: 2021-11-05

## 2021-11-02 RX ORDER — CLINDAMYCIN HYDROCHLORIDE 150 MG/1
450 CAPSULE ORAL EVERY 6 HOURS SCHEDULED
Status: DISCONTINUED | OUTPATIENT
Start: 2021-11-02 | End: 2021-11-02 | Stop reason: HOSPADM

## 2021-11-02 RX ORDER — TRAMADOL HYDROCHLORIDE 50 MG/1
50 TABLET ORAL EVERY 6 HOURS PRN
Qty: 8 TABLET | Refills: 0 | Status: SHIPPED | OUTPATIENT
Start: 2021-11-02 | End: 2021-11-04

## 2021-11-02 RX ORDER — SACCHAROMYCES BOULARDII 250 MG
500 CAPSULE ORAL 2 TIMES DAILY
Status: DISCONTINUED | OUTPATIENT
Start: 2021-11-02 | End: 2021-11-02 | Stop reason: HOSPADM

## 2021-11-02 RX ADMIN — SODIUM CHLORIDE, PRESERVATIVE FREE 10 ML: 5 INJECTION INTRAVENOUS at 09:21

## 2021-11-02 RX ADMIN — LEVETIRACETAM 500 MG: 500 TABLET ORAL at 09:20

## 2021-11-02 RX ADMIN — CLINDAMYCIN HYDROCHLORIDE 450 MG: 150 CAPSULE ORAL at 09:20

## 2021-11-02 RX ADMIN — Medication 500 MG: at 09:21

## 2021-11-02 RX ADMIN — CEFUROXIME AXETIL 500 MG: 250 TABLET ORAL at 09:21

## 2021-11-02 RX ADMIN — METOPROLOL SUCCINATE 50 MG: 50 TABLET, EXTENDED RELEASE ORAL at 09:13

## 2021-11-02 RX ADMIN — FAMOTIDINE 20 MG: 20 TABLET ORAL at 09:21

## 2021-11-02 NOTE — PLAN OF CARE
Goal Outcome Evaluation:  Plan of Care Reviewed With: patient        Progress: no change  Outcome Summary: VSS - bed alarm in place - ambulates with use of walker/assist x 1 - NSR on tele - no c/o pain - incontinent at times - rested well through night

## 2021-11-02 NOTE — DISCHARGE INSTR - APPOINTMENTS
Follow up with Dr. Reese Underwood in 1 week. [phone]    Follow up with hematology in 1 week. [phone]

## 2021-11-02 NOTE — PROGRESS NOTES
She complains of some vaginal itching.  She is tolerating p.o. she is afebrile vital signs are stable her labs are noted.  She has been switched to oral antibiotics.  I discussed the dosing with the hospitalist service.  They will also send her home on some Diflucan as well.  Her incisions are without signs of infection.  She does not have any clinical jaundice.  She can be discharged from my aspect follow-up in my office in 8 days.

## 2021-11-02 NOTE — PLAN OF CARE
Goal Outcome Evaluation:  Plan of Care Reviewed With: patient           Outcome Summary: PT: Patient requires SBA for sit to stand and CGA for gait x200 feet with rolling walker, CGA with cues for proper distance from walker and obstacle avoidance on the left side.  Patient requires verbal cues to keep walker with her during direction changes and backing up to recliner surface.  Continue to recommend 24 hour supervision and home health PT at discharge.

## 2021-11-02 NOTE — THERAPY TREATMENT NOTE
Acute Care - Physical Therapy Treatment Note  RE[facility] Werner     Patient Name: Shyann Davis  : 1945  MRN: [id number]  Today's Date: 2021      Visit Dx:     ICD-10-CM ICD-9-CM   1. Sepsis without acute organ dysfunction, due to unspecified organism (HCC)  A41.9 038.9     995.91   2. Pain of upper abdomen  R10.10 789.09   3. Abnormal x-ray of thoracic spine  R93.7 793.7   4. Abnormal x-ray of lumbar spine  R93.7 793.7   5. Acute cholecystitis  K81.0 575.0     Patient Active Problem List   Diagnosis   • Impaired glucose tolerance   • Mixed hyperlipidemia   • Essential hypertension   • Seizure disorder (HCC)   • Vitamin D deficiency   • Arteriovenous malformation of gastrointestinal tract   • Exogenous obesity   • Depression with anxiety   • Thrombocytopenia (HCC)   • Renal insufficiency   • Overactive bladder   • Age-related osteoporosis without current pathological fracture   • Chronic ITP (idiopathic thrombocytopenic purpura) (HCC)   • Sepsis without acute organ dysfunction (HCC)   • Acute cholecystitis   • Abnormal x-ray of lumbar spine   • Thyroid mass     Past Medical History:   Diagnosis Date   • Age-related osteoporosis without current pathological fracture 2019   • Anxiety    • Breast cancer (HCC)     Right   • Depression    • History of thrombocytopenia    • Hyperlipidemia    • Hypertension    • Osteoporosis    • Seizure (HCC)     after brain surgery only     Past Surgical History:   Procedure Laterality Date   • APPENDECTOMY     • BRAIN AVM REPAIR  1992   • BREAST LUMPECTOMY Right    • BREAST SURGERY     • CHOLECYSTECTOMY N/A 10/25/2021    Procedure: CHOLECYSTECTOMY LAPAROSCOPIC;  Surgeon: Gera Kay MD;  Location: [facility];  Service: General;  Laterality: N/A;   • HYSTERECTOMY       PT Assessment (last 12 hours)     PT Evaluation and Treatment     Row Name 21 0929          Physical Therapy Time and Intention    Subjective Information complains of; fatigue  -JW      Document Type therapy note (daily note)  -     Mode of Treatment physical therapy  -     Symptoms Noted During/After Treatment none  -     Row Name 11/02/21 0929          General Information    Patient Observations alert; cooperative; agree to therapy  -     Patient/Family/Caregiver Comments/Observations pt in recliner agrees to therapy  -     Existing Precautions/Restrictions fall  -     Barriers to Rehab cognitive status  -     Row Name 11/02/21 0929          Cognition    Personal Safety Interventions gait belt; nonskid shoes/slippers when out of bed  -     Row Name 11/02/21 0929          Pain Scale: Word Pre/Post-Treatment    Pre/Posttreatment Pain Comment no c/o pain  -     Row Name 11/02/21 0929          Bed Mobility    Comment (Bed Mobility) deferred up in chair  -     Row Name 11/02/21 0929          Transfers    Transfers sit-stand transfer; stand-sit transfer  -     Comment (Transfers) verbal cues for hand placement  -     Sit-Stand [place] (Transfers) contact guard; verbal cues  -     Stand-Sit [place] (Transfers) contact guard; verbal cues  -     Row Name 11/02/21 0929          Sit-Stand Transfer    Assistive Device (Sit-Stand Transfers) walker, front-wheeled  -     Row Name 11/02/21 0929          Stand-Sit Transfer    Assistive Device (Stand-Sit Transfers) walker, front-wheeled  -     Row Name 11/02/21 0929          Gait/Stairs (Locomotion)    [place] Level (Gait) contact guard; verbal cues  -     Assistive Device (Gait) walker, front-wheeled  -     Distance in Feet (Gait) 200  -     Pattern (Gait) swing-through  -     Deviations/Abnormal Patterns (Gait) base of support, narrow; renetta decreased  -     Bilateral Gait Deviations forward flexed posture  -     Comment (Gait/Stairs) pt requires verbal cues for proper distance from walker and safety during mobility.  pt with frequent episodes of running into objects on the left side  -     Row Name              Wound 10/25/21 1004 umbilical area Incision    Wound - Properties Group Placement Date: 10/25/21  -LF Placement Time: 1004  -LF Location: umbilical area  -LF Primary Wound Type: Incision  -LF Wound Outcome: Converged  -LF     Retired Wound - Properties Group Date first assessed: 10/25/21  -LF Time first assessed: 1004  -LF Location: umbilical area  -LF Primary Wound Type: Incision  -LF Wound Outcome: Converged  -LF     Row Name             Wound 10/25/21 1004 Left upper abdomen Incision    Wound - Properties Group Placement Date: 10/25/21  -LF Placement Time: 1004  -LF Side: Left  -LF Orientation: upper  -LF Location: abdomen  -LF Primary Wound Type: Incision  -LF Wound Outcome: Converged  -LF     Retired Wound - Properties Group Date first assessed: 10/25/21  -LF Time first assessed: 1004  -LF Side: Left  -LF Location: abdomen  -LF Primary Wound Type: Incision  -LF Wound Outcome: Converged  -LF     Row Name             Wound 10/25/21 1004 Right anterior hip Incision    Wound - Properties Group Placement Date: 10/25/21  -LF Placement Time: 1004  -LF Side: Right  -LF Orientation: anterior  -LF Location: hip  -LF Primary Wound Type: Incision  -LF Wound Outcome: Converged  -LF     Retired Wound - Properties Group Date first assessed: 10/25/21  -LF Time first assessed: 1004  -LF Side: Right  -LF Location: hip  -LF Primary Wound Type: Incision  -LF Wound Outcome: Converged  -LF     Row Name             Wound 10/25/21 1005 Left anterior hip Incision    Wound - Properties Group Placement Date: 10/25/21  -LF Placement Time: 1005  -LF Side: Left  -LF Orientation: anterior  -LF Location: hip  -LF Primary Wound Type: Incision  -LF Wound Outcome: Converged  -LF     Retired Wound - Properties Group Date first assessed: 10/25/21  -LF Time first assessed: 1005  -LF Side: Left  -LF Location: hip  -LF Primary Wound Type: Incision  -LF Wound Outcome: Converged  -LF     Row Name 11/02/21 0929          Plan of Care Review     Outcome Summary PT: Patient requires SBA for sit to stand and CGA for gait x200 feet with rolling walker, CGA with cues for proper distance from walker and obstacle avoidance on the left side.  Patient requires verbal cues to keep walker with her during direction changes and backing up to recliner surface.  Continue to recommend 24 hour supervision and home health PT at discharge.  -     Row Name 11/02/21 0929          Positioning and Restraints    Pre-Treatment Position sitting in chair/recliner  -     Post Treatment Position chair  -JW     In Chair reclined; call light within reach; encouraged to call for assist; with family/caregiver; exit alarm on  -     Row Name 11/02/21 0929          Progress Summary (PT)    Progress Toward Functional Goals (PT) progress toward functional goals is good  -           User Key  (r) = Recorded By, (t) = Taken By, (c) = Cosigned By    Initials Name Provider Type    Arcaeli Berg, PT Physical Therapist    Nain Xiao, RN Registered Nurse              Physical Therapy Education                 Title: PT OT SLP Therapies (Resolved)     Topic: Physical Therapy (Resolved)     Point: Mobility training (Resolved)     Learning Progress Summary           Patient Acceptance, E,TB, VU by  at 11/1/2021 1455   Significant Other Acceptance, E,TB, VU by  at 11/1/2021 1455      Show all documentation for this point (4)                             User Key     Initials Effective Dates Name Provider Type [facility] 06/16/21 -  Araceli Blancas, PT Physical Therapist PT              PT Recommendation and Plan  Anticipated Discharge Disposition (PT): home with 24/7 care, home with home health  Planned Therapy Interventions (PT): balance training, bed mobility training, gait training, home exercise program, patient/family education, strengthening, transfer training  Therapy Frequency (PT): 6 times/wk  Progress Summary (PT)  Progress Toward Functional Goals (PT):  progress toward functional goals is good  Plan of Care Reviewed With: patient  Outcome Summary: PT: Patient requires SBA for sit to stand and CGA for gait x200 feet with rolling walker, CGA with cues for proper distance from walker and obstacle avoidance on the left side.  Patient requires verbal cues to keep walker with her during direction changes and backing up to recliner surface.  Continue to recommend 24 hour supervision and home health PT at discharge.   Outcome Measures     Row Name 11/02/21 0929 11/01/21 1400          How much help from another person do you currently need...    Turning from your back to your side while in flat bed without using bedrails? 3  -JW 3  -JW     Moving from lying on back to sitting on the side of a flat bed without bedrails? 3  -JW 3  -JW     Moving to and from a bed to a chair (including a wheelchair)? 3  -JW 3  -JW     Standing up from a chair using your arms (e.g., wheelchair, bedside chair)? 3  -JW 3  -JW     Climbing 3-5 steps with a railing? 3  -JW 2  -JW     To walk in hospital room? 3  -JW 3  -JW     AM-PAC 6 Clicks Score (PT) 18  -JW 17  -JW            Functional Assessment    Outcome Measure Options AM-PAC 6 Clicks Basic Mobility (PT)  -JW AM-PAC 6 Clicks Basic Mobility (PT)  -JW           User Key  (r) = Recorded By, (t) = Taken By, (c) = Cosigned By    Initials Name Provider Type    Araceli Berg, PT Physical Therapist                 Time Calculation:    PT Charges     Row Name 11/02/21 1125             Time Calculation    Start Time 0929  -      Stop Time 0940  -      Time Calculation (min) 11 min  -JW      PT Received On 11/02/21  -JW      PT - Next Appointment 11/03/21  -              Timed Charges    30475 - Gait Training Minutes  11  -JW              Total Minutes    Timed Charges Total Minutes 11  -JW       Total Minutes 11  -JW            User Key  (r) = Recorded By, (t) = Taken By, (c) = Cosigned By    Initials Name Provider Type    JOHN Blancas  Araceli, PT Physical Therapist              Therapy Charges for Today     Code Description Service Date Service Provider Modifiers Qty    10828257199 HC GAIT TRAINING EA 15 MIN 11/1/2021 Araceli Blancas, PT GP 1    48718385948 HC GAIT TRAINING EA 15 MIN 11/2/2021 Araceli Blancas, PT GP 1          PT G-Codes  Outcome Measure Options: AM-PAC 6 Clicks Basic Mobility (PT)  AM-PAC 6 Clicks Score (PT): 18    Araceli Blancas, PT  11/2/2021

## 2021-11-02 NOTE — CASE MANAGEMENT/SOCIAL WORK
Continued Stay Note  [facility] Werner     Patient Name: Shyann Davis  MRN: [id number]  Today's Date: 11/2/2021    Admit Date: 10/22/2021     Discharge Plan     Row Name 11/02/21 1108       Plan    Plan Home with  and [facility]     Plan Comments CM called and notified Brenda with [facility]  of patient's discharge for today and she states that she will let the office know. CM will continue to follow for needs.               Discharge Codes    No documentation.               Expected Discharge Date and Time     Expected Discharge Date Expected Discharge Time    Nov 2, 2021             Carola Mai RN

## 2021-11-02 NOTE — CASE MANAGEMENT/SOCIAL WORK
Case Management Discharge Note      Final Note: Discharged home with [facility]     Provided Post Acute Provider List?: Yes  Post Acute Provider List: Home Health  Refused Provider List Comment: Offered community resources but patient declines the need for them at this time.  Provided Post Acute Provider Quality & Resource List?: Yes  Post Acute Provider Quality and Resource List: Home Health  Delivered To: Patient, Support Person  Support Person: Thanh Anushkaradha,   Method of Delivery: In person    Selected Continued Care - Discharged on 11/2/2021 Admission date: 10/22/2021 - Discharge disposition: Home or Self Care    Destination    No services have been selected for the patient.              Durable Medical Equipment    No services have been selected for the patient.              Dialysis/Infusion    No services have been selected for the patient.              Home Medical Care Coordination complete.    Service Provider Selected Services Address Phone Fax Patient Preferred     [facility]  [facility] [address] [address] [phone] --          Therapy    No services have been selected for the patient.              Community Resources    No services have been selected for the patient.              Community & DME    No services have been selected for the patient.                       Final Discharge Disposition Code: 06 - home with home health care

## 2021-11-02 NOTE — OUTREACH NOTE
Prep Survey      Responses   [facility] patient discharged from? [place]   Is LACE score < 7 ? No   Emergency Room discharge w/ pulse ox? No   Eligibility [facility]   Date of Admission 10/22/21   Date of Discharge 11/02/21   Discharge Disposition Home or Self Care   Discharge diagnosis Sepsis secondary to acute gangrenous cholecystitis,    laparoscopic cholecystectomy    Does the patient have one of the following disease processes/diagnoses(primary or secondary)? General Surgery   Does the patient have Home health ordered? Yes   What is the Home health agency?  [facility]   Is there a DME ordered? No   Prep survey completed? Yes          Bianca Rojas RN

## 2021-11-03 ENCOUNTER — HOME CARE VISIT (OUTPATIENT)
Dept: HOME HEALTH SERVICES | Facility: HOME HEALTHCARE | Age: 76
End: 2021-11-03

## 2021-11-03 ENCOUNTER — TRANSITIONAL CARE MANAGEMENT TELEPHONE ENCOUNTER (OUTPATIENT)
Dept: CALL CENTER | Facility: HOSPITAL | Age: 76
End: 2021-11-03

## 2021-11-03 VITALS
TEMPERATURE: 97.7 F | SYSTOLIC BLOOD PRESSURE: 120 MMHG | RESPIRATION RATE: 16 BRPM | OXYGEN SATURATION: 95 % | DIASTOLIC BLOOD PRESSURE: 80 MMHG | HEART RATE: 78 BPM

## 2021-11-03 PROCEDURE — G0299 HHS/HOSPICE OF RN EA 15 MIN: HCPCS

## 2021-11-03 NOTE — PROGRESS NOTES
"Enter Query Response Below      Query Response:       Sepsis can be further classified as present on admit.        If applicable, please update the problem list.     Patient: Shyann Davis        : 1945  Account: [id number]           Admit Date: 10/22/2021        Options to Respond to Query:    1. Access the Encounter     a. From the To-Do Side bar, click Respond With Note.     b. Click New Note     c. Answer query within the yellow box.                d. Update the Problem List if applicable.     Dr. Alfaro,     History and physical dated 10/22/21 notes patient admitted for \"abdominal pain n/v/d r/o cholecystitis suspect dehydration.\"  Consult dated 10/23/21 includes \"sepsis without organ dysfunction.\" Operative note dated 10/25/21 \"laparoscopic cholecystectomy for cholelithiasis gangrenous cholecystitis.\"  Discharge summary notes, \"sepsis secondary to acute gangrenous cholecystitis and diagnostics in ER revealed lactate 2.9, WBC 27.24 with left shift, platelets 101,000, anion gap 17.3, calcium 10.6, glucose 188.\" Treated with IV Zosyn 10/22-10/24 and changed to IV Merrem 10/24.      After further study, can sepsis be further specified as     Sepsis present on admit  Sepsis developed after admission  Dehydration and gangrenous cholecystitis without sepsis   Other (please specify)_______  Unable to determine    By submitting this query, we are merely seeking further clarification of documentation to accurately reflect all conditions that you are monitoring, evaluating, treating or that extend the hospitalization or utilize additional resources of care. Please utilize your independent clinical judgment when addressing the question(s) above.     This query and your response, once completed, will be entered into the legal medical record.    Sincerely,  Urszula Chaidez, LINDSEY, RN, CCDS  [email or web address]  System Director Clinical Documentation Integrity Program     "

## 2021-11-03 NOTE — OUTREACH NOTE
Call Center TCM Note      Responses   [facility] patient discharged from? [place]   Does the patient have one of the following disease processes/diagnoses(primary or secondary)? General Surgery   TCM attempt successful? Yes   Call start time 1531   Call end time 1533   Discharge diagnosis Sepsis secondary to acute gangrenous cholecystitis,    laparoscopic cholecystectomy    Meds reviewed with patient/caregiver? Yes   Is the patient having any side effects they believe may be caused by any medication additions or changes? No   Does the patient have all medications related to this admission filled (includes all antibiotics, pain medications, etc.) Yes   Is the patient taking all medications as directed (includes completed medication regime)? Yes   Does the patient have a follow up appointment scheduled with their surgeon? Yes   Has the patient kept scheduled appointments due by today? N/A   Comments Will route message to PCP for earlier available apt    What is the Home health agency?  [facility]   Has home health visited the patient within 72 hours of discharge? Yes   Psychosocial issues? No   Did the patient receive a copy of their discharge instructions? Yes   Nursing interventions Reviewed instructions with patient   What is the patient's perception of their health status since discharge? Improving   Nursing interventions Nurse provided patient education   Is the patient /caregiver able to teach back basic post-op care? Keep incision areas clean,dry and protected,  Practice 'cough and deep breath'   Is the patient/caregiver able to teach back signs and symptoms of incisional infection? Increased redness, swelling or pain at the incisonal site,  Increased drainage or bleeding,  Fever   Is the patient/caregiver able to teach back steps to recovery at home? Set small, achievable goals for return to baseline health,  Rest and rebuild strength, gradually increase activity   If the patient is a current smoker,  are they able to teach back resources for cessation? Not a smoker   Is the patient/caregiver able to teach back the hierarchy of who to call/visit for symptoms/problems? PCP, Specialist, Home health nurse, Urgent Care, ED, 911 Yes   TCM call completed? Yes          Gianna Naqvi RN    11/3/2021, 15:40 EDT

## 2021-11-04 ENCOUNTER — HOME CARE VISIT (OUTPATIENT)
Dept: HOME HEALTH SERVICES | Facility: HOME HEALTHCARE | Age: 76
End: 2021-11-04

## 2021-11-04 PROCEDURE — G0151 HHCP-SERV OF PT,EA 15 MIN: HCPCS

## 2021-11-05 VITALS
TEMPERATURE: 97.7 F | OXYGEN SATURATION: 94 % | HEART RATE: 75 BPM | SYSTOLIC BLOOD PRESSURE: 150 MMHG | RESPIRATION RATE: 16 BRPM | DIASTOLIC BLOOD PRESSURE: 90 MMHG

## 2021-11-05 NOTE — HOME HEALTH
Patient s/p cholecystectomy. She was previously ambulating on her own, independent with self care, and cooking. Patient does not drive due to vision issue.     Subjective: Patient states she is having trouble with diarrhea and is having urinary incontinence at night just since surgery. Patient reports feeling very weak.     Objective: patient has everything she needs on the main floor of home. She comes in the back entrance where there is a ramp. Patient is resting on the couch in supine. She is able to get in and out of the bed with SBA and using her walker. Does have wooden chairs next to head and foot of bed acting as a bedrail as patient has fallen out of bed in the past.  She has a rolling walker, and cane. Discussed possibility of use of bedcane and /or shower chair. Evaluation completed this visit anticipating 2w3 for strengthening, transfer and gait training. Walker height adjusted as it was too short. Did establish HEP for seated exercises. Patient performed 10 reps of each and encouraged to perform 1 to 2 times a day.     Plan: Continue next week 2 visits for strengthening exercises increasing to standing as patient tolerates, transfers and gait training with rilling walker, address steps weather pending.

## 2021-11-08 ENCOUNTER — OFFICE VISIT (OUTPATIENT)
Dept: FAMILY MEDICINE CLINIC | Facility: CLINIC | Age: 76
End: 2021-11-08

## 2021-11-08 VITALS
WEIGHT: 164 LBS | HEIGHT: 63 IN | HEART RATE: 76 BPM | OXYGEN SATURATION: 97 % | BODY MASS INDEX: 29.06 KG/M2 | DIASTOLIC BLOOD PRESSURE: 82 MMHG | TEMPERATURE: 97 F | SYSTOLIC BLOOD PRESSURE: 124 MMHG

## 2021-11-08 DIAGNOSIS — Z90.49 STATUS POST LAPAROSCOPIC CHOLECYSTECTOMY: Primary | ICD-10-CM

## 2021-11-08 DIAGNOSIS — K52.9 CHRONIC DIARRHEA: ICD-10-CM

## 2021-11-08 DIAGNOSIS — R53.1 GENERALIZED WEAKNESS: ICD-10-CM

## 2021-11-08 PROCEDURE — 99213 OFFICE O/P EST LOW 20 MIN: CPT | Performed by: FAMILY MEDICINE

## 2021-11-08 NOTE — PROGRESS NOTES
"Subjective   Shyann Davis is a 76 y.o. female with   Chief Complaint   Patient presents with   • Hospital Follow Up Visit     gallbladder surgery   .    History of Present Illness   76-year-old white female who apparently got very ill towards the end of October which included nausea/vomiting as well as diarrhea.  This came to a point where she went to the emergency room at [facility] and was found to be very ill with a necrotic gallbladder.  She went to surgery with Dr. Marcin Kay of the general surgery service.  She underwent laparoscopic cholecystectomy with pathology report showing severely inflamed necrotizing gallbladder with stones.  She states that she spent several days in the hospital with eventual discharge.  Received IV antibiotics as well as oral antibiotics upon discharge.  She continues to have some diarrhea although not like it had been.  She feels very weak and is \"nursed\" by her .  She is using a walker for stability when she ambulates.  She denies abdominal pain and her diet is increasing.  She has home therapy including physical therapy.  The following portions of the patient's history were reviewed and updated as appropriate: allergies, current medications, past family history, past medical history, past social history, past surgical history and problem list.    Review of Systems   Gastrointestinal:        Status post laparoscopic cholecystectomy for any necrotized cholecystitis with cholelithiasis.   Neurological: Positive for weakness.       Objective     Vitals:    11/08/21 1328   BP: 124/82   Pulse: 76   Temp: 97 °F (36.1 °C)   SpO2: 97%       Recent Results (from the past 672 hour(s))   Comprehensive Metabolic Panel    Collection Time: 10/22/21 10:50 PM    Specimen: Blood   Result Value Ref Range    Glucose 188 (H) 65 - 99 mg/dL    BUN 13 8 - 23 mg/dL    Creatinine 0.87 0.57 - 1.00 mg/dL    Sodium 139 136 - 145 mmol/L    Potassium 3.5 3.5 - 5.2 mmol/L    Chloride 96 (L) 98 - 107 " mmol/L    CO2 25.7 22.0 - 29.0 mmol/L    Calcium 10.6 (H) 8.6 - 10.5 mg/dL    Total Protein 8.1 6.0 - 8.5 g/dL    Albumin 4.70 3.50 - 5.20 g/dL    ALT (SGPT) 22 1 - 33 U/L    AST (SGOT) 26 1 - 32 U/L    Alkaline Phosphatase 91 39 - 117 U/L    Total Bilirubin 0.9 0.0 - 1.2 mg/dL    eGFR Non African Amer 63 >60 mL/min/1.73    Globulin 3.4 gm/dL    A/G Ratio 1.4 g/dL    BUN/Creatinine Ratio 14.9 7.0 - 25.0    Anion Gap 17.3 (H) 5.0 - 15.0 mmol/L   CBC Auto Differential    Collection Time: 10/22/21 10:50 PM    Specimen: Blood   Result Value Ref Range    WBC 27.24 (H) 3.40 - 10.80 10*3/mm3    RBC 6.15 (H) 3.77 - 5.28 10*6/mm3    Hemoglobin 14.0 12.0 - 15.9 g/dL    Hematocrit 45.2 34.0 - 46.6 %    MCV 73.5 (L) 79.0 - 97.0 fL    MCH 22.8 (L) 26.6 - 33.0 pg    MCHC 31.0 (L) 31.5 - 35.7 g/dL    RDW 14.8 12.3 - 15.4 %    RDW-SD 37.3 37.0 - 54.0 fl    MPV      Platelets 101 (L) 140 - 450 10*3/mm3    Neutrophil % 91.0 (H) 42.7 - 76.0 %    Lymphocyte % 5.0 (L) 19.6 - 45.3 %    Monocyte % 3.0 (L) 5.0 - 12.0 %    Eosinophil % 0.0 (L) 0.3 - 6.2 %    Basophil % 0.2 0.0 - 1.5 %    Immature Grans % 0.8 (H) 0.0 - 0.5 %    Neutrophils, Absolute 24.78 (H) 1.70 - 7.00 10*3/mm3    Lymphocytes, Absolute 1.36 0.70 - 3.10 10*3/mm3    Monocytes, Absolute 0.82 0.10 - 0.90 10*3/mm3    Eosinophils, Absolute 0.01 0.00 - 0.40 10*3/mm3    Basophils, Absolute 0.05 0.00 - 0.20 10*3/mm3    Immature Grans, Absolute 0.22 (H) 0.00 - 0.05 10*3/mm3    nRBC 0.0 0.0 - 0.2 /100 WBC   Manual Differential    Collection Time: 10/22/21 10:50 PM    Specimen: Blood   Result Value Ref Range    Neutrophil % 95.0 (H) 42.7 - 76.0 %    Lymphocyte % 2.0 (L) 19.6 - 45.3 %    Monocyte % 1.0 (L) 5.0 - 12.0 %    Bands %  2.0 0.0 - 5.0 %    Neutrophils Absolute 26.42 (H) 1.70 - 7.00 10*3/mm3    Lymphocytes Absolute 0.54 (L) 0.70 - 3.10 10*3/mm3    Monocytes Absolute 0.27 0.10 - 0.90 10*3/mm3    RBC Morphology Normal Normal    WBC Morphology Normal Normal    Giant Platelets  Slight/1+ None Seen   Procalcitonin    Collection Time: 10/22/21 10:50 PM    Specimen: Blood   Result Value Ref Range    Procalcitonin 0.07 0.00 - 0.25 ng/mL   Troponin    Collection Time: 10/22/21 10:50 PM    Specimen: Blood   Result Value Ref Range    Troponin T <0.010 0.000 - 0.030 ng/mL   Amylase    Collection Time: 10/22/21 10:50 PM    Specimen: Blood   Result Value Ref Range    Amylase 32 28 - 100 U/L   Lipase    Collection Time: 10/22/21 10:50 PM    Specimen: Blood   Result Value Ref Range    Lipase 17 13 - 60 U/L   Hemoglobin A1c    Collection Time: 10/22/21 10:50 PM    Specimen: Blood   Result Value Ref Range    Hemoglobin A1C 5.40 4.80 - 5.60 %   TSH    Collection Time: 10/22/21 10:50 PM    Specimen: Blood   Result Value Ref Range    TSH 1.210 0.270 - 4.200 uIU/mL   COVID-19 and FLU A/B PCR - Swab, Nasopharynx    Collection Time: 10/22/21 11:19 PM    Specimen: Nasopharynx; Swab   Result Value Ref Range    COVID19 Not Detected Not Detected - Ref. Range    Influenza A PCR Not Detected Not Detected    Influenza B PCR Not Detected Not Detected   Lactic Acid, Plasma    Collection Time: 10/23/21 12:11 AM    Specimen: Blood   Result Value Ref Range    Lactate 2.9 (C) 0.5 - 2.0 mmol/L   Blood Culture - Blood, Arm, Left    Collection Time: 10/23/21 12:12 AM    Specimen: Arm, Left; Blood   Result Value Ref Range    Blood Culture No growth at 5 days    Blood Culture - Blood, Arm, Right    Collection Time: 10/23/21 12:13 AM    Specimen: Arm, Right; Blood   Result Value Ref Range    Blood Culture No growth at 5 days    Urinalysis With Microscopic If Indicated (No Culture) - Urine, Clean Catch    Collection Time: 10/23/21 12:58 AM    Specimen: Urine, Clean Catch   Result Value Ref Range    Color, UA Yellow Yellow, Straw    Appearance, UA Clear Clear    pH, UA 7.0 4.5 - 8.0    Specific Gravity, UA 1.025 1.003 - 1.030    Glucose,  mg/dL (Trace) (A) Negative    Ketones, UA Negative Negative    Bilirubin, UA Negative  Negative    Blood, UA Small (1+) (A) Negative    Protein, UA >=300 mg/dL (3+) (A) Negative    Leuk Esterase, UA Negative Negative    Nitrite, UA Negative Negative    Urobilinogen, UA 0.2 E.U./dL 0.2 - 1.0 E.U./dL   Urinalysis, Microscopic Only - Urine, Clean Catch    Collection Time: 10/23/21 12:58 AM    Specimen: Urine, Clean Catch   Result Value Ref Range    RBC, UA 3-5 (A) None Seen /HPF    WBC, UA 0-2 (A) None Seen /HPF    Bacteria, UA Trace (A) None Seen /HPF    Squamous Epithelial Cells, UA 0-2 None Seen, 0-2 /HPF    Hyaline Casts, UA 0-2 None Seen /LPF    Coarse Granular Casts, UA 0-2 None Seen /LPF    Methodology Manual Light Microscopy    STAT Lactic Acid, Reflex    Collection Time: 10/23/21  4:23 AM    Specimen: Blood   Result Value Ref Range    Lactate 4.3 (C) 0.5 - 2.0 mmol/L   C-reactive Protein    Collection Time: 10/23/21  4:26 AM    Specimen: Blood   Result Value Ref Range    C-Reactive Protein 0.49 0.00 - 0.50 mg/dL   Comprehensive Metabolic Panel    Collection Time: 10/23/21  5:40 AM    Specimen: Blood   Result Value Ref Range    Glucose 140 (H) 65 - 99 mg/dL    BUN 13 8 - 23 mg/dL    Creatinine 0.89 0.57 - 1.00 mg/dL    Sodium 134 (L) 136 - 145 mmol/L    Potassium 3.3 (L) 3.5 - 5.2 mmol/L    Chloride 99 98 - 107 mmol/L    CO2 23.2 22.0 - 29.0 mmol/L    Calcium 9.0 8.6 - 10.5 mg/dL    Total Protein 7.0 6.0 - 8.5 g/dL    Albumin 3.60 3.50 - 5.20 g/dL    ALT (SGPT) 18 1 - 33 U/L    AST (SGOT) 22 1 - 32 U/L    Alkaline Phosphatase 74 39 - 117 U/L    Total Bilirubin 0.8 0.0 - 1.2 mg/dL    eGFR Non African Amer 62 >60 mL/min/1.73    Globulin 3.4 gm/dL    A/G Ratio 1.1 g/dL    BUN/Creatinine Ratio 14.6 7.0 - 25.0    Anion Gap 11.8 5.0 - 15.0 mmol/L   CBC Auto Differential    Collection Time: 10/23/21  5:40 AM    Specimen: Blood   Result Value Ref Range    WBC 27.61 (H) 3.40 - 10.80 10*3/mm3    RBC 5.79 (H) 3.77 - 5.28 10*6/mm3    Hemoglobin 13.2 12.0 - 15.9 g/dL    Hematocrit 42.2 34.0 - 46.6 %    MCV  72.9 (L) 79.0 - 97.0 fL    MCH 22.8 (L) 26.6 - 33.0 pg    MCHC 31.3 (L) 31.5 - 35.7 g/dL    RDW 14.5 12.3 - 15.4 %    RDW-SD 37.5 37.0 - 54.0 fl    MPV      Platelets 83 (L) 140 - 450 10*3/mm3    Neutrophil % 88.5 (H) 42.7 - 76.0 %    Lymphocyte % 6.2 (L) 19.6 - 45.3 %    Monocyte % 3.6 (L) 5.0 - 12.0 %    Eosinophil % 0.1 (L) 0.3 - 6.2 %    Basophil % 0.2 0.0 - 1.5 %    Immature Grans % 1.4 (H) 0.0 - 0.5 %    Neutrophils, Absolute 24.40 (H) 1.70 - 7.00 10*3/mm3    Lymphocytes, Absolute 1.72 0.70 - 3.10 10*3/mm3    Monocytes, Absolute 1.00 (H) 0.10 - 0.90 10*3/mm3    Eosinophils, Absolute 0.04 0.00 - 0.40 10*3/mm3    Basophils, Absolute 0.05 0.00 - 0.20 10*3/mm3    Immature Grans, Absolute 0.40 (H) 0.00 - 0.05 10*3/mm3    nRBC 0.0 0.0 - 0.2 /100 WBC   Scan Slide    Collection Time: 10/23/21  5:40 AM    Specimen: Blood   Result Value Ref Range    RBC Morphology Normal Normal    Microcytes      WBC Morphology Normal Normal    Giant Platelets Mod/2+ None Seen   Magnesium    Collection Time: 10/23/21  5:40 AM    Specimen: Blood   Result Value Ref Range    Magnesium 1.5 (L) 1.6 - 2.4 mg/dL   Lactic Acid, Plasma    Collection Time: 10/23/21 11:24 AM    Specimen: Blood   Result Value Ref Range    Lactate 2.7 (C) 0.5 - 2.0 mmol/L   Potassium    Collection Time: 10/23/21  4:21 PM    Specimen: Blood   Result Value Ref Range    Potassium 4.0 3.5 - 5.2 mmol/L   Lactic Acid, Plasma    Collection Time: 10/23/21  4:21 PM    Specimen: Blood   Result Value Ref Range    Lactate 2.9 (C) 0.5 - 2.0 mmol/L   CBC Auto Differential    Collection Time: 10/23/21  4:21 PM    Specimen: Blood   Result Value Ref Range    WBC 33.28 (C) 3.40 - 10.80 10*3/mm3    RBC 5.69 (H) 3.77 - 5.28 10*6/mm3    Hemoglobin 13.1 12.0 - 15.9 g/dL    Hematocrit 42.5 34.0 - 46.6 %    MCV 74.7 (L) 79.0 - 97.0 fL    MCH 23.0 (L) 26.6 - 33.0 pg    MCHC 30.8 (L) 31.5 - 35.7 g/dL    RDW 14.7 12.3 - 15.4 %    RDW-SD 39.2 37.0 - 54.0 fl    Platelets 74 (L) 140 - 450  10*3/mm3    Neutrophil % 92.1 (H) 42.7 - 76.0 %    Lymphocyte % 3.7 (L) 19.6 - 45.3 %    Monocyte % 2.5 (L) 5.0 - 12.0 %    Eosinophil % 0.0 (L) 0.3 - 6.2 %    Basophil % 0.2 0.0 - 1.5 %    Immature Grans % 1.5 (H) 0.0 - 0.5 %    Neutrophils, Absolute 30.64 (H) 1.70 - 7.00 10*3/mm3    Lymphocytes, Absolute 1.23 0.70 - 3.10 10*3/mm3    Monocytes, Absolute 0.84 0.10 - 0.90 10*3/mm3    Eosinophils, Absolute 0.00 0.00 - 0.40 10*3/mm3    Basophils, Absolute 0.07 0.00 - 0.20 10*3/mm3    Immature Grans, Absolute 0.50 (H) 0.00 - 0.05 10*3/mm3    nRBC 0.1 0.0 - 0.2 /100 WBC   Comprehensive Metabolic Panel    Collection Time: 10/23/21  4:21 PM    Specimen: Blood   Result Value Ref Range    Glucose 173 (H) 65 - 99 mg/dL    BUN 12 8 - 23 mg/dL    Creatinine 0.89 0.57 - 1.00 mg/dL    Sodium 135 (L) 136 - 145 mmol/L    Potassium 3.9 3.5 - 5.2 mmol/L    Chloride 104 98 - 107 mmol/L    CO2 18.3 (L) 22.0 - 29.0 mmol/L    Calcium 8.5 (L) 8.6 - 10.5 mg/dL    Total Protein 6.8 6.0 - 8.5 g/dL    Albumin 3.50 3.50 - 5.20 g/dL    ALT (SGPT) 21 1 - 33 U/L    AST (SGOT) 30 1 - 32 U/L    Alkaline Phosphatase 70 39 - 117 U/L    Total Bilirubin 0.8 0.0 - 1.2 mg/dL    eGFR Non African Amer 62 >60 mL/min/1.73    Globulin 3.3 gm/dL    A/G Ratio 1.1 g/dL    BUN/Creatinine Ratio 13.5 7.0 - 25.0    Anion Gap 12.7 5.0 - 15.0 mmol/L   Respiratory Panel, PCR (WITHOUT COVID) - Swab, Nasopharynx    Collection Time: 10/23/21  5:47 PM    Specimen: Nasopharynx; Swab   Result Value Ref Range    ADENOVIRUS, PCR Not Detected Not Detected    Coronavirus 229E Not Detected Not Detected    Coronavirus HKU1 Not Detected Not Detected    Coronavirus NL63 Not Detected Not Detected    Coronavirus OC43 Not Detected Not Detected    Human Metapneumovirus Not Detected Not Detected    Human Rhinovirus/Enterovirus Not Detected Not Detected    Influenza B PCR Not Detected Not Detected    Parainfluenza Virus 1 Not Detected Not Detected    Parainfluenza Virus 2 Not Detected Not  Detected    Parainfluenza Virus 3 Not Detected Not Detected    Parainfluenza Virus 4 Not Detected Not Detected    Bordetella pertussis pcr Not Detected Not Detected    Chlamydophila pneumoniae PCR Not Detected Not Detected    Mycoplasma pneumo by PCR Not Detected Not Detected    Influenza A PCR Not Detected Not Detected    RSV, PCR Not Detected Not Detected    Bordetella parapertussis PCR Not Detected Not Detected   Comprehensive Metabolic Panel    Collection Time: 10/24/21  4:23 AM    Specimen: Blood   Result Value Ref Range    Glucose 120 (H) 65 - 99 mg/dL    BUN 10 8 - 23 mg/dL    Creatinine 0.79 0.57 - 1.00 mg/dL    Sodium 137 136 - 145 mmol/L    Potassium 4.1 3.5 - 5.2 mmol/L    Chloride 107 98 - 107 mmol/L    CO2 22.2 22.0 - 29.0 mmol/L    Calcium 7.8 (L) 8.6 - 10.5 mg/dL    Total Protein 6.3 6.0 - 8.5 g/dL    Albumin 3.10 (L) 3.50 - 5.20 g/dL    ALT (SGPT) 23 1 - 33 U/L    AST (SGOT) 37 (H) 1 - 32 U/L    Alkaline Phosphatase 59 39 - 117 U/L    Total Bilirubin 0.6 0.0 - 1.2 mg/dL    eGFR Non African Amer 71 >60 mL/min/1.73    Globulin 3.2 gm/dL    A/G Ratio 1.0 g/dL    BUN/Creatinine Ratio 12.7 7.0 - 25.0    Anion Gap 7.8 5.0 - 15.0 mmol/L   Magnesium    Collection Time: 10/24/21  4:23 AM    Specimen: Blood   Result Value Ref Range    Magnesium 2.6 (H) 1.6 - 2.4 mg/dL   CBC Auto Differential    Collection Time: 10/24/21  6:30 AM    Specimen: Blood   Result Value Ref Range    WBC 32.65 (C) 3.40 - 10.80 10*3/mm3    RBC 5.21 3.77 - 5.28 10*6/mm3    Hemoglobin 12.0 12.0 - 15.9 g/dL    Hematocrit 37.9 34.0 - 46.6 %    MCV 72.7 (L) 79.0 - 97.0 fL    MCH 23.0 (L) 26.6 - 33.0 pg    MCHC 31.7 31.5 - 35.7 g/dL    RDW 15.0 12.3 - 15.4 %    RDW-SD 38.6 37.0 - 54.0 fl    Platelets 56 (L) 140 - 450 10*3/mm3    Neutrophil % 89.3 (H) 42.7 - 76.0 %    Lymphocyte % 4.3 (L) 19.6 - 45.3 %    Monocyte % 4.5 (L) 5.0 - 12.0 %    Eosinophil % 0.0 (L) 0.3 - 6.2 %    Basophil % 0.2 0.0 - 1.5 %    Immature Grans % 1.7 (H) 0.0 - 0.5 %     Neutrophils, Absolute 29.17 (H) 1.70 - 7.00 10*3/mm3    Lymphocytes, Absolute 1.40 0.70 - 3.10 10*3/mm3    Monocytes, Absolute 1.47 (H) 0.10 - 0.90 10*3/mm3    Eosinophils, Absolute 0.00 0.00 - 0.40 10*3/mm3    Basophils, Absolute 0.05 0.00 - 0.20 10*3/mm3    Immature Grans, Absolute 0.56 (H) 0.00 - 0.05 10*3/mm3    nRBC 0.0 0.0 - 0.2 /100 WBC   Gastrointestinal Panel, PCR - Stool, Per Rectum    Collection Time: 10/24/21  4:36 PM    Specimen: Per Rectum; Stool   Result Value Ref Range    Campylobacter Not Detected Not Detected    Plesiomonas shigelloides Not Detected Not Detected    Salmonella Not Detected Not Detected    Vibrio Not Detected Not Detected    Vibrio cholerae Not Detected Not Detected    Yersinia enterocolitica Not Detected Not Detected    Enteroaggregative E. coli (EAEC) Not Detected Not Detected    Enteropathogenic E. coli (EPEC) Not Detected Not Detected    Enterotoxigenic E. coli (ETEC) lt/st Not Detected Not Detected    Shiga-like toxin-producing E. coli (STEC) stx1/stx2 Not Detected Not Detected    Shigella/Enteroinvasive E. coli (EIEC) Not Detected Not Detected    Cryptosporidium Not Detected Not Detected    Cyclospora cayetanensis Not Detected Not Detected    Entamoeba histolytica Not Detected Not Detected    Giardia lamblia Not Detected Not Detected    Adenovirus F40/41 Not Detected Not Detected    Astrovirus Not Detected Not Detected    Norovirus GI/GII Not Detected Not Detected    Rotavirus A Not Detected Not Detected    Sapovirus (I, II, IV or V) Not Detected Not Detected   ECG 12 Lead    Collection Time: 10/24/21  6:14 PM   Result Value Ref Range    QT Interval 352 ms   Comprehensive Metabolic Panel    Collection Time: 10/25/21  4:35 AM    Specimen: Blood   Result Value Ref Range    Glucose 133 (H) 65 - 99 mg/dL    BUN 10 8 - 23 mg/dL    Creatinine 0.66 0.57 - 1.00 mg/dL    Sodium 140 136 - 145 mmol/L    Potassium 3.7 3.5 - 5.2 mmol/L    Chloride 111 (H) 98 - 107 mmol/L    CO2 22.0 22.0 -  29.0 mmol/L    Calcium 7.7 (L) 8.6 - 10.5 mg/dL    Total Protein 6.0 6.0 - 8.5 g/dL    Albumin 3.00 (L) 3.50 - 5.20 g/dL    ALT (SGPT) 28 1 - 33 U/L    AST (SGOT) 50 (H) 1 - 32 U/L    Alkaline Phosphatase 62 39 - 117 U/L    Total Bilirubin 0.6 0.0 - 1.2 mg/dL    eGFR Non African Amer 87 >60 mL/min/1.73    Globulin 3.0 gm/dL    A/G Ratio 1.0 g/dL    BUN/Creatinine Ratio 15.2 7.0 - 25.0    Anion Gap 7.0 5.0 - 15.0 mmol/L   Immature Platelet Fraction    Collection Time: 10/25/21  4:35 AM    Specimen: Blood   Result Value Ref Range    IPF 32.00 (H) 0.90 - 6.50 %   Lactate Dehydrogenase    Collection Time: 10/25/21  4:35 AM    Specimen: Blood   Result Value Ref Range     (H) 135 - 214 U/L   Cancer Antigen 15-3    Collection Time: 10/25/21  4:35 AM    Specimen: Blood   Result Value Ref Range    CA 15-3 15.1 <=25.0 U/mL   ERIK,PE and FLC, Serum    Collection Time: 10/25/21  4:35 AM    Specimen: Blood   Result Value Ref Range    IgG 863 586 - 1602 mg/dL    IgA 315 64 - 422 mg/dL    IgM 111 26 - 217 mg/dL    Total Protein 5.8 (L) 6.0 - 8.5 g/dL    Albumin 2.9 2.9 - 4.4 g/dL    Alpha-1-Globulin 0.3 0.0 - 0.4 g/dL    Alpha-2-Globulin 0.8 0.4 - 1.0 g/dL    Beta Globulin 0.8 0.7 - 1.3 g/dL    Gamma Globulin 0.9 0.4 - 1.8 g/dL    M-Lopez Not Observed Not Observed g/dL    Globulin 2.9 2.2 - 3.9 g/dL    A/G Ratio 1.1 0.7 - 1.7    Immunofixation Reflex, Serum Comment     Please note Comment     Free Light Chain, Kappa 21.2 (H) 3.3 - 19.4 mg/L    Free Lambda Light Chains 20.1 5.7 - 26.3 mg/L    Kappa/Lambda Ratio 1.05 0.26 - 1.65   CBC Auto Differential    Collection Time: 10/25/21  4:35 AM    Specimen: Blood   Result Value Ref Range    WBC 24.29 (H) 3.40 - 10.80 10*3/mm3    RBC 4.73 3.77 - 5.28 10*6/mm3    Hemoglobin 10.7 (L) 12.0 - 15.9 g/dL    Hematocrit 34.9 34.0 - 46.6 %    MCV 73.8 (L) 79.0 - 97.0 fL    MCH 22.6 (L) 26.6 - 33.0 pg    MCHC 30.7 (L) 31.5 - 35.7 g/dL    RDW 15.2 12.3 - 15.4 %    RDW-SD 40.5 37.0 - 54.0 fl     "MPV      Platelets 59 (L) 140 - 450 10*3/mm3    Neutrophil % 88.9 (H) 42.7 - 76.0 %    Lymphocyte % 4.1 (L) 19.6 - 45.3 %    Monocyte % 4.5 (L) 5.0 - 12.0 %    Eosinophil % 0.0 (L) 0.3 - 6.2 %    Basophil % 0.2 0.0 - 1.5 %    Immature Grans % 2.3 (H) 0.0 - 0.5 %    Neutrophils, Absolute 21.58 (H) 1.70 - 7.00 10*3/mm3    Lymphocytes, Absolute 1.00 0.70 - 3.10 10*3/mm3    Monocytes, Absolute 1.09 (H) 0.10 - 0.90 10*3/mm3    Eosinophils, Absolute 0.00 0.00 - 0.40 10*3/mm3    Basophils, Absolute 0.05 0.00 - 0.20 10*3/mm3    Immature Grans, Absolute 0.57 (H) 0.00 - 0.05 10*3/mm3    nRBC 0.2 0.0 - 0.2 /100 WBC   Tissue Pathology Exam    Collection Time: 10/25/21 10:02 AM    Specimen: Gallbladder; Tissue   Result Value Ref Range    Case Report       Surgical Pathology Report                         Case: [id number]                                  Authorizing Provider:  Gera Kay MD        Collected:           10/25/2021 10:02 AM          Ordering Location:     [facility]   Received:            10/25/2021 12:59 PM                                 OR                                                                           Pathologist:           Ruben Rosenberg MD                                                          Specimen:    Gallbladder, GALLBLADDER AND CONTENTS                                                      Final Diagnosis       1. Gallbladder:    A. Severe acute necrotizing calculus cholecystitis .    [facility]/kds      Gross Description       1. Received in formalin labeled \"gallbladder\" is a 9.0 x 4.0 x 3.0 cm gallbladder with a markedly hemorrhagic cystic duct. The cystic duct appears to be disrupted due to surgical intervention and a lumen cannot be discerned. The serosa is smooth variegated tan red and focally necrotic. Contents include orange red viscous bile and a single 1.6 x 1.5 x 1.3 cm yellow hard cholelith. The mucosa is tan red and markedly necrotic. The wall thickness ranges from " 0.2 cm to 0.4 cm.  Representative sections are submitted to include the cystic duct margin (smallest fragment), fundus, body and neck as 1A.     jap/uso/jab/pkm      Basic Metabolic Panel    Collection Time: 10/26/21  3:37 AM    Specimen: Blood   Result Value Ref Range    Glucose 138 (H) 65 - 99 mg/dL    BUN 16 8 - 23 mg/dL    Creatinine 0.82 0.57 - 1.00 mg/dL    Sodium 138 136 - 145 mmol/L    Potassium 3.9 3.5 - 5.2 mmol/L    Chloride 107 98 - 107 mmol/L    CO2 22.5 22.0 - 29.0 mmol/L    Calcium 8.0 (L) 8.6 - 10.5 mg/dL    eGFR Non African Amer 68 >60 mL/min/1.73    BUN/Creatinine Ratio 19.5 7.0 - 25.0    Anion Gap 8.5 5.0 - 15.0 mmol/L   CBC Auto Differential    Collection Time: 10/26/21  3:37 AM    Specimen: Blood   Result Value Ref Range    WBC 17.61 (H) 3.40 - 10.80 10*3/mm3    RBC 4.40 3.77 - 5.28 10*6/mm3    Hemoglobin 10.1 (L) 12.0 - 15.9 g/dL    Hematocrit 32.9 (L) 34.0 - 46.6 %    MCV 74.8 (L) 79.0 - 97.0 fL    MCH 23.0 (L) 26.6 - 33.0 pg    MCHC 30.7 (L) 31.5 - 35.7 g/dL    RDW 15.3 12.3 - 15.4 %    RDW-SD 41.4 37.0 - 54.0 fl    MPV 11.3 6.0 - 12.0 fL    Platelets 118 (L) 140 - 450 10*3/mm3    Neutrophil % 89.5 (H) 42.7 - 76.0 %    Lymphocyte % 4.3 (L) 19.6 - 45.3 %    Monocyte % 4.7 (L) 5.0 - 12.0 %    Eosinophil % 0.0 (L) 0.3 - 6.2 %    Basophil % 0.1 0.0 - 1.5 %    Immature Grans % 1.4 (H) 0.0 - 0.5 %    Neutrophils, Absolute 15.77 (H) 1.70 - 7.00 10*3/mm3    Lymphocytes, Absolute 0.75 0.70 - 3.10 10*3/mm3    Monocytes, Absolute 0.82 0.10 - 0.90 10*3/mm3    Eosinophils, Absolute 0.00 0.00 - 0.40 10*3/mm3    Basophils, Absolute 0.02 0.00 - 0.20 10*3/mm3    Immature Grans, Absolute 0.25 (H) 0.00 - 0.05 10*3/mm3    nRBC 0.2 0.0 - 0.2 /100 WBC   Magnesium    Collection Time: 10/26/21  3:37 AM    Specimen: Blood   Result Value Ref Range    Magnesium 2.0 1.6 - 2.4 mg/dL   Procalcitonin    Collection Time: 10/26/21  3:37 AM    Specimen: Blood   Result Value Ref Range    Procalcitonin 0.27 (H) 0.00 - 0.25  ng/mL   Prepare Platelet Pheresis, 2 Units    Collection Time: 10/26/21  5:10 AM   Result Value Ref Range    Product Code X9458K03     Unit Number [id number]     UNIT  ABO O     UNIT  RH POS     Dispense Status PT     Blood Expiration Date 202110282359     Blood Type Barcode 5100     Product Code [id number]     Unit Number [id number]     UNIT  ABO O     UNIT  RH POS     Dispense Status PT     Blood Expiration Date 202110282359     Blood Type Barcode 5100    CBC (No Diff)    Collection Time: 10/27/21  3:38 AM    Specimen: Blood   Result Value Ref Range    WBC 15.97 (H) 3.40 - 10.80 10*3/mm3    RBC 4.40 3.77 - 5.28 10*6/mm3    Hemoglobin 10.1 (L) 12.0 - 15.9 g/dL    Hematocrit 35.1 34.0 - 46.6 %    MCV 79.8 79.0 - 97.0 fL    MCH 23.0 (L) 26.6 - 33.0 pg    MCHC 28.8 (L) 31.5 - 35.7 g/dL    RDW 15.3 12.3 - 15.4 %    RDW-SD 44.2 37.0 - 54.0 fl    MPV 12.8 (H) 6.0 - 12.0 fL    Platelets 81 (L) 140 - 450 10*3/mm3   Basic Metabolic Panel    Collection Time: 10/27/21  3:38 AM    Specimen: Blood   Result Value Ref Range    Glucose 99 65 - 99 mg/dL    BUN 25 (H) 8 - 23 mg/dL    Creatinine 1.00 0.57 - 1.00 mg/dL    Sodium 136 136 - 145 mmol/L    Potassium 3.9 3.5 - 5.2 mmol/L    Chloride 106 98 - 107 mmol/L    CO2 18.2 (L) 22.0 - 29.0 mmol/L    Calcium 8.3 (L) 8.6 - 10.5 mg/dL    eGFR Non African Amer 54 (L) >60 mL/min/1.73    BUN/Creatinine Ratio 25.0 7.0 - 25.0    Anion Gap 11.8 5.0 - 15.0 mmol/L   CBC Auto Differential    Collection Time: 10/28/21  7:24 AM    Specimen: Blood   Result Value Ref Range    WBC 15.51 (H) 3.40 - 10.80 10*3/mm3    RBC 4.92 3.77 - 5.28 10*6/mm3    Hemoglobin 11.2 (L) 12.0 - 15.9 g/dL    Hematocrit 35.6 34.0 - 46.6 %    MCV 72.4 (L) 79.0 - 97.0 fL    MCH 22.8 (L) 26.6 - 33.0 pg    MCHC 31.5 31.5 - 35.7 g/dL    RDW 15.1 12.3 - 15.4 %    RDW-SD 39.3 37.0 - 54.0 fl    MPV      Platelets 102 (L) 140 - 450 10*3/mm3    Neutrophil % 80.1 (H) 42.7 - 76.0 %    Lymphocyte % 9.7 (L) 19.6 - 45.3 %     Monocyte % 6.4 5.0 - 12.0 %    Eosinophil % 0.3 0.3 - 6.2 %    Basophil % 0.3 0.0 - 1.5 %    Immature Grans % 3.2 (H) 0.0 - 0.5 %    Neutrophils, Absolute 12.42 (H) 1.70 - 7.00 10*3/mm3    Lymphocytes, Absolute 1.51 0.70 - 3.10 10*3/mm3    Monocytes, Absolute 0.99 (H) 0.10 - 0.90 10*3/mm3    Eosinophils, Absolute 0.04 0.00 - 0.40 10*3/mm3    Basophils, Absolute 0.05 0.00 - 0.20 10*3/mm3    Immature Grans, Absolute 0.50 (H) 0.00 - 0.05 10*3/mm3    nRBC 0.0 0.0 - 0.2 /100 WBC   Magnesium    Collection Time: 10/28/21  7:24 AM    Specimen: Blood   Result Value Ref Range    Magnesium 1.8 1.6 - 2.4 mg/dL   Potassium    Collection Time: 10/28/21  7:24 AM    Specimen: Blood   Result Value Ref Range    Potassium 3.3 (L) 3.5 - 5.2 mmol/L   Scan Slide    Collection Time: 10/28/21  7:24 AM    Specimen: Blood   Result Value Ref Range    Anisocytosis Slight/1+ None Seen    WBC Morphology Normal Normal    Large Platelets Slight/1+ None Seen   Potassium    Collection Time: 10/28/21  7:35 PM    Specimen: Blood   Result Value Ref Range    Potassium 3.9 3.5 - 5.2 mmol/L   Basic Metabolic Panel    Collection Time: 10/29/21  6:36 AM    Specimen: Blood   Result Value Ref Range    Glucose 96 65 - 99 mg/dL    BUN 15 8 - 23 mg/dL    Creatinine 0.75 0.57 - 1.00 mg/dL    Sodium 139 136 - 145 mmol/L    Potassium 3.7 3.5 - 5.2 mmol/L    Chloride 103 98 - 107 mmol/L    CO2 29.9 (H) 22.0 - 29.0 mmol/L    Calcium 9.6 8.6 - 10.5 mg/dL    eGFR Non African Amer 75 >60 mL/min/1.73    BUN/Creatinine Ratio 20.0 7.0 - 25.0    Anion Gap 6.1 5.0 - 15.0 mmol/L   CBC Auto Differential    Collection Time: 10/29/21  6:36 AM    Specimen: Blood   Result Value Ref Range    WBC 15.37 (H) 3.40 - 10.80 10*3/mm3    RBC 5.08 3.77 - 5.28 10*6/mm3    Hemoglobin 11.4 (L) 12.0 - 15.9 g/dL    Hematocrit 36.8 34.0 - 46.6 %    MCV 72.4 (L) 79.0 - 97.0 fL    MCH 22.4 (L) 26.6 - 33.0 pg    MCHC 31.0 (L) 31.5 - 35.7 g/dL    RDW 15.0 12.3 - 15.4 %    RDW-SD 38.4 37.0 - 54.0 fl     MPV 12.2 (H) 6.0 - 12.0 fL    Platelets 112 (L) 140 - 450 10*3/mm3    Neutrophil % 74.2 42.7 - 76.0 %    Lymphocyte % 14.8 (L) 19.6 - 45.3 %    Monocyte % 6.4 5.0 - 12.0 %    Eosinophil % 0.7 0.3 - 6.2 %    Basophil % 0.3 0.0 - 1.5 %    Immature Grans % 3.6 (H) 0.0 - 0.5 %    Neutrophils, Absolute 11.39 (H) 1.70 - 7.00 10*3/mm3    Lymphocytes, Absolute 2.27 0.70 - 3.10 10*3/mm3    Monocytes, Absolute 0.99 (H) 0.10 - 0.90 10*3/mm3    Eosinophils, Absolute 0.11 0.00 - 0.40 10*3/mm3    Basophils, Absolute 0.05 0.00 - 0.20 10*3/mm3    Immature Grans, Absolute 0.56 (H) 0.00 - 0.05 10*3/mm3    nRBC 0.0 0.0 - 0.2 /100 WBC   Scan Slide    Collection Time: 10/29/21  6:36 AM    Specimen: Blood   Result Value Ref Range    Anisocytosis Slight/1+ None Seen    WBC Morphology Normal Normal    Large Platelets Slight/1+ None Seen   Magnesium    Collection Time: 10/29/21  6:36 AM    Specimen: Blood   Result Value Ref Range    Magnesium 1.9 1.6 - 2.4 mg/dL   Basic Metabolic Panel    Collection Time: 10/30/21  3:39 AM    Specimen: Blood   Result Value Ref Range    Glucose 100 (H) 65 - 99 mg/dL    BUN 13 8 - 23 mg/dL    Creatinine 0.78 0.57 - 1.00 mg/dL    Sodium 139 136 - 145 mmol/L    Potassium 3.3 (L) 3.5 - 5.2 mmol/L    Chloride 99 98 - 107 mmol/L    CO2 35.4 (H) 22.0 - 29.0 mmol/L    Calcium 9.6 8.6 - 10.5 mg/dL    eGFR Non African Amer 72 >60 mL/min/1.73    BUN/Creatinine Ratio 16.7 7.0 - 25.0    Anion Gap 4.6 (L) 5.0 - 15.0 mmol/L   CBC (No Diff)    Collection Time: 10/30/21  3:39 AM    Specimen: Blood   Result Value Ref Range    WBC 16.10 (H) 3.40 - 10.80 10*3/mm3    RBC 4.90 3.77 - 5.28 10*6/mm3    Hemoglobin 11.0 (L) 12.0 - 15.9 g/dL    Hematocrit 35.5 34.0 - 46.6 %    MCV 72.4 (L) 79.0 - 97.0 fL    MCH 22.4 (L) 26.6 - 33.0 pg    MCHC 31.0 (L) 31.5 - 35.7 g/dL    RDW 14.9 12.3 - 15.4 %    RDW-SD 38.2 37.0 - 54.0 fl    MPV 12.9 (H) 6.0 - 12.0 fL    Platelets 140 140 - 450 10*3/mm3   Magnesium    Collection Time: 10/30/21   3:39 AM    Specimen: Blood   Result Value Ref Range    Magnesium 1.8 1.6 - 2.4 mg/dL   Basic Metabolic Panel    Collection Time: 10/31/21  5:04 AM    Specimen: Blood   Result Value Ref Range    Glucose 104 (H) 65 - 99 mg/dL    BUN 13 8 - 23 mg/dL    Creatinine 0.79 0.57 - 1.00 mg/dL    Sodium 136 136 - 145 mmol/L    Potassium 3.7 3.5 - 5.2 mmol/L    Chloride 96 (L) 98 - 107 mmol/L    CO2 34.1 (H) 22.0 - 29.0 mmol/L    Calcium 9.9 8.6 - 10.5 mg/dL    eGFR Non African Amer 71 >60 mL/min/1.73    BUN/Creatinine Ratio 16.5 7.0 - 25.0    Anion Gap 5.9 5.0 - 15.0 mmol/L   CBC (No Diff)    Collection Time: 10/31/21  5:04 AM    Specimen: Blood   Result Value Ref Range    WBC 14.85 (H) 3.40 - 10.80 10*3/mm3    RBC 4.91 3.77 - 5.28 10*6/mm3    Hemoglobin 11.0 (L) 12.0 - 15.9 g/dL    Hematocrit 35.6 34.0 - 46.6 %    MCV 72.5 (L) 79.0 - 97.0 fL    MCH 22.4 (L) 26.6 - 33.0 pg    MCHC 30.9 (L) 31.5 - 35.7 g/dL    RDW 15.0 12.3 - 15.4 %    RDW-SD 38.5 37.0 - 54.0 fl    MPV      Platelets 132 (L) 140 - 450 10*3/mm3   Magnesium    Collection Time: 10/31/21  5:04 AM    Specimen: Blood   Result Value Ref Range    Magnesium 1.8 1.6 - 2.4 mg/dL   CBC (No Diff)    Collection Time: 11/01/21  6:00 AM    Specimen: Blood   Result Value Ref Range    WBC 12.19 (H) 3.40 - 10.80 10*3/mm3    RBC 4.95 3.77 - 5.28 10*6/mm3    Hemoglobin 11.5 (L) 12.0 - 15.9 g/dL    Hematocrit 36.1 34.0 - 46.6 %    MCV 72.9 (L) 79.0 - 97.0 fL    MCH 23.2 (L) 26.6 - 33.0 pg    MCHC 31.9 31.5 - 35.7 g/dL    RDW 15.0 12.3 - 15.4 %    RDW-SD 38.7 37.0 - 54.0 fl    MPV      Platelets 129 (L) 140 - 450 10*3/mm3   Basic Metabolic Panel    Collection Time: 11/01/21  6:06 AM    Specimen: Blood   Result Value Ref Range    Glucose 111 (H) 65 - 99 mg/dL    BUN 13 8 - 23 mg/dL    Creatinine 0.92 0.57 - 1.00 mg/dL    Sodium 137 136 - 145 mmol/L    Potassium 3.8 3.5 - 5.2 mmol/L    Chloride 97 (L) 98 - 107 mmol/L    CO2 34.0 (H) 22.0 - 29.0 mmol/L    Calcium 10.4 8.6 - 10.5 mg/dL     eGFR Non African Amer 59 (L) >60 mL/min/1.73    BUN/Creatinine Ratio 14.1 7.0 - 25.0    Anion Gap 6.0 5.0 - 15.0 mmol/L   Magnesium    Collection Time: 11/01/21  6:06 AM    Specimen: Blood   Result Value Ref Range    Magnesium 1.9 1.6 - 2.4 mg/dL   Magnesium    Collection Time: 11/02/21  3:52 AM    Specimen: Blood   Result Value Ref Range    Magnesium 2.0 1.6 - 2.4 mg/dL   CBC Auto Differential    Collection Time: 11/02/21  3:52 AM    Specimen: Blood   Result Value Ref Range    WBC 11.78 (H) 3.40 - 10.80 10*3/mm3    RBC 4.82 3.77 - 5.28 10*6/mm3    Hemoglobin 10.9 (L) 12.0 - 15.9 g/dL    Hematocrit 35.6 34.0 - 46.6 %    MCV 73.9 (L) 79.0 - 97.0 fL    MCH 22.6 (L) 26.6 - 33.0 pg    MCHC 30.6 (L) 31.5 - 35.7 g/dL    RDW 14.6 12.3 - 15.4 %    RDW-SD 39.6 37.0 - 54.0 fl    MPV      Platelets 119 (L) 140 - 450 10*3/mm3    Neutrophil % 76.7 (H) 42.7 - 76.0 %    Lymphocyte % 11.9 (L) 19.6 - 45.3 %    Monocyte % 8.1 5.0 - 12.0 %    Eosinophil % 1.0 0.3 - 6.2 %    Basophil % 0.5 0.0 - 1.5 %    Immature Grans % 1.8 (H) 0.0 - 0.5 %    Neutrophils, Absolute 9.03 (H) 1.70 - 7.00 10*3/mm3    Lymphocytes, Absolute 1.40 0.70 - 3.10 10*3/mm3    Monocytes, Absolute 0.96 (H) 0.10 - 0.90 10*3/mm3    Eosinophils, Absolute 0.12 0.00 - 0.40 10*3/mm3    Basophils, Absolute 0.06 0.00 - 0.20 10*3/mm3    Immature Grans, Absolute 0.21 (H) 0.00 - 0.05 10*3/mm3       Physical Exam  Vitals and nursing note reviewed.   Constitutional:       Appearance: Normal appearance. She is well-developed and well-groomed.      Comments: Patient does appear to be somewhat weak and washed out.  She is observed walking with a walker.   HENT:      Head: Normocephalic and atraumatic.   Neck:      Thyroid: No thyroid mass or thyromegaly.      Vascular: Normal carotid pulses. No carotid bruit.      Trachea: Trachea and phonation normal.   Cardiovascular:      Rate and Rhythm: Normal rate and regular rhythm.      Heart sounds: Normal heart sounds. No murmur  heard.  No friction rub. No gallop.    Pulmonary:      Effort: Pulmonary effort is normal. No respiratory distress.      Breath sounds: Normal breath sounds. No decreased breath sounds, wheezing, rhonchi or rales.   Musculoskeletal:      Cervical back: Neck supple.   Lymphadenopathy:      Cervical: No cervical adenopathy.   Skin:     General: Skin is warm and dry.      Findings: No rash.   Neurological:      Mental Status: She is alert and oriented to person, place, and time.   Psychiatric:         Attention and Perception: Attention and perception normal.         Mood and Affect: Mood and affect normal.         Speech: Speech normal.         Behavior: Behavior normal. Behavior is cooperative.         Thought Content: Thought content normal.         Cognition and Memory: Cognition and memory normal.         Judgment: Judgment normal.         Assessment/Plan   Diagnoses and all orders for this visit:    1. Status post laparoscopic cholecystectomy (Primary)    2. Generalized weakness    3. Chronic diarrhea    Patient has been advised to avoid fried and fatty foods as well as dairy products until her diarrhea resolves.  If at this time next week her diarrhea fails to resolve stool samples will be obtained looking for underlying etiology including C. difficile.  She will see her surgeon in 2 days for further evaluation of the wounds.  Have encouraged her to give great effort with her physical therapist to get back her strength.    Return if symptoms worsen or fail to improve, for Next scheduled follow up.

## 2021-11-09 ENCOUNTER — HOME CARE VISIT (OUTPATIENT)
Dept: HOME HEALTH SERVICES | Facility: HOME HEALTHCARE | Age: 76
End: 2021-11-09

## 2021-11-09 VITALS
SYSTOLIC BLOOD PRESSURE: 118 MMHG | OXYGEN SATURATION: 96 % | HEART RATE: 76 BPM | DIASTOLIC BLOOD PRESSURE: 70 MMHG | TEMPERATURE: 97.2 F

## 2021-11-09 PROCEDURE — G0151 HHCP-SERV OF PT,EA 15 MIN: HCPCS

## 2021-11-09 PROCEDURE — G0300 HHS/HOSPICE OF LPN EA 15 MIN: HCPCS

## 2021-11-10 ENCOUNTER — READMISSION MANAGEMENT (OUTPATIENT)
Dept: CALL CENTER | Facility: HOSPITAL | Age: 76
End: 2021-11-10

## 2021-11-10 ENCOUNTER — OFFICE VISIT (OUTPATIENT)
Dept: SURGERY | Facility: CLINIC | Age: 76
End: 2021-11-10

## 2021-11-10 VITALS
DIASTOLIC BLOOD PRESSURE: 78 MMHG | HEART RATE: 77 BPM | SYSTOLIC BLOOD PRESSURE: 120 MMHG | OXYGEN SATURATION: 96 % | TEMPERATURE: 98.2 F | RESPIRATION RATE: 18 BRPM

## 2021-11-10 DIAGNOSIS — R19.7 DIARRHEA, UNSPECIFIED TYPE: Primary | ICD-10-CM

## 2021-11-10 DIAGNOSIS — Z09 SURGICAL FOLLOW-UP CARE: ICD-10-CM

## 2021-11-10 PROCEDURE — 99024 POSTOP FOLLOW-UP VISIT: CPT | Performed by: SURGERY

## 2021-11-10 NOTE — HOME HEALTH
Subjective: Patient states that she went to the doctor yesterday but felt she was slow trying to walk and spouse used a wheelchair to push her down long hallway.  Patient reports that taller walker feels easier to walk with.     Objective:  Spouse found the other walker that was taller in the home. Patient now using it. Did need verbal cues to stay closer to walker. Patient instructed in standing exercises at kitchen sink. She was able to perform 10 reps of heel raises, hamstring curls, hip abduction and marching in place. Good tolerance to exercises. She was given a HEP placed in her folder.    Plan: Continue therapeutic exercises in standing. Omitted marching in seated position due to it causing abdominal discomfort. Transfer and gait training with rolling walker.

## 2021-11-10 NOTE — OUTREACH NOTE
General Surgery Week 2 Survey      Responses   [facility] patient discharged from? [place]   Does the patient have one of the following disease processes/diagnoses(primary or secondary)? General Surgery   Week 2 attempt successful? No   Unsuccessful attempts Attempt 1          Aida Conklin RN

## 2021-11-10 NOTE — PROGRESS NOTES
1 WEEK S/P LAP YAEL. PATIENT SAID SHE STILL FEELS WEAK. SOME DIARRHEA.  She feels weak with fatigue poor appetite denies any fevers or chills she is having some watery diarrhea.  She is off antibiotics.  Her abdomen is soft and nontender incisions are healing well she is not having clinical jaundice.  We will check a C. difficile on her and call her with the results.  I would like to see her back in the office in 1 month

## 2021-11-11 ENCOUNTER — HOME CARE VISIT (OUTPATIENT)
Dept: HOME HEALTH SERVICES | Facility: HOME HEALTHCARE | Age: 76
End: 2021-11-11

## 2021-11-11 VITALS
SYSTOLIC BLOOD PRESSURE: 128 MMHG | DIASTOLIC BLOOD PRESSURE: 80 MMHG | TEMPERATURE: 98 F | HEART RATE: 69 BPM | RESPIRATION RATE: 18 BRPM | OXYGEN SATURATION: 95 %

## 2021-11-11 VITALS
TEMPERATURE: 98 F | DIASTOLIC BLOOD PRESSURE: 80 MMHG | SYSTOLIC BLOOD PRESSURE: 128 MMHG | HEART RATE: 69 BPM | RESPIRATION RATE: 18 BRPM | OXYGEN SATURATION: 95 %

## 2021-11-11 PROCEDURE — G0151 HHCP-SERV OF PT,EA 15 MIN: HCPCS

## 2021-11-11 PROCEDURE — G0493 RN CARE EA 15 MIN HH/HOSPICE: HCPCS

## 2021-11-12 ENCOUNTER — HOSPITAL ENCOUNTER (OUTPATIENT)
Dept: ULTRASOUND IMAGING | Facility: HOSPITAL | Age: 76
Discharge: HOME | End: 2021-11-12
Admitting: INTERNAL MEDICINE

## 2021-11-12 ENCOUNTER — HOSPITAL ENCOUNTER (OUTPATIENT)
Dept: NUCLEAR MEDICINE | Facility: HOSPITAL | Age: 76
Discharge: HOME | End: 2021-11-12

## 2021-11-12 ENCOUNTER — READMISSION MANAGEMENT (OUTPATIENT)
Dept: CALL CENTER | Facility: HOSPITAL | Age: 76
End: 2021-11-12

## 2021-11-12 DIAGNOSIS — D69.3 CHRONIC ITP (IDIOPATHIC THROMBOCYTOPENIC PURPURA) (HCC): ICD-10-CM

## 2021-11-12 DIAGNOSIS — D69.6 THROMBOCYTOPENIA (HCC): ICD-10-CM

## 2021-11-12 PROCEDURE — A9503 TC99M MEDRONATE: HCPCS | Performed by: INTERNAL MEDICINE

## 2021-11-12 PROCEDURE — 78306 BONE IMAGING WHOLE BODY: CPT

## 2021-11-12 PROCEDURE — 76536 US EXAM OF HEAD AND NECK: CPT

## 2021-11-12 PROCEDURE — 0 TECHNETIUM MEDRONATE KIT: Performed by: INTERNAL MEDICINE

## 2021-11-12 RX ORDER — TC 99M MEDRONATE 20 MG/10ML
22 INJECTION, POWDER, LYOPHILIZED, FOR SOLUTION INTRAVENOUS
Status: COMPLETED | OUTPATIENT
Start: 2021-11-12 | End: 2021-11-12

## 2021-11-12 RX ADMIN — Medication 22 MILLICURIE: at 10:15

## 2021-11-12 NOTE — OUTREACH NOTE
General Surgery Week 2 Survey      Responses   [facility] patient discharged from? [place]   Does the patient have one of the following disease processes/diagnoses(primary or secondary)? General Surgery   Week 2 attempt successful? No   Unsuccessful attempts Attempt 2          Carey Mai RN

## 2021-11-12 NOTE — HOME HEALTH
Subjective: Patient states that she went to see the surgeon yesterday and was very exhausted afterward. Spouse states that they are going to get a stool sample due to her recent complaints of   Diarrhea.     Objective: Patient laying on couch when therapist arrived. She was able to set up herself on the couch. Instructed in seated and standing exercises on HEP and she was able to perform with verbal cueing. Patient is forgetful and does need reminders to perform exercises. Gait training with and without assistive device. Patient not safe to ambulate without use of walker at this time. Recommended continue use of walker. Patient and spouse verbalized understanding.     Plan: Continue with therapeutic exercises, gait training and begin balance activities next week.

## 2021-11-16 ENCOUNTER — HOME CARE VISIT (OUTPATIENT)
Dept: HOME HEALTH SERVICES | Facility: HOME HEALTHCARE | Age: 76
End: 2021-11-16

## 2021-11-16 VITALS
DIASTOLIC BLOOD PRESSURE: 68 MMHG | OXYGEN SATURATION: 95 % | TEMPERATURE: 97.7 F | SYSTOLIC BLOOD PRESSURE: 104 MMHG | HEART RATE: 76 BPM

## 2021-11-16 VITALS
OXYGEN SATURATION: 97 % | TEMPERATURE: 97.8 F | SYSTOLIC BLOOD PRESSURE: 116 MMHG | RESPIRATION RATE: 18 BRPM | DIASTOLIC BLOOD PRESSURE: 70 MMHG | HEART RATE: 87 BPM

## 2021-11-16 PROCEDURE — G0151 HHCP-SERV OF PT,EA 15 MIN: HCPCS

## 2021-11-16 PROCEDURE — G0300 HHS/HOSPICE OF LPN EA 15 MIN: HCPCS

## 2021-11-17 NOTE — HOME HEALTH
Subjective: Patient states that she still feels weak.     Objective: patient was seen later in the day but did appear to be more fatigued and also slightly confused. She was able to follow verbal cueing for transfers sit to stand x 5 reps from chair with arms and seated exercises. Patient instructed in ambulation with rolling walker. She did need verbal cues to stay in closer proximity with walker as well as to increase step length and height. Pt. shuffing her feet this visit. Patient encouraged to continue to use walker for ambulation and to ambulate short distances throughout the day.     Plan: Continue next visit with therapeutic exericise, transfer training and gait training with rolling walker.

## 2021-11-18 ENCOUNTER — HOME CARE VISIT (OUTPATIENT)
Dept: HOME HEALTH SERVICES | Facility: HOME HEALTHCARE | Age: 76
End: 2021-11-18

## 2021-11-18 VITALS
SYSTOLIC BLOOD PRESSURE: 100 MMHG | DIASTOLIC BLOOD PRESSURE: 60 MMHG | OXYGEN SATURATION: 96 % | HEART RATE: 74 BPM | RESPIRATION RATE: 18 BRPM | TEMPERATURE: 97.7 F

## 2021-11-18 PROCEDURE — G0180 MD CERTIFICATION HHA PATIENT: HCPCS | Performed by: FAMILY MEDICINE

## 2021-11-18 PROCEDURE — G0151 HHCP-SERV OF PT,EA 15 MIN: HCPCS

## 2021-11-19 ENCOUNTER — LAB (OUTPATIENT)
Dept: OTHER | Facility: HOSPITAL | Age: 76
End: 2021-11-19

## 2021-11-19 ENCOUNTER — OFFICE VISIT (OUTPATIENT)
Dept: ONCOLOGY | Facility: CLINIC | Age: 76
End: 2021-11-19

## 2021-11-19 ENCOUNTER — READMISSION MANAGEMENT (OUTPATIENT)
Dept: CALL CENTER | Facility: HOSPITAL | Age: 76
End: 2021-11-19

## 2021-11-19 VITALS
BODY MASS INDEX: 28.7 KG/M2 | OXYGEN SATURATION: 98 % | SYSTOLIC BLOOD PRESSURE: 118 MMHG | DIASTOLIC BLOOD PRESSURE: 75 MMHG | WEIGHT: 162 LBS | HEIGHT: 63 IN | RESPIRATION RATE: 18 BRPM | HEART RATE: 72 BPM | TEMPERATURE: 97.3 F

## 2021-11-19 DIAGNOSIS — D69.3 CHRONIC ITP (IDIOPATHIC THROMBOCYTOPENIC PURPURA) (HCC): ICD-10-CM

## 2021-11-19 DIAGNOSIS — R93.7 ABNORMAL X-RAY OF LUMBAR SPINE: ICD-10-CM

## 2021-11-19 DIAGNOSIS — D69.6 THROMBOCYTOPENIA (HCC): Primary | ICD-10-CM

## 2021-11-19 DIAGNOSIS — E07.9 THYROID MASS: ICD-10-CM

## 2021-11-19 LAB
BASOPHILS # BLD AUTO: 0.02 10*3/MM3 (ref 0–0.2)
BASOPHILS NFR BLD AUTO: 0.2 % (ref 0–1.5)
DEPRECATED RDW RBC AUTO: 36.3 FL (ref 37–54)
EOSINOPHIL # BLD AUTO: 0.04 10*3/MM3 (ref 0–0.4)
EOSINOPHIL NFR BLD AUTO: 0.3 % (ref 0.3–6.2)
ERYTHROCYTE [DISTWIDTH] IN BLOOD BY AUTOMATED COUNT: 14.8 % (ref 12.3–15.4)
HCT VFR BLD AUTO: 37.1 % (ref 34–46.6)
HGB BLD-MCNC: 11.8 G/DL (ref 12–15.9)
IMM GRANULOCYTES # BLD AUTO: 0.08 10*3/MM3 (ref 0–0.05)
IMM GRANULOCYTES NFR BLD AUTO: 0.7 % (ref 0–0.5)
LYMPHOCYTES # BLD AUTO: 1.86 10*3/MM3 (ref 0.7–3.1)
LYMPHOCYTES NFR BLD AUTO: 15.7 % (ref 19.6–45.3)
MCH RBC QN AUTO: 22.1 PG (ref 26.6–33)
MCHC RBC AUTO-ENTMCNC: 31.8 G/DL (ref 31.5–35.7)
MCV RBC AUTO: 69.6 FL (ref 79–97)
MICROCYTES BLD QL: NORMAL
MONOCYTES # BLD AUTO: 0.49 10*3/MM3 (ref 0.1–0.9)
MONOCYTES NFR BLD AUTO: 4.1 % (ref 5–12)
NEUTROPHILS NFR BLD AUTO: 79 % (ref 42.7–76)
NEUTROPHILS NFR BLD AUTO: 9.39 10*3/MM3 (ref 1.7–7)
NRBC BLD AUTO-RTO: 0 /100 WBC (ref 0–0.2)
PLAT MORPH BLD: NORMAL
PLATELET # BLD AUTO: 101 10*3/MM3 (ref 140–450)
PLATELET # BLD AUTO: 101 10*3/MM3 (ref 140–450)
PLATELETS.RETICULATED NFR BLD AUTO: 24.9 % (ref 0.9–6.5)
RBC # BLD AUTO: 5.33 10*6/MM3 (ref 3.77–5.28)
WBC MORPH BLD: NORMAL
WBC NRBC COR # BLD: 11.88 10*3/MM3 (ref 3.4–10.8)

## 2021-11-19 PROCEDURE — 85007 BL SMEAR W/DIFF WBC COUNT: CPT | Performed by: INTERNAL MEDICINE

## 2021-11-19 PROCEDURE — 36415 COLL VENOUS BLD VENIPUNCTURE: CPT

## 2021-11-19 PROCEDURE — 85025 COMPLETE CBC W/AUTO DIFF WBC: CPT | Performed by: INTERNAL MEDICINE

## 2021-11-19 PROCEDURE — 99214 OFFICE O/P EST MOD 30 MIN: CPT | Performed by: INTERNAL MEDICINE

## 2021-11-19 PROCEDURE — 85055 RETICULATED PLATELET ASSAY: CPT | Performed by: INTERNAL MEDICINE

## 2021-11-19 NOTE — OUTREACH NOTE
General Surgery Week 3 Survey      Responses   [facility] patient discharged from? [place]   Does the patient have one of the following disease processes/diagnoses(primary or secondary)? General Surgery   Week 3 attempt successful? No   Unsuccessful attempts Attempt 1          Ammon Sandy RN

## 2021-11-19 NOTE — CASE COMMUNICATION
Patient missed a skiiled nurse  visit from [facility] on 11.18.21.     Reason: Nurse spoke to patient on phone and informed her of SNV today. Patient agreed to visit. Nurse drove to patient home and knocked. No answer. Vehicle  in drivway      For your records only.   As per home health protocol, MD must be notified of missed/cancelled visits; therefore the prescribed frequency was not met.

## 2021-11-19 NOTE — PROGRESS NOTES
Subjective     REASON FOR CONSULTATION:   1.  Mild chronic ITP  2.  Remote history of breast cancer  3.  Thyroid nodules    HISTORY OF PRESENT ILLNESS:  The patient is a 76 y.o. year old female who was referred to us from her primary care office for evaluation of mild chronic thrombocytopenia.  Her platelet count had been slightly low for at least 2 years with a platelet count of 134,000 in June 2019.  It had drifted down to 82,000 on labs from 3/3/2021.  Her hemoglobin and white cells were normal.    With her initial consult visit of 3/19/2021 we performed an immature platelet fraction which was markedly elevated consistent with ITP.      We have been observing her without treatment as her platelet count is remained in a safe range from 80,000-110,000.      Since her last follow-up visit in our office on 10/1/2021, she required hospitalization due to gangrenous cholecystitis leading to sepsis.  She was in the hospital from 10/22/2021 through 11-21 and underwent cholecystectomy and antibiotic therapy.  During the hospitalization she had other imaging studies showing a couple of masses in the left lobe of the thyroid and some lesions in the bone noted on CT scan that were felt possibly to be hemangiomas.    Because of these issues she was scheduled to undergo outpatient bone scan and ultrasound of the thyroid and follow-up in the office today.    The bone scan performed on 11/12/2021 did not show any suspicious findings to suggest metastatic disease to the bone.  Her thyroid ultrasound did confirm abnormalities in the thyroid and FNA biopsy was recommended.    Shyann is still quite weak from her hospitalization.  She is in a wheelchair today.  We discussed setting her up for a thyroid biopsy and she wanted to put this off until after the holidays which I think would be fine.  History of Present Illness     Past Medical History:   Diagnosis Date   • Age-related osteoporosis without current pathological fracture  2/8/2019   • Anxiety    • Breast cancer (HCC)     Right   • Depression    • History of thrombocytopenia    • Hyperlipidemia    • Hypertension    • Osteoporosis    • Seizure (HCC)     after brain surgery only        Past Surgical History:   Procedure Laterality Date   • APPENDECTOMY  2007   • BRAIN AVM REPAIR  1992   • BREAST LUMPECTOMY Right 2002   • BREAST SURGERY     • CHOLECYSTECTOMY N/A 10/25/2021    Procedure: CHOLECYSTECTOMY LAPAROSCOPIC;  Surgeon: Gera Kay MD;  Location: [facility];  Service: General;  Laterality: N/A;   • HYSTERECTOMY  1976        Current Outpatient Medications on File Prior to Visit   Medication Sig Dispense Refill   • atorvastatin (LIPITOR) 40 MG tablet TAKE 1 TABLET EVERY DAY 90 tablet 0   • Calcium 600-200 MG-UNIT per tablet Take 1 tablet by mouth 2 (Two) Times a Day.     • levETIRAcetam (KEPPRA) 500 MG tablet TAKE 1/2 TAB TWICE A DAY FOR A WEEK THEN 1 TAB TWICE A DAY     • metoprolol succinate XL (TOPROL-XL) 50 MG 24 hr tablet Take 1 tablet by mouth Daily. 30 tablet 1   • Mirabegron ER (Myrbetriq) 25 MG tablet sustained-release 24 hour 24 hr tablet Take 25 mg by mouth Daily.     • MULTIPLE VITAMINS ESSENTIAL PO Take  by mouth.     • PARoxetine (PAXIL) 20 MG tablet TAKE 1 TABLET EVERY MORNING 90 tablet 0   • PROLIA 60 MG/ML solution syringe FOR  BY YOUR PHYSICIAN TO INJECT 60MG (1ML) SUBCUTANEOUSLY EVERY 6 MONTHS 1 syringe 0   • saccharomyces boulardii (FLORASTOR) 250 MG capsule Take 2 capsules by mouth 2 (Two) Times a Day. 20 capsule 0     No current facility-administered medications on file prior to visit.        ALLERGIES:    Allergies   Allergen Reactions   • Cephalexin Myalgia     Migraine   • Sulfa Antibiotics Myalgia     Migraine   • Erythromycin GI Intolerance   • Neomycin-Bacitracin Zn-Polymyx Rash        Social History     Socioeconomic History   • Marital status:      Spouse name: Thanh   Tobacco Use   • Smoking status: Never Smoker   •  "Smokeless tobacco: Never Used   Substance and Sexual Activity   • Alcohol use: No   • Drug use: No   • Sexual activity: Defer        No family history on file.     Review of Systems   Constitutional: Positive for activity change and fatigue. Negative for chills and fever.   HENT: Negative for mouth sores, trouble swallowing and voice change.    Eyes: Negative for pain and visual disturbance.   Respiratory: Negative for cough, shortness of breath and wheezing.    Cardiovascular: Negative for chest pain and palpitations.   Gastrointestinal: Negative for abdominal pain, constipation, diarrhea, nausea and vomiting.   Genitourinary: Negative for difficulty urinating, frequency and urgency.        Stress incontinence   Musculoskeletal: Negative for arthralgias and joint swelling.   Skin: Negative for rash.   Neurological: Negative for dizziness, seizures, weakness and headaches.   Hematological: Negative for adenopathy. Bruises/bleeds easily.   Psychiatric/Behavioral: Negative for behavioral problems and confusion. The patient is not nervous/anxious.         Memory deficit        Objective     Vitals:    11/19/21 1506   BP: 118/75   Pulse: 72   Resp: 18   Temp: 97.3 °F (36.3 °C)   TempSrc: Temporal   SpO2: 98%   Weight: 73.5 kg (162 lb)   Height: 160 cm (62.99\")   PainSc: 0-No pain     Current Status 11/19/2021   ECOG score 0       Physical Exam  Constitutional:       General: She is not in acute distress.     Appearance: She is well-developed.   HENT:      Head: Normocephalic.   Eyes:      General: No scleral icterus.     Conjunctiva/sclera: Conjunctivae normal.      Pupils: Pupils are equal, round, and reactive to light.   Neck:      Thyroid: No thyromegaly.      Vascular: No JVD.   Cardiovascular:      Rate and Rhythm: Normal rate and regular rhythm.      Heart sounds: No murmur heard.  No friction rub. No gallop.    Pulmonary:      Effort: Pulmonary effort is normal.      Breath sounds: Normal breath sounds. No " wheezing or rales.   Abdominal:      General: There is no distension.      Palpations: Abdomen is soft. There is no mass.      Tenderness: There is no abdominal tenderness.   Musculoskeletal:         General: No deformity. Normal range of motion.      Cervical back: Normal range of motion and neck supple.      Comments: Kyphosis   Lymphadenopathy:      Cervical: No cervical adenopathy.   Skin:     General: Skin is warm and dry.      Findings: No erythema or rash.   Neurological:      Mental Status: She is alert and oriented to person, place, and time.      Cranial Nerves: No cranial nerve deficit.      Deep Tendon Reflexes: Reflexes are normal and symmetric.   Psychiatric:         Behavior: Behavior normal.         Judgment: Judgment normal.           RECENT LABS:  Hematology WBC   Date Value Ref Range Status   11/19/2021 11.88 (H) 3.40 - 10.80 10*3/mm3 Final   09/02/2021 10.04 3.40 - 10.80 10*3/mm3 Final     RBC   Date Value Ref Range Status   11/19/2021 5.33 (H) 3.77 - 5.28 10*6/mm3 Final   09/02/2021 5.36 (H) 3.77 - 5.28 10*6/mm3 Final     Hemoglobin   Date Value Ref Range Status   11/19/2021 11.8 (L) 12.0 - 15.9 g/dL Final     Hematocrit   Date Value Ref Range Status   11/19/2021 37.1 34.0 - 46.6 % Final     Platelets   Date Value Ref Range Status   11/19/2021 101 (L) 140 - 450 10*3/mm3 Final   11/19/2021 101 (L) 140 - 450 10*3/mm3 Final          PATHOLOGY 10/25/2021  Final Diagnosis   1. Gallbladder:    A. Severe acute necrotizing calculus cholecystitis .     NUCLEAR MEDICINE WHOLE BODY BONE SCAN 11/12/2021  IMPRESSION:  Degenerative/arthritic uptake without evidence for bony  metastatic disease. There is a history of abnormal CT of the spine  though that is not available for comparison. Scoliotic curvature is  present of the lower cervical spine and upper thoracic spine where there  is associated degenerative uptake.     THYROID SONOGRAM 11/12/2021.  IMPRESSION:  1.  Multiple bilateral thyroid nodules.  Fine-needle aspiration of both  the 3.2 cm nodule within the left mid to inferior pole and of the 4.1 cm  nodule within the medial left thyroid/isthmus is recommended per TIRADS  criteria. If biopsy results are negative, follow-up with thyroid  sonogram 6 months is recommended to ensure stability.  2.  Mildly heterogenous thyroid echotexture suggestive of diffuse  thyroid disease in the appropriate clinical context and correlation with  patient history and laboratory values is recommended to establish the  most appropriate etiology as sonographic findings are nonspecific.    Assessment/Plan   1.  Mild chronic ITP with a platelet count that has been in a safe range from 80,000-110,000.  Platelet count in the office today is 101,000.  2.  Remote history of breast cancer.    3.  Negative colon cancer screening with Cologuard 8/18/2020  4.  Microcytic anemia.  No evidence of iron deficiency.  5.  Gangrenous cholecystitis treated with cholecystectomy 10/25/2021  6.  Lesions in the bone noted on CT scans during her recent admission.  Bone scan from 11/12/2021 does not show any worrisome findings as noted above.  7.  Thyroid nodules.  FNA biopsy is recommended      PLAN  1.  We will schedule ultrasound-guided FNA of the 2 largest nodules in the thyroid.  She wishes to put this off until after the holidays which I think would be totally fine therefore this will be scheduled for late December or early January.  2.  MD follow-up a couple of weeks after the procedure to review the pathology report.  From there we suspect we will be able to get her back on routine every 6-month follow-up schedule.

## 2021-11-22 ENCOUNTER — HOME CARE VISIT (OUTPATIENT)
Dept: HOME HEALTH SERVICES | Facility: HOME HEALTHCARE | Age: 76
End: 2021-11-22

## 2021-11-22 VITALS
RESPIRATION RATE: 18 BRPM | HEART RATE: 67 BPM | DIASTOLIC BLOOD PRESSURE: 60 MMHG | SYSTOLIC BLOOD PRESSURE: 120 MMHG | TEMPERATURE: 97.8 F | OXYGEN SATURATION: 94 %

## 2021-11-22 PROCEDURE — G0151 HHCP-SERV OF PT,EA 15 MIN: HCPCS

## 2021-11-23 ENCOUNTER — READMISSION MANAGEMENT (OUTPATIENT)
Dept: CALL CENTER | Facility: HOSPITAL | Age: 76
End: 2021-11-23

## 2021-11-23 ENCOUNTER — HOME CARE VISIT (OUTPATIENT)
Dept: HOME HEALTH SERVICES | Facility: HOME HEALTHCARE | Age: 76
End: 2021-11-23

## 2021-11-23 VITALS
HEART RATE: 59 BPM | DIASTOLIC BLOOD PRESSURE: 56 MMHG | OXYGEN SATURATION: 95 % | TEMPERATURE: 98 F | SYSTOLIC BLOOD PRESSURE: 106 MMHG | RESPIRATION RATE: 16 BRPM

## 2021-11-23 PROCEDURE — G0493 RN CARE EA 15 MIN HH/HOSPICE: HCPCS

## 2021-11-23 NOTE — OUTREACH NOTE
General Surgery Week 3 Survey      Responses   [facility] patient discharged from? [place]   Does the patient have one of the following disease processes/diagnoses(primary or secondary)? General Surgery   Week 3 attempt successful? Yes   Call start time 1600   Call end time 1602   Discharge diagnosis Sepsis secondary to acute gangrenous cholecystitis,    laparoscopic cholecystectomy    Meds reviewed with patient/caregiver? Yes   Is the patient having any side effects they believe may be caused by any medication additions or changes? No   Does the patient have all medications related to this admission filled (includes all antibiotics, pain medications, etc.) Yes   Is the patient taking all medications as directed (includes completed medication regime)? Yes   Does the patient have a follow up appointment scheduled with their surgeon? Yes   Has the patient kept scheduled appointments due by today? Yes   What is the Home health agency?  [facility]   Has home health visited the patient within 72 hours of discharge? Yes   Psychosocial issues? No   Did the patient receive a copy of their discharge instructions? Yes   Nursing interventions Reviewed instructions with patient   What is the patient's perception of their health status since discharge? Improving   Nursing interventions Nurse provided patient education   Is the patient /caregiver able to teach back basic post-op care? Keep incision areas clean,dry and protected,  Practice 'cough and deep breath',  Lifting as instructed by MD in discharge instructions   Is the patient/caregiver able to teach back signs and symptoms of incisional infection? Increased redness, swelling or pain at the incisonal site,  Increased drainage or bleeding,  Incisional warmth,  Pus or odor from incision,  Fever   Is the patient/caregiver able to teach back steps to recovery at home? Set small, achievable goals for return to baseline health,  Rest and rebuild strength, gradually  increase activity,  Eat a well-balance diet,  Make a list of questions for surgeon's appointment   If the patient is a current smoker, are they able to teach back resources for cessation? Not a smoker   Is the patient/caregiver able to teach back the hierarchy of who to call/visit for symptoms/problems? PCP, Specialist, Home health nurse, Urgent Care, ED, 911 Yes   Additional teach back comments Discussed hydration, ready for the holiday, no fever, denies issues today.   Week 3 call completed? Yes   Wrap up additional comments Improving.  Says sites are all healed.          Nisha Faustin RN

## 2021-11-23 NOTE — HOME HEALTH
Subjective: Patient states that she is feeling better. She does have a 3 in 1 commode she is using now and is independent with commode transfers.     Objective: patient sitting on couch reading when therapist arrived. Patient able to transfer sit to stand from couch without assist. She is continuing to use the rolling walker for ambulation but had improved cadance. She continues to require occasional cues not to shuffle her feet. Performed standing exercises times 15 reps.     Plan: will continue to see patient for one more visit to finalize HEP and work toward remaining goals.

## 2021-11-23 NOTE — HOME HEALTH
Subjective: Patient states that she is doing a little better. She reports her  went to have lunch with a group of his friends.     Objective: Patient up ambulating with rolling walker when therapist arrived. She sat on couch for therapist to take her vital signs. Patient performed seated exercises long arc quads and ankle pumps. She then ambulated into kitchen and performed standing exercises with cues for correct technique. Patient did ambulate throughout home in accessible rooms. Patient feels she needs to continue use of rolling walker. Spouse arrived at time therapist was leaving and inquired how patient did with her therapy. Recommended patient have elevated commode seat. Spouse states they have one in the basement that he will bring up and place over the commode.     Plan: Continue 2 visits next week with continued gait and transfer training along with LE therapeutic exercises. Anticipate discharge after next week.

## 2021-11-24 ENCOUNTER — HOME CARE VISIT (OUTPATIENT)
Dept: HOME HEALTH SERVICES | Facility: HOME HEALTHCARE | Age: 76
End: 2021-11-24

## 2021-11-24 PROCEDURE — G0151 HHCP-SERV OF PT,EA 15 MIN: HCPCS

## 2021-11-26 VITALS
OXYGEN SATURATION: 97 % | RESPIRATION RATE: 18 BRPM | DIASTOLIC BLOOD PRESSURE: 70 MMHG | SYSTOLIC BLOOD PRESSURE: 130 MMHG | HEART RATE: 71 BPM | TEMPERATURE: 97.7 F

## 2021-11-26 NOTE — HOME HEALTH
Subjective: Patient states she is doing fine. She reports that the elevated commode is the best thing ever invented.     Objective: Patient is now ambulating without device. She is able to ambulate up and down ramp at front entrance of home. She has gone up and down the steps as well but railing is not as secure. Patient able to perform exercises on HEP using teach back method.     Plan: Patient has met goals for physical therapy and is being discharged from PT, skilled nursing remains in.

## 2021-11-29 ENCOUNTER — HOME CARE VISIT (OUTPATIENT)
Dept: HOME HEALTH SERVICES | Facility: HOME HEALTHCARE | Age: 76
End: 2021-11-29

## 2021-11-29 VITALS
OXYGEN SATURATION: 95 % | SYSTOLIC BLOOD PRESSURE: 108 MMHG | TEMPERATURE: 98.1 F | RESPIRATION RATE: 16 BRPM | DIASTOLIC BLOOD PRESSURE: 64 MMHG | HEART RATE: 81 BPM

## 2021-11-29 PROCEDURE — G0493 RN CARE EA 15 MIN HH/HOSPICE: HCPCS

## 2021-12-02 ENCOUNTER — READMISSION MANAGEMENT (OUTPATIENT)
Dept: CALL CENTER | Facility: HOSPITAL | Age: 76
End: 2021-12-02

## 2021-12-02 NOTE — OUTREACH NOTE
General Surgery Week 4 Survey      Responses   [facility] patient discharged from? [place]   Does the patient have one of the following disease processes/diagnoses(primary or secondary)? General Surgery   Week 4 attempt successful? No          Tahmina Wilson RN

## 2021-12-03 ENCOUNTER — TELEPHONE (OUTPATIENT)
Dept: FAMILY MEDICINE CLINIC | Facility: CLINIC | Age: 76
End: 2021-12-03

## 2021-12-03 NOTE — PATIENT INSTRUCTIONS
Call Dr. Yossi Guerra, [facility] [place] at [phone] Denosumab injection  What is this medicine?  DENOSUMAB (den oh keena mab) slows bone breakdown. Prolia is used to treat osteoporosis in women after menopause and in men, and in people who are taking corticosteroids for 6 months or more. Xgeva is used to treat a high calcium level due to cancer and to prevent bone fractures and other bone problems caused by multiple myeloma or cancer bone metastases. Xgeva is also used to treat giant cell tumor of the bone.  This medicine may be used for other purposes; ask your health care provider or pharmacist if you have questions.  COMMON BRAND NAME(S): Prolia, XGEVA  What should I tell my health care provider before I take this medicine?  They need to know if you have any of these conditions:  · dental disease  · having surgery or tooth extraction  · infection  · kidney disease  · low levels of calcium or Vitamin D in the blood  · malnutrition  · on hemodialysis  · skin conditions or sensitivity  · thyroid or parathyroid disease  · an unusual reaction to denosumab, other medicines, foods, dyes, or preservatives  · pregnant or trying to get pregnant  · breast-feeding  How should I use this medicine?  This medicine is for injection under the skin. It is given by a health care professional in a hospital or clinic setting.  A special MedGuide will be given to you before each treatment. Be sure to read this information carefully each time.  For Prolia, talk to your pediatrician regarding the use of this medicine in children. Special care may be needed. For Xgeva, talk to your pediatrician regarding the use of this medicine in children. While this drug may be prescribed for children as young as 13 years for selected conditions, precautions do apply.  Overdosage: If you think you have taken too much of this medicine contact a poison control center or emergency room at once.  NOTE: This medicine is only  for you. Do not share this medicine with others.  What if I miss a dose?  It is important not to miss your dose. Call your doctor or health care professional if you are unable to keep an appointment.  What may interact with this medicine?  Do not take this medicine with any of the following medications:  · other medicines containing denosumab  This medicine may also interact with the following medications:  · medicines that lower your chance of fighting infection  · steroid medicines like prednisone or cortisone  This list may not describe all possible interactions. Give your health care provider a list of all the medicines, herbs, non-prescription drugs, or dietary supplements you use. Also tell them if you smoke, drink alcohol, or use illegal drugs. Some items may interact with your medicine.  What should I watch for while using this medicine?  Visit your doctor or health care professional for regular checks on your progress. Your doctor or health care professional may order blood tests and other tests to see how you are doing.  Call your doctor or health care professional for advice if you get a fever, chills or sore throat, or other symptoms of a cold or flu. Do not treat yourself. This drug may decrease your body's ability to fight infection. Try to avoid being around people who are sick.  You should make sure you get enough calcium and vitamin D while you are taking this medicine, unless your doctor tells you not to. Discuss the foods you eat and the vitamins you take with your health care professional.  See your dentist regularly. Brush and floss your teeth as directed. Before you have any dental work done, tell your dentist you are receiving this medicine.  Do not become pregnant while taking this medicine or for 5 months after stopping it. Talk with your doctor or health care professional about your birth control options while taking this medicine. Women should inform their doctor if they wish to become  "pregnant or think they might be pregnant. There is a potential for serious side effects to an unborn child. Talk to your health care professional or pharmacist for more information.  What side effects may I notice from receiving this medicine?  Side effects that you should report to your doctor or health care professional as soon as possible:  · allergic reactions like skin rash, itching or hives, swelling of the face, lips, or tongue  · bone pain  · breathing problems  · dizziness  · jaw pain, especially after dental work  · redness, blistering, peeling of the skin  · signs and symptoms of infection like fever or chills; cough; sore throat; pain or trouble passing urine  · signs of low calcium like fast heartbeat, muscle cramps or muscle pain; pain, tingling, numbness in the hands or feet; seizures  · unusual bleeding or bruising  · unusually weak or tired  Side effects that usually do not require medical attention (report to your doctor or health care professional if they continue or are bothersome):  · constipation  · diarrhea  · headache  · joint pain  · loss of appetite  · muscle pain  · runny nose  · tiredness  · upset stomach  This list may not describe all possible side effects. Call your doctor for medical advice about side effects. You may report side effects to FDA at 8-973-FDA-1169.  Where should I keep my medicine?  This medicine is only given in a clinic, doctor's office, or other health care setting and will not be stored at home.  NOTE: This sheet is a summary. It may not cover all possible information. If you have questions about this medicine, talk to your doctor, pharmacist, or health care provider.  © 2021 [employer]vier/Gold Standard (2019-04-26 16:10:44)   if you have any problems or concerns.    We know you have a Choice in healthcare and appreciate you using [facility].  Our purpose is to provide you \"Excellent Care\".  We hope that you will always choose us in the future and continue " to recommend us to your family and friends.

## 2021-12-03 NOTE — TELEPHONE ENCOUNTER
Caller: [facility]     Relationship:     Best call back number: [phone]    Who are you requesting to speak with (clinical staff, provider,  specific staff member): MA OR DOCTOR     What was the call regarding: CALLER FROM [facility] STATES PATIENT IS COMING IN Monday FOR PROLIA SHOT AND THEY NEED TO VERIFY WITH OFFICE IF PATIENT CAN RECEIVE SHOT     Do you require a callback: YES

## 2021-12-06 ENCOUNTER — HOSPITAL ENCOUNTER (OUTPATIENT)
Dept: INFUSION THERAPY | Facility: HOSPITAL | Age: 76
Discharge: HOME | End: 2021-12-06
Admitting: FAMILY MEDICINE

## 2021-12-06 VITALS
OXYGEN SATURATION: 100 % | BODY MASS INDEX: 27.81 KG/M2 | HEART RATE: 74 BPM | TEMPERATURE: 97.1 F | DIASTOLIC BLOOD PRESSURE: 74 MMHG | HEIGHT: 64 IN | SYSTOLIC BLOOD PRESSURE: 121 MMHG

## 2021-12-06 DIAGNOSIS — M81.0 AGE-RELATED OSTEOPOROSIS WITHOUT CURRENT PATHOLOGICAL FRACTURE: Primary | ICD-10-CM

## 2021-12-06 PROCEDURE — 25010000002 DENOSUMAB 60 MG/ML SOLUTION PREFILLED SYRINGE: Performed by: FAMILY MEDICINE

## 2021-12-06 PROCEDURE — 96372 THER/PROPH/DIAG INJ SC/IM: CPT

## 2021-12-06 RX ADMIN — DENOSUMAB 60 MG: 60 INJECTION SUBCUTANEOUS at 15:06

## 2021-12-06 NOTE — NURSING NOTE
NURSING PROGRESS NOTE:    PATIENT ARRIVED TO ACC AT 1500 VIA W/C FOR SCHEDULED INJECTION.  PROCEDURE PERFORMED WITHOUT INCIDENT. ESCORTED TO LOBBY PER W/C AND  DISCHARGED HOME AT 1518.  JOSE GARCIA

## 2021-12-08 ENCOUNTER — OFFICE VISIT (OUTPATIENT)
Dept: SURGERY | Facility: CLINIC | Age: 76
End: 2021-12-08

## 2021-12-08 DIAGNOSIS — Z09 SURGICAL FOLLOW-UP CARE: Primary | ICD-10-CM

## 2021-12-08 PROCEDURE — 99024 POSTOP FOLLOW-UP VISIT: CPT | Performed by: SURGERY

## 2021-12-08 NOTE — PROGRESS NOTES
Patient presents for 43 day po alise bynum. She is witout complaints  She denies problems.  She denies any clinical jaundice.  She is eating well.  Her incisions are well-healed her abdomen is soft and nontender there is no impulse.  She does not have any clinical jaundice.  She can resume normal activities I will see her back as needed

## 2021-12-13 DIAGNOSIS — E78.2 MIXED HYPERLIPIDEMIA: ICD-10-CM

## 2021-12-13 DIAGNOSIS — F41.8 DEPRESSION WITH ANXIETY: ICD-10-CM

## 2021-12-13 RX ORDER — ATORVASTATIN CALCIUM 40 MG/1
TABLET, FILM COATED ORAL
Qty: 90 TABLET | Refills: 0 | Status: SHIPPED | OUTPATIENT
Start: 2021-12-13 | End: 2022-02-23

## 2021-12-13 RX ORDER — PAROXETINE HYDROCHLORIDE 20 MG/1
TABLET, FILM COATED ORAL
Qty: 90 TABLET | Refills: 0 | Status: SHIPPED | OUTPATIENT
Start: 2021-12-13 | End: 2021-12-13 | Stop reason: SDUPTHER

## 2021-12-13 RX ORDER — PAROXETINE HYDROCHLORIDE 20 MG/1
20 TABLET, FILM COATED ORAL EVERY MORNING
Qty: 90 TABLET | Refills: 0 | Status: SHIPPED | OUTPATIENT
Start: 2021-12-13 | End: 2022-02-23

## 2021-12-23 ENCOUNTER — CLINICAL SUPPORT (OUTPATIENT)
Dept: ONCOLOGY | Facility: HOSPITAL | Age: 76
End: 2021-12-23

## 2021-12-23 ENCOUNTER — LAB (OUTPATIENT)
Dept: OTHER | Facility: HOSPITAL | Age: 76
End: 2021-12-23

## 2021-12-23 VITALS
HEIGHT: 63 IN | TEMPERATURE: 97.1 F | RESPIRATION RATE: 18 BRPM | HEART RATE: 75 BPM | DIASTOLIC BLOOD PRESSURE: 82 MMHG | BODY MASS INDEX: 29.77 KG/M2 | SYSTOLIC BLOOD PRESSURE: 136 MMHG | OXYGEN SATURATION: 99 % | WEIGHT: 168 LBS

## 2021-12-23 DIAGNOSIS — D69.3 CHRONIC ITP (IDIOPATHIC THROMBOCYTOPENIC PURPURA) (HCC): ICD-10-CM

## 2021-12-23 LAB
ALBUMIN SERPL-MCNC: 3.6 G/DL (ref 3.5–5.2)
ALBUMIN/GLOB SERPL: 1.1 G/DL
ALP SERPL-CCNC: 105 U/L (ref 39–117)
ALT SERPL W P-5'-P-CCNC: 18 U/L (ref 1–33)
ANION GAP SERPL CALCULATED.3IONS-SCNC: 7.7 MMOL/L (ref 5–15)
AST SERPL-CCNC: 18 U/L (ref 1–32)
BASOPHILS # BLD AUTO: 0.03 10*3/MM3 (ref 0–0.2)
BASOPHILS NFR BLD AUTO: 0.3 % (ref 0–1.5)
BILIRUB SERPL-MCNC: 0.3 MG/DL (ref 0–1.2)
BUN SERPL-MCNC: 17 MG/DL (ref 8–23)
BUN/CREAT SERPL: 16.3 (ref 7–25)
CALCIUM SPEC-SCNC: 10 MG/DL (ref 8.6–10.5)
CHLORIDE SERPL-SCNC: 106 MMOL/L (ref 98–107)
CO2 SERPL-SCNC: 29.3 MMOL/L (ref 22–29)
CREAT SERPL-MCNC: 1.04 MG/DL (ref 0.57–1)
DEPRECATED RDW RBC AUTO: 44.3 FL (ref 37–54)
EOSINOPHIL # BLD AUTO: 0.08 10*3/MM3 (ref 0–0.4)
EOSINOPHIL NFR BLD AUTO: 0.8 % (ref 0.3–6.2)
ERYTHROCYTE [DISTWIDTH] IN BLOOD BY AUTOMATED COUNT: 16.8 % (ref 12.3–15.4)
GFR SERPL CREATININE-BSD FRML MDRD: 52 ML/MIN/1.73
GLOBULIN UR ELPH-MCNC: 3.2 GM/DL
GLUCOSE SERPL-MCNC: 114 MG/DL (ref 65–99)
HCT VFR BLD AUTO: 35.7 % (ref 34–46.6)
HGB BLD-MCNC: 10.9 G/DL (ref 12–15.9)
HYPOCHROMIA BLD QL: NORMAL
IMM GRANULOCYTES # BLD AUTO: 0.07 10*3/MM3 (ref 0–0.05)
IMM GRANULOCYTES NFR BLD AUTO: 0.7 % (ref 0–0.5)
LYMPHOCYTES # BLD AUTO: 2.15 10*3/MM3 (ref 0.7–3.1)
LYMPHOCYTES NFR BLD AUTO: 22.3 % (ref 19.6–45.3)
MCH RBC QN AUTO: 22.5 PG (ref 26.6–33)
MCHC RBC AUTO-ENTMCNC: 30.5 G/DL (ref 31.5–35.7)
MCV RBC AUTO: 73.8 FL (ref 79–97)
MONOCYTES # BLD AUTO: 0.51 10*3/MM3 (ref 0.1–0.9)
MONOCYTES NFR BLD AUTO: 5.3 % (ref 5–12)
NEUTROPHILS NFR BLD AUTO: 6.82 10*3/MM3 (ref 1.7–7)
NEUTROPHILS NFR BLD AUTO: 70.6 % (ref 42.7–76)
NRBC BLD AUTO-RTO: 0 /100 WBC (ref 0–0.2)
PLAT MORPH BLD: NORMAL
PLATELET # BLD AUTO: 90 10*3/MM3 (ref 140–450)
PLATELET # BLD AUTO: 90 10*3/MM3 (ref 140–450)
PLATELETS.RETICULATED NFR BLD AUTO: 25.2 % (ref 0.9–6.5)
POTASSIUM SERPL-SCNC: 4 MMOL/L (ref 3.5–5.2)
PROT SERPL-MCNC: 6.8 G/DL (ref 6–8.5)
RBC # BLD AUTO: 4.84 10*6/MM3 (ref 3.77–5.28)
SODIUM SERPL-SCNC: 143 MMOL/L (ref 136–145)
WBC MORPH BLD: NORMAL
WBC NRBC COR # BLD: 9.66 10*3/MM3 (ref 3.4–10.8)

## 2021-12-23 PROCEDURE — 85055 RETICULATED PLATELET ASSAY: CPT | Performed by: INTERNAL MEDICINE

## 2021-12-23 PROCEDURE — 85025 COMPLETE CBC W/AUTO DIFF WBC: CPT | Performed by: INTERNAL MEDICINE

## 2021-12-23 PROCEDURE — 36415 COLL VENOUS BLD VENIPUNCTURE: CPT

## 2021-12-23 PROCEDURE — 85007 BL SMEAR W/DIFF WBC COUNT: CPT | Performed by: INTERNAL MEDICINE

## 2021-12-23 PROCEDURE — 80053 COMPREHEN METABOLIC PANEL: CPT | Performed by: INTERNAL MEDICINE

## 2021-12-23 PROCEDURE — G0463 HOSPITAL OUTPT CLINIC VISIT: HCPCS

## 2021-12-23 NOTE — NURSING NOTE
Lab Results   Component Value Date    WBC 9.66 12/23/2021    HGB 10.9 (L) 12/23/2021    HCT 35.7 12/23/2021    MCV 73.8 (L) 12/23/2021    PLT 90 (L) 12/23/2021    PLT 90 (L) 12/23/2021        Patient here for CBC with RN review.  Patient scheduled for fine-needle aspiration on 12/29/2021.  Patient denies bleeding, SOA, or increased fatigue.  CBC reviewed with patient.  Advised patient that message would be sent to provider to advise id any additional orders were needed prior to 12/29/2021 appt.  Patient v/u and was discharged in stable condition.    Message sent to Dr. Irene to advise.

## 2021-12-28 ENCOUNTER — TELEPHONE (OUTPATIENT)
Dept: ONCOLOGY | Facility: CLINIC | Age: 76
End: 2021-12-28

## 2021-12-29 ENCOUNTER — HOSPITAL ENCOUNTER (OUTPATIENT)
Dept: ULTRASOUND IMAGING | Facility: HOSPITAL | Age: 76
Discharge: HOME | End: 2021-12-29
Admitting: INTERNAL MEDICINE

## 2021-12-29 ENCOUNTER — APPOINTMENT (OUTPATIENT)
Dept: ULTRASOUND IMAGING | Facility: HOSPITAL | Age: 76
End: 2021-12-29

## 2021-12-29 VITALS
OXYGEN SATURATION: 99 % | DIASTOLIC BLOOD PRESSURE: 82 MMHG | HEART RATE: 62 BPM | RESPIRATION RATE: 16 BRPM | HEIGHT: 63 IN | WEIGHT: 169 LBS | TEMPERATURE: 98.4 F | BODY MASS INDEX: 29.95 KG/M2 | SYSTOLIC BLOOD PRESSURE: 174 MMHG

## 2021-12-29 DIAGNOSIS — D69.3 CHRONIC ITP (IDIOPATHIC THROMBOCYTOPENIC PURPURA): ICD-10-CM

## 2021-12-29 DIAGNOSIS — E07.9 THYROID MASS: ICD-10-CM

## 2021-12-29 DIAGNOSIS — D69.6 THROMBOCYTOPENIA: ICD-10-CM

## 2021-12-29 PROCEDURE — 0 LIDOCAINE 1 % SOLUTION: Performed by: RADIOLOGY

## 2021-12-29 PROCEDURE — 88173 CYTOPATH EVAL FNA REPORT: CPT | Performed by: INTERNAL MEDICINE

## 2021-12-29 PROCEDURE — 88305 TISSUE EXAM BY PATHOLOGIST: CPT | Performed by: INTERNAL MEDICINE

## 2021-12-29 RX ORDER — LIDOCAINE HYDROCHLORIDE 10 MG/ML
10 INJECTION, SOLUTION INFILTRATION; PERINEURAL ONCE
Status: COMPLETED | OUTPATIENT
Start: 2021-12-29 | End: 2021-12-29

## 2021-12-29 RX ADMIN — LIDOCAINE HYDROCHLORIDE 2 ML: 10 INJECTION, SOLUTION INFILTRATION; PERINEURAL at 13:52

## 2021-12-30 ENCOUNTER — TELEPHONE (OUTPATIENT)
Dept: INTERVENTIONAL RADIOLOGY/VASCULAR | Facility: HOSPITAL | Age: 76
End: 2021-12-30

## 2021-12-30 LAB
CYTO UR: NORMAL
LAB AP CASE REPORT: NORMAL
LAB AP DIAGNOSIS COMMENT: NORMAL
LAB AP NON-GYN SPECIMEN ADEQUACY: NORMAL
PATH REPORT.FINAL DX SPEC: NORMAL
PATH REPORT.GROSS SPEC: NORMAL

## 2022-01-13 ENCOUNTER — TELEPHONE (OUTPATIENT)
Dept: FAMILY MEDICINE CLINIC | Facility: CLINIC | Age: 77
End: 2022-01-13

## 2022-01-13 RX ORDER — CIPROFLOXACIN 500 MG/1
500 TABLET, FILM COATED ORAL 2 TIMES DAILY
Qty: 14 TABLET | Refills: 0 | Status: SHIPPED | OUTPATIENT
Start: 2022-01-13 | End: 2022-03-17

## 2022-01-13 NOTE — TELEPHONE ENCOUNTER
Caller: Shyann Davis    Relationship: Self    Best call back number: [phone]    What medication are you requesting: WHATEVER THE PATIENT'S PCP RECOMMENDS - THE PATIENT HAS BEEN TAKING STRONG ANTIBIOTICS LATELY DUE TO A RECENT PROCEDURE TO REMOVE HER GALLBLADDER.    What are your current symptoms: FREQUENCY URINATING, BURNING SENSATION WHEN URINATING, URGE TO URINATE    How long have you been experiencing symptoms: 3 OR MORE DAYS    Have you had these symptoms before:    [x] Yes  [] No    Have you been treated for these symptoms before:   [x] Yes  [] No    If a prescription is needed, what is your preferred pharmacy and phone number: [id number] - [place], [place] - [address] [address] AT [facility] [address] - [phone] ph - [phone] FX

## 2022-01-14 ENCOUNTER — LAB (OUTPATIENT)
Dept: OTHER | Facility: HOSPITAL | Age: 77
End: 2022-01-14

## 2022-01-14 ENCOUNTER — TELEMEDICINE (OUTPATIENT)
Dept: ONCOLOGY | Facility: CLINIC | Age: 77
End: 2022-01-14

## 2022-01-14 VITALS
DIASTOLIC BLOOD PRESSURE: 88 MMHG | WEIGHT: 167.8 LBS | OXYGEN SATURATION: 96 % | HEIGHT: 63 IN | SYSTOLIC BLOOD PRESSURE: 128 MMHG | RESPIRATION RATE: 18 BRPM | HEART RATE: 62 BPM | BODY MASS INDEX: 29.73 KG/M2 | TEMPERATURE: 97.7 F

## 2022-01-14 DIAGNOSIS — D69.6 THROMBOCYTOPENIA: Primary | ICD-10-CM

## 2022-01-14 DIAGNOSIS — D69.6 THROMBOCYTOPENIA: ICD-10-CM

## 2022-01-14 DIAGNOSIS — D69.3 CHRONIC ITP (IDIOPATHIC THROMBOCYTOPENIC PURPURA): Primary | ICD-10-CM

## 2022-01-14 DIAGNOSIS — D69.3 CHRONIC ITP (IDIOPATHIC THROMBOCYTOPENIC PURPURA): ICD-10-CM

## 2022-01-14 DIAGNOSIS — R93.7 ABNORMAL X-RAY OF LUMBAR SPINE: ICD-10-CM

## 2022-01-14 DIAGNOSIS — E07.9 THYROID MASS: ICD-10-CM

## 2022-01-14 LAB
DEPRECATED RDW RBC AUTO: 41.5 FL (ref 37–54)
EOSINOPHIL # BLD MANUAL: 0.1 10*3/MM3 (ref 0–0.4)
EOSINOPHIL NFR BLD MANUAL: 1 % (ref 0.3–6.2)
ERYTHROCYTE [DISTWIDTH] IN BLOOD BY AUTOMATED COUNT: 16.2 % (ref 12.3–15.4)
HCT VFR BLD AUTO: 38.4 % (ref 34–46.6)
HGB BLD-MCNC: 12.1 G/DL (ref 12–15.9)
LYMPHOCYTES # BLD MANUAL: 2.79 10*3/MM3 (ref 0.7–3.1)
LYMPHOCYTES NFR BLD MANUAL: 2 % (ref 5–12)
MCH RBC QN AUTO: 22.6 PG (ref 26.6–33)
MCHC RBC AUTO-ENTMCNC: 31.5 G/DL (ref 31.5–35.7)
MCV RBC AUTO: 71.8 FL (ref 79–97)
MONOCYTES # BLD: 0.2 10*3/MM3 (ref 0.1–0.9)
NEUTROPHILS # BLD AUTO: 6.87 10*3/MM3 (ref 1.7–7)
NEUTROPHILS NFR BLD MANUAL: 69 % (ref 42.7–76)
PLAT MORPH BLD: NORMAL
PLATELET # BLD AUTO: 91 10*3/MM3 (ref 140–450)
RBC # BLD AUTO: 5.35 10*6/MM3 (ref 3.77–5.28)
RBC MORPH BLD: NORMAL
SCAN SLIDE: NORMAL
VARIANT LYMPHS NFR BLD MANUAL: 28 % (ref 19.6–45.3)
WBC MORPH BLD: NORMAL
WBC NRBC COR # BLD: 9.96 10*3/MM3 (ref 3.4–10.8)

## 2022-01-14 PROCEDURE — 99213 OFFICE O/P EST LOW 20 MIN: CPT | Performed by: INTERNAL MEDICINE

## 2022-01-14 PROCEDURE — 85025 COMPLETE CBC W/AUTO DIFF WBC: CPT | Performed by: INTERNAL MEDICINE

## 2022-01-14 PROCEDURE — 85007 BL SMEAR W/DIFF WBC COUNT: CPT | Performed by: INTERNAL MEDICINE

## 2022-01-14 PROCEDURE — 36415 COLL VENOUS BLD VENIPUNCTURE: CPT

## 2022-01-14 NOTE — PROGRESS NOTES
This was an audio and video enabled telemedicine encounter.    Subjective     REASON FOR CONSULTATION:   1.  Mild chronic ITP  2.  Remote history of breast cancer  3.  Thyroid nodules. Pathology benign from FNA    HISTORY OF PRESENT ILLNESS:  The patient is a 76 y.o. year old female who was referred to us from her primary care office for evaluation of mild chronic thrombocytopenia.  Her platelet count had been slightly low for at least 2 years with a platelet count of 134,000 in June 2019.  It had drifted down to 82,000 on labs from 3/3/2021.  Her hemoglobin and white cells were normal.    With her initial consult visit of 3/19/2021 we performed an immature platelet fraction which was markedly elevated consistent with ITP.      We have been observing her without treatment as her platelet count is remained in a safe range from 80,000-110,000.      She required hospitalization due to gangrenous cholecystitis leading to sepsis.  She was in the hospital from 10/22/2021 through 11/2/2021 and underwent cholecystectomy and antibiotic therapy.  During the hospitalization she had other imaging studies showing a couple of masses in the left lobe of the thyroid and some lesions in the bone noted on CT scan that were felt possibly to be hemangiomas.    Because of these issues she was scheduled to undergo outpatient bone scan and ultrasound of the thyroid as an outpatient.    The bone scan performed on 11/12/2021 did not show any suspicious findings to suggest metastatic disease to the bone.  Her thyroid ultrasound did confirm abnormalities in the thyroid and FNA biopsy was performed 12/29/2021 showing no evidence of malignancy.      History of Present Illness     Past Medical History:   Diagnosis Date   • Age-related osteoporosis without current pathological fracture 2/8/2019   • Anxiety    • Breast cancer (HCC)     Right   • Depression    • History of thrombocytopenia    • Hyperlipidemia    • Hypertension    • Osteoporosis     • Seizure (HCC)     after brain surgery only        Past Surgical History:   Procedure Laterality Date   • APPENDECTOMY  2007   • BRAIN AVM REPAIR  1992   • BREAST LUMPECTOMY Right 2002   • BREAST SURGERY     • CHOLECYSTECTOMY N/A 10/25/2021    Procedure: CHOLECYSTECTOMY LAPAROSCOPIC;  Surgeon: Gera Kay MD;  Location: [facility];  Service: General;  Laterality: N/A;   • HYSTERECTOMY  1976        Current Outpatient Medications on File Prior to Visit   Medication Sig Dispense Refill   • atorvastatin (LIPITOR) 40 MG tablet TAKE 1 TABLET EVERY DAY 90 tablet 0   • Calcium 600-200 MG-UNIT per tablet Take 1 tablet by mouth 2 (Two) Times a Day.     • ciprofloxacin (Cipro) 500 MG tablet Take 1 tablet by mouth 2 (Two) Times a Day. 14 tablet 0   • levETIRAcetam (KEPPRA) 500 MG tablet TAKE 1/2 TAB TWICE A DAY FOR A WEEK THEN 1 TAB TWICE A DAY     • metoprolol succinate XL (TOPROL-XL) 50 MG 24 hr tablet Take 1 tablet by mouth Daily. 30 tablet 1   • Mirabegron ER (Myrbetriq) 25 MG tablet sustained-release 24 hour 24 hr tablet Take 25 mg by mouth Daily.     • MULTIPLE VITAMINS ESSENTIAL PO Take  by mouth.     • PARoxetine (PAXIL) 20 MG tablet Take 1 tablet by mouth Every Morning. 90 tablet 0   • PROLIA 60 MG/ML solution syringe FOR  BY YOUR PHYSICIAN TO INJECT 60MG (1ML) SUBCUTANEOUSLY EVERY 6 MONTHS 1 syringe 0   • saccharomyces boulardii (FLORASTOR) 250 MG capsule Take 2 capsules by mouth 2 (Two) Times a Day. 20 capsule 0     No current facility-administered medications on file prior to visit.        ALLERGIES:    Allergies   Allergen Reactions   • Cephalexin Myalgia     Migraine   • Sulfa Antibiotics Myalgia     Migraine   • Erythromycin GI Intolerance   • Neomycin-Bacitracin Zn-Polymyx Rash        Social History     Socioeconomic History   • Marital status:      Spouse name: Thanh   Tobacco Use   • Smoking status: Never Smoker   • Smokeless tobacco: Never Used   Vaping Use   • Vaping Use:  "Never used   Substance and Sexual Activity   • Alcohol use: No   • Drug use: No   • Sexual activity: Defer        No family history on file.     Review of Systems   Constitutional: Positive for activity change and fatigue. Negative for chills and fever.   HENT: Negative for mouth sores, trouble swallowing and voice change.    Eyes: Negative for pain and visual disturbance.   Respiratory: Negative for cough, shortness of breath and wheezing.    Cardiovascular: Negative for chest pain and palpitations.   Gastrointestinal: Negative for abdominal pain, constipation, diarrhea, nausea and vomiting.   Genitourinary: Negative for difficulty urinating, frequency and urgency.        Stress incontinence   Musculoskeletal: Negative for arthralgias and joint swelling.   Skin: Negative for rash.   Neurological: Negative for dizziness, seizures, weakness and headaches.   Hematological: Negative for adenopathy. Bruises/bleeds easily.   Psychiatric/Behavioral: Negative for behavioral problems and confusion. The patient is not nervous/anxious.         Memory deficit        Objective     Vitals:    01/14/22 1510   BP: 128/88   Pulse: 62   Resp: 18   Temp: 97.7 °F (36.5 °C)   TempSrc: Temporal   SpO2: 96%   Weight: 76.1 kg (167 lb 12.8 oz)   Height: 160 cm (62.99\")   PainSc: 0-No pain     Current Status 11/19/2021   ECOG score 0       Physical Exam  Constitutional:       General: She is not in acute distress.     Appearance: She is well-developed.   HENT:      Head: Normocephalic.   Eyes:      General: No scleral icterus.     Conjunctiva/sclera: Conjunctivae normal.      Pupils: Pupils are equal, round, and reactive to light.   Neck:      Thyroid: No thyromegaly.      Vascular: No JVD.   Cardiovascular:      Rate and Rhythm: Normal rate and regular rhythm.      Heart sounds: No murmur heard.  No friction rub. No gallop.    Pulmonary:      Effort: Pulmonary effort is normal.      Breath sounds: Normal breath sounds. No wheezing or " rales.   Abdominal:      General: There is no distension.      Palpations: Abdomen is soft. There is no mass.      Tenderness: There is no abdominal tenderness.   Musculoskeletal:         General: No deformity. Normal range of motion.      Cervical back: Normal range of motion and neck supple.      Comments: Kyphosis   Lymphadenopathy:      Cervical: No cervical adenopathy.   Skin:     General: Skin is warm and dry.      Findings: No erythema or rash.   Neurological:      Mental Status: She is alert and oriented to person, place, and time.      Cranial Nerves: No cranial nerve deficit.      Deep Tendon Reflexes: Reflexes are normal and symmetric.   Psychiatric:         Behavior: Behavior normal.         Judgment: Judgment normal.           RECENT LABS:  Hematology WBC   Date Value Ref Range Status   01/14/2022 9.96 3.40 - 10.80 10*3/mm3 Final   09/02/2021 10.04 3.40 - 10.80 10*3/mm3 Final     RBC   Date Value Ref Range Status   01/14/2022 5.35 (H) 3.77 - 5.28 10*6/mm3 Final   09/02/2021 5.36 (H) 3.77 - 5.28 10*6/mm3 Final     Hemoglobin   Date Value Ref Range Status   01/14/2022 12.1 12.0 - 15.9 g/dL Final     Hematocrit   Date Value Ref Range Status   01/14/2022 38.4 34.0 - 46.6 % Final     Platelets   Date Value Ref Range Status   01/14/2022 91 (L) 140 - 450 10*3/mm3 Final      PATHOLOGY 12/29/2021  Final Diagnosis   1. Thyroid, Left Isthmus, Fine Needle Aspiration (FNA):               A. [place] Category II:  Changes consistent with benign follicular nodule.     2. Thyroid, Left Mid to Inferior, Fine Needle Aspiration (FNA):               A. [place] Category II:  Changes consistent with benign follicular nodule.       PATHOLOGY 10/25/2021  Final Diagnosis   1. Gallbladder:    A. Severe acute necrotizing calculus cholecystitis .     NUCLEAR MEDICINE WHOLE BODY BONE SCAN 11/12/2021  IMPRESSION:  Degenerative/arthritic uptake without evidence for bony  metastatic disease. There is a history of abnormal CT of the  spine  though that is not available for comparison. Scoliotic curvature is  present of the lower cervical spine and upper thoracic spine where there  is associated degenerative uptake.     THYROID SONOGRAM 11/12/2021.  IMPRESSION:  1.  Multiple bilateral thyroid nodules. Fine-needle aspiration of both  the 3.2 cm nodule within the left mid to inferior pole and of the 4.1 cm  nodule within the medial left thyroid/isthmus is recommended per TIRADS  criteria. If biopsy results are negative, follow-up with thyroid  sonogram 6 months is recommended to ensure stability.  2.  Mildly heterogenous thyroid echotexture suggestive of diffuse  thyroid disease in the appropriate clinical context and correlation with  patient history and laboratory values is recommended to establish the  most appropriate etiology as sonographic findings are nonspecific.    Assessment/Plan   1.  Mild chronic ITP with a platelet count that has been in a safe range from 80,000-110,000.  Platelet count in the office today is 91,000.  2.  Remote history of breast cancer.    3.  Negative colon cancer screening with Cologuard 8/18/2020  4.  Microcytic anemia.  No evidence of iron deficiency.  5.  Gangrenous cholecystitis treated with cholecystectomy 10/25/2021  6.  Lesions in the bone noted on CT scans during her recent admission.  Bone scan from 11/12/2021 does not show any worrisome findings as noted above.  7.  Thyroid nodules.  FNA biopsy 12/29/2021 benign      PLAN  1.  We discussed the path report with Shyann.  2.  We will now get back on her routine 6 month follow up schedule.    She was instructed to call us with any new problems or concerns prior to that follow up visit.

## 2022-02-22 DIAGNOSIS — E78.2 MIXED HYPERLIPIDEMIA: ICD-10-CM

## 2022-02-22 DIAGNOSIS — F41.8 DEPRESSION WITH ANXIETY: ICD-10-CM

## 2022-02-23 RX ORDER — PAROXETINE HYDROCHLORIDE 20 MG/1
TABLET, FILM COATED ORAL
Qty: 90 TABLET | Refills: 0 | Status: SHIPPED | OUTPATIENT
Start: 2022-02-23 | End: 2022-05-09

## 2022-02-23 RX ORDER — ATORVASTATIN CALCIUM 40 MG/1
TABLET, FILM COATED ORAL
Qty: 90 TABLET | Refills: 0 | Status: SHIPPED | OUTPATIENT
Start: 2022-02-23 | End: 2022-05-09

## 2022-03-09 DIAGNOSIS — E78.2 MIXED HYPERLIPIDEMIA: Primary | ICD-10-CM

## 2022-03-09 DIAGNOSIS — R73.02 IMPAIRED GLUCOSE TOLERANCE: ICD-10-CM

## 2022-03-09 DIAGNOSIS — E55.9 VITAMIN D DEFICIENCY: ICD-10-CM

## 2022-03-10 DIAGNOSIS — N39.0 URINARY TRACT INFECTION WITH HEMATURIA, SITE UNSPECIFIED: Primary | ICD-10-CM

## 2022-03-10 DIAGNOSIS — R31.9 URINARY TRACT INFECTION WITH HEMATURIA, SITE UNSPECIFIED: Primary | ICD-10-CM

## 2022-03-11 LAB
25(OH)D3+25(OH)D2 SERPL-MCNC: 86.5 NG/ML (ref 30–100)
ALBUMIN SERPL-MCNC: 4.3 G/DL (ref 3.7–4.7)
ALBUMIN/GLOB SERPL: 1.5 {RATIO} (ref 1.2–2.2)
ALP SERPL-CCNC: 83 IU/L (ref 44–121)
ALT SERPL-CCNC: 19 IU/L (ref 0–32)
AST SERPL-CCNC: 23 IU/L (ref 0–40)
BASOPHILS # BLD AUTO: 0 X10E3/UL (ref 0–0.2)
BASOPHILS NFR BLD AUTO: 0 %
BILIRUB SERPL-MCNC: 0.4 MG/DL (ref 0–1.2)
BUN SERPL-MCNC: 16 MG/DL (ref 8–27)
BUN/CREAT SERPL: 17 (ref 12–28)
CALCIUM SERPL-MCNC: 10.3 MG/DL (ref 8.7–10.3)
CHLORIDE SERPL-SCNC: 103 MMOL/L (ref 96–106)
CHOLEST SERPL-MCNC: 147 MG/DL (ref 100–199)
CO2 SERPL-SCNC: 24 MMOL/L (ref 20–29)
CREAT SERPL-MCNC: 0.96 MG/DL (ref 0.57–1)
EGFR GENE MUT ANL BLD/T: 61 ML/MIN/1.73
EOSINOPHIL # BLD AUTO: 0.1 X10E3/UL (ref 0–0.4)
EOSINOPHIL NFR BLD AUTO: 1 %
ERYTHROCYTE [DISTWIDTH] IN BLOOD BY AUTOMATED COUNT: 14.4 % (ref 11.7–15.4)
GLOBULIN SER CALC-MCNC: 2.8 G/DL (ref 1.5–4.5)
GLUCOSE SERPL-MCNC: 91 MG/DL (ref 65–99)
HBA1C MFR BLD: 5.6 % (ref 4.8–5.6)
HCT VFR BLD AUTO: 42 % (ref 34–46.6)
HDLC SERPL-MCNC: 55 MG/DL
HGB BLD-MCNC: 13.2 G/DL (ref 11.1–15.9)
IMM GRANULOCYTES # BLD AUTO: 0 X10E3/UL (ref 0–0.1)
IMM GRANULOCYTES NFR BLD AUTO: 0 %
LDLC SERPL CALC-MCNC: 67 MG/DL (ref 0–99)
LYMPHOCYTES # BLD AUTO: 2.1 X10E3/UL (ref 0.7–3.1)
LYMPHOCYTES NFR BLD AUTO: 20 %
MCH RBC QN AUTO: 22.6 PG (ref 26.6–33)
MCHC RBC AUTO-ENTMCNC: 31.4 G/DL (ref 31.5–35.7)
MCV RBC AUTO: 72 FL (ref 79–97)
MONOCYTES # BLD AUTO: 0.5 X10E3/UL (ref 0.1–0.9)
MONOCYTES NFR BLD AUTO: 5 %
MORPHOLOGY BLD-IMP: ABNORMAL
NEUTROPHILS # BLD AUTO: 7.6 X10E3/UL (ref 1.4–7)
NEUTROPHILS NFR BLD AUTO: 74 %
PLATELET # BLD AUTO: 91 X10E3/UL (ref 150–450)
POTASSIUM SERPL-SCNC: 3.9 MMOL/L (ref 3.5–5.2)
PROT SERPL-MCNC: 7.1 G/DL (ref 6–8.5)
RBC # BLD AUTO: 5.83 X10E6/UL (ref 3.77–5.28)
SODIUM SERPL-SCNC: 141 MMOL/L (ref 134–144)
TRIGL SERPL-MCNC: 145 MG/DL (ref 0–149)
TSH SERPL DL<=0.005 MIU/L-ACNC: 1.47 UIU/ML (ref 0.45–4.5)
UNABLE TO VOID: NORMAL
VLDLC SERPL CALC-MCNC: 25 MG/DL (ref 5–40)
WBC # BLD AUTO: 10.4 X10E3/UL (ref 3.4–10.8)

## 2022-03-17 ENCOUNTER — OFFICE VISIT (OUTPATIENT)
Dept: FAMILY MEDICINE CLINIC | Facility: CLINIC | Age: 77
End: 2022-03-17

## 2022-03-17 VITALS
SYSTOLIC BLOOD PRESSURE: 120 MMHG | WEIGHT: 169 LBS | TEMPERATURE: 98 F | HEART RATE: 68 BPM | BODY MASS INDEX: 29.95 KG/M2 | DIASTOLIC BLOOD PRESSURE: 76 MMHG | HEIGHT: 63 IN | OXYGEN SATURATION: 98 %

## 2022-03-17 DIAGNOSIS — M81.0 AGE-RELATED OSTEOPOROSIS WITHOUT CURRENT PATHOLOGICAL FRACTURE: ICD-10-CM

## 2022-03-17 DIAGNOSIS — I10 ESSENTIAL HYPERTENSION: ICD-10-CM

## 2022-03-17 DIAGNOSIS — Z00.00 MEDICARE ANNUAL WELLNESS VISIT, SUBSEQUENT: Primary | ICD-10-CM

## 2022-03-17 DIAGNOSIS — D69.3 CHRONIC ITP (IDIOPATHIC THROMBOCYTOPENIC PURPURA): ICD-10-CM

## 2022-03-17 DIAGNOSIS — F41.8 DEPRESSION WITH ANXIETY: ICD-10-CM

## 2022-03-17 DIAGNOSIS — E66.09 EXOGENOUS OBESITY: ICD-10-CM

## 2022-03-17 DIAGNOSIS — R71.8 MICROCYTOSIS: ICD-10-CM

## 2022-03-17 DIAGNOSIS — R73.02 IMPAIRED GLUCOSE TOLERANCE: ICD-10-CM

## 2022-03-17 DIAGNOSIS — E55.9 VITAMIN D DEFICIENCY: ICD-10-CM

## 2022-03-17 DIAGNOSIS — G40.909 SEIZURE DISORDER: ICD-10-CM

## 2022-03-17 DIAGNOSIS — E78.2 MIXED HYPERLIPIDEMIA: ICD-10-CM

## 2022-03-17 PROCEDURE — 99214 OFFICE O/P EST MOD 30 MIN: CPT | Performed by: FAMILY MEDICINE

## 2022-03-17 PROCEDURE — 1160F RVW MEDS BY RX/DR IN RCRD: CPT | Performed by: FAMILY MEDICINE

## 2022-03-17 PROCEDURE — 96160 PT-FOCUSED HLTH RISK ASSMT: CPT | Performed by: FAMILY MEDICINE

## 2022-03-17 PROCEDURE — G0439 PPPS, SUBSEQ VISIT: HCPCS | Performed by: FAMILY MEDICINE

## 2022-03-17 PROCEDURE — 1170F FXNL STATUS ASSESSED: CPT | Performed by: FAMILY MEDICINE

## 2022-03-17 PROCEDURE — 1126F AMNT PAIN NOTED NONE PRSNT: CPT | Performed by: FAMILY MEDICINE

## 2022-03-17 NOTE — PROGRESS NOTES
The ABCs of the Annual Wellness Visit  Subsequent Medicare Wellness Visit    Chief Complaint   Patient presents with   • Annual Exam     SUB AWV      Subjective    History of Present Illness:  Shyann Davis is a 76 y.o. female who presents for a Subsequent Medicare Wellness Visit.  76-year-old white female here for subsequent Medicare wellness visit as well as for further medical management of multiple medical problems.  Patient with history of hyperlipidemia using atorvastatin at 40 mg daily.  She also has history of depression with anxiety features with the use of Paxil at 20 mg daily.  She also has history of essential hypertension and uses Toprol-XL at 50 mg daily.  Keppra is used at 500 mg twice daily for seizure disorder.  Patient has history of age-related osteoporosis and uses Prolia twice a year.  All medications are used appropriately and are well-tolerated without side effects.  Fasting labs have been obtained prior to this visit.  In general patient is doing well and has no acute complaints.    The following portions of the patient's history were reviewed and   updated as appropriate: allergies, current medications, past family history, past medical history, past social history, past surgical history and problem list.    Compared to one year ago, the patient feels her physical   health is the same.    Compared to one year ago, the patient feels her mental   health is the same.    Recent Hospitalizations:  This patient has had a [facility] admission record on file within the last 365 days.    Current Medical Providers:  Patient Care Team:  Reese Underwood DO as PCP - General (Family Medicine)  Reese Underwood DO as Referring Physician (Family Medicine)  Henry Conde MD as Consulting Physician (Hematology and Oncology)    Outpatient Medications Prior to Visit   Medication Sig Dispense Refill   • atorvastatin (LIPITOR) 40 MG tablet TAKE 1 TABLET EVERY DAY 90 tablet 0   • Calcium 600-200 MG-UNIT  per tablet Take 1 tablet by mouth 2 (Two) Times a Day.     • levETIRAcetam (KEPPRA) 500 MG tablet TAKE 1/2 TAB TWICE A DAY FOR A WEEK THEN 1 TAB TWICE A DAY     • metoprolol succinate XL (TOPROL-XL) 50 MG 24 hr tablet Take 1 tablet by mouth Daily. 30 tablet 1   • Mirabegron ER (MYRBETRIQ) 25 MG tablet sustained-release 24 hour 24 hr tablet Take 25 mg by mouth Daily.     • MULTIPLE VITAMINS ESSENTIAL PO Take  by mouth.     • PARoxetine (PAXIL) 20 MG tablet TAKE 1 TABLET EVERY MORNING 90 tablet 0   • PROLIA 60 MG/ML solution syringe FOR  BY YOUR PHYSICIAN TO INJECT 60MG (1ML) SUBCUTANEOUSLY EVERY 6 MONTHS 1 syringe 0   • ciprofloxacin (Cipro) 500 MG tablet Take 1 tablet by mouth 2 (Two) Times a Day. 14 tablet 0   • saccharomyces boulardii (FLORASTOR) 250 MG capsule Take 2 capsules by mouth 2 (Two) Times a Day. 20 capsule 0     No facility-administered medications prior to visit.       No opioid medication identified on active medication list. I have reviewed chart for other potential  high risk medication/s and harmful drug interactions in the elderly.          Aspirin is not on active medication list.  Aspirin use is not indicated based on review of current medical condition/s. Risk of harm outweighs potential benefits.  .    Patient Active Problem List   Diagnosis   • Impaired glucose tolerance   • Mixed hyperlipidemia   • Essential hypertension   • Seizure disorder (HCC)   • Vitamin D deficiency   • Arteriovenous malformation of gastrointestinal tract   • Exogenous obesity   • Depression with anxiety   • Thrombocytopenia (HCC)   • Renal insufficiency   • Overactive bladder   • Age-related osteoporosis without current pathological fracture   • Chronic ITP (idiopathic thrombocytopenic purpura) ([facility])   • Sepsis without acute organ dysfunction ([facility])   • Acute cholecystitis   • Abnormal x-ray of lumbar spine   • Thyroid mass   • Microcytosis     Advance Care Planning  Advance Directive is not on  "file.  ACP discussion was held with the patient during this visit. Patient does not have an advance directive, information provided.    Review of Systems   Cardiovascular:        Hypertension, hyperlipidemia   Endocrine:        Exogenous obesity, vitamin D deficiency   Musculoskeletal:        Age-related osteoporosis   Neurological: Positive for seizures.   Psychiatric/Behavioral: Positive for dysphoric mood. The patient is nervous/anxious.         Objective    Vitals:    03/17/22 1549   BP: 120/76   Pulse: 68   Temp: 98 °F (36.7 °C)   SpO2: 98%   Weight: 76.7 kg (169 lb)   Height: 160 cm (63\")   PainSc: 0-No pain     BMI Readings from Last 1 Encounters:   03/17/22 29.94 kg/m²   BMI is above normal parameters. Recommendations include: exercise counseling and nutrition counseling    Does the patient have evidence of cognitive impairment? No    Physical Exam  Vitals and nursing note reviewed.   Constitutional:       Appearance: Normal appearance. She is well-developed and well-groomed. She is obese.      Comments: Exogenous obesity with a BMI of 29.9   HENT:      Head: Normocephalic and atraumatic.   Neck:      Thyroid: No thyroid mass or thyromegaly.      Vascular: Normal carotid pulses. No carotid bruit.      Trachea: Trachea and phonation normal.   Cardiovascular:      Rate and Rhythm: Normal rate and regular rhythm.      Heart sounds: Normal heart sounds. No murmur heard.    No friction rub. No gallop.   Pulmonary:      Effort: Pulmonary effort is normal. No respiratory distress.      Breath sounds: Normal breath sounds. No decreased breath sounds, wheezing, rhonchi or rales.   Musculoskeletal:      Cervical back: Neck supple.   Lymphadenopathy:      Cervical: No cervical adenopathy.   Skin:     General: Skin is warm and dry.      Findings: No rash.   Neurological:      Mental Status: She is alert and oriented to person, place, and time.   Psychiatric:         Attention and Perception: Attention and perception " normal.         Mood and Affect: Mood and affect normal.         Speech: Speech normal.         Behavior: Behavior normal. Behavior is cooperative.         Thought Content: Thought content normal.         Cognition and Memory: Cognition and memory normal.         Judgment: Judgment normal.       Orders Only on 03/09/2022   Component Date Value Ref Range Status   • WBC 03/10/2022 10.4  3.4 - 10.8 x10E3/uL Final    **Verified by repeat analysis**   • RBC 03/10/2022 5.83 (A) 3.77 - 5.28 x10E6/uL Final   • Hemoglobin 03/10/2022 13.2  11.1 - 15.9 g/dL Final   • Hematocrit 03/10/2022 42.0  34.0 - 46.6 % Final   • MCV 03/10/2022 72 (A) 79 - 97 fL Final   • MCH 03/10/2022 22.6 (A) 26.6 - 33.0 pg Final   • MCHC 03/10/2022 31.4 (A) 31.5 - 35.7 g/dL Final   • RDW 03/10/2022 14.4  11.7 - 15.4 % Final   • Platelets 03/10/2022 91 (A) 150 - 450 x10E3/uL Final    Comment: Actual platelet count may be somewhat higher than reported due to  aggregation of platelets in this sample.  Large platelets were observed.     • Neutrophil Rel % 03/10/2022 74  Not Estab. % Final   • Lymphocyte Rel % 03/10/2022 20  Not Estab. % Final   • Monocyte Rel % 03/10/2022 5  Not Estab. % Final   • Eosinophil Rel % 03/10/2022 1  Not Estab. % Final   • Basophil Rel % 03/10/2022 0  Not Estab. % Final   • Neutrophils Absolute 03/10/2022 7.6 (A) 1.4 - 7.0 x10E3/uL Final   • Lymphocytes Absolute 03/10/2022 2.1  0.7 - 3.1 x10E3/uL Final   • Monocytes Absolute 03/10/2022 0.5  0.1 - 0.9 x10E3/uL Final   • Eosinophils Absolute 03/10/2022 0.1  0.0 - 0.4 x10E3/uL Final   • Basophils Absolute 03/10/2022 0.0  0.0 - 0.2 x10E3/uL Final   • Immature Granulocyte Rel % 03/10/2022 0  Not Estab. % Final   • Immature Grans Absolute 03/10/2022 0.0  0.0 - 0.1 x10E3/uL Final   • Hematology Comments: 03/10/2022 Note:   Final    Verified by microscopic examination.   • Glucose 03/10/2022 91  65 - 99 mg/dL Final   • BUN 03/10/2022 16  8 - 27 mg/dL Final   • Creatinine 03/10/2022  0.96  0.57 - 1.00 mg/dL Final   • EGFR Result 03/10/2022 61  >59 mL/min/1.73 Final   • BUN/Creatinine Ratio 03/10/2022 17  12 - 28 Final   • Sodium 03/10/2022 141  134 - 144 mmol/L Final   • Potassium 03/10/2022 3.9  3.5 - 5.2 mmol/L Final   • Chloride 03/10/2022 103  96 - 106 mmol/L Final   • Total CO2 03/10/2022 24  20 - 29 mmol/L Final   • Calcium 03/10/2022 10.3  8.7 - 10.3 mg/dL Final   • Total Protein 03/10/2022 7.1  6.0 - 8.5 g/dL Final   • Albumin 03/10/2022 4.3  3.7 - 4.7 g/dL Final   • Globulin 03/10/2022 2.8  1.5 - 4.5 g/dL Final   • A/G Ratio 03/10/2022 1.5  1.2 - 2.2 Final   • Total Bilirubin 03/10/2022 0.4  0.0 - 1.2 mg/dL Final   • Alkaline Phosphatase 03/10/2022 83  44 - 121 IU/L Final   • AST (SGOT) 03/10/2022 23  0 - 40 IU/L Final   • ALT (SGPT) 03/10/2022 19  0 - 32 IU/L Final   • Hemoglobin A1C 03/10/2022 5.6  4.8 - 5.6 % Final    Comment:          Prediabetes: 5.7 - 6.4           Diabetes: >6.4           Glycemic control for adults with diabetes: <7.0     • Total Cholesterol 03/10/2022 147  100 - 199 mg/dL Final   • Triglycerides 03/10/2022 145  0 - 149 mg/dL Final   • HDL Cholesterol 03/10/2022 55  >39 mg/dL Final   • VLDL Cholesterol Anders 03/10/2022 25  5 - 40 mg/dL Final   • LDL Chol Calc (NIH) 03/10/2022 67  0 - 99 mg/dL Final   • TSH 03/10/2022 1.470  0.450 - 4.500 uIU/mL Final   • 25 Hydroxy, Vitamin D 03/10/2022 86.5  30.0 - 100.0 ng/mL Final    Comment: Vitamin D deficiency has been defined by the [place] of  Medicine and an Endocrine Society practice guideline as a  level of serum 25-OH vitamin D less than 20 ng/mL (1,2).  The Endocrine Society went on to further define vitamin D  insufficiency as a level between 21 and 29 ng/mL (2).  1. IOM ([place] of Medicine). 2010. Dietary reference     intakes for calcium and D. [place]: The     National Academies Press.  2. Kp PEARCE, Dannie KAPLAN, Violeta SHARP, et al.     Evaluation, treatment, and prevention of vitamin D      deficiency: an Endocrine Society clinical practice     guideline. JCEM. 2011 Jul; 96(7):1911-30.     • Unable to Void 03/10/2022 Comment   Final    Comment: The patient was not able to render a urine sample and has been  instructed to return for a urine collection at their earliest  convenience.  The urine testing that you have requested has  been deleted from this report.  When the patient returns and  provides a urine specimen, the urine testing will be performed  and separately reported.         Lab Results   Component Value Date    CHLPL 147 03/10/2022    TRIG 145 03/10/2022    HDL 55 03/10/2022    LDL 67 03/10/2022    VLDL 25 03/10/2022    HGBA1C 5.6 03/10/2022            HEALTH RISK ASSESSMENT    Smoking Status:  Social History     Tobacco Use   Smoking Status Never Smoker   Smokeless Tobacco Never Used     Alcohol Consumption:  Social History     Substance and Sexual Activity   Alcohol Use No     Fall Risk Screen:    STEADI Fall Risk Assessment was completed, and patient is at MODERATE risk for falls. Assessment completed on:3/17/2022    Depression Screening:  PHQ-2/PHQ-9 Depression Screening 3/17/2022   Retired PHQ-9 Total Score -   Retired Total Score -   Little Interest or Pleasure in Doing Things 0-->not at all   Feeling Down, Depressed or Hopeless 0-->not at all   PHQ-9: Brief Depression Severity Measure Score 0       Health Habits and Functional and Cognitive Screening:  Functional & Cognitive Status 3/17/2022   Do you have difficulty preparing food and eating? No   Do you have difficulty bathing yourself, getting dressed or grooming yourself? No   Do you have difficulty using the toilet? No   Do you have difficulty moving around from place to place? No   Do you have trouble with steps or getting out of a bed or a chair? No   Current Diet Well Balanced Diet   Dental Exam Up to date   Eye Exam Up to date   Exercise (times per week) 0 times per week   Current Exercises Include No Regular Exercise    Current Exercise Activities Include -   Do you need help using the phone?  No   Are you deaf or do you have serious difficulty hearing?  No   Do you need help with transportation? No   Do you need help shopping? No   Do you need help preparing meals?  No   Do you need help with housework?  No   Do you need help with laundry? No   Do you need help taking your medications? No   Do you need help managing money? No   Do you ever drive or ride in a car without wearing a seat belt? No   Have you felt unusual stress, anger or loneliness in the last month? No   Who do you live with? Spouse   If you need help, do you have trouble finding someone available to you? No   Have you been bothered in the last four weeks by sexual problems? -   Do you have difficulty concentrating, remembering or making decisions? No       Age-appropriate Screening Schedule:  Refer to the list below for future screening recommendations based on patient's age, sex and/or medical conditions. Orders for these recommended tests are listed in the plan section. The patient has been provided with a written plan.    Health Maintenance   Topic Date Due   • ZOSTER VACCINE (1 of 2) Never done   • DXA SCAN  12/29/2019   • TDAP/TD VACCINES (2 - Td or Tdap) 10/01/2022   • LIPID PANEL  03/10/2023   • MAMMOGRAM  05/25/2023   • INFLUENZA VACCINE  Completed              Assessment/Plan   CMS Preventative Services Quick Reference  Risk Factors Identified During Encounter  Cardiovascular Disease  Dementia/Memory   Depression/Dysphoria  Fall Risk-High or Moderate  Hearing Problem  Immunizations Discussed/Encouraged (specific Immunizations; Tdap, Influenza, Pneumococcal 23 and Shingrix  Inactivity/Sedentary  Obesity/Overweight   Polypharmacy  The above risks/problems have been discussed with the patient.  Follow up actions/plans if indicated are seen below in the Assessment/Plan Section.  Pertinent information has been shared with the patient in the After Visit  Summary.    Diagnoses and all orders for this visit:    1. Medicare annual wellness visit, subsequent (Primary)  -     Comprehensive metabolic panel; Future  -     TSH; Future  -     Vitamin D 25 hydroxy; Future  -     Lipid panel; Future  -     Iron and TIBC; Future  -     Ferritin; Future  -     Hemoglobin A1c; Future  -     CBC w AUTO Differential; Future    2. Essential hypertension  -     Comprehensive metabolic panel; Future  -     TSH; Future  -     Vitamin D 25 hydroxy; Future  -     Lipid panel; Future  -     Iron and TIBC; Future  -     Ferritin; Future  -     Hemoglobin A1c; Future  -     CBC w AUTO Differential; Future    3. Mixed hyperlipidemia  -     Comprehensive metabolic panel; Future  -     TSH; Future  -     Vitamin D 25 hydroxy; Future  -     Lipid panel; Future  -     Iron and TIBC; Future  -     Ferritin; Future  -     Hemoglobin A1c; Future  -     CBC w AUTO Differential; Future    4. Chronic ITP (idiopathic thrombocytopenic purpura) (HCC)  -     Comprehensive metabolic panel; Future  -     TSH; Future  -     Vitamin D 25 hydroxy; Future  -     Lipid panel; Future  -     Iron and TIBC; Future  -     Ferritin; Future  -     Hemoglobin A1c; Future  -     CBC w AUTO Differential; Future    5. Exogenous obesity  -     Comprehensive metabolic panel; Future  -     TSH; Future  -     Vitamin D 25 hydroxy; Future  -     Lipid panel; Future  -     Iron and TIBC; Future  -     Ferritin; Future  -     Hemoglobin A1c; Future  -     CBC w AUTO Differential; Future    6. Impaired glucose tolerance  -     Comprehensive metabolic panel; Future  -     TSH; Future  -     Vitamin D 25 hydroxy; Future  -     Lipid panel; Future  -     Iron and TIBC; Future  -     Ferritin; Future  -     Hemoglobin A1c; Future  -     CBC w AUTO Differential; Future    7. Vitamin D deficiency  -     Comprehensive metabolic panel; Future  -     TSH; Future  -     Vitamin D 25 hydroxy; Future  -     Lipid panel; Future  -     Iron  and TIBC; Future  -     Ferritin; Future  -     Hemoglobin A1c; Future  -     CBC w AUTO Differential; Future    8. Depression with anxiety  -     Comprehensive metabolic panel; Future  -     TSH; Future  -     Vitamin D 25 hydroxy; Future  -     Lipid panel; Future  -     Iron and TIBC; Future  -     Ferritin; Future  -     Hemoglobin A1c; Future  -     CBC w AUTO Differential; Future    9. Age-related osteoporosis without current pathological fracture  -     Comprehensive metabolic panel; Future  -     TSH; Future  -     Vitamin D 25 hydroxy; Future  -     Lipid panel; Future  -     Iron and TIBC; Future  -     Ferritin; Future  -     Hemoglobin A1c; Future  -     CBC w AUTO Differential; Future    10. Seizure disorder (HCC)  -     Comprehensive metabolic panel; Future  -     TSH; Future  -     Vitamin D 25 hydroxy; Future  -     Lipid panel; Future  -     Iron and TIBC; Future  -     Ferritin; Future  -     Hemoglobin A1c; Future  -     CBC w AUTO Differential; Future    11. Microcytosis  -     Comprehensive metabolic panel; Future  -     TSH; Future  -     Vitamin D 25 hydroxy; Future  -     Lipid panel; Future  -     Iron and TIBC; Future  -     Ferritin; Future  -     Hemoglobin A1c; Future  -     CBC w AUTO Differential; Future        Follow Up:   Return in about 6 months (around 9/17/2022) for Recheck.     An After Visit Summary and PPPS were made available to the patient.        I spent 30 minutes caring for Shyann on this date of service. This time includes time spent by me in the following activities:preparing for the visit, reviewing tests, obtaining and/or reviewing a separately obtained history, performing a medically appropriate examination and/or evaluation , counseling and educating the patient/family/caregiver, ordering medications, tests, or procedures, documenting information in the medical record, independently interpreting results and communicating that information with the  patient/family/caregiver and care coordination

## 2022-03-18 PROBLEM — R71.8 MICROCYTOSIS: Status: ACTIVE | Noted: 2022-03-18

## 2022-04-01 ENCOUNTER — TELEPHONE (OUTPATIENT)
Dept: FAMILY MEDICINE CLINIC | Facility: CLINIC | Age: 77
End: 2022-04-01

## 2022-04-01 RX ORDER — METOPROLOL SUCCINATE 50 MG/1
50 TABLET, EXTENDED RELEASE ORAL
Qty: 30 TABLET | Refills: 1 | Status: SHIPPED | OUTPATIENT
Start: 2022-04-01 | End: 2022-05-09

## 2022-04-01 NOTE — TELEPHONE ENCOUNTER
Caller: AnushkafroylanphillipShyann    Relationship: Self    Best call back number: [phone]    Requested Prescriptions:   Requested Prescriptions     Pending Prescriptions Disp Refills   • metoprolol succinate XL (TOPROL-XL) 50 MG 24 hr tablet 30 tablet 1     Sig: Take 1 tablet by mouth Daily.        Pharmacy where request should be sent: [facility] - [place] [id number] [facility] - [phone]  - [phone] FX     Additional details provided by patient: PATIENT HAS 10 TABLETS LEFT ON THIS PRESCRIPTION.     Does the patient have less than a 3 day supply:  [] Yes  [x] No    Carmine Koenig Rep   04/01/22 15:56 EDT

## 2022-04-01 NOTE — TELEPHONE ENCOUNTER
Caller: Shyann Davis    Relationship: Self    Best call back number: [phone]    What is the best time to reach you: ANYTIME    Who are you requesting to speak with (clinical staff, provider,  specific staff member): DR. ARAGON    What was the call regarding: PATIENT WANTS TO KNOW IF SHE CAN START COMING BACK TO THE OFFICE FOR HER PROLIA SHOTS.     PLEASE CALL AND LET PATIENT KNOW IF SHE IS ABLE TO OR NOT.

## 2022-05-09 DIAGNOSIS — F41.8 DEPRESSION WITH ANXIETY: ICD-10-CM

## 2022-05-09 DIAGNOSIS — E78.2 MIXED HYPERLIPIDEMIA: ICD-10-CM

## 2022-05-09 RX ORDER — ATORVASTATIN CALCIUM 40 MG/1
TABLET, FILM COATED ORAL
Qty: 90 TABLET | Refills: 0 | Status: SHIPPED | OUTPATIENT
Start: 2022-05-09 | End: 2022-09-11 | Stop reason: SDUPTHER

## 2022-05-09 RX ORDER — METOPROLOL SUCCINATE 50 MG/1
TABLET, EXTENDED RELEASE ORAL
Qty: 60 TABLET | Refills: 0 | Status: SHIPPED | OUTPATIENT
Start: 2022-05-09 | End: 2022-07-26

## 2022-05-09 RX ORDER — PAROXETINE HYDROCHLORIDE 20 MG/1
TABLET, FILM COATED ORAL
Qty: 90 TABLET | Refills: 0 | Status: SHIPPED | OUTPATIENT
Start: 2022-05-09 | End: 2022-09-11 | Stop reason: SDUPTHER

## 2022-05-16 ENCOUNTER — TELEPHONE (OUTPATIENT)
Dept: FAMILY MEDICINE CLINIC | Facility: CLINIC | Age: 77
End: 2022-05-16

## 2022-05-20 DIAGNOSIS — M81.0 AGE-RELATED OSTEOPOROSIS WITHOUT CURRENT PATHOLOGICAL FRACTURE: Primary | ICD-10-CM

## 2022-06-13 ENCOUNTER — HOSPITAL ENCOUNTER (OUTPATIENT)
Dept: INFUSION THERAPY | Facility: HOSPITAL | Age: 77
Setting detail: INFUSION SERIES
Discharge: HOME | End: 2022-06-13

## 2022-06-13 VITALS
SYSTOLIC BLOOD PRESSURE: 96 MMHG | RESPIRATION RATE: 16 BRPM | HEART RATE: 72 BPM | OXYGEN SATURATION: 98 % | DIASTOLIC BLOOD PRESSURE: 63 MMHG | TEMPERATURE: 97.1 F

## 2022-06-13 DIAGNOSIS — M81.0 AGE-RELATED OSTEOPOROSIS WITHOUT CURRENT PATHOLOGICAL FRACTURE: Primary | ICD-10-CM

## 2022-06-13 LAB
25(OH)D3 SERPL-MCNC: 83.6 NG/ML (ref 30–100)
ALBUMIN SERPL-MCNC: 4 G/DL (ref 3.5–5.2)
ALBUMIN/GLOB SERPL: 1.3 G/DL
ALP SERPL-CCNC: 84 U/L (ref 39–117)
ALT SERPL W P-5'-P-CCNC: 21 U/L (ref 1–33)
ANION GAP SERPL CALCULATED.3IONS-SCNC: 13.4 MMOL/L (ref 5–15)
AST SERPL-CCNC: 23 U/L (ref 1–32)
BILIRUB SERPL-MCNC: 0.6 MG/DL (ref 0–1.2)
BUN SERPL-MCNC: 22 MG/DL (ref 8–23)
BUN/CREAT SERPL: 19.8 (ref 7–25)
CALCIUM SPEC-SCNC: 10.9 MG/DL (ref 8.6–10.5)
CHLORIDE SERPL-SCNC: 104 MMOL/L (ref 98–107)
CO2 SERPL-SCNC: 22.6 MMOL/L (ref 22–29)
CREAT SERPL-MCNC: 1.11 MG/DL (ref 0.57–1)
EGFRCR SERPLBLD CKD-EPI 2021: 51.6 ML/MIN/1.73
GLOBULIN UR ELPH-MCNC: 3.2 GM/DL
GLUCOSE SERPL-MCNC: 174 MG/DL (ref 65–99)
MAGNESIUM SERPL-MCNC: 1.7 MG/DL (ref 1.6–2.4)
PHOSPHATE SERPL-MCNC: 2.6 MG/DL (ref 2.5–4.5)
POTASSIUM SERPL-SCNC: 3.7 MMOL/L (ref 3.5–5.2)
PROT SERPL-MCNC: 7.2 G/DL (ref 6–8.5)
SODIUM SERPL-SCNC: 140 MMOL/L (ref 136–145)

## 2022-06-13 PROCEDURE — 84100 ASSAY OF PHOSPHORUS: CPT | Performed by: FAMILY MEDICINE

## 2022-06-13 PROCEDURE — 96372 THER/PROPH/DIAG INJ SC/IM: CPT

## 2022-06-13 PROCEDURE — 83735 ASSAY OF MAGNESIUM: CPT | Performed by: FAMILY MEDICINE

## 2022-06-13 PROCEDURE — 25010000002 DENOSUMAB 60 MG/ML SOLUTION PREFILLED SYRINGE: Performed by: FAMILY MEDICINE

## 2022-06-13 PROCEDURE — 82306 VITAMIN D 25 HYDROXY: CPT | Performed by: FAMILY MEDICINE

## 2022-06-13 PROCEDURE — 80053 COMPREHEN METABOLIC PANEL: CPT | Performed by: FAMILY MEDICINE

## 2022-06-13 PROCEDURE — 36415 COLL VENOUS BLD VENIPUNCTURE: CPT

## 2022-06-13 RX ADMIN — DENOSUMAB 60 MG: 60 INJECTION SUBCUTANEOUS at 11:09

## 2022-06-13 NOTE — NURSING NOTE
Patient arrived to [facility] at 10:20.  Labs drawn and medication administered as ordered without complications.  AVS discussed and copy given to patient.  Patient discharged in stable condition and without complaints at 11:15.

## 2022-06-13 NOTE — PATIENT INSTRUCTIONS
"  Call Dr. Reese Underwood [facility] [place] at [phone] if you have any problems or concerns.    We know you have a Choice in healthcare and appreciate you using [facility].  Our purpose is to provide you \"Excellent Care\".  We hope that you will always choose us in the future and continue to recommend us to your family and friends.             "

## 2022-07-07 ENCOUNTER — TELEPHONE (OUTPATIENT)
Dept: FAMILY MEDICINE CLINIC | Facility: CLINIC | Age: 77
End: 2022-07-07

## 2022-07-07 NOTE — TELEPHONE ENCOUNTER
Caller: SusanSchuyler    Relationship: Emergency Contact    Best call back number: [phone]*    What is the best time to reach you: ANYTIME    Who are you requesting to speak with (clinical staff, provider,  specific staff member): CLINICAL    What was the call regarding: PATIENT'S SON CALLING STATING THAT THE PATIENT'S CARETAKER (HER SPOUSE), PASSED AWAY 2 WEEKS AGO, AND SCHUYLER HAS BEEN TAKING CARE OF THE PATIENT. THE PATIENT HAS FMLA PAPERS THAT WILL NEED TO BE COMPLETED BY DR. ARAGON. SCHUYLER STATES THAT HE WILL DROP THE PAPERWORK OFF AT THE OFFICE. SCHUYLER STATES THAT HE NEEDS THE PAPERWORK TO BE COMPLETED BY Wednesday, 7/13, AS HE NEEDS TO TURN IN THE PAPERWORK BY 5PM ON 7/13.    Do you require a callback: YES PLEASE TO ADVISE.

## 2022-07-07 NOTE — TELEPHONE ENCOUNTER
Advised pts son ok to bring in paperwork, he will need to do that as soon as he can since needs back by the 12th

## 2022-07-14 ENCOUNTER — OFFICE VISIT (OUTPATIENT)
Dept: FAMILY MEDICINE CLINIC | Facility: CLINIC | Age: 77
End: 2022-07-14

## 2022-07-14 VITALS
OXYGEN SATURATION: 96 % | HEART RATE: 82 BPM | BODY MASS INDEX: 31.18 KG/M2 | WEIGHT: 176 LBS | HEIGHT: 63 IN | TEMPERATURE: 97.7 F | DIASTOLIC BLOOD PRESSURE: 86 MMHG | SYSTOLIC BLOOD PRESSURE: 138 MMHG

## 2022-07-14 DIAGNOSIS — F95.9 FACIAL TIC: ICD-10-CM

## 2022-07-14 DIAGNOSIS — G47.9 SLEEP DISORDER: ICD-10-CM

## 2022-07-14 DIAGNOSIS — F43.21 GRIEVING: Primary | ICD-10-CM

## 2022-07-14 DIAGNOSIS — G47.10 HYPERSOMNOLENCE: ICD-10-CM

## 2022-07-14 PROCEDURE — 99214 OFFICE O/P EST MOD 30 MIN: CPT | Performed by: FAMILY MEDICINE

## 2022-07-15 ENCOUNTER — OFFICE VISIT (OUTPATIENT)
Dept: ONCOLOGY | Facility: CLINIC | Age: 77
End: 2022-07-15

## 2022-07-15 ENCOUNTER — LAB (OUTPATIENT)
Dept: OTHER | Facility: HOSPITAL | Age: 77
End: 2022-07-15

## 2022-07-15 VITALS
HEART RATE: 76 BPM | RESPIRATION RATE: 16 BRPM | DIASTOLIC BLOOD PRESSURE: 82 MMHG | HEIGHT: 63 IN | SYSTOLIC BLOOD PRESSURE: 128 MMHG | WEIGHT: 175.3 LBS | OXYGEN SATURATION: 96 % | BODY MASS INDEX: 31.06 KG/M2 | TEMPERATURE: 97.1 F

## 2022-07-15 DIAGNOSIS — D69.3 CHRONIC ITP (IDIOPATHIC THROMBOCYTOPENIC PURPURA): ICD-10-CM

## 2022-07-15 DIAGNOSIS — D69.6 THROMBOCYTOPENIA: Primary | ICD-10-CM

## 2022-07-15 DIAGNOSIS — E07.9 THYROID MASS: ICD-10-CM

## 2022-07-15 DIAGNOSIS — R71.8 RBC MICROCYTOSIS: ICD-10-CM

## 2022-07-15 DIAGNOSIS — R93.7 ABNORMAL X-RAY OF LUMBAR SPINE: ICD-10-CM

## 2022-07-15 DIAGNOSIS — D69.6 THROMBOCYTOPENIA: ICD-10-CM

## 2022-07-15 LAB
ALBUMIN SERPL-MCNC: 3.9 G/DL (ref 3.5–5.2)
ALBUMIN/GLOB SERPL: 1.2 G/DL
ALP SERPL-CCNC: 81 U/L (ref 39–117)
ALT SERPL W P-5'-P-CCNC: 21 U/L (ref 1–33)
ANION GAP SERPL CALCULATED.3IONS-SCNC: 8.9 MMOL/L (ref 5–15)
AST SERPL-CCNC: 23 U/L (ref 1–32)
BASOPHILS # BLD AUTO: 0.03 10*3/MM3 (ref 0–0.2)
BASOPHILS NFR BLD AUTO: 0.3 % (ref 0–1.5)
BILIRUB SERPL-MCNC: 0.5 MG/DL (ref 0–1.2)
BUN SERPL-MCNC: 22 MG/DL (ref 8–23)
BUN/CREAT SERPL: 20 (ref 7–25)
CALCIUM SPEC-SCNC: 9.6 MG/DL (ref 8.6–10.5)
CHLORIDE SERPL-SCNC: 107 MMOL/L (ref 98–107)
CO2 SERPL-SCNC: 27.1 MMOL/L (ref 22–29)
CREAT SERPL-MCNC: 1.1 MG/DL (ref 0.57–1)
DEPRECATED RDW RBC AUTO: 42.5 FL (ref 37–54)
EGFRCR SERPLBLD CKD-EPI 2021: 52.2 ML/MIN/1.73
EOSINOPHIL # BLD AUTO: 0.06 10*3/MM3 (ref 0–0.4)
EOSINOPHIL NFR BLD AUTO: 0.5 % (ref 0.3–6.2)
ERYTHROCYTE [DISTWIDTH] IN BLOOD BY AUTOMATED COUNT: 15.7 % (ref 12.3–15.4)
FERRITIN SERPL-MCNC: 28.6 NG/ML (ref 13–150)
GLOBULIN UR ELPH-MCNC: 3.2 GM/DL
GLUCOSE SERPL-MCNC: 124 MG/DL (ref 65–99)
HCT VFR BLD AUTO: 41.9 % (ref 34–46.6)
HGB BLD-MCNC: 12.9 G/DL (ref 12–15.9)
IMM GRANULOCYTES # BLD AUTO: 0.06 10*3/MM3 (ref 0–0.05)
IMM GRANULOCYTES NFR BLD AUTO: 0.5 % (ref 0–0.5)
IRON 24H UR-MRATE: 74 MCG/DL (ref 37–145)
IRON SATN MFR SERPL: 16 % (ref 20–50)
LYMPHOCYTES # BLD AUTO: 2.36 10*3/MM3 (ref 0.7–3.1)
LYMPHOCYTES NFR BLD AUTO: 21 % (ref 19.6–45.3)
MCH RBC QN AUTO: 23.3 PG (ref 26.6–33)
MCHC RBC AUTO-ENTMCNC: 30.8 G/DL (ref 31.5–35.7)
MCV RBC AUTO: 75.8 FL (ref 79–97)
MONOCYTES # BLD AUTO: 0.52 10*3/MM3 (ref 0.1–0.9)
MONOCYTES NFR BLD AUTO: 4.6 % (ref 5–12)
NEUTROPHILS NFR BLD AUTO: 73.1 % (ref 42.7–76)
NEUTROPHILS NFR BLD AUTO: 8.19 10*3/MM3 (ref 1.7–7)
NRBC BLD AUTO-RTO: 0 /100 WBC (ref 0–0.2)
PLAT MORPH BLD: NORMAL
PLATELET # BLD AUTO: 92 10*3/MM3 (ref 140–450)
PLATELET # BLD AUTO: 92 10*3/MM3 (ref 140–450)
PLATELETS.RETICULATED NFR BLD AUTO: 33.1 % (ref 0.9–6.5)
POTASSIUM SERPL-SCNC: 4.4 MMOL/L (ref 3.5–5.2)
PROT SERPL-MCNC: 7.1 G/DL (ref 6–8.5)
RBC # BLD AUTO: 5.53 10*6/MM3 (ref 3.77–5.28)
RBC MORPH BLD: NORMAL
SODIUM SERPL-SCNC: 143 MMOL/L (ref 136–145)
TIBC SERPL-MCNC: 448 MCG/DL (ref 298–536)
TRANSFERRIN SERPL-MCNC: 301 MG/DL (ref 200–360)
WBC MORPH BLD: NORMAL
WBC NRBC COR # BLD: 11.22 10*3/MM3 (ref 3.4–10.8)

## 2022-07-15 PROCEDURE — 82728 ASSAY OF FERRITIN: CPT | Performed by: INTERNAL MEDICINE

## 2022-07-15 PROCEDURE — 85007 BL SMEAR W/DIFF WBC COUNT: CPT | Performed by: INTERNAL MEDICINE

## 2022-07-15 PROCEDURE — 99213 OFFICE O/P EST LOW 20 MIN: CPT | Performed by: INTERNAL MEDICINE

## 2022-07-15 PROCEDURE — 84466 ASSAY OF TRANSFERRIN: CPT | Performed by: INTERNAL MEDICINE

## 2022-07-15 PROCEDURE — 83020 HEMOGLOBIN ELECTROPHORESIS: CPT | Performed by: INTERNAL MEDICINE

## 2022-07-15 PROCEDURE — 85055 RETICULATED PLATELET ASSAY: CPT | Performed by: INTERNAL MEDICINE

## 2022-07-15 PROCEDURE — 85025 COMPLETE CBC W/AUTO DIFF WBC: CPT | Performed by: INTERNAL MEDICINE

## 2022-07-15 PROCEDURE — 83540 ASSAY OF IRON: CPT | Performed by: INTERNAL MEDICINE

## 2022-07-15 PROCEDURE — 36415 COLL VENOUS BLD VENIPUNCTURE: CPT

## 2022-07-15 PROCEDURE — 80053 COMPREHEN METABOLIC PANEL: CPT | Performed by: INTERNAL MEDICINE

## 2022-07-15 RX ORDER — ERGOCALCIFEROL 1.25 MG/1
CAPSULE ORAL
COMMUNITY

## 2022-07-15 NOTE — PROGRESS NOTES
Subjective     REASON FOR CONSULTATION:   1.  Mild chronic ITP  2.  Remote history of breast cancer  3.  Thyroid nodules. Pathology benign from FNA    HISTORY OF PRESENT ILLNESS:  The patient is a 76 y.o. year old female who was referred to us from her primary care office for evaluation of mild chronic thrombocytopenia.  Her platelet count had been slightly low for at least 2 years with a platelet count of 134,000 in 2019.  It had drifted down to 82,000 on labs from 3/3/2021.  Her hemoglobin and white cells were normal.    With her initial consult visit of 3/19/2021 we performed an immature platelet fraction which was markedly elevated consistent with ITP.      We have been observing her without treatment as her platelet count has remained in a safe range from 80,000-110,000.      She required hospitalization due to gangrenous cholecystitis leading to sepsis.  She was in the hospital from 10/22/2021 through 2021 and underwent cholecystectomy and antibiotic therapy.     Shyann is here today for her routine 6-month follow-up.  Her platelet count is stable today at 92,000.  She is not anemic but she does have low MCV chronically and we will be checking hemoglobin electrophoresis with her labs today.    She tells me that she [facility] with no suspicious findings.    Unfortunately her   unexpectedly of a heart attack while working in the garden just few weeks ago.    History of Present Illness     Past Medical History:   Diagnosis Date   • Age-related osteoporosis without current pathological fracture 2019   • Anxiety    • Breast cancer (HCC)     Right   • Depression    • History of thrombocytopenia    • Hyperlipidemia    • Hypertension    • Osteoporosis    • Seizure (HCC)     after brain surgery only        Past Surgical History:   Procedure Laterality Date   • APPENDECTOMY     • BRAIN AVM REPAIR     • BREAST LUMPECTOMY  Right 2002   • BREAST SURGERY     • CHOLECYSTECTOMY N/A 10/25/2021    Procedure: CHOLECYSTECTOMY LAPAROSCOPIC;  Surgeon: Gera Kay MD;  Location: [facility];  Service: General;  Laterality: N/A;   • HYSTERECTOMY  1976        Current Outpatient Medications on File Prior to Visit   Medication Sig Dispense Refill   • atorvastatin (LIPITOR) 40 MG tablet TAKE 1 TABLET EVERY DAY 90 tablet 0   • Calcium 600-200 MG-UNIT per tablet Take 1 tablet by mouth 2 (Two) Times a Day.     • levETIRAcetam (KEPPRA) 500 MG tablet TAKE 1/2 TAB TWICE A DAY FOR A WEEK THEN 1 TAB TWICE A DAY     • metoprolol succinate XL (TOPROL-XL) 50 MG 24 hr tablet TAKE 1 TABLET EVERY DAY 60 tablet 0   • Mirabegron ER (MYRBETRIQ) 25 MG tablet sustained-release 24 hour 24 hr tablet Take 25 mg by mouth Daily.     • MULTIPLE VITAMINS ESSENTIAL PO Take  by mouth.     • PARoxetine (PAXIL) 20 MG tablet TAKE 1 TABLET EVERY MORNING 90 tablet 0   • PROLIA 60 MG/ML solution syringe FOR  BY YOUR PHYSICIAN TO INJECT 60MG (1ML) SUBCUTANEOUSLY EVERY 6 MONTHS 1 syringe 0     No current facility-administered medications on file prior to visit.        ALLERGIES:    Allergies   Allergen Reactions   • Cephalexin Myalgia     Migraine   • Sulfa Antibiotics Myalgia     Migraine   • Erythromycin GI Intolerance   • Neomycin-Bacitracin Zn-Polymyx Rash        Social History     Socioeconomic History   • Marital status:      Spouse name: Thanh   Tobacco Use   • Smoking status: Never Smoker   • Smokeless tobacco: Never Used   Vaping Use   • Vaping Use: Never used   Substance and Sexual Activity   • Alcohol use: No   • Drug use: No   • Sexual activity: Defer        History reviewed. No pertinent family history.     Review of Systems   Constitutional: Positive for activity change and fatigue. Negative for chills and fever.   HENT: Negative for mouth sores, trouble swallowing and voice change.    Eyes: Negative for pain and visual disturbance.  "  Respiratory: Negative for cough, shortness of breath and wheezing.    Cardiovascular: Negative for chest pain and palpitations.   Gastrointestinal: Negative for abdominal pain, constipation, diarrhea, nausea and vomiting.   Genitourinary: Negative for difficulty urinating, frequency and urgency.        Stress incontinence   Musculoskeletal: Negative for arthralgias and joint swelling.   Skin: Negative for rash.   Neurological: Negative for dizziness, seizures, weakness and headaches.   Hematological: Negative for adenopathy. Bruises/bleeds easily.   Psychiatric/Behavioral: Negative for behavioral problems and confusion. The patient is not nervous/anxious.         Memory deficit        Objective     Vitals:    07/15/22 1518   Temp: 97.1 °F (36.2 °C)   TempSrc: Temporal   Weight: 79.5 kg (175 lb 4.8 oz)   Height: 160 cm (62.99\")   PainSc: 0-No pain     Current Status 7/15/2022   ECOG score 0       Physical Exam  Constitutional:       General: She is not in acute distress.     Appearance: She is well-developed.   HENT:      Head: Normocephalic.   Eyes:      General: No scleral icterus.     Conjunctiva/sclera: Conjunctivae normal.      Pupils: Pupils are equal, round, and reactive to light.   Neck:      Thyroid: No thyromegaly.      Vascular: No JVD.   Cardiovascular:      Rate and Rhythm: Normal rate and regular rhythm.      Heart sounds: No murmur heard.    No friction rub. No gallop.   Pulmonary:      Effort: Pulmonary effort is normal.      Breath sounds: Normal breath sounds. No wheezing or rales.   Abdominal:      General: There is no distension.      Palpations: Abdomen is soft. There is no mass.      Tenderness: There is no abdominal tenderness.   Musculoskeletal:         General: No deformity. Normal range of motion.      Cervical back: Normal range of motion and neck supple.      Comments: Kyphosis   Lymphadenopathy:      Cervical: No cervical adenopathy.   Skin:     General: Skin is warm and dry.      " Findings: No erythema or rash.   Neurological:      Mental Status: She is alert and oriented to person, place, and time.      Cranial Nerves: No cranial nerve deficit.      Deep Tendon Reflexes: Reflexes are normal and symmetric.   Psychiatric:         Behavior: Behavior normal.         Judgment: Judgment normal.           RECENT LABS:  Hematology WBC   Date Value Ref Range Status   03/10/2022 10.4 3.4 - 10.8 x10E3/uL Final     Comment:     **Verified by repeat analysis**     RBC   Date Value Ref Range Status   03/10/2022 5.83 (H) 3.77 - 5.28 x10E6/uL Final     Hemoglobin   Date Value Ref Range Status   03/10/2022 13.2 11.1 - 15.9 g/dL Final   01/14/2022 12.1 12.0 - 15.9 g/dL Final     Hematocrit   Date Value Ref Range Status   03/10/2022 42.0 34.0 - 46.6 % Final   01/14/2022 38.4 34.0 - 46.6 % Final     Platelets   Date Value Ref Range Status   03/10/2022 91 (L) 150 - 450 x10E3/uL Final     Comment:     Actual platelet count may be somewhat higher than reported due to  aggregation of platelets in this sample.  Large platelets were observed.     01/14/2022 91 (L) 140 - 450 10*3/mm3 Final      7/15/2022  IPF   0.90 - 6.50 % 33.10 High      25.20 High    24.90 High     Platelets   140 - 450 10*3/mm3 92 Low              Lab Results   Component Value Date    GLUCOSE 174 (H) 06/13/2022    BUN 22 06/13/2022    CREATININE 1.11 (H) 06/13/2022    EGFRIFNONA 52 (L) 12/23/2021    EGFRIFAFRI 60 (L) 09/02/2021    BCR 19.8 06/13/2022    K 3.7 06/13/2022    CO2 22.6 06/13/2022    CALCIUM 10.9 (H) 06/13/2022    PROTENTOTREF 7.1 03/10/2022    ALBUMIN 4.00 06/13/2022    LABIL2 1.5 03/10/2022    AST 23 06/13/2022    ALT 21 06/13/2022         PATHOLOGY 12/29/2021  Final Diagnosis   1. Thyroid, Left Isthmus, Fine Needle Aspiration (FNA):               A. Gridley Category II:  Changes consistent with benign follicular nodule.     2. Thyroid, Left Mid to Inferior, Fine Needle Aspiration (FNA):               A. Gridley Category II:   Changes consistent with benign follicular nodule.       PATHOLOGY 10/25/2021  Final Diagnosis   1. Gallbladder:    A. Severe acute necrotizing calculus cholecystitis .     NUCLEAR MEDICINE WHOLE BODY BONE SCAN 11/12/2021  IMPRESSION:  Degenerative/arthritic uptake without evidence for bony  metastatic disease. There is a history of abnormal CT of the spine  though that is not available for comparison. Scoliotic curvature is  present of the lower cervical spine and upper thoracic spine where there  is associated degenerative uptake.     THYROID SONOGRAM 11/12/2021.  IMPRESSION:  1.  Multiple bilateral thyroid nodules. Fine-needle aspiration of both  the 3.2 cm nodule within the left mid to inferior pole and of the 4.1 cm  nodule within the medial left thyroid/isthmus is recommended per TIRADS  criteria. If biopsy results are negative, follow-up with thyroid  sonogram 6 months is recommended to ensure stability.  2.  Mildly heterogenous thyroid echotexture suggestive of diffuse  thyroid disease in the appropriate clinical context and correlation with  patient history and laboratory values is recommended to establish the  most appropriate etiology as sonographic findings are nonspecific.    Assessment & Plan   1.  Mild chronic ITP with a platelet count that has been in a safe range from 80,000-110,000.  Platelet count in the office today is 92,000.  2.  Remote history of breast cancer.  Recent screening mammogram with no suspicious findings.  3.  Negative colon cancer screening with Cologuard 8/18/2020  4.  Microcytic anemia.  No evidence of iron deficiency.  Suspect she may have a hemoglobinopathy.  5.  Gangrenous cholecystitis treated with cholecystectomy 10/25/2021  6.  Lesions in the bone noted on CT scans during her recent admission.  Bone scan from 11/12/2021 does not show any worrisome findings as noted above.  7.  Thyroid nodules.  FNA biopsy 12/29/2021 benign  8.  Grief reaction due to the unexpected loss of  her  a few weeks ago.      PLAN  1.  We will continue to observe her blood counts with MD follow-up and lab in 6 months.  2.  We will get a copy of her latest mammogram report.  3.  We did order hemoglobin electrophoresis today to try to explain her chronic microcytosis.    She was instructed to call us with any new problems or concerns prior to that follow up visit.

## 2022-07-16 PROBLEM — G47.9 SLEEP DISORDER: Status: ACTIVE | Noted: 2022-07-16

## 2022-07-16 PROBLEM — F95.9 FACIAL TIC: Status: ACTIVE | Noted: 2022-07-16

## 2022-07-16 PROBLEM — G47.10 HYPERSOMNOLENCE: Status: ACTIVE | Noted: 2022-07-16

## 2022-07-16 NOTE — PROGRESS NOTES
Subjective   Shyann Davis is a 76 y.o. female with   Chief Complaint   Patient presents with   • Tremors     Son is taking over his moms care and has questions.  Discuss tremor, is it normal?  Her jaw is always shaking   • Sleeping Problem     Pt sleeps all the time   .    History of Present Illness   76-year-old white female who has recently lost her -passed away from a sudden MI.  She has been extremely anxious and grieving and is cared for by her adult son.  She continues to live alone in her home and again her care is overseen by her son.  She has episodes when she is nervous that her mouth somewhat quivers but there has been no tremor observed in the upper extremities either at rest or with intention.  No other tremor is seen in any other part of the body.  Her mouth movement again seems to be related to times of stress and many times is absent.  She also states that she has been sleeping all the time and that this has been going on for quite a long period of time.  She is unaware of snoring and has never had a sleep eval.  Last fasting labs drawn in this office were in March of this year.  For the most part these results were unremarkable with the exclusion of low platelets which has been identified by hematology as ITP.  She will see hematology within the next 1 to 2 days.  She is not receiving grief counseling and appears not to be interested.    The following portions of the patient's history were reviewed and updated as appropriate: allergies, current medications, past family history, past medical history, past social history, past surgical history and problem list.    Review of Systems   Neurological:        Mouth tic   Psychiatric/Behavioral: Positive for sleep disturbance. The patient is nervous/anxious.         Grieving       Objective     Vitals:    07/14/22 0949   BP: 138/86   Pulse: 82   Temp: 97.7 °F (36.5 °C)   SpO2: 96%       Recent Results (from the past 672 hour(s))   Comprehensive  Metabolic Panel    Collection Time: 07/15/22  3:12 PM    Specimen: Blood   Result Value Ref Range    Glucose 124 (H) 65 - 99 mg/dL    BUN 22 8 - 23 mg/dL    Creatinine 1.10 (H) 0.57 - 1.00 mg/dL    Sodium 143 136 - 145 mmol/L    Potassium 4.4 3.5 - 5.2 mmol/L    Chloride 107 98 - 107 mmol/L    CO2 27.1 22.0 - 29.0 mmol/L    Calcium 9.6 8.6 - 10.5 mg/dL    Total Protein 7.1 6.0 - 8.5 g/dL    Albumin 3.90 3.50 - 5.20 g/dL    ALT (SGPT) 21 1 - 33 U/L    AST (SGOT) 23 1 - 32 U/L    Alkaline Phosphatase 81 39 - 117 U/L    Total Bilirubin 0.5 0.0 - 1.2 mg/dL    Globulin 3.2 gm/dL    A/G Ratio 1.2 g/dL    BUN/Creatinine Ratio 20.0 7.0 - 25.0    Anion Gap 8.9 5.0 - 15.0 mmol/L    eGFR 52.2 (L) >60.0 mL/min/1.73   Immature Platelet Fraction    Collection Time: 07/15/22  3:12 PM    Specimen: Blood   Result Value Ref Range    IPF 33.10 (H) 0.90 - 6.50 %    Platelets 92 (L) 140 - 450 10*3/mm3   Ferritin    Collection Time: 07/15/22  3:12 PM    Specimen: Blood   Result Value Ref Range    Ferritin 28.60 13.00 - 150.00 ng/mL   Iron Profile    Collection Time: 07/15/22  3:12 PM    Specimen: Blood   Result Value Ref Range    Iron 74 37 - 145 mcg/dL    Iron Saturation 16 (L) 20 - 50 %    Transferrin 301 200 - 360 mg/dL    TIBC 448 298 - 536 mcg/dL   CBC Auto Differential    Collection Time: 07/15/22  3:12 PM    Specimen: Blood   Result Value Ref Range    WBC 11.22 (H) 3.40 - 10.80 10*3/mm3    RBC 5.53 (H) 3.77 - 5.28 10*6/mm3    Hemoglobin 12.9 12.0 - 15.9 g/dL    Hematocrit 41.9 34.0 - 46.6 %    MCV 75.8 (L) 79.0 - 97.0 fL    MCH 23.3 (L) 26.6 - 33.0 pg    MCHC 30.8 (L) 31.5 - 35.7 g/dL    RDW 15.7 (H) 12.3 - 15.4 %    RDW-SD 42.5 37.0 - 54.0 fl    Platelets 92 (L) 140 - 450 10*3/mm3    Neutrophil % 73.1 42.7 - 76.0 %    Lymphocyte % 21.0 19.6 - 45.3 %    Monocyte % 4.6 (L) 5.0 - 12.0 %    Eosinophil % 0.5 0.3 - 6.2 %    Basophil % 0.3 0.0 - 1.5 %    Immature Grans % 0.5 0.0 - 0.5 %    Neutrophils, Absolute 8.19 (H) 1.70 - 7.00  10*3/mm3    Lymphocytes, Absolute 2.36 0.70 - 3.10 10*3/mm3    Monocytes, Absolute 0.52 0.10 - 0.90 10*3/mm3    Eosinophils, Absolute 0.06 0.00 - 0.40 10*3/mm3    Basophils, Absolute 0.03 0.00 - 0.20 10*3/mm3    Immature Grans, Absolute 0.06 (H) 0.00 - 0.05 10*3/mm3    nRBC 0.0 0.0 - 0.2 /100 WBC   Scan Slide    Collection Time: 07/15/22  3:12 PM    Specimen: Blood   Result Value Ref Range    RBC Morphology Normal Normal    WBC Morphology Normal Normal    Platelet Morphology Normal Normal       Physical Exam  Vitals and nursing note reviewed.   Constitutional:       Appearance: Normal appearance. She is well-developed and well-groomed.   HENT:      Head: Normocephalic and atraumatic.   Neck:      Thyroid: No thyroid mass or thyromegaly.      Vascular: Normal carotid pulses. No carotid bruit.      Trachea: Trachea and phonation normal.   Cardiovascular:      Rate and Rhythm: Normal rate and regular rhythm.      Heart sounds: Normal heart sounds. No murmur heard.    No friction rub. No gallop.   Pulmonary:      Effort: Pulmonary effort is normal. No respiratory distress.      Breath sounds: Normal breath sounds. No decreased breath sounds, wheezing, rhonchi or rales.   Musculoskeletal:      Cervical back: Neck supple.   Lymphadenopathy:      Cervical: No cervical adenopathy.   Skin:     General: Skin is warm and dry.      Findings: No rash.   Neurological:      Mental Status: She is alert and oriented to person, place, and time.      Comments: Mouth tic was observed on a couple of occasions during this visit although this activity was very rare.  For the most part she communicated appropriately without adverse movement of mouth.  No evidence of tremor was observed in the upper extremities at either rest or with intention.   Psychiatric:         Attention and Perception: Attention and perception normal.         Mood and Affect: Mood and affect normal.         Speech: Speech normal.         Behavior: Behavior normal.  Behavior is cooperative.         Thought Content: Thought content normal.         Cognition and Memory: Cognition and memory normal.         Judgment: Judgment normal.         Assessment & Plan   Diagnoses and all orders for this visit:    1. Grieving (Primary)    2. Facial tic    3. Sleep disorder    4. Hypersomnolence    Reassurance was given to patient in regards to facial tic with anticipation of spontaneous resolution with time.  Have strongly recommended grief counseling which patient will consider.  Have also very strongly recommended a sleep evaluation given her prolonged sleep habits and hypersomnolence.  She again is not ready to pursue this but will consider this and contact this office for further arrangements when she is ready.  Follow-up in this office will otherwise be as per previous scheduled which is often preceded by fasting labs.  20 minutes of a 30-minute appointment spent counseling in regards to facial tics versus tremors.  Time was also spent counseling in regards to grief, sleep evaluation as well as hypersomnolence.    Return if symptoms worsen or fail to improve, for Next scheduled follow up.

## 2022-07-18 LAB
HGB A MFR BLD ELPH: 97.3 % (ref 96.4–98.8)
HGB A2 MFR BLD ELPH: 2.7 % (ref 1.8–3.2)
HGB F MFR BLD ELPH: 0 % (ref 0–2)
HGB FRACT BLD-IMP: NORMAL
HGB S MFR BLD ELPH: 0 %

## 2022-07-26 RX ORDER — METOPROLOL SUCCINATE 50 MG/1
TABLET, EXTENDED RELEASE ORAL
Qty: 90 TABLET | Refills: 0 | Status: SHIPPED | OUTPATIENT
Start: 2022-07-26 | End: 2023-01-30

## 2022-08-08 ENCOUNTER — TELEPHONE (OUTPATIENT)
Dept: FAMILY MEDICINE CLINIC | Facility: CLINIC | Age: 77
End: 2022-08-08

## 2022-08-08 NOTE — TELEPHONE ENCOUNTER
PATIENT WOULD LIKE TO KNOW WHAT THE FOLLOWING MEDICATION IS FOR.   metoprolol succinate XL     PLEASE ADVISE [phone]

## 2022-08-30 DIAGNOSIS — E55.9 VITAMIN D DEFICIENCY: ICD-10-CM

## 2022-08-30 DIAGNOSIS — I10 ESSENTIAL HYPERTENSION: ICD-10-CM

## 2022-08-30 DIAGNOSIS — Z00.00 MEDICARE ANNUAL WELLNESS VISIT, SUBSEQUENT: ICD-10-CM

## 2022-08-30 DIAGNOSIS — R73.02 IMPAIRED GLUCOSE TOLERANCE: ICD-10-CM

## 2022-08-30 DIAGNOSIS — E78.2 MIXED HYPERLIPIDEMIA: ICD-10-CM

## 2022-08-30 DIAGNOSIS — G40.909 SEIZURE DISORDER: ICD-10-CM

## 2022-08-30 DIAGNOSIS — M81.0 AGE-RELATED OSTEOPOROSIS WITHOUT CURRENT PATHOLOGICAL FRACTURE: ICD-10-CM

## 2022-08-30 DIAGNOSIS — R71.8 MICROCYTOSIS: ICD-10-CM

## 2022-08-30 DIAGNOSIS — F41.8 DEPRESSION WITH ANXIETY: ICD-10-CM

## 2022-08-30 DIAGNOSIS — D69.3 CHRONIC ITP (IDIOPATHIC THROMBOCYTOPENIC PURPURA): ICD-10-CM

## 2022-08-30 DIAGNOSIS — E66.09 EXOGENOUS OBESITY: ICD-10-CM

## 2022-09-02 LAB
25(OH)D3+25(OH)D2 SERPL-MCNC: 65.9 NG/ML (ref 30–100)
ALBUMIN SERPL-MCNC: 4.1 G/DL (ref 3.5–5.2)
ALBUMIN/GLOB SERPL: 1.6 G/DL
ALP SERPL-CCNC: 74 U/L (ref 39–117)
ALT SERPL-CCNC: 21 U/L (ref 1–33)
AST SERPL-CCNC: 26 U/L (ref 1–32)
BASOPHILS # BLD AUTO: 0.04 10*3/MM3 (ref 0–0.2)
BASOPHILS NFR BLD AUTO: 0.4 % (ref 0–1.5)
BILIRUB SERPL-MCNC: 0.6 MG/DL (ref 0–1.2)
BUN SERPL-MCNC: 18 MG/DL (ref 8–23)
BUN/CREAT SERPL: 18.2 (ref 7–25)
CALCIUM SERPL-MCNC: 10.1 MG/DL (ref 8.6–10.5)
CHLORIDE SERPL-SCNC: 103 MMOL/L (ref 98–107)
CHOLEST SERPL-MCNC: 140 MG/DL (ref 0–200)
CO2 SERPL-SCNC: 30 MMOL/L (ref 22–29)
CREAT SERPL-MCNC: 0.99 MG/DL (ref 0.57–1)
EGFRCR-CYS SERPLBLD CKD-EPI 2021: 58.8 ML/MIN/1.73
EOSINOPHIL # BLD AUTO: 0.08 10*3/MM3 (ref 0–0.4)
EOSINOPHIL NFR BLD AUTO: 0.7 % (ref 0.3–6.2)
ERYTHROCYTE [DISTWIDTH] IN BLOOD BY AUTOMATED COUNT: 14.9 % (ref 12.3–15.4)
FERRITIN SERPL-MCNC: 31.5 NG/ML (ref 13–150)
GLOBULIN SER CALC-MCNC: 2.5 GM/DL
GLUCOSE SERPL-MCNC: 106 MG/DL (ref 65–99)
HBA1C MFR BLD: 5.6 % (ref 4.8–5.6)
HCT VFR BLD AUTO: 43.3 % (ref 34–46.6)
HDLC SERPL-MCNC: 56 MG/DL (ref 40–60)
HGB BLD-MCNC: 13.1 G/DL (ref 12–15.9)
IMM GRANULOCYTES # BLD AUTO: 0.06 10*3/MM3 (ref 0–0.05)
IMM GRANULOCYTES NFR BLD AUTO: 0.5 % (ref 0–0.5)
IRON SATN MFR SERPL: 18 % (ref 20–50)
IRON SERPL-MCNC: 86 MCG/DL (ref 37–145)
LDLC SERPL CALC-MCNC: 58 MG/DL (ref 0–100)
LYMPHOCYTES # BLD AUTO: 1.88 10*3/MM3 (ref 0.7–3.1)
LYMPHOCYTES NFR BLD AUTO: 17.2 % (ref 19.6–45.3)
MCH RBC QN AUTO: 22.8 PG (ref 26.6–33)
MCHC RBC AUTO-ENTMCNC: 30.3 G/DL (ref 31.5–35.7)
MCV RBC AUTO: 75.4 FL (ref 79–97)
MONOCYTES # BLD AUTO: 0.49 10*3/MM3 (ref 0.1–0.9)
MONOCYTES NFR BLD AUTO: 4.5 % (ref 5–12)
NEUTROPHILS # BLD AUTO: 8.38 10*3/MM3 (ref 1.7–7)
NEUTROPHILS NFR BLD AUTO: 76.7 % (ref 42.7–76)
PLATELET # BLD AUTO: 104 10*3/MM3 (ref 140–450)
POTASSIUM SERPL-SCNC: 3.8 MMOL/L (ref 3.5–5.2)
PROT SERPL-MCNC: 6.6 G/DL (ref 6–8.5)
RBC # BLD AUTO: 5.74 10*6/MM3 (ref 3.77–5.28)
SODIUM SERPL-SCNC: 142 MMOL/L (ref 136–145)
TIBC SERPL-MCNC: 489 MCG/DL
TRIGL SERPL-MCNC: 153 MG/DL (ref 0–150)
TSH SERPL DL<=0.005 MIU/L-ACNC: 1.53 UIU/ML (ref 0.27–4.2)
UIBC SERPL-MCNC: 403 MCG/DL (ref 112–346)
VLDLC SERPL CALC-MCNC: 26 MG/DL (ref 5–40)
WBC # BLD AUTO: 10.93 10*3/MM3 (ref 3.4–10.8)

## 2022-09-08 ENCOUNTER — OFFICE VISIT (OUTPATIENT)
Dept: FAMILY MEDICINE CLINIC | Facility: CLINIC | Age: 77
End: 2022-09-08

## 2022-09-08 VITALS
HEIGHT: 63 IN | BODY MASS INDEX: 31.54 KG/M2 | SYSTOLIC BLOOD PRESSURE: 126 MMHG | OXYGEN SATURATION: 98 % | WEIGHT: 178 LBS | TEMPERATURE: 98 F | DIASTOLIC BLOOD PRESSURE: 70 MMHG | HEART RATE: 74 BPM

## 2022-09-08 DIAGNOSIS — D69.3 CHRONIC ITP (IDIOPATHIC THROMBOCYTOPENIC PURPURA): ICD-10-CM

## 2022-09-08 DIAGNOSIS — E66.09 EXOGENOUS OBESITY: ICD-10-CM

## 2022-09-08 DIAGNOSIS — F41.8 DEPRESSION WITH ANXIETY: ICD-10-CM

## 2022-09-08 DIAGNOSIS — N32.81 OVERACTIVE BLADDER: ICD-10-CM

## 2022-09-08 DIAGNOSIS — R73.02 IMPAIRED GLUCOSE TOLERANCE: ICD-10-CM

## 2022-09-08 DIAGNOSIS — E78.2 MIXED HYPERLIPIDEMIA: ICD-10-CM

## 2022-09-08 DIAGNOSIS — I10 ESSENTIAL HYPERTENSION: Primary | ICD-10-CM

## 2022-09-08 DIAGNOSIS — M81.0 AGE-RELATED OSTEOPOROSIS WITHOUT CURRENT PATHOLOGICAL FRACTURE: ICD-10-CM

## 2022-09-08 DIAGNOSIS — E55.9 VITAMIN D DEFICIENCY: ICD-10-CM

## 2022-09-08 DIAGNOSIS — R71.8 MICROCYTOSIS: ICD-10-CM

## 2022-09-08 PROCEDURE — 99213 OFFICE O/P EST LOW 20 MIN: CPT | Performed by: FAMILY MEDICINE

## 2022-09-11 RX ORDER — PAROXETINE HYDROCHLORIDE 20 MG/1
20 TABLET, FILM COATED ORAL EVERY MORNING
Qty: 90 TABLET | Refills: 1 | Status: SHIPPED | OUTPATIENT
Start: 2022-09-11 | End: 2023-03-10 | Stop reason: SDUPTHER

## 2022-09-11 RX ORDER — ATORVASTATIN CALCIUM 40 MG/1
40 TABLET, FILM COATED ORAL DAILY
Qty: 90 TABLET | Refills: 1 | Status: SHIPPED | OUTPATIENT
Start: 2022-09-11 | End: 2023-03-10 | Stop reason: SDUPTHER

## 2022-09-11 NOTE — PROGRESS NOTES
Subjective   Shyann Davis is a 77 y.o. female with   Chief Complaint   Patient presents with   • Hypertension   • Hyperlipidemia   .    History of Present Illness   77-year-old white female with known history of hypertension and hyperlipidemia here for further medical management.  Currently patient is using atorvastatin at 40 mg daily as well as Toprol-XL at 50 mg daily.  Patient with history of bipolar disorder using Paxil 20 mg daily as well as Keppra at 500 mg twice daily for seizure disorder.  Myrbetriq is used for overactive bladder and Prolia on an every 6-month basis for osteoporosis.  All medications are used appropriately and are well-tolerated without side effects.  Fasting labs have been obtained prior to this visit.  Patient is here accompanied by her son as she has been recently .  The following portions of the patient's history were reviewed and updated as appropriate: allergies, current medications, past family history, past medical history, past social history, past surgical history and problem list.    Review of Systems   Cardiovascular:        Hyperlipidemia, hypertension   Endocrine:        Exogenous obesity, vitamin D deficiency   Musculoskeletal:        Osteoporosis   Neurological: Positive for seizures.   Psychiatric/Behavioral: Positive for dysphoric mood. The patient is nervous/anxious.         Bipolar disorder       Objective     Vitals:    09/08/22 1510   BP: 126/70   Pulse: 74   Temp: 98 °F (36.7 °C)   SpO2: 98%       Recent Results (from the past 672 hour(s))   CBC w AUTO Differential    Collection Time: 09/01/22  9:47 AM    Specimen: Blood   Result Value Ref Range    WBC 10.93 (H) 3.40 - 10.80 10*3/mm3    RBC 5.74 (H) 3.77 - 5.28 10*6/mm3    Hemoglobin 13.1 12.0 - 15.9 g/dL    Hematocrit 43.3 34.0 - 46.6 %    MCV 75.4 (L) 79.0 - 97.0 fL    MCH 22.8 (L) 26.6 - 33.0 pg    MCHC 30.3 (L) 31.5 - 35.7 g/dL    RDW 14.9 12.3 - 15.4 %    Platelets 104 (L) 140 - 450 10*3/mm3    Neutrophil  Rel % 76.7 (H) 42.7 - 76.0 %    Lymphocyte Rel % 17.2 (L) 19.6 - 45.3 %    Monocyte Rel % 4.5 (L) 5.0 - 12.0 %    Eosinophil Rel % 0.7 0.3 - 6.2 %    Basophil Rel % 0.4 0.0 - 1.5 %    Neutrophils Absolute 8.38 (H) 1.70 - 7.00 10*3/mm3    Lymphocytes Absolute 1.88 0.70 - 3.10 10*3/mm3    Monocytes Absolute 0.49 0.10 - 0.90 10*3/mm3    Eosinophils Absolute 0.08 0.00 - 0.40 10*3/mm3    Basophils Absolute 0.04 0.00 - 0.20 10*3/mm3    Immature Granulocyte Rel % 0.5 0.0 - 0.5 %    Immature Grans Absolute 0.06 (H) 0.00 - 0.05 10*3/mm3   Hemoglobin A1c    Collection Time: 09/01/22  9:47 AM    Specimen: Blood   Result Value Ref Range    Hemoglobin A1C 5.60 4.80 - 5.60 %   Ferritin    Collection Time: 09/01/22  9:47 AM    Specimen: Blood   Result Value Ref Range    Ferritin 31.50 13.00 - 150.00 ng/mL   Iron and TIBC    Collection Time: 09/01/22  9:47 AM    Specimen: Blood   Result Value Ref Range    TIBC 489 mcg/dL    UIBC 403 (H) 112 - 346 mcg/dL    Iron 86 37 - 145 mcg/dL    Iron Saturation 18 (L) 20 - 50 %   Lipid panel    Collection Time: 09/01/22  9:47 AM    Specimen: Blood   Result Value Ref Range    Total Cholesterol 140 0 - 200 mg/dL    Triglycerides 153 (H) 0 - 150 mg/dL    HDL Cholesterol 56 40 - 60 mg/dL    VLDL Cholesterol Anders 26 5 - 40 mg/dL    LDL Chol Calc (NIH) 58 0 - 100 mg/dL   Vitamin D 25 hydroxy    Collection Time: 09/01/22  9:47 AM    Specimen: Blood   Result Value Ref Range    25 Hydroxy, Vitamin D 65.9 30.0 - 100.0 ng/ml   TSH    Collection Time: 09/01/22  9:47 AM    Specimen: Blood   Result Value Ref Range    TSH 1.530 0.270 - 4.200 uIU/mL   Comprehensive metabolic panel    Collection Time: 09/01/22  9:47 AM    Specimen: Blood   Result Value Ref Range    Glucose 106 (H) 65 - 99 mg/dL    BUN 18 8 - 23 mg/dL    Creatinine 0.99 0.57 - 1.00 mg/dL    EGFR Result 58.8 (L) >60.0 mL/min/1.73    BUN/Creatinine Ratio 18.2 7.0 - 25.0    Sodium 142 136 - 145 mmol/L    Potassium 3.8 3.5 - 5.2 mmol/L    Chloride  103 98 - 107 mmol/L    Total CO2 30.0 (H) 22.0 - 29.0 mmol/L    Calcium 10.1 8.6 - 10.5 mg/dL    Total Protein 6.6 6.0 - 8.5 g/dL    Albumin 4.10 3.50 - 5.20 g/dL    Globulin 2.5 gm/dL    A/G Ratio 1.6 g/dL    Total Bilirubin 0.6 0.0 - 1.2 mg/dL    Alkaline Phosphatase 74 39 - 117 U/L    AST (SGOT) 26 1 - 32 U/L    ALT (SGPT) 21 1 - 33 U/L       Physical Exam  Vitals and nursing note reviewed.   Constitutional:       Appearance: Normal appearance. She is well-developed and well-groomed. She is obese.      Comments: Exogenous obesity with a BMI of 31.5   HENT:      Head: Normocephalic and atraumatic.   Neck:      Thyroid: No thyroid mass or thyromegaly.      Vascular: Normal carotid pulses. No carotid bruit.      Trachea: Trachea and phonation normal.   Cardiovascular:      Rate and Rhythm: Normal rate and regular rhythm.      Heart sounds: Normal heart sounds. No murmur heard.    No friction rub. No gallop.   Pulmonary:      Effort: Pulmonary effort is normal. No respiratory distress.      Breath sounds: Normal breath sounds. No decreased breath sounds, wheezing, rhonchi or rales.   Musculoskeletal:      Cervical back: Neck supple.   Lymphadenopathy:      Cervical: No cervical adenopathy.   Skin:     General: Skin is warm and dry.      Findings: No rash.   Neurological:      Mental Status: She is alert and oriented to person, place, and time.   Psychiatric:         Attention and Perception: Attention and perception normal.         Mood and Affect: Mood and affect normal.         Speech: Speech normal.         Behavior: Behavior normal. Behavior is cooperative.         Thought Content: Thought content normal.         Cognition and Memory: Cognition and memory normal.         Judgment: Judgment normal.         Assessment & Plan   Diagnoses and all orders for this visit:    1. Essential hypertension (Primary)  -     Comprehensive metabolic panel; Future  -     Vitamin D 25 hydroxy; Future  -     TSH; Future  -      Lipid panel; Future  -     Hemoglobin A1c; Future  -     CBC w AUTO Differential; Future  -     Ferritin; Future  -     Iron and TIBC; Future    2. Mixed hyperlipidemia  -     atorvastatin (LIPITOR) 40 MG tablet; Take 1 tablet by mouth Daily.  Dispense: 90 tablet; Refill: 1  -     Comprehensive metabolic panel; Future  -     Vitamin D 25 hydroxy; Future  -     TSH; Future  -     Lipid panel; Future  -     Hemoglobin A1c; Future  -     CBC w AUTO Differential; Future  -     Ferritin; Future  -     Iron and TIBC; Future    3. Chronic ITP (idiopathic thrombocytopenic purpura) (HCC)  -     Comprehensive metabolic panel; Future  -     Vitamin D 25 hydroxy; Future  -     TSH; Future  -     Lipid panel; Future  -     Hemoglobin A1c; Future  -     CBC w AUTO Differential; Future  -     Ferritin; Future  -     Iron and TIBC; Future    4. Exogenous obesity  -     Comprehensive metabolic panel; Future  -     Vitamin D 25 hydroxy; Future  -     TSH; Future  -     Lipid panel; Future  -     Hemoglobin A1c; Future  -     CBC w AUTO Differential; Future  -     Ferritin; Future  -     Iron and TIBC; Future    5. Impaired glucose tolerance  -     Comprehensive metabolic panel; Future  -     Vitamin D 25 hydroxy; Future  -     TSH; Future  -     Lipid panel; Future  -     Hemoglobin A1c; Future  -     CBC w AUTO Differential; Future  -     Ferritin; Future  -     Iron and TIBC; Future    6. Vitamin D deficiency  -     Comprehensive metabolic panel; Future  -     Vitamin D 25 hydroxy; Future  -     TSH; Future  -     Lipid panel; Future  -     Hemoglobin A1c; Future  -     CBC w AUTO Differential; Future  -     Ferritin; Future  -     Iron and TIBC; Future    7. Overactive bladder  -     Comprehensive metabolic panel; Future  -     Vitamin D 25 hydroxy; Future  -     TSH; Future  -     Lipid panel; Future  -     Hemoglobin A1c; Future  -     CBC w AUTO Differential; Future  -     Ferritin; Future  -     Iron and TIBC; Future    8.  Depression with anxiety  -     PARoxetine (PAXIL) 20 MG tablet; Take 1 tablet by mouth Every Morning.  Dispense: 90 tablet; Refill: 1  -     Comprehensive metabolic panel; Future  -     Vitamin D 25 hydroxy; Future  -     TSH; Future  -     Lipid panel; Future  -     Hemoglobin A1c; Future  -     CBC w AUTO Differential; Future  -     Ferritin; Future  -     Iron and TIBC; Future    9. Age-related osteoporosis without current pathological fracture  -     Comprehensive metabolic panel; Future  -     Vitamin D 25 hydroxy; Future  -     TSH; Future  -     Lipid panel; Future  -     Hemoglobin A1c; Future  -     CBC w AUTO Differential; Future  -     Ferritin; Future  -     Iron and TIBC; Future    10. Microcytosis  -     Comprehensive metabolic panel; Future  -     Vitamin D 25 hydroxy; Future  -     TSH; Future  -     Lipid panel; Future  -     Hemoglobin A1c; Future  -     CBC w AUTO Differential; Future  -     Ferritin; Future  -     Iron and TIBC; Future        Return in about 6 months (around 3/8/2023) for Recheck.

## 2022-09-20 ENCOUNTER — OFFICE VISIT (OUTPATIENT)
Dept: FAMILY MEDICINE CLINIC | Facility: CLINIC | Age: 77
End: 2022-09-20

## 2022-09-20 VITALS
WEIGHT: 180 LBS | SYSTOLIC BLOOD PRESSURE: 122 MMHG | DIASTOLIC BLOOD PRESSURE: 80 MMHG | HEIGHT: 63 IN | HEART RATE: 87 BPM | BODY MASS INDEX: 31.89 KG/M2 | OXYGEN SATURATION: 97 % | TEMPERATURE: 97.7 F

## 2022-09-20 DIAGNOSIS — R10.31 RIGHT LOWER QUADRANT ABDOMINAL PAIN: ICD-10-CM

## 2022-09-20 DIAGNOSIS — K62.89 PERIANAL PAIN: Primary | ICD-10-CM

## 2022-09-20 LAB
DEVELOPER EXPIRATION DATE: NORMAL
DEVELOPER LOT NUMBER: NORMAL
EXPIRATION DATE: NORMAL
FECAL OCCULT BLOOD SCREEN, POC: NEGATIVE
Lab: NORMAL
NEGATIVE CONTROL: NEGATIVE
POSITIVE CONTROL: POSITIVE

## 2022-09-20 PROCEDURE — 82270 OCCULT BLOOD FECES: CPT | Performed by: FAMILY MEDICINE

## 2022-09-20 PROCEDURE — 99213 OFFICE O/P EST LOW 20 MIN: CPT | Performed by: FAMILY MEDICINE

## 2022-09-20 RX ORDER — HYDROCORTISONE 25 MG/G
CREAM TOPICAL 2 TIMES DAILY
Qty: 28 G | Refills: 1 | Status: SHIPPED | OUTPATIENT
Start: 2022-09-20 | End: 2022-09-21 | Stop reason: SDUPTHER

## 2022-09-20 NOTE — PROGRESS NOTES
Subjective   Shyann Davis is a 77 y.o. female with   Chief Complaint   Patient presents with   • Hemorrhoids   .    History of Present Illness   77-year-old white female with 2 or 3-day history of increased tenesmus with perirectal pain and minimal bleeding.  Bowel movements for the most part of been normal-brown and formed without constipation or diarrhea.  She does have past history of external hemorrhoids.  There has been no pain in the anal verge region.  Patient denies fever and there has been no nausea/vomiting.  Patient denies melena and there has been no hematemesis.  There has been minimal right lower quadrant abdominal pain.  The following portions of the patient's history were reviewed and updated as appropriate: allergies, current medications, past family history, past medical history, past social history, past surgical history and problem list.    Review of Systems   Constitutional: Negative for fever.   Gastrointestinal: Positive for abdominal pain, anal bleeding and rectal pain.       Objective     Vitals:    09/20/22 0859   BP: 122/80   Pulse: 87   Temp: 97.7 °F (36.5 °C)   SpO2: 97%       Recent Results (from the past 672 hour(s))   CBC w AUTO Differential    Collection Time: 09/01/22  9:47 AM    Specimen: Blood   Result Value Ref Range    WBC 10.93 (H) 3.40 - 10.80 10*3/mm3    RBC 5.74 (H) 3.77 - 5.28 10*6/mm3    Hemoglobin 13.1 12.0 - 15.9 g/dL    Hematocrit 43.3 34.0 - 46.6 %    MCV 75.4 (L) 79.0 - 97.0 fL    MCH 22.8 (L) 26.6 - 33.0 pg    MCHC 30.3 (L) 31.5 - 35.7 g/dL    RDW 14.9 12.3 - 15.4 %    Platelets 104 (L) 140 - 450 10*3/mm3    Neutrophil Rel % 76.7 (H) 42.7 - 76.0 %    Lymphocyte Rel % 17.2 (L) 19.6 - 45.3 %    Monocyte Rel % 4.5 (L) 5.0 - 12.0 %    Eosinophil Rel % 0.7 0.3 - 6.2 %    Basophil Rel % 0.4 0.0 - 1.5 %    Neutrophils Absolute 8.38 (H) 1.70 - 7.00 10*3/mm3    Lymphocytes Absolute 1.88 0.70 - 3.10 10*3/mm3    Monocytes Absolute 0.49 0.10 - 0.90 10*3/mm3    Eosinophils  Absolute 0.08 0.00 - 0.40 10*3/mm3    Basophils Absolute 0.04 0.00 - 0.20 10*3/mm3    Immature Granulocyte Rel % 0.5 0.0 - 0.5 %    Immature Grans Absolute 0.06 (H) 0.00 - 0.05 10*3/mm3   Hemoglobin A1c    Collection Time: 09/01/22  9:47 AM    Specimen: Blood   Result Value Ref Range    Hemoglobin A1C 5.60 4.80 - 5.60 %   Ferritin    Collection Time: 09/01/22  9:47 AM    Specimen: Blood   Result Value Ref Range    Ferritin 31.50 13.00 - 150.00 ng/mL   Iron and TIBC    Collection Time: 09/01/22  9:47 AM    Specimen: Blood   Result Value Ref Range    TIBC 489 mcg/dL    UIBC 403 (H) 112 - 346 mcg/dL    Iron 86 37 - 145 mcg/dL    Iron Saturation 18 (L) 20 - 50 %   Lipid panel    Collection Time: 09/01/22  9:47 AM    Specimen: Blood   Result Value Ref Range    Total Cholesterol 140 0 - 200 mg/dL    Triglycerides 153 (H) 0 - 150 mg/dL    HDL Cholesterol 56 40 - 60 mg/dL    VLDL Cholesterol Anders 26 5 - 40 mg/dL    LDL Chol Calc (NIH) 58 0 - 100 mg/dL   Vitamin D 25 hydroxy    Collection Time: 09/01/22  9:47 AM    Specimen: Blood   Result Value Ref Range    25 Hydroxy, Vitamin D 65.9 30.0 - 100.0 ng/ml   TSH    Collection Time: 09/01/22  9:47 AM    Specimen: Blood   Result Value Ref Range    TSH 1.530 0.270 - 4.200 uIU/mL   Comprehensive metabolic panel    Collection Time: 09/01/22  9:47 AM    Specimen: Blood   Result Value Ref Range    Glucose 106 (H) 65 - 99 mg/dL    BUN 18 8 - 23 mg/dL    Creatinine 0.99 0.57 - 1.00 mg/dL    EGFR Result 58.8 (L) >60.0 mL/min/1.73    BUN/Creatinine Ratio 18.2 7.0 - 25.0    Sodium 142 136 - 145 mmol/L    Potassium 3.8 3.5 - 5.2 mmol/L    Chloride 103 98 - 107 mmol/L    Total CO2 30.0 (H) 22.0 - 29.0 mmol/L    Calcium 10.1 8.6 - 10.5 mg/dL    Total Protein 6.6 6.0 - 8.5 g/dL    Albumin 4.10 3.50 - 5.20 g/dL    Globulin 2.5 gm/dL    A/G Ratio 1.6 g/dL    Total Bilirubin 0.6 0.0 - 1.2 mg/dL    Alkaline Phosphatase 74 39 - 117 U/L    AST (SGOT) 26 1 - 32 U/L    ALT (SGPT) 21 1 - 33 U/L   POC  Occult Blood Stool    Collection Time: 09/20/22  9:48 AM    Specimen: Stool   Result Value Ref Range    Fecal Occult Blood Negative Negative    Lot Number [id number]     Expiration Date 3,302,023     DEVELOPER LOT NUMBER [id number]     DEVELOPER EXPIRATION DATE 3,302,023     Positive Control Positive Positive    Negative Control Negative Negative       Physical Exam  Vitals and nursing note reviewed.   Constitutional:       Appearance: Normal appearance. She is well-developed and well-groomed.   HENT:      Head: Normocephalic and atraumatic.   Neck:      Thyroid: No thyroid mass or thyromegaly.      Vascular: Normal carotid pulses. No carotid bruit.      Trachea: Trachea and phonation normal.   Cardiovascular:      Rate and Rhythm: Normal rate and regular rhythm.      Heart sounds: Normal heart sounds. No murmur heard.    No friction rub. No gallop.   Pulmonary:      Effort: Pulmonary effort is normal. No respiratory distress.      Breath sounds: Normal breath sounds. No decreased breath sounds, wheezing, rhonchi or rales.   Abdominal:      General: Abdomen is flat. Bowel sounds are normal. There is no distension.      Palpations: Abdomen is soft. There is no hepatomegaly, splenomegaly or mass.      Tenderness: There is abdominal tenderness in the right lower quadrant. There is no right CVA tenderness, left CVA tenderness, guarding or rebound.      Hernia: No hernia is present.   Genitourinary:     Rectum: Guaiac result negative. No mass, tenderness, anal fissure or external hemorrhoid. Normal anal tone.      Comments: Perianal area is inspected thoroughly without evidence of fissure or external hemorrhoid.  No evidence of tenderness with palpation.  Digital rectal exam was performed with no rectal vault mass or tenderness.  Hemoccult is negative.  Musculoskeletal:      Cervical back: Neck supple.   Lymphadenopathy:      Cervical: No cervical adenopathy.   Skin:     General: Skin is warm and dry.      Findings: No rash.    Neurological:      Mental Status: She is alert and oriented to person, place, and time.   Psychiatric:         Attention and Perception: Attention and perception normal.         Mood and Affect: Mood and affect normal.         Speech: Speech normal.         Behavior: Behavior normal. Behavior is cooperative.         Thought Content: Thought content normal.         Cognition and Memory: Cognition and memory normal.         Judgment: Judgment normal.         Assessment & Plan   Diagnoses and all orders for this visit:    1. Perianal pain (Primary)  -     Hydrocortisone, Perianal, (ANUSOL-HC) 2.5 % rectal cream; Insert  into the rectum 2 (Two) Times a Day.  Dispense: 28 g; Refill: 1  -     POC Occult Blood Stool    2. Right lower quadrant abdominal pain    I recommended increase dietary fiber which may include Metamucil or Benefiber.  Increase fluids would also be of assistance.  Anusol HC will be provided with patient to follow-up in this office in 3 to 4 days if symptoms fail to show improvement.    Return if symptoms worsen or fail to improve.

## 2022-09-21 DIAGNOSIS — K62.89 PERIANAL PAIN: ICD-10-CM

## 2022-09-21 RX ORDER — HYDROCORTISONE 25 MG/G
CREAM TOPICAL 2 TIMES DAILY
Qty: 28 G | Refills: 0 | Status: SHIPPED | OUTPATIENT
Start: 2022-09-21

## 2022-09-21 NOTE — TELEPHONE ENCOUNTER
Caller: Shyann Davis    Relationship: Self    Best call back number: [phone]    Requested Prescriptions:   Requested Prescriptions      No prescriptions requested or ordered in this encounter        Pharmacy where request should be sent:  [facility]/pharmacy #[id number] [facility] [address] [address] AT [address] [address] - [phone] [facility] [phone]   [phone]    Additional details provided by patient: PATIENT WANTS SENT TO LOCAL PHARMACY     Does the patient have less than a 3 day supply:  [x] Yes  [] No    Carmine MEDINA Rep   09/21/22 11:02 EDT

## 2022-10-27 ENCOUNTER — TELEPHONE (OUTPATIENT)
Dept: ONCOLOGY | Facility: CLINIC | Age: 77
End: 2022-10-27

## 2022-10-27 NOTE — TELEPHONE ENCOUNTER
Pts last platelet count on 9/1/22 was 104. Reviewed lab and pts upcoming root canal and bridge with Dr. Conde. Per Dr. Conde pt is ok to proceed with her oral surgery. I advised pt to contact Dr. Underwood's office regarding her Prolia injection on 12/14. It may need to be pushed out. Pt V/U.

## 2022-10-27 NOTE — TELEPHONE ENCOUNTER
Caller: Shyann Davis    Relationship: Self    Best call back number: [phone]    Who are you requesting to speak with (clinical staff, provider,  specific staff member): CLINICAL    What was the call regarding: CALLING TO NOTIFY OFFICE THAT SHE IS SCHEDULED TO RECEIVE BRIDGE AT [facility] 11/1/22.    CALLING TO VERIFY IF SHE NEEDS TO BE AWARE OF ANY STEPS OF CARE PRIOR TO PROCEDURE.    Do you require a callback: YES

## 2022-12-13 DIAGNOSIS — M81.0 AGE-RELATED OSTEOPOROSIS WITHOUT CURRENT PATHOLOGICAL FRACTURE: Primary | ICD-10-CM

## 2023-01-13 ENCOUNTER — OFFICE VISIT (OUTPATIENT)
Dept: ONCOLOGY | Facility: CLINIC | Age: 78
End: 2023-01-13
Payer: MEDICARE

## 2023-01-13 ENCOUNTER — LAB (OUTPATIENT)
Dept: OTHER | Facility: HOSPITAL | Age: 78
End: 2023-01-13
Payer: MEDICARE

## 2023-01-13 VITALS
BODY MASS INDEX: 31.86 KG/M2 | HEIGHT: 63 IN | HEART RATE: 68 BPM | WEIGHT: 179.8 LBS | RESPIRATION RATE: 16 BRPM | TEMPERATURE: 97.5 F | DIASTOLIC BLOOD PRESSURE: 84 MMHG | OXYGEN SATURATION: 97 % | SYSTOLIC BLOOD PRESSURE: 132 MMHG

## 2023-01-13 DIAGNOSIS — D69.3 CHRONIC ITP (IDIOPATHIC THROMBOCYTOPENIC PURPURA): ICD-10-CM

## 2023-01-13 DIAGNOSIS — D69.6 THROMBOCYTOPENIA: ICD-10-CM

## 2023-01-13 DIAGNOSIS — R71.8 RBC MICROCYTOSIS: Primary | ICD-10-CM

## 2023-01-13 DIAGNOSIS — N28.9 RENAL INSUFFICIENCY: ICD-10-CM

## 2023-01-13 DIAGNOSIS — D69.3 CHRONIC ITP (IDIOPATHIC THROMBOCYTOPENIC PURPURA): Primary | ICD-10-CM

## 2023-01-13 LAB
ALBUMIN SERPL-MCNC: 4 G/DL (ref 3.5–5.2)
ALBUMIN/GLOB SERPL: 1.3 G/DL
ALP SERPL-CCNC: 91 U/L (ref 39–117)
ALT SERPL W P-5'-P-CCNC: 16 U/L (ref 1–33)
ANION GAP SERPL CALCULATED.3IONS-SCNC: 7.9 MMOL/L (ref 5–15)
AST SERPL-CCNC: 19 U/L (ref 1–32)
BASOPHILS # BLD AUTO: 0.03 10*3/MM3 (ref 0–0.2)
BASOPHILS NFR BLD AUTO: 0.2 % (ref 0–1.5)
BILIRUB SERPL-MCNC: 0.5 MG/DL (ref 0–1.2)
BUN SERPL-MCNC: 23 MG/DL (ref 8–23)
BUN/CREAT SERPL: 18.9 (ref 7–25)
CALCIUM SPEC-SCNC: 10.9 MG/DL (ref 8.6–10.5)
CHLORIDE SERPL-SCNC: 105 MMOL/L (ref 98–107)
CO2 SERPL-SCNC: 31.1 MMOL/L (ref 22–29)
CREAT SERPL-MCNC: 1.22 MG/DL (ref 0.57–1)
DEPRECATED RDW RBC AUTO: 40.6 FL (ref 37–54)
EGFRCR SERPLBLD CKD-EPI 2021: 45.8 ML/MIN/1.73
EOSINOPHIL # BLD AUTO: 0.07 10*3/MM3 (ref 0–0.4)
EOSINOPHIL NFR BLD AUTO: 0.6 % (ref 0.3–6.2)
ERYTHROCYTE [DISTWIDTH] IN BLOOD BY AUTOMATED COUNT: 14.7 % (ref 12.3–15.4)
FERRITIN SERPL-MCNC: 54.6 NG/ML (ref 13–150)
GLOBULIN UR ELPH-MCNC: 3.1 GM/DL
GLUCOSE SERPL-MCNC: 66 MG/DL (ref 65–99)
HCT VFR BLD AUTO: 40.4 % (ref 34–46.6)
HGB BLD-MCNC: 12.3 G/DL (ref 12–15.9)
IMM GRANULOCYTES # BLD AUTO: 0.1 10*3/MM3 (ref 0–0.05)
IMM GRANULOCYTES NFR BLD AUTO: 0.8 % (ref 0–0.5)
IRON 24H UR-MRATE: 52 MCG/DL (ref 37–145)
IRON SATN MFR SERPL: 12 % (ref 20–50)
LYMPHOCYTES # BLD AUTO: 2.25 10*3/MM3 (ref 0.7–3.1)
LYMPHOCYTES NFR BLD AUTO: 18.1 % (ref 19.6–45.3)
MCH RBC QN AUTO: 23.3 PG (ref 26.6–33)
MCHC RBC AUTO-ENTMCNC: 30.4 G/DL (ref 31.5–35.7)
MCV RBC AUTO: 76.5 FL (ref 79–97)
MONOCYTES # BLD AUTO: 0.63 10*3/MM3 (ref 0.1–0.9)
MONOCYTES NFR BLD AUTO: 5.1 % (ref 5–12)
NEUTROPHILS NFR BLD AUTO: 75.2 % (ref 42.7–76)
NEUTROPHILS NFR BLD AUTO: 9.36 10*3/MM3 (ref 1.7–7)
NRBC BLD AUTO-RTO: 0 /100 WBC (ref 0–0.2)
PLAT MORPH BLD: NORMAL
PLATELET # BLD AUTO: 96 10*3/MM3 (ref 140–450)
PLATELET # BLD AUTO: 96 10*3/MM3 (ref 140–450)
PLATELETS.RETICULATED NFR BLD AUTO: 35.9 % (ref 0.9–6.5)
POTASSIUM SERPL-SCNC: 4.3 MMOL/L (ref 3.5–5.2)
PROT SERPL-MCNC: 7.1 G/DL (ref 6–8.5)
RBC # BLD AUTO: 5.28 10*6/MM3 (ref 3.77–5.28)
RBC MORPH BLD: NORMAL
SODIUM SERPL-SCNC: 144 MMOL/L (ref 136–145)
TIBC SERPL-MCNC: 417 MCG/DL (ref 298–536)
TRANSFERRIN SERPL-MCNC: 280 MG/DL (ref 200–360)
WBC MORPH BLD: NORMAL
WBC NRBC COR # BLD: 12.44 10*3/MM3 (ref 3.4–10.8)

## 2023-01-13 PROCEDURE — 82728 ASSAY OF FERRITIN: CPT | Performed by: INTERNAL MEDICINE

## 2023-01-13 PROCEDURE — 85055 RETICULATED PLATELET ASSAY: CPT | Performed by: INTERNAL MEDICINE

## 2023-01-13 PROCEDURE — 80053 COMPREHEN METABOLIC PANEL: CPT | Performed by: INTERNAL MEDICINE

## 2023-01-13 PROCEDURE — 85007 BL SMEAR W/DIFF WBC COUNT: CPT | Performed by: INTERNAL MEDICINE

## 2023-01-13 PROCEDURE — 85025 COMPLETE CBC W/AUTO DIFF WBC: CPT | Performed by: INTERNAL MEDICINE

## 2023-01-13 PROCEDURE — 83540 ASSAY OF IRON: CPT | Performed by: INTERNAL MEDICINE

## 2023-01-13 PROCEDURE — 99213 OFFICE O/P EST LOW 20 MIN: CPT | Performed by: INTERNAL MEDICINE

## 2023-01-13 PROCEDURE — 84466 ASSAY OF TRANSFERRIN: CPT | Performed by: INTERNAL MEDICINE

## 2023-01-13 PROCEDURE — 36415 COLL VENOUS BLD VENIPUNCTURE: CPT

## 2023-01-13 NOTE — PROGRESS NOTES
Subjective     REASON FOR CONSULTATION:   1.  Mild chronic ITP  2.  Remote history of breast cancer  3.  Thyroid nodules. Pathology benign from FNA    HISTORY OF PRESENT ILLNESS:  The patient is a 77 y.o. year old female who was referred to us from her primary care office for evaluation of mild chronic thrombocytopenia.  Her platelet count had been slightly low for at least 2 years with a platelet count of 134,000 in June 2019.  It had drifted down to 82,000 on labs from 3/3/2021.  Her hemoglobin and white cells were normal.    With her initial consult visit of 3/19/2021 we performed an immature platelet fraction which was markedly elevated consistent with ITP.      We have been observing her without treatment as her platelet count has remained in a safe range from 80,000-110,000.      She required hospitalization due to gangrenous cholecystitis leading to sepsis.  She was in the hospital from 10/22/2021 through 11/2/2021 and underwent cholecystectomy and antibiotic therapy.     INTERVAL HISTORY:   Shyann is here today for her routine 6-month follow-up.  Her platelet count is stable today at 96,000.  Her IPF remains significantly elevated at 35%.  She continues to have a chronically low MCV at 76.  She had a hemoglobin electrophoresis performed in July 2022 showing no abnormalities therefore we suspect she may have an alpha thalassemia trait.    She looks good in the office today but she is still obviously grieving the recent unexpected loss of her 's she reports that she had a tough time through the holidays in this regard.  History of Present Illness     Past Medical History:   Diagnosis Date   • Age-related osteoporosis without current pathological fracture 2/8/2019   • Anxiety    • Breast cancer (HCC)     Right   • Depression    • History of thrombocytopenia    • Hyperlipidemia    • Hypertension    • Osteoporosis    • Seizure (HCC)     after brain surgery only        Past Surgical History:   Procedure  Laterality Date   • APPENDECTOMY  2007   • BRAIN AVM REPAIR  1992   • BREAST LUMPECTOMY Right 2002   • BREAST SURGERY     • CHOLECYSTECTOMY N/A 10/25/2021    Procedure: CHOLECYSTECTOMY LAPAROSCOPIC;  Surgeon: Gera Kay MD;  Location: [facility];  Service: General;  Laterality: N/A;   • HYSTERECTOMY  1976        Current Outpatient Medications on File Prior to Visit   Medication Sig Dispense Refill   • atorvastatin (LIPITOR) 40 MG tablet Take 1 tablet by mouth Daily. 90 tablet 1   • Calcium 600-200 MG-UNIT per tablet Take 1 tablet by mouth 2 (Two) Times a Day.     • Hydrocortisone, Perianal, (ANUSOL-HC) 2.5 % rectal cream Insert  into the rectum 2 (Two) Times a Day. 28 g 0   • levETIRAcetam (KEPPRA) 500 MG tablet TAKE 1/2 TAB TWICE A DAY FOR A WEEK THEN 1 TAB TWICE A DAY     • metoprolol succinate XL (TOPROL-XL) 50 MG 24 hr tablet TAKE 1 TABLET EVERY DAY 90 tablet 0   • Mirabegron ER (MYRBETRIQ) 25 MG tablet sustained-release 24 hour 24 hr tablet Take 25 mg by mouth Daily.     • MULTIPLE VITAMINS ESSENTIAL PO Take  by mouth.     • PARoxetine (PAXIL) 20 MG tablet Take 1 tablet by mouth Every Morning. 90 tablet 1   • PROLIA 60 MG/ML solution syringe FOR  BY YOUR PHYSICIAN TO INJECT 60MG (1ML) SUBCUTANEOUSLY EVERY 6 MONTHS 1 syringe 0   • Vitamin D, Ergocalciferol, 19658 units capsule Take  by mouth.       No current facility-administered medications on file prior to visit.        ALLERGIES:    Allergies   Allergen Reactions   • Cephalexin Myalgia     Migraine   • Sulfa Antibiotics Myalgia     Migraine   • Erythromycin GI Intolerance   • Neomycin-Bacitracin Zn-Polymyx Rash        Social History     Socioeconomic History   • Marital status:      Spouse name: Thanh   Tobacco Use   • Smoking status: Never   • Smokeless tobacco: Never   Vaping Use   • Vaping Use: Never used   Substance and Sexual Activity   • Alcohol use: No   • Drug use: No   • Sexual activity: Defer        No family history  "on file.     Review of Systems   Constitutional: Positive for activity change and fatigue. Negative for chills and fever.   HENT: Negative for mouth sores, trouble swallowing and voice change.    Eyes: Negative for pain and visual disturbance.   Respiratory: Negative for cough, shortness of breath and wheezing.    Cardiovascular: Negative for chest pain and palpitations.   Gastrointestinal: Negative for abdominal pain, constipation, diarrhea, nausea and vomiting.   Genitourinary: Negative for difficulty urinating, frequency and urgency.        Stress incontinence   Musculoskeletal: Negative for arthralgias and joint swelling.   Skin: Negative for rash.   Neurological: Negative for dizziness, seizures, weakness and headaches.   Hematological: Negative for adenopathy. Bruises/bleeds easily.   Psychiatric/Behavioral: Negative for behavioral problems and confusion. The patient is not nervous/anxious.         Memory deficit        Objective     Vitals:    01/13/23 1446   BP: 132/84   Pulse: 68   Resp: 16   Temp: 97.5 °F (36.4 °C)   TempSrc: Oral   SpO2: 97%   Weight: 81.6 kg (179 lb 12.8 oz)   Height: 160 cm (62.99\")   PainSc: 0-No pain     Current Status 1/13/2023   ECOG score 0       Physical Exam  Constitutional:       General: She is not in acute distress.     Appearance: She is well-developed.   HENT:      Head: Normocephalic.   Eyes:      General: No scleral icterus.     Conjunctiva/sclera: Conjunctivae normal.      Pupils: Pupils are equal, round, and reactive to light.   Neck:      Thyroid: No thyromegaly.      Vascular: No JVD.   Cardiovascular:      Rate and Rhythm: Normal rate and regular rhythm.      Heart sounds: No murmur heard.    No friction rub. No gallop.   Pulmonary:      Effort: Pulmonary effort is normal.      Breath sounds: Normal breath sounds. No wheezing or rales.   Abdominal:      General: There is no distension.      Palpations: Abdomen is soft. There is no mass.      Tenderness: There is no " abdominal tenderness.   Musculoskeletal:         General: No deformity. Normal range of motion.      Cervical back: Normal range of motion and neck supple.      Comments: Kyphosis   Lymphadenopathy:      Cervical: No cervical adenopathy.   Skin:     General: Skin is warm and dry.      Findings: No erythema or rash.   Neurological:      Mental Status: She is alert and oriented to person, place, and time.      Cranial Nerves: No cranial nerve deficit.      Deep Tendon Reflexes: Reflexes are normal and symmetric.   Psychiatric:         Behavior: Behavior normal.         Judgment: Judgment normal.           RECENT LABS:  Hematology WBC   Date Value Ref Range Status   01/13/2023 12.44 (H) 3.40 - 10.80 10*3/mm3 Final   09/01/2022 10.93 (H) 3.40 - 10.80 10*3/mm3 Final     RBC   Date Value Ref Range Status   01/13/2023 5.28 3.77 - 5.28 10*6/mm3 Final   09/01/2022 5.74 (H) 3.77 - 5.28 10*6/mm3 Final     Hemoglobin   Date Value Ref Range Status   01/13/2023 12.3 12.0 - 15.9 g/dL Final     Hematocrit   Date Value Ref Range Status   01/13/2023 40.4 34.0 - 46.6 % Final     Platelets   Date Value Ref Range Status   01/13/2023 96 (L) 140 - 450 10*3/mm3 Final   01/13/2023 96 (L) 140 - 450 10*3/mm3 Final        Lab Results   Component Value Date    GLUCOSE 66 01/13/2023    BUN 23 01/13/2023    CREATININE 1.22 (H) 01/13/2023    EGFRIFNONA 52 (L) 12/23/2021    EGFRIFAFRI 60 (L) 09/02/2021    BCR 18.9 01/13/2023    K 4.3 01/13/2023    CO2 31.1 (H) 01/13/2023    CALCIUM 10.9 (H) 01/13/2023    PROTENTOTREF 6.6 09/01/2022    ALBUMIN 4.0 01/13/2023    LABIL2 1.6 09/01/2022    AST 19 01/13/2023    ALT 16 01/13/2023       Hemoglobinopathy Fractionation Cascade  7/15/2022      Hgb F Quant  0.0 - 2.0 % 0.0    Hgb A  96.4 - 98.8 % 97.3    Hgb A2 Quant  1.8 - 3.2 % 2.7    Hgb S  0.0 % 0.0    Hgb Interp. Comment    Comment: Normal hemoglobin present; no hemoglobin variant or beta thalassemia   identified.   Note: Alpha thalassemia may not be  detected by the Hgb Fractionation          PATHOLOGY 12/29/2021  Final Diagnosis   1. Thyroid, Left Isthmus, Fine Needle Aspiration (FNA):               A. [place] Category II:  Changes consistent with benign follicular nodule.     2. Thyroid, Left Mid to Inferior, Fine Needle Aspiration (FNA):               A. [place] Category II:  Changes consistent with benign follicular nodule.       PATHOLOGY 10/25/2021  Final Diagnosis   1. Gallbladder:    A. Severe acute necrotizing calculus cholecystitis .     NUCLEAR MEDICINE WHOLE BODY BONE SCAN 11/12/2021  IMPRESSION:  Degenerative/arthritic uptake without evidence for bony  metastatic disease. There is a history of abnormal CT of the spine  though that is not available for comparison. Scoliotic curvature is  present of the lower cervical spine and upper thoracic spine where there  is associated degenerative uptake.     THYROID SONOGRAM 11/12/2021.  IMPRESSION:  1.  Multiple bilateral thyroid nodules. Fine-needle aspiration of both  the 3.2 cm nodule within the left mid to inferior pole and of the 4.1 cm  nodule within the medial left thyroid/isthmus is recommended per TIRADS  criteria. If biopsy results are negative, follow-up with thyroid  sonogram 6 months is recommended to ensure stability.  2.  Mildly heterogenous thyroid echotexture suggestive of diffuse  thyroid disease in the appropriate clinical context and correlation with  patient history and laboratory values is recommended to establish the  most appropriate etiology as sonographic findings are nonspecific.    Assessment & Plan   1.  Mild chronic ITP with a platelet count that has been in a safe range from 80,000-110,000.  Platelet count in the office today is 96,000.  2.  Remote history of breast cancer.    3.  Negative colon cancer screening with Cologuard 8/18/2020  4.  Microcytic anemia.  No evidence of iron deficiency.  Suspect she may have a thalassemia trait.  No evidence of beta thalassemia on  hemoglobin electrophoresis.  5.  Gangrenous cholecystitis treated with cholecystectomy 10/25/2021  6.  Lesions in the bone noted on CT scans during her recent admission.  Bone scan from 11/12/2021 does not show any worrisome findings as noted above.  7.  Thyroid nodules.  FNA biopsy 12/29/2021 benign  8.  Grief reaction due to the unexpected loss of her  a few weeks ago.      PLAN  1.  We will continue to observe her blood counts with MD follow-up and lab in 6 months.  2.  She was instructed to call us if she has any unusual bleeding or bruising  Scheduled for any invasive surgical procedures.

## 2023-01-18 ENCOUNTER — TELEPHONE (OUTPATIENT)
Dept: ONCOLOGY | Facility: CLINIC | Age: 78
End: 2023-01-18
Payer: MEDICARE

## 2023-01-18 RX ORDER — FERROUS SULFATE 325(65) MG
325 TABLET ORAL
Qty: 60 TABLET | Refills: 1 | Status: SHIPPED | OUTPATIENT
Start: 2023-01-18 | End: 2023-03-10 | Stop reason: ALTCHOICE

## 2023-01-18 NOTE — TELEPHONE ENCOUNTER
----- Message from Henry Conde MD sent at 1/17/2023  4:29 PM EST -----  Please contact patient and get her started on oral iron supplementation with ferrous sulfate 325 mg p.o. daily.    Thanks    Patient made aware of the recommendation, v/u. Requested medicine be sent to [facility].

## 2023-01-30 RX ORDER — METOPROLOL SUCCINATE 50 MG/1
TABLET, EXTENDED RELEASE ORAL
Qty: 90 TABLET | Refills: 0 | Status: SHIPPED | OUTPATIENT
Start: 2023-01-30

## 2023-03-01 DIAGNOSIS — E66.09 EXOGENOUS OBESITY: ICD-10-CM

## 2023-03-01 DIAGNOSIS — I10 ESSENTIAL HYPERTENSION: ICD-10-CM

## 2023-03-01 DIAGNOSIS — R71.8 MICROCYTOSIS: ICD-10-CM

## 2023-03-01 DIAGNOSIS — E78.2 MIXED HYPERLIPIDEMIA: ICD-10-CM

## 2023-03-01 DIAGNOSIS — D69.3 CHRONIC ITP (IDIOPATHIC THROMBOCYTOPENIC PURPURA): ICD-10-CM

## 2023-03-01 DIAGNOSIS — N32.81 OVERACTIVE BLADDER: ICD-10-CM

## 2023-03-01 DIAGNOSIS — E55.9 VITAMIN D DEFICIENCY: ICD-10-CM

## 2023-03-01 DIAGNOSIS — F41.8 DEPRESSION WITH ANXIETY: ICD-10-CM

## 2023-03-01 DIAGNOSIS — M81.0 AGE-RELATED OSTEOPOROSIS WITHOUT CURRENT PATHOLOGICAL FRACTURE: ICD-10-CM

## 2023-03-01 DIAGNOSIS — R73.02 IMPAIRED GLUCOSE TOLERANCE: ICD-10-CM

## 2023-03-01 NOTE — TELEPHONE ENCOUNTER
----- Message from Daniela Mccullough RN sent at 12/23/2021  2:34 PM EST -----  Patient was in today for CBC with review.  Platelets 90, Hgb 10.9.  Patient is scheduled for fine-needle aspiration on 12/29/2021.  Please contact patient if she is needing a transfusion or any additional orders prior to her appt on 12/29/2021.  Thanks!    
Spoke with Denae in Radiology and she said that a plt count of 90 would be fine for a fine-needle aspiration  
impaired balance/pain/decreased strength

## 2023-03-02 ENCOUNTER — FLU SHOT (OUTPATIENT)
Dept: FAMILY MEDICINE CLINIC | Facility: CLINIC | Age: 78
End: 2023-03-02
Payer: MEDICARE

## 2023-03-02 DIAGNOSIS — Z23 NEED FOR INFLUENZA VACCINATION: Primary | ICD-10-CM

## 2023-03-02 PROCEDURE — G0008 ADMIN INFLUENZA VIRUS VAC: HCPCS | Performed by: FAMILY MEDICINE

## 2023-03-02 PROCEDURE — 90662 IIV NO PRSV INCREASED AG IM: CPT | Performed by: FAMILY MEDICINE

## 2023-03-04 LAB
25(OH)D3+25(OH)D2 SERPL-MCNC: 45.4 NG/ML (ref 30–100)
ALBUMIN SERPL-MCNC: 4.1 G/DL (ref 3.5–5.2)
ALBUMIN/GLOB SERPL: 1.4 G/DL
ALP SERPL-CCNC: 106 U/L (ref 39–117)
ALT SERPL-CCNC: 18 U/L (ref 1–33)
AST SERPL-CCNC: 24 U/L (ref 1–32)
BASOPHILS # BLD AUTO: ABNORMAL 10*3/UL
BASOPHILS # BLD MANUAL: 0.12 10*3/MM3 (ref 0–0.2)
BASOPHILS NFR BLD MANUAL: 1 % (ref 0–1.5)
BILIRUB SERPL-MCNC: 0.7 MG/DL (ref 0–1.2)
BUN SERPL-MCNC: 19 MG/DL (ref 8–23)
BUN/CREAT SERPL: 15.7 (ref 7–25)
CALCIUM SERPL-MCNC: 11 MG/DL (ref 8.6–10.5)
CHLORIDE SERPL-SCNC: 100 MMOL/L (ref 98–107)
CHOLEST SERPL-MCNC: 120 MG/DL (ref 0–200)
CO2 SERPL-SCNC: 34.3 MMOL/L (ref 22–29)
CREAT SERPL-MCNC: 1.21 MG/DL (ref 0.57–1)
DIFFERENTIAL COMMENT: ABNORMAL
EGFRCR SERPLBLD CKD-EPI 2021: 46.3 ML/MIN/1.73
EOSINOPHIL # BLD AUTO: ABNORMAL 10*3/UL
EOSINOPHIL # BLD MANUAL: 0.24 10*3/MM3 (ref 0–0.4)
EOSINOPHIL NFR BLD AUTO: ABNORMAL %
EOSINOPHIL NFR BLD MANUAL: 2.1 % (ref 0.3–6.2)
ERYTHROCYTE [DISTWIDTH] IN BLOOD BY AUTOMATED COUNT: 14.1 % (ref 12.3–15.4)
FERRITIN SERPL-MCNC: 58.6 NG/ML (ref 13–150)
GLOBULIN SER CALC-MCNC: 2.9 GM/DL
GLUCOSE SERPL-MCNC: 122 MG/DL (ref 65–99)
HBA1C MFR BLD: 5.5 % (ref 4.8–5.6)
HCT VFR BLD AUTO: 40.2 % (ref 34–46.6)
HDLC SERPL-MCNC: 56 MG/DL (ref 40–60)
HGB BLD-MCNC: 12.8 G/DL (ref 12–15.9)
IRON SATN MFR SERPL: 18 % (ref 20–50)
IRON SERPL-MCNC: 77 MCG/DL (ref 37–145)
LDLC SERPL CALC-MCNC: 39 MG/DL (ref 0–100)
LYMPHOCYTES # BLD AUTO: ABNORMAL 10*3/UL
LYMPHOCYTES # BLD MANUAL: 2.27 10*3/MM3 (ref 0.7–3.1)
LYMPHOCYTES NFR BLD AUTO: ABNORMAL %
LYMPHOCYTES NFR BLD MANUAL: 19.6 % (ref 19.6–45.3)
MCH RBC QN AUTO: 23.5 PG (ref 26.6–33)
MCHC RBC AUTO-ENTMCNC: 31.8 G/DL (ref 31.5–35.7)
MCV RBC AUTO: 73.8 FL (ref 79–97)
MONOCYTES # BLD MANUAL: 0.36 10*3/MM3 (ref 0.1–0.9)
MONOCYTES NFR BLD AUTO: ABNORMAL %
MONOCYTES NFR BLD MANUAL: 3.1 % (ref 5–12)
NEUTROPHILS # BLD MANUAL: 8.6 10*3/MM3 (ref 1.7–7)
NEUTROPHILS NFR BLD AUTO: ABNORMAL %
NEUTROPHILS NFR BLD MANUAL: 74.2 % (ref 42.7–76)
PLATELET # BLD AUTO: 101 10*3/MM3 (ref 140–450)
PLATELET BLD QL SMEAR: ABNORMAL
POTASSIUM SERPL-SCNC: 3.9 MMOL/L (ref 3.5–5.2)
PROT SERPL-MCNC: 7 G/DL (ref 6–8.5)
RBC # BLD AUTO: 5.45 10*6/MM3 (ref 3.77–5.28)
RBC MORPH BLD: ABNORMAL
SODIUM SERPL-SCNC: 141 MMOL/L (ref 136–145)
TIBC SERPL-MCNC: 417 MCG/DL
TRIGL SERPL-MCNC: 152 MG/DL (ref 0–150)
TSH SERPL DL<=0.005 MIU/L-ACNC: 0.84 UIU/ML (ref 0.27–4.2)
UIBC SERPL-MCNC: 340 MCG/DL (ref 112–346)
VLDLC SERPL CALC-MCNC: 25 MG/DL (ref 5–40)
WBC # BLD AUTO: 11.59 10*3/MM3 (ref 3.4–10.8)

## 2023-03-09 ENCOUNTER — OFFICE VISIT (OUTPATIENT)
Dept: FAMILY MEDICINE CLINIC | Facility: CLINIC | Age: 78
End: 2023-03-09
Payer: MEDICARE

## 2023-03-09 VITALS
DIASTOLIC BLOOD PRESSURE: 70 MMHG | OXYGEN SATURATION: 99 % | SYSTOLIC BLOOD PRESSURE: 120 MMHG | HEIGHT: 63 IN | HEART RATE: 67 BPM | TEMPERATURE: 97.1 F | WEIGHT: 180 LBS | BODY MASS INDEX: 31.89 KG/M2

## 2023-03-09 DIAGNOSIS — N18.31 STAGE 3A CHRONIC KIDNEY DISEASE: ICD-10-CM

## 2023-03-09 DIAGNOSIS — F41.8 DEPRESSION WITH ANXIETY: ICD-10-CM

## 2023-03-09 DIAGNOSIS — G40.909 SEIZURE DISORDER: ICD-10-CM

## 2023-03-09 DIAGNOSIS — D50.9 IRON DEFICIENCY ANEMIA, UNSPECIFIED IRON DEFICIENCY ANEMIA TYPE: ICD-10-CM

## 2023-03-09 DIAGNOSIS — E55.9 VITAMIN D DEFICIENCY: ICD-10-CM

## 2023-03-09 DIAGNOSIS — D69.3 CHRONIC ITP (IDIOPATHIC THROMBOCYTOPENIC PURPURA): ICD-10-CM

## 2023-03-09 DIAGNOSIS — M81.0 AGE-RELATED OSTEOPOROSIS WITHOUT CURRENT PATHOLOGICAL FRACTURE: ICD-10-CM

## 2023-03-09 DIAGNOSIS — E78.2 MIXED HYPERLIPIDEMIA: ICD-10-CM

## 2023-03-09 DIAGNOSIS — E83.52 HYPERCALCEMIA: ICD-10-CM

## 2023-03-09 DIAGNOSIS — E66.09 EXOGENOUS OBESITY: ICD-10-CM

## 2023-03-09 DIAGNOSIS — D72.829 LEUKOCYTOSIS, UNSPECIFIED TYPE: ICD-10-CM

## 2023-03-09 DIAGNOSIS — I10 ESSENTIAL HYPERTENSION: Primary | ICD-10-CM

## 2023-03-09 DIAGNOSIS — R73.02 IMPAIRED GLUCOSE TOLERANCE: ICD-10-CM

## 2023-03-09 PROCEDURE — 3078F DIAST BP <80 MM HG: CPT | Performed by: FAMILY MEDICINE

## 2023-03-09 PROCEDURE — 99214 OFFICE O/P EST MOD 30 MIN: CPT | Performed by: FAMILY MEDICINE

## 2023-03-09 PROCEDURE — 1160F RVW MEDS BY RX/DR IN RCRD: CPT | Performed by: FAMILY MEDICINE

## 2023-03-09 PROCEDURE — 1159F MED LIST DOCD IN RCRD: CPT | Performed by: FAMILY MEDICINE

## 2023-03-09 PROCEDURE — 3074F SYST BP LT 130 MM HG: CPT | Performed by: FAMILY MEDICINE

## 2023-03-10 PROBLEM — D50.9 IRON DEFICIENCY ANEMIA: Status: ACTIVE | Noted: 2023-03-10

## 2023-03-10 PROBLEM — D69.6 THROMBOCYTOPENIA: Status: RESOLVED | Noted: 2017-12-26 | Resolved: 2023-03-10

## 2023-03-10 PROBLEM — D72.829 LEUKOCYTOSIS: Status: ACTIVE | Noted: 2023-03-10

## 2023-03-10 PROBLEM — E83.52 HYPERCALCEMIA: Status: ACTIVE | Noted: 2023-03-10

## 2023-03-10 PROBLEM — N18.31 STAGE 3A CHRONIC KIDNEY DISEASE (HCC): Status: ACTIVE | Noted: 2018-07-01

## 2023-03-10 RX ORDER — ATORVASTATIN CALCIUM 40 MG/1
40 TABLET, FILM COATED ORAL DAILY
Qty: 90 TABLET | Refills: 1 | Status: SHIPPED | OUTPATIENT
Start: 2023-03-10

## 2023-03-10 RX ORDER — PAROXETINE HYDROCHLORIDE 20 MG/1
20 TABLET, FILM COATED ORAL EVERY MORNING
Qty: 90 TABLET | Refills: 1 | Status: SHIPPED | OUTPATIENT
Start: 2023-03-10

## 2023-03-10 RX ORDER — DOXYCYCLINE HYCLATE 50 MG/1
324 CAPSULE, GELATIN COATED ORAL
Qty: 90 TABLET | Refills: 1 | Status: SHIPPED | OUTPATIENT
Start: 2023-03-10

## 2023-03-10 NOTE — PROGRESS NOTES
Subjective   Shyann Davis is a 77 y.o. female with   Chief Complaint   Patient presents with   • Hypertension   .    History of Present Illness   77-year-old white female here in follow-up for essential hypertension and for further medical management.  Patient with multiple medical issues including hyperlipidemia, seizure disorder as well as depression with anxiety features.  She has also been found to be iron deficient and was suggested to start iron supplementation by hematology.  She has a history of thrombocytopenia which is why she is followed by hematology to begin with.  Next hematology appointment is still not for 4 to 5 months.  Fasting labs have been acquired prior to this visit.  Current medications include atorvastatin, Keppra, Toprol-XL as well as Paxil and vitamin D at 50,000 units weekly.  She was prescribed iron sulfate but has not acquired this product as of yet.  In general all medications are used appropriately and are well-tolerated without side effects.  The following portions of the patient's history were reviewed and updated as appropriate: allergies, current medications, past family history, past medical history, past social history, past surgical history and problem list.    Review of Systems   Cardiovascular:        Hypertension, hyperlipidemia   Endocrine:        Exogenous obesity, vitamin D deficiency   Neurological: Positive for seizures.   Hematological:        Thrombocytopenia, iron deficiency anemia   Psychiatric/Behavioral: Positive for dysphoric mood. The patient is nervous/anxious.        Objective     Vitals:    03/09/23 1536   BP: 120/70   Pulse: 67   Temp: 97.1 °F (36.2 °C)   SpO2: 99%       Recent Results (from the past 672 hour(s))   Iron and TIBC    Collection Time: 03/03/23  9:52 AM    Specimen: Blood   Result Value Ref Range    TIBC 417 mcg/dL    UIBC 340 112 - 346 mcg/dL    Iron 77 37 - 145 mcg/dL    Iron Saturation 18 (L) 20 - 50 %   Ferritin    Collection Time: 03/03/23   9:52 AM    Specimen: Blood   Result Value Ref Range    Ferritin 58.60 13.00 - 150.00 ng/mL   CBC w AUTO Differential    Collection Time: 03/03/23  9:52 AM    Specimen: Blood   Result Value Ref Range    WBC 11.59 (H) 3.40 - 10.80 10*3/mm3    RBC 5.45 (H) 3.77 - 5.28 10*6/mm3    Hemoglobin 12.8 12.0 - 15.9 g/dL    Hematocrit 40.2 34.0 - 46.6 %    MCV 73.8 (L) 79.0 - 97.0 fL    MCH 23.5 (L) 26.6 - 33.0 pg    MCHC 31.8 31.5 - 35.7 g/dL    RDW 14.1 12.3 - 15.4 %    Platelets 101 (L) 140 - 450 10*3/mm3    Neutrophil Rel % CANCELED     Lymphocyte Rel % CANCELED     Monocyte Rel % CANCELED     Eosinophil Rel % CANCELED     Lymphocytes Absolute CANCELED     Eosinophils Absolute CANCELED     Basophils Absolute CANCELED    Hemoglobin A1c    Collection Time: 03/03/23  9:52 AM    Specimen: Blood   Result Value Ref Range    Hemoglobin A1C 5.50 4.80 - 5.60 %   Lipid panel    Collection Time: 03/03/23  9:52 AM    Specimen: Blood   Result Value Ref Range    Total Cholesterol 120 0 - 200 mg/dL    Triglycerides 152 (H) 0 - 150 mg/dL    HDL Cholesterol 56 40 - 60 mg/dL    VLDL Cholesterol Anders 25 5 - 40 mg/dL    LDL Chol Calc (NIH) 39 0 - 100 mg/dL   TSH    Collection Time: 03/03/23  9:52 AM    Specimen: Blood   Result Value Ref Range    TSH 0.845 0.270 - 4.200 uIU/mL   Vitamin D 25 hydroxy    Collection Time: 03/03/23  9:52 AM    Specimen: Blood   Result Value Ref Range    25 Hydroxy, Vitamin D 45.4 30.0 - 100.0 ng/mL   Comprehensive metabolic panel    Collection Time: 03/03/23  9:52 AM    Specimen: Blood   Result Value Ref Range    Glucose 122 (H) 65 - 99 mg/dL    BUN 19 8 - 23 mg/dL    Creatinine 1.21 (H) 0.57 - 1.00 mg/dL    EGFR Result 46.3 (L) >60.0 mL/min/1.73    BUN/Creatinine Ratio 15.7 7.0 - 25.0    Sodium 141 136 - 145 mmol/L    Potassium 3.9 3.5 - 5.2 mmol/L    Chloride 100 98 - 107 mmol/L    Total CO2 34.3 (H) 22.0 - 29.0 mmol/L    Calcium 11.0 (H) 8.6 - 10.5 mg/dL    Total Protein 7.0 6.0 - 8.5 g/dL    Albumin 4.1 3.5 -  5.2 g/dL    Globulin 2.9 gm/dL    A/G Ratio 1.4 g/dL    Total Bilirubin 0.7 0.0 - 1.2 mg/dL    Alkaline Phosphatase 106 39 - 117 U/L    AST (SGOT) 24 1 - 32 U/L    ALT (SGPT) 18 1 - 33 U/L   Manual Differential    Collection Time: 03/03/23  9:52 AM   Result Value Ref Range    Neutrophil Rel % 74.2 42.7 - 76.0 %    Lymphocyte Rel % 19.6 19.6 - 45.3 %    Monocyte Rel % 3.1 (L) 5.0 - 12.0 %    Eosinophil Rel % 2.1 0.3 - 6.2 %    Basophil Rel % 1.0 0.0 - 1.5 %    Neutrophils Absolute 8.60 (H) 1.70 - 7.00 10*3/mm3    Lymphocytes Absolute 2.27 0.70 - 3.10 10*3/mm3    Monocytes Absolute 0.36 0.10 - 0.90 10*3/mm3    Eosinophil Abs 0.24 0.00 - 0.40 10*3/mm3    Basophils Absolute 0.12 0.00 - 0.20 10*3/mm3    Differential Comment Comment     Comment Comment     Plt Comment Comment        Physical Exam  Vitals and nursing note reviewed.   Constitutional:       Appearance: Normal appearance. She is well-developed and well-groomed. She is obese.      Comments: Exogenous obesity with a BMI 31.9   HENT:      Head: Normocephalic and atraumatic.   Neck:      Thyroid: No thyroid mass or thyromegaly.      Vascular: Normal carotid pulses. No carotid bruit.      Trachea: Trachea and phonation normal.   Cardiovascular:      Rate and Rhythm: Normal rate and regular rhythm.      Heart sounds: Normal heart sounds. No murmur heard.    No friction rub. No gallop.   Pulmonary:      Effort: Pulmonary effort is normal. No respiratory distress.      Breath sounds: Normal breath sounds. No decreased breath sounds, wheezing, rhonchi or rales.   Musculoskeletal:      Cervical back: Neck supple.   Lymphadenopathy:      Cervical: No cervical adenopathy.   Skin:     General: Skin is warm and dry.      Findings: No rash.   Neurological:      Mental Status: She is alert and oriented to person, place, and time.   Psychiatric:         Attention and Perception: Attention and perception normal.         Mood and Affect: Mood and affect normal.         Speech:  Speech normal.         Behavior: Behavior normal. Behavior is cooperative.         Thought Content: Thought content normal.         Cognition and Memory: Cognition and memory normal.         Judgment: Judgment normal.         Assessment & Plan   Diagnoses and all orders for this visit:    1. Essential hypertension (Primary)  -     Comprehensive metabolic panel; Future  -     TSH; Future  -     Hemoglobin A1c; Future  -     Iron and TIBC; Future  -     Ferritin; Future  -     Vitamin D 25 hydroxy; Future  -     Lipid panel; Future  -     CBC w AUTO Differential; Future  -     PTH, Intact; Future    2. Mixed hyperlipidemia  -     atorvastatin (LIPITOR) 40 MG tablet; Take 1 tablet by mouth Daily.  Dispense: 90 tablet; Refill: 1  -     Comprehensive metabolic panel; Future  -     TSH; Future  -     Hemoglobin A1c; Future  -     Iron and TIBC; Future  -     Ferritin; Future  -     Vitamin D 25 hydroxy; Future  -     Lipid panel; Future  -     CBC w AUTO Differential; Future  -     PTH, Intact; Future    3. Chronic ITP (idiopathic thrombocytopenic purpura) (HCC)  -     Comprehensive metabolic panel; Future  -     TSH; Future  -     Hemoglobin A1c; Future  -     Iron and TIBC; Future  -     Ferritin; Future  -     Vitamin D 25 hydroxy; Future  -     Lipid panel; Future  -     CBC w AUTO Differential; Future  -     PTH, Intact; Future    4. Exogenous obesity  -     Comprehensive metabolic panel; Future  -     TSH; Future  -     Hemoglobin A1c; Future  -     Iron and TIBC; Future  -     Ferritin; Future  -     Vitamin D 25 hydroxy; Future  -     Lipid panel; Future  -     CBC w AUTO Differential; Future  -     PTH, Intact; Future    5. Impaired glucose tolerance  -     Comprehensive metabolic panel; Future  -     TSH; Future  -     Hemoglobin A1c; Future  -     Iron and TIBC; Future  -     Ferritin; Future  -     Vitamin D 25 hydroxy; Future  -     Lipid panel; Future  -     CBC w AUTO Differential; Future  -     PTH,  Intact; Future    6. Vitamin D deficiency  -     Comprehensive metabolic panel; Future  -     TSH; Future  -     Hemoglobin A1c; Future  -     Iron and TIBC; Future  -     Ferritin; Future  -     Vitamin D 25 hydroxy; Future  -     Lipid panel; Future  -     CBC w AUTO Differential; Future  -     PTH, Intact; Future    7. Stage 3a chronic kidney disease (HCC)  -     Comprehensive metabolic panel; Future  -     TSH; Future  -     Hemoglobin A1c; Future  -     Iron and TIBC; Future  -     Ferritin; Future  -     Vitamin D 25 hydroxy; Future  -     Lipid panel; Future  -     CBC w AUTO Differential; Future  -     PTH, Intact; Future    8. Depression with anxiety  -     PARoxetine (PAXIL) 20 MG tablet; Take 1 tablet by mouth Every Morning.  Dispense: 90 tablet; Refill: 1  -     Comprehensive metabolic panel; Future  -     TSH; Future  -     Hemoglobin A1c; Future  -     Iron and TIBC; Future  -     Ferritin; Future  -     Vitamin D 25 hydroxy; Future  -     Lipid panel; Future  -     CBC w AUTO Differential; Future  -     PTH, Intact; Future    9. Age-related osteoporosis without current pathological fracture  -     Comprehensive metabolic panel; Future  -     TSH; Future  -     Hemoglobin A1c; Future  -     Iron and TIBC; Future  -     Ferritin; Future  -     Vitamin D 25 hydroxy; Future  -     Lipid panel; Future  -     CBC w AUTO Differential; Future  -     PTH, Intact; Future    10. Seizure disorder (HCC)  -     Comprehensive metabolic panel; Future  -     TSH; Future  -     Hemoglobin A1c; Future  -     Iron and TIBC; Future  -     Ferritin; Future  -     Vitamin D 25 hydroxy; Future  -     Lipid panel; Future  -     CBC w AUTO Differential; Future  -     PTH, Intact; Future    11. Hypercalcemia  -     Comprehensive metabolic panel; Future  -     TSH; Future  -     Hemoglobin A1c; Future  -     Iron and TIBC; Future  -     Ferritin; Future  -     Vitamin D 25 hydroxy; Future  -     Lipid panel; Future  -     CBC w  AUTO Differential; Future  -     PTH, Intact; Future    12. Iron deficiency anemia, unspecified iron deficiency anemia type  -     Comprehensive metabolic panel; Future  -     TSH; Future  -     Hemoglobin A1c; Future  -     Iron and TIBC; Future  -     Ferritin; Future  -     Vitamin D 25 hydroxy; Future  -     Lipid panel; Future  -     CBC w AUTO Differential; Future  -     PTH, Intact; Future  -     ferrous gluconate (FERGON) 324 MG tablet; Take 1 tablet by mouth Daily With Breakfast.  Dispense: 90 tablet; Refill: 1    13. Leukocytosis, unspecified type  -     Comprehensive metabolic panel; Future  -     TSH; Future  -     Hemoglobin A1c; Future  -     Iron and TIBC; Future  -     Ferritin; Future  -     Vitamin D 25 hydroxy; Future  -     Lipid panel; Future  -     CBC w AUTO Differential; Future  -     PTH, Intact; Future        Return in about 6 months (around 9/9/2023) for Recheck.

## 2023-06-08 ENCOUNTER — TELEPHONE (OUTPATIENT)
Dept: FAMILY MEDICINE CLINIC | Facility: CLINIC | Age: 78
End: 2023-06-08
Payer: MEDICARE

## 2023-06-08 DIAGNOSIS — M81.0 AGE-RELATED OSTEOPOROSIS WITHOUT CURRENT PATHOLOGICAL FRACTURE: Primary | ICD-10-CM

## 2023-06-08 NOTE — TELEPHONE ENCOUNTER
Nasreen called from [employer] [employer] this morning, they need an updated dexa scan before pt can have her next Prolia. Her last scan was in 2017, Medicare requires pt's to have a scan every 2 years unless there is a medical reason why they should not. Did not see documentation stating she shouldn't, so can you put in an order for a dexa to get pt up to date to continue Prolia.

## 2023-06-08 NOTE — TELEPHONE ENCOUNTER
Called to inform pt that insurance would not cover prolia due to not having a dexascan. Informed her that we would be canceling the appt for the prolia shot until she got her dexascan. Pt was frustrated but verbalized understanding.

## 2023-06-14 ENCOUNTER — HOSPITAL ENCOUNTER (OUTPATIENT)
Dept: INFUSION THERAPY | Facility: HOSPITAL | Age: 78
Discharge: HOME | End: 2023-06-14
Payer: MEDICARE

## 2023-06-16 NOTE — NURSING NOTE
Spoke with Ms. Davis today.  She states is not going to take Prolia injections anymore until she discusses it with Dr. Underwood in September.  Patient reminded of and encouraged to keep appointment on June 28th for DEXA. JOSE Lyn

## 2023-07-18 ENCOUNTER — APPOINTMENT (OUTPATIENT)
Dept: WOMENS IMAGING | Facility: HOSPITAL | Age: 78
End: 2023-07-18
Payer: MEDICARE

## 2023-07-18 PROCEDURE — 77063 BREAST TOMOSYNTHESIS BI: CPT | Performed by: RADIOLOGY

## 2023-07-18 PROCEDURE — 77067 SCR MAMMO BI INCL CAD: CPT | Performed by: RADIOLOGY

## 2023-08-02 ENCOUNTER — OFFICE VISIT (OUTPATIENT)
Dept: FAMILY MEDICINE CLINIC | Facility: CLINIC | Age: 78
End: 2023-08-02
Payer: MEDICARE

## 2023-08-02 VITALS
HEIGHT: 63 IN | OXYGEN SATURATION: 99 % | BODY MASS INDEX: 32.07 KG/M2 | WEIGHT: 181 LBS | SYSTOLIC BLOOD PRESSURE: 118 MMHG | DIASTOLIC BLOOD PRESSURE: 78 MMHG | HEART RATE: 76 BPM | TEMPERATURE: 97.5 F

## 2023-08-02 DIAGNOSIS — N32.81 OVERACTIVE BLADDER: ICD-10-CM

## 2023-08-02 DIAGNOSIS — R35.0 URINARY FREQUENCY: Primary | ICD-10-CM

## 2023-08-02 LAB
BILIRUB BLD-MCNC: NEGATIVE MG/DL
CLARITY, POC: CLEAR
COLOR UR: NORMAL
GLUCOSE UR STRIP-MCNC: NEGATIVE MG/DL
KETONES UR QL: NEGATIVE
LEUKOCYTE EST, POC: NEGATIVE
NITRITE UR-MCNC: NEGATIVE MG/ML
PH UR: 5 [PH] (ref 5–8)
PROT UR STRIP-MCNC: NEGATIVE MG/DL
RBC # UR STRIP: NEGATIVE /UL
SP GR UR: 1.01 (ref 1–1.03)
UROBILINOGEN UR QL: NORMAL

## 2023-08-02 PROCEDURE — 1159F MED LIST DOCD IN RCRD: CPT | Performed by: FAMILY MEDICINE

## 2023-08-02 PROCEDURE — 3078F DIAST BP <80 MM HG: CPT | Performed by: FAMILY MEDICINE

## 2023-08-02 PROCEDURE — 99213 OFFICE O/P EST LOW 20 MIN: CPT | Performed by: FAMILY MEDICINE

## 2023-08-02 PROCEDURE — 3074F SYST BP LT 130 MM HG: CPT | Performed by: FAMILY MEDICINE

## 2023-08-02 PROCEDURE — 81002 URINALYSIS NONAUTO W/O SCOPE: CPT | Performed by: FAMILY MEDICINE

## 2023-08-02 PROCEDURE — 1160F RVW MEDS BY RX/DR IN RCRD: CPT | Performed by: FAMILY MEDICINE

## 2023-08-09 LAB
BACTERIA UR CULT: ABNORMAL
BACTERIA UR CULT: ABNORMAL
OTHER ANTIBIOTIC SUSC ISLT: ABNORMAL

## 2023-08-09 RX ORDER — CIPROFLOXACIN 500 MG/1
500 TABLET, FILM COATED ORAL 2 TIMES DAILY
Qty: 14 TABLET | Refills: 0 | Status: SHIPPED | OUTPATIENT
Start: 2023-08-09

## 2023-08-19 DIAGNOSIS — D50.9 IRON DEFICIENCY ANEMIA, UNSPECIFIED IRON DEFICIENCY ANEMIA TYPE: ICD-10-CM

## 2023-08-21 RX ORDER — DOXYCYCLINE HYCLATE 50 MG/1
CAPSULE, GELATIN COATED ORAL
Qty: 90 TABLET | Refills: 0 | Status: SHIPPED | OUTPATIENT
Start: 2023-08-21

## 2023-08-24 ENCOUNTER — OFFICE VISIT (OUTPATIENT)
Dept: FAMILY MEDICINE CLINIC | Facility: CLINIC | Age: 78
End: 2023-08-24
Payer: MEDICARE

## 2023-08-24 VITALS
BODY MASS INDEX: 31.89 KG/M2 | SYSTOLIC BLOOD PRESSURE: 120 MMHG | HEART RATE: 82 BPM | HEIGHT: 63 IN | TEMPERATURE: 97.5 F | WEIGHT: 180 LBS | DIASTOLIC BLOOD PRESSURE: 82 MMHG | OXYGEN SATURATION: 97 %

## 2023-08-24 DIAGNOSIS — G40.909 SEIZURE DISORDER: ICD-10-CM

## 2023-08-24 DIAGNOSIS — E78.2 MIXED HYPERLIPIDEMIA: ICD-10-CM

## 2023-08-24 DIAGNOSIS — R53.1 GENERALIZED WEAKNESS: Primary | ICD-10-CM

## 2023-08-24 DIAGNOSIS — F41.8 DEPRESSION WITH ANXIETY: ICD-10-CM

## 2023-08-24 DIAGNOSIS — I10 ESSENTIAL HYPERTENSION: ICD-10-CM

## 2023-08-24 PROCEDURE — 1159F MED LIST DOCD IN RCRD: CPT | Performed by: FAMILY MEDICINE

## 2023-08-24 PROCEDURE — 3074F SYST BP LT 130 MM HG: CPT | Performed by: FAMILY MEDICINE

## 2023-08-24 PROCEDURE — 3079F DIAST BP 80-89 MM HG: CPT | Performed by: FAMILY MEDICINE

## 2023-08-24 PROCEDURE — 1160F RVW MEDS BY RX/DR IN RCRD: CPT | Performed by: FAMILY MEDICINE

## 2023-08-24 PROCEDURE — 99213 OFFICE O/P EST LOW 20 MIN: CPT | Performed by: FAMILY MEDICINE

## 2023-08-26 RX ORDER — PAROXETINE HYDROCHLORIDE 20 MG/1
20 TABLET, FILM COATED ORAL EVERY MORNING
Qty: 90 TABLET | Refills: 1 | Status: SHIPPED | OUTPATIENT
Start: 2023-08-26

## 2023-08-26 RX ORDER — ATORVASTATIN CALCIUM 40 MG/1
40 TABLET, FILM COATED ORAL DAILY
Qty: 90 TABLET | Refills: 1 | Status: SHIPPED | OUTPATIENT
Start: 2023-08-26

## 2023-08-26 NOTE — PROGRESS NOTES
Subjective   Shyann Davis is a 78 y.o. female with   Chief Complaint   Patient presents with    Need for Physical Therapy     Pt in assisted living, they want to do some physical tx, work on gait, strength and stamina.  Needed a visit to discuss this need.   .    History of Present Illness   78-year-old white female who has moved to an assisted living here for further evaluation in regards to generalized weakness.  She has gone through a very difficult time since the loss of her  more than a year ago.  There is a degree of dementia and she has spent inadvertent amount of time sitting.  She has moved to the assisted living at the encouragement of her children.  They do prepare meals for her and help her with organization of medication and other things that she may need.  She has not inclined to be very social and is not exercising.  They do have the availability of physical therapy at this institution and will need a prescription to increase her activity in both upper and lower extremities.  The following portions of the patient's history were reviewed and updated as appropriate: allergies, current medications, past family history, past medical history, past social history, past surgical history and problem list.    Review of Systems   Neurological:  Positive for weakness.        Dementia     Objective     Vitals:    08/24/23 1554   BP: 120/82   Pulse: 82   Temp: 97.5 øF (36.4 øC)   SpO2: 97%       Recent Results (from the past 672 hour(s))   POC Urinalysis Dipstick    Collection Time: 08/02/23  4:12 PM    Specimen: Urine   Result Value Ref Range    Color Straw Yellow, Straw, Dark Yellow, Gianna    Clarity, UA Clear Clear    Glucose, UA Negative Negative mg/dL    Bilirubin Negative Negative    Ketones, UA Negative Negative    Specific Gravity  1.015 1.005 - 1.030    Blood, UA Negative Negative    pH, Urine 5.0 5.0 - 8.0    Protein, POC Negative Negative mg/dL    Urobilinogen, UA Normal Normal, 0.2 E.U./dL     Leukocytes Negative Negative    Nitrite, UA Negative Negative   Urine Culture - Urine, Urine, Clean Catch    Collection Time: 08/02/23  4:43 PM    Specimen: Urine, Clean Catch    UR   Result Value Ref Range    Urine Culture Final report (A)     Result 1 Comment (A)     Susceptibility Testing Comment        Physical Exam  Vitals and nursing note reviewed.   Constitutional:       Appearance: Normal appearance. She is well-developed and well-groomed.   HENT:      Head: Normocephalic and atraumatic.   Neck:      Thyroid: No thyroid mass or thyromegaly.      Vascular: Normal carotid pulses. No carotid bruit.      Trachea: Trachea and phonation normal.   Cardiovascular:      Rate and Rhythm: Normal rate and regular rhythm.      Heart sounds: Normal heart sounds. No murmur heard.    No friction rub. No gallop.   Pulmonary:      Effort: Pulmonary effort is normal. No respiratory distress.      Breath sounds: Normal breath sounds. No decreased breath sounds, wheezing, rhonchi or rales.   Musculoskeletal:      Cervical back: Neck supple.   Lymphadenopathy:      Cervical: No cervical adenopathy.   Skin:     General: Skin is warm and dry.      Findings: No rash.   Neurological:      Mental Status: She is alert and oriented to person, place, and time.   Psychiatric:         Attention and Perception: Attention and perception normal.         Mood and Affect: Mood and affect normal.         Speech: Speech normal.         Behavior: Behavior normal. Behavior is cooperative.         Thought Content: Thought content normal.         Cognition and Memory: Cognition and memory normal.         Judgment: Judgment normal.       Assessment & Plan   Diagnoses and all orders for this visit:    1. Generalized weakness (Primary)    2. Essential hypertension    3. Mixed hyperlipidemia  -     atorvastatin (LIPITOR) 40 MG tablet; Take 1 tablet by mouth Daily.  Dispense: 90 tablet; Refill: 1    4. Depression with anxiety  -     PARoxetine (PAXIL) 20  MG tablet; Take 1 tablet by mouth Every Morning.  Dispense: 90 tablet; Refill: 1    5. Seizure disorder    Prescription signed for her to have physical therapy at her institution.    Return if symptoms worsen or fail to improve, for Next scheduled follow up.

## 2023-08-29 ENCOUNTER — TELEPHONE (OUTPATIENT)
Dept: FAMILY MEDICINE CLINIC | Facility: CLINIC | Age: 78
End: 2023-08-29
Payer: MEDICARE

## 2023-08-29 NOTE — TELEPHONE ENCOUNTER
Caller: VENKAT    Relationship: [facility]    Best call back number: [phone]    What orders are you requesting (i.e. lab or imaging): ADELINE FERRARI    In what timeframe would the patient need to come in: ASAP    Where will you receive your lab/imaging services: [facility]     Additional notes: [phone].  SHE NEEDS A COPY OF THE LAST OFFICE NOTE IF YOU CAN FAX IT TO HER.

## 2023-08-30 ENCOUNTER — TELEPHONE (OUTPATIENT)
Dept: FAMILY MEDICINE CLINIC | Facility: CLINIC | Age: 78
End: 2023-08-30
Payer: MEDICARE

## 2023-08-30 NOTE — TELEPHONE ENCOUNTER
NENA WITH [facility] CALLED FOR VERBAL ORDERS FOR PHYSICAL THERAPY, 1-2 TIMES A WEEKS FOR 8 WEEKS.     CALL BACK NUMBER [phone]

## 2023-09-26 ENCOUNTER — HOSPITAL ENCOUNTER (OUTPATIENT)
Dept: BONE DENSITY | Facility: HOSPITAL | Age: 78
Discharge: HOME | End: 2023-09-26
Admitting: FAMILY MEDICINE
Payer: MEDICARE

## 2023-09-26 DIAGNOSIS — Z78.0 POST-MENOPAUSAL: ICD-10-CM

## 2023-09-26 PROCEDURE — 77080 DXA BONE DENSITY AXIAL: CPT

## 2024-01-30 ENCOUNTER — LAB (OUTPATIENT)
Dept: OTHER | Facility: HOSPITAL | Age: 79
End: 2024-01-30
Payer: MEDICARE

## 2024-01-30 ENCOUNTER — OFFICE VISIT (OUTPATIENT)
Dept: ONCOLOGY | Facility: CLINIC | Age: 79
End: 2024-01-30
Payer: MEDICARE

## 2024-01-30 VITALS
TEMPERATURE: 97.3 F | OXYGEN SATURATION: 97 % | BODY MASS INDEX: 32 KG/M2 | SYSTOLIC BLOOD PRESSURE: 116 MMHG | DIASTOLIC BLOOD PRESSURE: 76 MMHG | RESPIRATION RATE: 16 BRPM | WEIGHT: 180.6 LBS | HEIGHT: 63 IN | HEART RATE: 80 BPM

## 2024-01-30 DIAGNOSIS — D50.9 IRON DEFICIENCY ANEMIA, UNSPECIFIED IRON DEFICIENCY ANEMIA TYPE: ICD-10-CM

## 2024-01-30 DIAGNOSIS — D69.3 CHRONIC ITP (IDIOPATHIC THROMBOCYTOPENIC PURPURA): ICD-10-CM

## 2024-01-30 DIAGNOSIS — D69.3 CHRONIC ITP (IDIOPATHIC THROMBOCYTOPENIC PURPURA): Primary | ICD-10-CM

## 2024-01-30 LAB
ACANTHOCYTES BLD QL SMEAR: NORMAL
ALBUMIN SERPL-MCNC: 4 G/DL (ref 3.5–5.2)
ALBUMIN/GLOB SERPL: 1.3 G/DL
ALP SERPL-CCNC: 120 U/L (ref 39–117)
ALT SERPL W P-5'-P-CCNC: 14 U/L (ref 1–33)
ANION GAP SERPL CALCULATED.3IONS-SCNC: 8.6 MMOL/L (ref 5–15)
AST SERPL-CCNC: 18 U/L (ref 1–32)
BASOPHILS # BLD AUTO: 0.05 10*3/MM3 (ref 0–0.2)
BASOPHILS NFR BLD AUTO: 0.5 % (ref 0–1.5)
BILIRUB SERPL-MCNC: 0.5 MG/DL (ref 0–1.2)
BUN SERPL-MCNC: 26 MG/DL (ref 8–23)
BUN/CREAT SERPL: 21.8 (ref 7–25)
CALCIUM SPEC-SCNC: 10.7 MG/DL (ref 8.6–10.5)
CHLORIDE SERPL-SCNC: 106 MMOL/L (ref 98–107)
CO2 SERPL-SCNC: 30.4 MMOL/L (ref 22–29)
CREAT SERPL-MCNC: 1.19 MG/DL (ref 0.57–1)
DEPRECATED RDW RBC AUTO: 42 FL (ref 37–54)
EGFRCR SERPLBLD CKD-EPI 2021: 46.9 ML/MIN/1.73
EOSINOPHIL # BLD AUTO: 0.14 10*3/MM3 (ref 0–0.4)
EOSINOPHIL NFR BLD AUTO: 1.3 % (ref 0.3–6.2)
ERYTHROCYTE [DISTWIDTH] IN BLOOD BY AUTOMATED COUNT: 15.4 % (ref 12.3–15.4)
FERRITIN SERPL-MCNC: 83.4 NG/ML (ref 13–150)
FOLATE SERPL-MCNC: >20 NG/ML (ref 4.78–24.2)
GLOBULIN UR ELPH-MCNC: 3.2 GM/DL
GLUCOSE SERPL-MCNC: 116 MG/DL (ref 65–99)
HCT VFR BLD AUTO: 44 % (ref 34–46.6)
HGB BLD-MCNC: 13.4 G/DL (ref 12–15.9)
IMM GRANULOCYTES # BLD AUTO: 0.07 10*3/MM3 (ref 0–0.05)
IMM GRANULOCYTES NFR BLD AUTO: 0.6 % (ref 0–0.5)
IRON 24H UR-MRATE: 86 MCG/DL (ref 37–145)
IRON SATN MFR SERPL: 22 % (ref 20–50)
LYMPHOCYTES # BLD AUTO: 1.83 10*3/MM3 (ref 0.7–3.1)
LYMPHOCYTES NFR BLD AUTO: 16.5 % (ref 19.6–45.3)
MCH RBC QN AUTO: 23.1 PG (ref 26.6–33)
MCHC RBC AUTO-ENTMCNC: 30.5 G/DL (ref 31.5–35.7)
MCV RBC AUTO: 75.7 FL (ref 79–97)
MONOCYTES # BLD AUTO: 0.52 10*3/MM3 (ref 0.1–0.9)
MONOCYTES NFR BLD AUTO: 4.7 % (ref 5–12)
NEUTROPHILS NFR BLD AUTO: 76.4 % (ref 42.7–76)
NEUTROPHILS NFR BLD AUTO: 8.5 10*3/MM3 (ref 1.7–7)
NRBC BLD AUTO-RTO: 0 /100 WBC (ref 0–0.2)
PLAT MORPH BLD: NORMAL
PLATELET # BLD AUTO: 92 10*3/MM3 (ref 140–450)
PLATELET # BLD AUTO: 92 10*3/MM3 (ref 140–450)
PLATELETS.RETICULATED NFR BLD AUTO: 39.4 % (ref 0.9–6.5)
POTASSIUM SERPL-SCNC: 3.6 MMOL/L (ref 3.5–5.2)
PROT SERPL-MCNC: 7.2 G/DL (ref 6–8.5)
RBC # BLD AUTO: 5.81 10*6/MM3 (ref 3.77–5.28)
SODIUM SERPL-SCNC: 145 MMOL/L (ref 136–145)
TIBC SERPL-MCNC: 387 MCG/DL (ref 298–536)
TRANSFERRIN SERPL-MCNC: 260 MG/DL (ref 200–360)
VIT B12 BLD-MCNC: 1397 PG/ML (ref 211–946)
WBC MORPH BLD: NORMAL
WBC NRBC COR # BLD AUTO: 11.11 10*3/MM3 (ref 3.4–10.8)

## 2024-01-30 PROCEDURE — 85007 BL SMEAR W/DIFF WBC COUNT: CPT | Performed by: INTERNAL MEDICINE

## 2024-01-30 PROCEDURE — 83540 ASSAY OF IRON: CPT | Performed by: INTERNAL MEDICINE

## 2024-01-30 PROCEDURE — 84466 ASSAY OF TRANSFERRIN: CPT | Performed by: INTERNAL MEDICINE

## 2024-01-30 PROCEDURE — 36415 COLL VENOUS BLD VENIPUNCTURE: CPT

## 2024-01-30 PROCEDURE — 85025 COMPLETE CBC W/AUTO DIFF WBC: CPT | Performed by: INTERNAL MEDICINE

## 2024-01-30 PROCEDURE — 85055 RETICULATED PLATELET ASSAY: CPT | Performed by: INTERNAL MEDICINE

## 2024-01-30 PROCEDURE — 80053 COMPREHEN METABOLIC PANEL: CPT | Performed by: INTERNAL MEDICINE

## 2024-01-30 PROCEDURE — 82728 ASSAY OF FERRITIN: CPT | Performed by: INTERNAL MEDICINE

## 2024-01-30 PROCEDURE — 82607 VITAMIN B-12: CPT | Performed by: INTERNAL MEDICINE

## 2024-01-30 PROCEDURE — 82746 ASSAY OF FOLIC ACID SERUM: CPT | Performed by: INTERNAL MEDICINE

## 2024-01-30 NOTE — PROGRESS NOTES
Subjective     REASON FOR CONSULTATION:   1.  Mild chronic ITP  2.  Remote history of breast cancer  3.  Thyroid nodules. Pathology benign from FNA    HISTORY OF PRESENT ILLNESS:  The patient is a 78 y.o. year old female who was referred to us from her primary care office for evaluation of mild chronic thrombocytopenia.  Her platelet count had been slightly low for at least 2 years with a platelet count of 134,000 in June 2019.  It had drifted down to 82,000 on labs from 3/3/2021.  Her hemoglobin and white cells were normal.    With her initial consult visit of 3/19/2021 we performed an immature platelet fraction which was markedly elevated consistent with ITP.      We have been observing her without treatment as her platelet count has remained in a safe range from 80,000-110,000.      She required hospitalization due to gangrenous cholecystitis leading to sepsis.  She was in the hospital from 10/22/2021 through 11/2/2021 and underwent cholecystectomy and antibiotic therapy.     INTERVAL HISTORY:   Shyann is here today for her routine 6-month follow-up.  Her platelet count is stable today at 92,000.  Her IPF remains significantly elevated at 39%.  She continues to have a chronically low MCV at 76.  She had a hemoglobin electrophoresis performed in July 2022 showing no abnormalities therefore we suspect she may have an alpha thalassemia trait.    She had her annual mammogram performed 7/18/2023 with no suspicious findings.  She also had a bone density scan 9/26/2023 showing stable osteopenia.    She is now living in an assisted living facility at [facility].   History of Present Illness     Past Medical History:   Diagnosis Date    Age-related osteoporosis without current pathological fracture 2/8/2019    Anxiety     Breast cancer     Right    Depression     History of thrombocytopenia     Hyperlipidemia     Hypertension     Osteoporosis     Seizure     after brain surgery only        Past Surgical  History:   Procedure Laterality Date    APPENDECTOMY  2007    BRAIN AVM REPAIR  1992    BREAST LUMPECTOMY Right 2002    BREAST SURGERY      CHOLECYSTECTOMY N/A 10/25/2021    Procedure: CHOLECYSTECTOMY LAPAROSCOPIC;  Surgeon: Gera Kay MD;  Location: [facility];  Service: General;  Laterality: N/A;    HYSTERECTOMY  1976        Current Outpatient Medications on File Prior to Visit   Medication Sig Dispense Refill    atorvastatin (LIPITOR) 40 MG tablet Take 1 tablet by mouth Daily. 90 tablet 1    Calcium 600-200 MG-UNIT per tablet Take 1 tablet by mouth 2 (Two) Times a Day.      ferrous gluconate (FERGON) 324 MG tablet TAKE 1 TABLET EVERY DAY WITH BREAKFAST 90 tablet 0    Hydrocortisone, Perianal, (ANUSOL-HC) 2.5 % rectal cream Insert  into the rectum 2 (Two) Times a Day. 28 g 0    levETIRAcetam (KEPPRA) 500 MG tablet TAKE 1/2 TAB TWICE A DAY FOR A WEEK THEN 1 TAB TWICE A DAY      metoprolol succinate XL (TOPROL-XL) 50 MG 24 hr tablet TAKE 1 TABLET EVERY DAY 90 tablet 0    Mirabegron ER (Myrbetriq) 25 MG tablet sustained-release 24 hour 24 hr tablet Take 1 tablet by mouth Daily. 90 tablet 1    MULTIPLE VITAMINS ESSENTIAL PO Take  by mouth.      PARoxetine (PAXIL) 20 MG tablet Take 1 tablet by mouth Every Morning. 90 tablet 1    Vitamin D, Ergocalciferol, 84330 units capsule Take  by mouth.       No current facility-administered medications on file prior to visit.        ALLERGIES:    Allergies   Allergen Reactions    Cephalexin Myalgia     Migraine    Sulfa Antibiotics Myalgia     Migraine    Erythromycin GI Intolerance    Neomycin-Bacitracin Zn-Polymyx Rash        Social History     Socioeconomic History    Marital status:      Spouse name: Thanh   Tobacco Use    Smoking status: Never    Smokeless tobacco: Never   Vaping Use    Vaping Use: Never used   Substance and Sexual Activity    Alcohol use: No    Drug use: No    Sexual activity: Defer        No family history on file.     Review of Systems  "  Constitutional:  Positive for activity change and fatigue. Negative for chills and fever.   HENT:  Negative for mouth sores, trouble swallowing and voice change.    Eyes:  Negative for pain and visual disturbance.   Respiratory:  Negative for cough, shortness of breath and wheezing.    Cardiovascular:  Negative for chest pain and palpitations.   Gastrointestinal:  Negative for abdominal pain, constipation, diarrhea, nausea and vomiting.   Genitourinary:  Negative for difficulty urinating, frequency and urgency.        Stress incontinence   Musculoskeletal:  Negative for arthralgias and joint swelling.   Skin:  Negative for rash.   Neurological:  Negative for dizziness, seizures, weakness and headaches.   Hematological:  Negative for adenopathy. Bruises/bleeds easily.   Psychiatric/Behavioral:  Negative for behavioral problems and confusion. The patient is not nervous/anxious.         Memory deficit        Objective     Vitals:    01/30/24 1047   BP: 116/76   Pulse: 80   Resp: 16   Temp: 97.3 °F (36.3 °C)   TempSrc: Temporal   SpO2: 97%   Weight: 81.9 kg (180 lb 9.6 oz)   Height: 160 cm (62.99\")   PainSc: 0-No pain           1/30/2024    10:43 AM   Current Status   ECOG score 1       Physical Exam  Constitutional:       General: She is not in acute distress.     Appearance: She is well-developed.   HENT:      Head: Normocephalic.   Eyes:      General: No scleral icterus.     Conjunctiva/sclera: Conjunctivae normal.      Pupils: Pupils are equal, round, and reactive to light.   Neck:      Thyroid: No thyromegaly.      Vascular: No JVD.   Cardiovascular:      Rate and Rhythm: Normal rate and regular rhythm.      Heart sounds: No murmur heard.     No friction rub. No gallop.   Pulmonary:      Effort: Pulmonary effort is normal.      Breath sounds: Normal breath sounds. No wheezing or rales.   Abdominal:      General: There is no distension.      Palpations: Abdomen is soft. There is no mass.      Tenderness: There is " no abdominal tenderness.   Musculoskeletal:         General: No deformity. Normal range of motion.      Cervical back: Normal range of motion and neck supple.      Comments: Kyphosis   Lymphadenopathy:      Cervical: No cervical adenopathy.   Skin:     General: Skin is warm and dry.      Findings: No erythema or rash.   Neurological:      Mental Status: She is alert and oriented to person, place, and time.      Cranial Nerves: No cranial nerve deficit.      Deep Tendon Reflexes: Reflexes are normal and symmetric.   Psychiatric:         Behavior: Behavior normal.         Judgment: Judgment normal.           RECENT LABS:  Hematology WBC   Date Value Ref Range Status   01/30/2024 11.11 (H) 3.40 - 10.80 10*3/mm3 Final   03/03/2023 11.59 (H) 3.40 - 10.80 10*3/mm3 Final     RBC   Date Value Ref Range Status   01/30/2024 5.81 (H) 3.77 - 5.28 10*6/mm3 Final   03/03/2023 5.45 (H) 3.77 - 5.28 10*6/mm3 Final     Hemoglobin   Date Value Ref Range Status   01/30/2024 13.4 12.0 - 15.9 g/dL Final     Hematocrit   Date Value Ref Range Status   01/30/2024 44.0 34.0 - 46.6 % Final     Platelets   Date Value Ref Range Status   01/30/2024 92 (L) 140 - 450 10*3/mm3 Final   01/30/2024 92 (L) 140 - 450 10*3/mm3 Final        Lab Results   Component Value Date    GLUCOSE 102 (H) 07/07/2023    BUN 26 (H) 07/07/2023    CREATININE 1.06 (H) 07/07/2023    EGFRIFNONA 52 (L) 12/23/2021    EGFRIFAFRI 60 (L) 09/02/2021    BCR 24.5 07/07/2023    K 4.3 07/07/2023    CO2 27.5 07/07/2023    CALCIUM 10.8 (H) 07/07/2023    PROTENTOTREF 7.0 03/03/2023    ALBUMIN 3.5 07/07/2023    LABIL2 1.4 03/03/2023    AST 22 07/07/2023    ALT 18 07/07/2023       Hemoglobinopathy Fractionation Cascade  7/15/2022      Hgb F Quant  0.0 - 2.0 % 0.0    Hgb A  96.4 - 98.8 % 97.3    Hgb A2 Quant  1.8 - 3.2 % 2.7    Hgb S  0.0 % 0.0    Hgb Interp. Comment    Comment: Normal hemoglobin present; no hemoglobin variant or beta thalassemia   identified.   Note: Alpha thalassemia may  not be detected by the Hgb Fractionation        Lab Results   Component Value Date    IRON 81 07/07/2023    TIBC 423 07/07/2023    FERRITIN 45.70 07/07/2023       PATHOLOGY 12/29/2021  Final Diagnosis   1. Thyroid, Left Isthmus, Fine Needle Aspiration (FNA):               A. [place] Category II:  Changes consistent with benign follicular nodule.     2. Thyroid, Left Mid to Inferior, Fine Needle Aspiration (FNA):               A. [place] Category II:  Changes consistent with benign follicular nodule.       PATHOLOGY 10/25/2021  Final Diagnosis   1. Gallbladder:    A. Severe acute necrotizing calculus cholecystitis .     NUCLEAR MEDICINE WHOLE BODY BONE SCAN 11/12/2021  IMPRESSION:  Degenerative/arthritic uptake without evidence for bony  metastatic disease. There is a history of abnormal CT of the spine  though that is not available for comparison. Scoliotic curvature is  present of the lower cervical spine and upper thoracic spine where there  is associated degenerative uptake.     THYROID SONOGRAM 11/12/2021.  IMPRESSION:  1.  Multiple bilateral thyroid nodules. Fine-needle aspiration of both  the 3.2 cm nodule within the left mid to inferior pole and of the 4.1 cm  nodule within the medial left thyroid/isthmus is recommended per TIRADS  criteria. If biopsy results are negative, follow-up with thyroid  sonogram 6 months is recommended to ensure stability.  2.  Mildly heterogenous thyroid echotexture suggestive of diffuse  thyroid disease in the appropriate clinical context and correlation with  patient history and laboratory values is recommended to establish the  most appropriate etiology as sonographic findings are nonspecific.    Assessment & Plan   1.  Mild chronic ITP with a platelet count that has been in a safe range from 80,000-110,000.  Platelet count in the office today is 92,000.  2.  Remote history of breast cancer.  Annual mammogram 7/18/2023 showing no worrisome findings.  3.  Negative colon cancer  screening with Cologuard 8/18/2020  4.  Microcytic anemia.  No evidence of iron deficiency.  Suspect she may have a thalassemia trait.  No evidence of beta thalassemia on hemoglobin electrophoresis.  5.  Gangrenous cholecystitis treated with cholecystectomy 10/25/2021  6.  Lesions in the bone noted on CT scans during her recent admission.  Bone scan from 11/12/2021 does not show any worrisome findings as noted above.  7.  Thyroid nodules.  FNA biopsy 12/29/2021 benign    PLAN  1.  We will continue to observe her blood counts with MD follow-up and lab in 6 months.  2.  She was instructed to call us if she has any unusual bleeding or bruising or if she is scheduled for any invasive surgical procedures.

## 2024-03-24 DIAGNOSIS — E78.2 MIXED HYPERLIPIDEMIA: ICD-10-CM

## 2024-03-24 DIAGNOSIS — F41.8 DEPRESSION WITH ANXIETY: ICD-10-CM

## 2024-03-25 RX ORDER — ATORVASTATIN CALCIUM 40 MG/1
40 TABLET, FILM COATED ORAL DAILY
Qty: 90 TABLET | Refills: 0 | Status: SHIPPED | OUTPATIENT
Start: 2024-03-25

## 2024-03-25 RX ORDER — PAROXETINE HYDROCHLORIDE 20 MG/1
20 TABLET, FILM COATED ORAL EVERY MORNING
Qty: 90 TABLET | Refills: 0 | Status: SHIPPED | OUTPATIENT
Start: 2024-03-25

## 2024-06-11 NOTE — PROGRESS NOTES
.     REASONS FOR FOLLOWUP: Chronic ITP    HISTORY OF PRESENT ILLNESS:  The patient is a 79 y.o. year old female  who is here for follow-up with the above-mentioned history.    No new problems.  Feeling fine.  No bleeding.    Past Medical History:   Diagnosis Date    Age-related osteoporosis without current pathological fracture 2/8/2019    Anxiety     Breast cancer     Right    Depression     History of thrombocytopenia     Hyperlipidemia     Hypertension     Osteoporosis     Seizure     after brain surgery only     Past Surgical History:   Procedure Laterality Date    APPENDECTOMY  2007    BRAIN AVM REPAIR  1992    BREAST LUMPECTOMY Right 2002    BREAST SURGERY      CHOLECYSTECTOMY N/A 10/25/2021    Procedure: CHOLECYSTECTOMY LAPAROSCOPIC;  Surgeon: Gera Kay MD;  Location: [facility];  Service: General;  Laterality: N/A;    HYSTERECTOMY  1976       MEDICATIONS    Current Outpatient Medications:     atorvastatin (LIPITOR) 40 MG tablet, TAKE 1 TABLET EVERY DAY, Disp: 90 tablet, Rfl: 0    Calcium 600-200 MG-UNIT per tablet, Take 1 tablet by mouth 2 (Two) Times a Day., Disp: , Rfl:     ferrous gluconate (FERGON) 324 MG tablet, TAKE 1 TABLET EVERY DAY WITH BREAKFAST, Disp: 90 tablet, Rfl: 0    Hydrocortisone, Perianal, (ANUSOL-HC) 2.5 % rectal cream, Insert  into the rectum 2 (Two) Times a Day., Disp: 28 g, Rfl: 0    levETIRAcetam (KEPPRA) 500 MG tablet, TAKE 1/2 TAB TWICE A DAY FOR A WEEK THEN 1 TAB TWICE A DAY, Disp: , Rfl:     metoprolol succinate XL (TOPROL-XL) 50 MG 24 hr tablet, TAKE 1 TABLET EVERY DAY, Disp: 90 tablet, Rfl: 0    MULTIPLE VITAMINS ESSENTIAL PO, Take  by mouth., Disp: , Rfl:     PARoxetine (PAXIL) 20 MG tablet, TAKE 1 TABLET EVERY MORNING, Disp: 90 tablet, Rfl: 0    Vitamin D, Ergocalciferol, 30532 units capsule, Take  by mouth., Disp: , Rfl:     ALLERGIES:     Allergies   Allergen Reactions    Cephalexin Myalgia     Migraine    Sulfa Antibiotics Myalgia     Migraine    Erythromycin GI  Intolerance    Neomycin-Bacitracin Zn-Polymyx Rash       SOCIAL HISTORY:       Social History     Socioeconomic History    Marital status:      Spouse name: Thanh   Tobacco Use    Smoking status: Never    Smokeless tobacco: Never   Vaping Use    Vaping status: Never Used   Substance and Sexual Activity    Alcohol use: No    Drug use: No    Sexual activity: Defer         FAMILY HISTORY:  No family history on file.    REVIEW OF SYSTEMS:  Review of Systems   Constitutional:  Negative for activity change.   HENT:  Negative for nosebleeds and trouble swallowing.    Respiratory:  Negative for shortness of breath and wheezing.    Cardiovascular:  Negative for chest pain and palpitations.   Gastrointestinal:  Negative for constipation, diarrhea and nausea.   Genitourinary:  Negative for dysuria and hematuria.   Musculoskeletal:  Negative for arthralgias and myalgias.   Skin:  Negative for rash and wound.   Neurological:  Negative for seizures and syncope.   Hematological:  Negative for adenopathy. Does not bruise/bleed easily.   Psychiatric/Behavioral:  Negative for confusion.             Vitals:    08/13/24 0926   BP: 139/83   Pulse: 71   Temp: 98.1 °F (36.7 °C)   SpO2: 97%   Weight: 81.8 kg (180 lb 6.4 oz)   PainSc: 0-No pain         1/30/2024    10:43 AM   Current Status   ECOG score 1        PHYSICAL EXAM:        CONSTITUTIONAL:  Vital signs reviewed.  No distress, looks comfortable.  EYES:  Conjunctiva and lids unremarkable.  PERRLA  EARS,NOSE,MOUTH,THROAT:  Ears and nose appear unremarkable.  Lips, teeth, gums appear unremarkable.  RESPIRATORY:  Normal respiratory effort.  Lungs clear to auscultation bilaterally.  CARDIOVASCULAR:  Normal S1, S2.  No murmurs rubs or gallops.  No significant lower extremity edema.  GASTROINTESTINAL: Abdomen appears unremarkable.  Nontender.  No hepatomegaly.  No splenomegaly.  LYMPHATIC:  No cervical, supraclavicular, axillary lymphadenopathy.  SKIN:  Warm.  No  kristi.  PSYCHIATRIC:  Normal judgment and insight.  Normal mood and affect.         RECENT LABS:        WBC   Date/Time Value Ref Range Status   08/13/2024 09:21 AM 12.75 (H) 3.40 - 10.80 10*3/mm3 Final   03/03/2023 09:52 AM 11.59 (H) 3.40 - 10.80 10*3/mm3 Final     Hemoglobin   Date/Time Value Ref Range Status   08/13/2024 09:21 AM 12.4 12.0 - 15.9 g/dL Final     Platelets   Date/Time Value Ref Range Status   08/13/2024 09:21 AM 84 (L) 140 - 450 10*3/mm3 Final   08/13/2024 09:21 AM 84 (L) 140 - 450 10*3/mm3 Final       Assessment & Plan   There are no diagnoses linked to this encounter.      Shyann Davis   *Chronic ITP  November 2021-August 2024: PLT mostly   PLT 92    *Breast cancer 2002, treated with lumpectomy and XRT    *Anemia  Hb 13.4    *Microcytosis without iron deficiency  Hemoglobin electrophoresis negative for beta thalassemia.  It is suspected she may have alpha thalassemia trait.  MCV 75.7    *Leukocytosis  WBC 11.1.  Predominance of mature segmented neutrophils.  Therefore, appears reactive    *Gangrenous cholecystitis, cholecystectomy 10/25/2021    *lesions in bone on CT from cholecystitis admission, 10/2021.  Bone scan 11/12/2021 with no worrisome findings.    *Thyroid nodules on CT from cholecystitis 10/2021 admission  FNA 12/29/2021: Benign    *8/13/2024: Initial visit with me (prior patient of Dr. Conde's).    Plan  MD CBC IPF 6 months    For this visit I reviewed several past CBCs, none of which were ordered by me.  Also reviewed Dr. Conde's last note and PCP last note  I am following her longitudinally

## 2024-08-07 DIAGNOSIS — E78.2 MIXED HYPERLIPIDEMIA: ICD-10-CM

## 2024-08-07 DIAGNOSIS — F41.8 DEPRESSION WITH ANXIETY: ICD-10-CM

## 2024-08-07 RX ORDER — PAROXETINE HYDROCHLORIDE 20 MG/1
20 TABLET, FILM COATED ORAL EVERY MORNING
Qty: 90 TABLET | Refills: 3 | OUTPATIENT
Start: 2024-08-07

## 2024-08-07 RX ORDER — ATORVASTATIN CALCIUM 40 MG/1
40 TABLET, FILM COATED ORAL DAILY
Qty: 90 TABLET | Refills: 3 | OUTPATIENT
Start: 2024-08-07

## 2024-08-13 ENCOUNTER — OFFICE VISIT (OUTPATIENT)
Dept: ONCOLOGY | Facility: CLINIC | Age: 79
End: 2024-08-13
Payer: MEDICARE

## 2024-08-13 ENCOUNTER — LAB (OUTPATIENT)
Dept: OTHER | Facility: HOSPITAL | Age: 79
End: 2024-08-13
Payer: MEDICARE

## 2024-08-13 VITALS
BODY MASS INDEX: 31.96 KG/M2 | WEIGHT: 180.4 LBS | HEART RATE: 71 BPM | DIASTOLIC BLOOD PRESSURE: 83 MMHG | TEMPERATURE: 98.1 F | OXYGEN SATURATION: 97 % | SYSTOLIC BLOOD PRESSURE: 139 MMHG

## 2024-08-13 DIAGNOSIS — D69.3 CHRONIC ITP (IDIOPATHIC THROMBOCYTOPENIC PURPURA): Primary | ICD-10-CM

## 2024-08-13 DIAGNOSIS — D50.9 IRON DEFICIENCY ANEMIA, UNSPECIFIED IRON DEFICIENCY ANEMIA TYPE: ICD-10-CM

## 2024-08-13 DIAGNOSIS — D69.3 CHRONIC ITP (IDIOPATHIC THROMBOCYTOPENIC PURPURA): ICD-10-CM

## 2024-08-13 LAB
ALBUMIN SERPL-MCNC: 3.9 G/DL (ref 3.5–5.2)
ALBUMIN/GLOB SERPL: 1.3 G/DL
ALP SERPL-CCNC: 122 U/L (ref 39–117)
ALT SERPL W P-5'-P-CCNC: 17 U/L (ref 1–33)
ANION GAP SERPL CALCULATED.3IONS-SCNC: 8.2 MMOL/L (ref 5–15)
AST SERPL-CCNC: 18 U/L (ref 1–32)
BASOPHILS # BLD AUTO: 0.04 10*3/MM3 (ref 0–0.2)
BASOPHILS NFR BLD AUTO: 0.3 % (ref 0–1.5)
BILIRUB SERPL-MCNC: 0.4 MG/DL (ref 0–1.2)
BUN SERPL-MCNC: 20 MG/DL (ref 8–23)
BUN/CREAT SERPL: 16.9 (ref 7–25)
CALCIUM SPEC-SCNC: 11.1 MG/DL (ref 8.6–10.5)
CHLORIDE SERPL-SCNC: 106 MMOL/L (ref 98–107)
CO2 SERPL-SCNC: 30.8 MMOL/L (ref 22–29)
CREAT SERPL-MCNC: 1.18 MG/DL (ref 0.57–1)
DEPRECATED RDW RBC AUTO: 41.9 FL (ref 37–54)
EGFRCR SERPLBLD CKD-EPI 2021: 47.1 ML/MIN/1.73
EOSINOPHIL # BLD AUTO: 0.11 10*3/MM3 (ref 0–0.4)
EOSINOPHIL NFR BLD AUTO: 0.9 % (ref 0.3–6.2)
ERYTHROCYTE [DISTWIDTH] IN BLOOD BY AUTOMATED COUNT: 15.1 % (ref 12.3–15.4)
GLOBULIN UR ELPH-MCNC: 3.1 GM/DL
GLUCOSE SERPL-MCNC: 102 MG/DL (ref 65–99)
HCT VFR BLD AUTO: 41 % (ref 34–46.6)
HGB BLD-MCNC: 12.4 G/DL (ref 12–15.9)
IMM GRANULOCYTES # BLD AUTO: 0.1 10*3/MM3 (ref 0–0.05)
IMM GRANULOCYTES NFR BLD AUTO: 0.8 % (ref 0–0.5)
LYMPHOCYTES # BLD AUTO: 1.8 10*3/MM3 (ref 0.7–3.1)
LYMPHOCYTES NFR BLD AUTO: 14.1 % (ref 19.6–45.3)
MCH RBC QN AUTO: 23.5 PG (ref 26.6–33)
MCHC RBC AUTO-ENTMCNC: 30.2 G/DL (ref 31.5–35.7)
MCV RBC AUTO: 77.7 FL (ref 79–97)
MONOCYTES # BLD AUTO: 0.59 10*3/MM3 (ref 0.1–0.9)
MONOCYTES NFR BLD AUTO: 4.6 % (ref 5–12)
NEUTROPHILS NFR BLD AUTO: 10.11 10*3/MM3 (ref 1.7–7)
NEUTROPHILS NFR BLD AUTO: 79.3 % (ref 42.7–76)
NRBC BLD AUTO-RTO: 0 /100 WBC (ref 0–0.2)
PLATELET # BLD AUTO: 84 10*3/MM3 (ref 140–450)
PLATELET # BLD AUTO: 84 10*3/MM3 (ref 140–450)
PLATELETS.RETICULATED NFR BLD AUTO: 35.8 % (ref 0.9–6.5)
POTASSIUM SERPL-SCNC: 3.9 MMOL/L (ref 3.5–5.2)
PROT SERPL-MCNC: 7 G/DL (ref 6–8.5)
RBC # BLD AUTO: 5.28 10*6/MM3 (ref 3.77–5.28)
SODIUM SERPL-SCNC: 145 MMOL/L (ref 136–145)
WBC NRBC COR # BLD AUTO: 12.75 10*3/MM3 (ref 3.4–10.8)

## 2024-08-13 PROCEDURE — 80053 COMPREHEN METABOLIC PANEL: CPT | Performed by: INTERNAL MEDICINE

## 2024-08-13 PROCEDURE — 36415 COLL VENOUS BLD VENIPUNCTURE: CPT

## 2024-08-13 PROCEDURE — G2211 COMPLEX E/M VISIT ADD ON: HCPCS | Performed by: INTERNAL MEDICINE

## 2024-08-13 PROCEDURE — 3079F DIAST BP 80-89 MM HG: CPT | Performed by: INTERNAL MEDICINE

## 2024-08-13 PROCEDURE — 85025 COMPLETE CBC W/AUTO DIFF WBC: CPT | Performed by: INTERNAL MEDICINE

## 2024-08-13 PROCEDURE — 3075F SYST BP GE 130 - 139MM HG: CPT | Performed by: INTERNAL MEDICINE

## 2024-08-13 PROCEDURE — 1126F AMNT PAIN NOTED NONE PRSNT: CPT | Performed by: INTERNAL MEDICINE

## 2024-08-13 PROCEDURE — 85055 RETICULATED PLATELET ASSAY: CPT | Performed by: INTERNAL MEDICINE

## 2024-08-13 PROCEDURE — 99214 OFFICE O/P EST MOD 30 MIN: CPT | Performed by: INTERNAL MEDICINE

## 2025-03-04 ENCOUNTER — LAB (OUTPATIENT)
Dept: OTHER | Facility: HOSPITAL | Age: 80
End: 2025-03-04
Payer: MEDICARE

## 2025-03-04 ENCOUNTER — OFFICE VISIT (OUTPATIENT)
Dept: ONCOLOGY | Facility: CLINIC | Age: 80
End: 2025-03-04
Payer: MEDICARE

## 2025-03-04 VITALS
DIASTOLIC BLOOD PRESSURE: 83 MMHG | HEART RATE: 66 BPM | BODY MASS INDEX: 31.91 KG/M2 | HEIGHT: 63 IN | OXYGEN SATURATION: 96 % | RESPIRATION RATE: 16 BRPM | WEIGHT: 180.1 LBS | TEMPERATURE: 97.4 F | SYSTOLIC BLOOD PRESSURE: 135 MMHG

## 2025-03-04 DIAGNOSIS — D69.3 CHRONIC ITP (IDIOPATHIC THROMBOCYTOPENIC PURPURA): ICD-10-CM

## 2025-03-04 DIAGNOSIS — D69.3 CHRONIC ITP (IDIOPATHIC THROMBOCYTOPENIC PURPURA): Primary | ICD-10-CM

## 2025-03-04 LAB
BASOPHILS # BLD AUTO: 0.06 10*3/MM3 (ref 0–0.2)
BASOPHILS NFR BLD AUTO: 0.5 % (ref 0–1.5)
DEPRECATED RDW RBC AUTO: 40.5 FL (ref 37–54)
EOSINOPHIL # BLD AUTO: 0.13 10*3/MM3 (ref 0–0.4)
EOSINOPHIL NFR BLD AUTO: 1 % (ref 0.3–6.2)
ERYTHROCYTE [DISTWIDTH] IN BLOOD BY AUTOMATED COUNT: 15.4 % (ref 12.3–15.4)
HCT VFR BLD AUTO: 38.1 % (ref 34–46.6)
HGB BLD-MCNC: 12.1 G/DL (ref 12–15.9)
IMM GRANULOCYTES # BLD AUTO: 0.2 10*3/MM3 (ref 0–0.05)
IMM GRANULOCYTES NFR BLD AUTO: 1.5 % (ref 0–0.5)
LYMPHOCYTES # BLD AUTO: 2.41 10*3/MM3 (ref 0.7–3.1)
LYMPHOCYTES NFR BLD AUTO: 18.6 % (ref 19.6–45.3)
MCH RBC QN AUTO: 23.4 PG (ref 26.6–33)
MCHC RBC AUTO-ENTMCNC: 31.8 G/DL (ref 31.5–35.7)
MCV RBC AUTO: 73.7 FL (ref 79–97)
MONOCYTES # BLD AUTO: 0.76 10*3/MM3 (ref 0.1–0.9)
MONOCYTES NFR BLD AUTO: 5.9 % (ref 5–12)
NEUTROPHILS NFR BLD AUTO: 72.5 % (ref 42.7–76)
NEUTROPHILS NFR BLD AUTO: 9.39 10*3/MM3 (ref 1.7–7)
NRBC BLD AUTO-RTO: 0 /100 WBC (ref 0–0.2)
PLAT MORPH BLD: NORMAL
PLATELET # BLD AUTO: 96 10*3/MM3 (ref 140–450)
PLATELET # BLD AUTO: 96 10*3/MM3 (ref 140–450)
PLATELETS.RETICULATED NFR BLD AUTO: 35.1 % (ref 0.9–6.5)
RBC # BLD AUTO: 5.17 10*6/MM3 (ref 3.77–5.28)
RBC MORPH BLD: NORMAL
WBC MORPH BLD: NORMAL
WBC NRBC COR # BLD AUTO: 12.95 10*3/MM3 (ref 3.4–10.8)

## 2025-03-04 PROCEDURE — 85025 COMPLETE CBC W/AUTO DIFF WBC: CPT | Performed by: INTERNAL MEDICINE

## 2025-03-04 PROCEDURE — 85007 BL SMEAR W/DIFF WBC COUNT: CPT | Performed by: INTERNAL MEDICINE

## 2025-03-04 PROCEDURE — 36415 COLL VENOUS BLD VENIPUNCTURE: CPT

## 2025-03-04 PROCEDURE — 3075F SYST BP GE 130 - 139MM HG: CPT | Performed by: INTERNAL MEDICINE

## 2025-03-04 PROCEDURE — 99213 OFFICE O/P EST LOW 20 MIN: CPT | Performed by: INTERNAL MEDICINE

## 2025-03-04 PROCEDURE — 85055 RETICULATED PLATELET ASSAY: CPT | Performed by: INTERNAL MEDICINE

## 2025-03-04 PROCEDURE — 1126F AMNT PAIN NOTED NONE PRSNT: CPT | Performed by: INTERNAL MEDICINE

## 2025-03-04 PROCEDURE — 3079F DIAST BP 80-89 MM HG: CPT | Performed by: INTERNAL MEDICINE

## 2025-03-04 RX ORDER — MIRABEGRON 25 MG/1
TABLET, FILM COATED, EXTENDED RELEASE ORAL
COMMUNITY
Start: 2025-01-31

## 2025-03-04 NOTE — PROGRESS NOTES
.     REASONS FOR FOLLOWUP: Chronic ITP    HISTORY OF PRESENT ILLNESS:  The patient is a 79 y.o. year old female  who is here for follow-up with the above-mentioned history.    No significant bleeding issues.  No new problems.  No fever, chills, weight loss, night sweats    Past Medical History:   Diagnosis Date    Age-related osteoporosis without current pathological fracture 2/8/2019    Anxiety     Breast cancer     Right    Depression     History of thrombocytopenia     Hyperlipidemia     Hypertension     Osteoporosis     Seizure     after brain surgery only     Past Surgical History:   Procedure Laterality Date    APPENDECTOMY  2007    BRAIN AVM REPAIR  1992    BREAST LUMPECTOMY Right 2002    BREAST SURGERY      CHOLECYSTECTOMY N/A 10/25/2021    Procedure: CHOLECYSTECTOMY LAPAROSCOPIC;  Surgeon: Gera Kay MD;  Location: [facility];  Service: General;  Laterality: N/A;    HYSTERECTOMY  1976       MEDICATIONS    Current Outpatient Medications:     atorvastatin (LIPITOR) 40 MG tablet, TAKE 1 TABLET EVERY DAY, Disp: 90 tablet, Rfl: 0    Calcium 600-200 MG-UNIT per tablet, Take 1 tablet by mouth 2 (Two) Times a Day., Disp: , Rfl:     ferrous gluconate (FERGON) 324 MG tablet, TAKE 1 TABLET EVERY DAY WITH BREAKFAST, Disp: 90 tablet, Rfl: 0    Hydrocortisone, Perianal, (ANUSOL-HC) 2.5 % rectal cream, Insert  into the rectum 2 (Two) Times a Day., Disp: 28 g, Rfl: 0    levETIRAcetam (KEPPRA) 500 MG tablet, TAKE 1/2 TAB TWICE A DAY FOR A WEEK THEN 1 TAB TWICE A DAY, Disp: , Rfl:     metoprolol succinate XL (TOPROL-XL) 50 MG 24 hr tablet, TAKE 1 TABLET EVERY DAY, Disp: 90 tablet, Rfl: 0    MULTIPLE VITAMINS ESSENTIAL PO, Take  by mouth., Disp: , Rfl:     Myrbetriq 25 MG tablet sustained-release 24 hour 24 hr tablet, , Disp: , Rfl:     PARoxetine (PAXIL) 20 MG tablet, TAKE 1 TABLET EVERY MORNING, Disp: 90 tablet, Rfl: 0    Vitamin D, Ergocalciferol, 34181 units capsule, Take  by mouth., Disp: , Rfl:     ALLERGIES:   "   Allergies   Allergen Reactions    Cephalexin Myalgia     Migraine    Sulfa Antibiotics Myalgia     Migraine    Erythromycin GI Intolerance    Neomycin-Bacitracin Zn-Polymyx Rash       SOCIAL HISTORY:       Social History     Socioeconomic History    Marital status:      Spouse name: Thanh   Tobacco Use    Smoking status: Never    Smokeless tobacco: Never   Vaping Use    Vaping status: Never Used   Substance and Sexual Activity    Alcohol use: No    Drug use: No    Sexual activity: Defer         FAMILY HISTORY:  No family history on file.    REVIEW OF SYSTEMS:  Review of Systems   Constitutional:  Negative for activity change.   HENT:  Negative for nosebleeds and trouble swallowing.    Respiratory:  Negative for shortness of breath and wheezing.    Cardiovascular:  Negative for chest pain and palpitations.   Gastrointestinal:  Negative for constipation, diarrhea and nausea.   Genitourinary:  Negative for dysuria and hematuria.   Musculoskeletal:  Negative for arthralgias and myalgias.   Skin:  Negative for rash and wound.   Neurological:  Negative for seizures and syncope.   Hematological:  Negative for adenopathy. Does not bruise/bleed easily.   Psychiatric/Behavioral:  Negative for confusion.             Vitals:    03/04/25 1319   BP: 135/83   Pulse: 66   Resp: 16   Temp: 97.4 °F (36.3 °C)   TempSrc: Oral   SpO2: 96%   Weight: 81.7 kg (180 lb 1.6 oz)   Height: 160 cm (63\")   PainSc: 0-No pain           3/4/2025     1:20 PM   Current Status   ECOG score 1        PHYSICAL EXAM:          CONSTITUTIONAL:  Vital signs reviewed.  No distress, looks comfortable.  EYES:  Conjunctiva and lids unremarkable.  PERRLA  EARS,NOSE,MOUTH,THROAT:  Ears and nose appear unremarkable.  Lips, teeth, gums appear unremarkable.  RESPIRATORY:  Normal respiratory effort.  Lungs clear to auscultation bilaterally.  CARDIOVASCULAR:  Normal S1, S2.  No murmurs rubs or gallops.  No significant lower extremity edema.  GASTROINTESTINAL: " Abdomen appears unremarkable.  Nontender.  No hepatomegaly.  No splenomegaly.  LYMPHATIC:  No cervical, supraclavicular, axillary lymphadenopathy.  SKIN:  Warm.  No rashes.  PSYCHIATRIC:  Normal judgment and insight.  Normal mood and affect.        RECENT LABS:        WBC   Date/Time Value Ref Range Status   03/04/2025 01:06 PM 12.95 (H) 3.40 - 10.80 10*3/mm3 Final   03/03/2023 09:52 AM 11.59 (H) 3.40 - 10.80 10*3/mm3 Final     Hemoglobin   Date/Time Value Ref Range Status   03/04/2025 01:06 PM 12.1 12.0 - 15.9 g/dL Final     Platelets   Date/Time Value Ref Range Status   03/04/2025 01:06 PM 96 (L) 140 - 450 10*3/mm3 Final   03/04/2025 01:06 PM 96 (L) 140 - 450 10*3/mm3 Final       Assessment & Plan   There are no diagnoses linked to this encounter.      Shyann Davis   *Chronic ITP  November 2021-August 2024: PLT mostly   PLT 96, from 92    *Breast cancer 2002, treated with lumpectomy and XRT  Last mammogram August 2024, unremarkable.  Follows with Dr. Keene who does her breast exams, [place], last seen 8/20/2024    *Anemia  Hb 12.1, from 13.4    *Microcytosis without iron deficiency  Hemoglobin electrophoresis negative for beta thalassemia.  It is suspected she may have alpha thalassemia trait.  MCV 73.7, from 75.7    *Leukocytosis  WBC 11.1.  Predominance of mature segmented neutrophils.  Therefore, appears reactive  WBC 13    *Gangrenous cholecystitis, cholecystectomy 10/25/2021    *lesions in bone on CT from cholecystitis admission, 10/2021.  Bone scan 11/12/2021 with no worrisome findings.    *Thyroid nodules on CT from cholecystitis 10/2021 admission  FNA 12/29/2021: Benign    *8/13/2024: Initial visit with me (prior patient of Dr. Briones).    Plan  We were doing MD CBC IPF every 6 months.  However, since I see she is also following with hematology oncology, Dr. Keene at [facility] in September, I told her she would not necessarily need to follow-up with us.  Her daughter-in-law  states they will likely stop following up with us and continue following up with Dr. Morrell  They declined follow-up here which is reasonable.  They will call us back if they change their mind and want to follow-up here.    For this visit I reviewed the last mammogram the last note from hematology oncology at [place] in August

## 2025-04-10 ENCOUNTER — TELEPHONE (OUTPATIENT)
Dept: FAMILY MEDICINE CLINIC | Facility: CLINIC | Age: 80
End: 2025-04-10
Payer: MEDICARE

## 2025-04-10 NOTE — TELEPHONE ENCOUNTER
Caller: Schuyler Davis    Relationship: Emergency Contact    Best call back number: [phone]     What medication are you requesting:     levETIRAcetam (KEPPRA) 500 MG tablet       If a prescription is needed, what is your preferred pharmacy and phone number: [id number] - [place], [place] - [address] [address] AT INTERSECTION OF [facility] [phone] - [phone] ph - [phone]      Additional notes: PATIENT'S SON STATES THAT SHE WAS SEEING A DOCTOR AT [facility], BUT WILL BE GOING BACK TO DR ARAGON. THE PREVIOUS DOCTOR LET THE MEDICATION , AND THEY NEED A PARTIAL REFILL TO LAST UNTIL SHE CAN GET BACK TO DR ARAGON. PLEASE CALL TO CONFIRM OR FOLLOW UP AS NEEDED.

## 2025-04-10 NOTE — TELEPHONE ENCOUNTER
Patient's son is requesting a prescription for:    Levetiracetam (Keppra)  500mg tablets    PATIENT'S SON STATES THAT SHE WAS SEEING A DOCTOR AT THE [facility], BUT WILL BE GOING BACK TO DR ARAGON. THE PREVIOUS DOCTOR LET THE MEDICATION , AND THEY NEED A PARTIAL REFILL TO LAST UNTIL SHE CAN GET BACK TO DR ARAGON. PLEASE CALL TO CONFIRM OR FOLLOW UP AS NEEDED.

## 2025-04-11 DIAGNOSIS — G40.909 SEIZURE DISORDER: Primary | ICD-10-CM

## 2025-04-11 RX ORDER — LEVETIRACETAM 500 MG/1
TABLET ORAL
Qty: 30 TABLET | Refills: 0 | Status: SHIPPED | OUTPATIENT
Start: 2025-04-11

## 2025-04-16 DIAGNOSIS — E78.2 MIXED HYPERLIPIDEMIA: ICD-10-CM

## 2025-04-16 DIAGNOSIS — E66.09 EXOGENOUS OBESITY: ICD-10-CM

## 2025-04-16 DIAGNOSIS — E55.9 VITAMIN D DEFICIENCY: ICD-10-CM

## 2025-04-16 DIAGNOSIS — M81.0 AGE-RELATED OSTEOPOROSIS WITHOUT CURRENT PATHOLOGICAL FRACTURE: ICD-10-CM

## 2025-04-16 DIAGNOSIS — E07.9 THYROID MASS: ICD-10-CM

## 2025-04-16 DIAGNOSIS — I10 ESSENTIAL HYPERTENSION: ICD-10-CM

## 2025-04-16 DIAGNOSIS — F41.8 DEPRESSION WITH ANXIETY: ICD-10-CM

## 2025-04-16 DIAGNOSIS — R73.02 IMPAIRED GLUCOSE TOLERANCE: ICD-10-CM

## 2025-04-16 DIAGNOSIS — E83.52 HYPERCALCEMIA: ICD-10-CM

## 2025-04-16 DIAGNOSIS — N18.31 STAGE 3A CHRONIC KIDNEY DISEASE: ICD-10-CM

## 2025-04-18 LAB
25(OH)D3+25(OH)D2 SERPL-MCNC: 57.4 NG/ML (ref 30–100)
ALBUMIN SERPL-MCNC: 3.7 G/DL (ref 3.5–5.2)
ALBUMIN/GLOB SERPL: 1.5 G/DL
ALP SERPL-CCNC: 83 U/L (ref 39–117)
ALT SERPL-CCNC: 13 U/L (ref 1–33)
AST SERPL-CCNC: 19 U/L (ref 1–32)
BASOPHILS # BLD AUTO: 0.03 10*3/MM3 (ref 0–0.2)
BASOPHILS NFR BLD AUTO: 0.3 % (ref 0–1.5)
BILIRUB SERPL-MCNC: 0.7 MG/DL (ref 0–1.2)
BUN SERPL-MCNC: 24 MG/DL (ref 8–23)
BUN/CREAT SERPL: 20.9 (ref 7–25)
CALCIUM SERPL-MCNC: 10.6 MG/DL (ref 8.6–10.5)
CHLORIDE SERPL-SCNC: 108 MMOL/L (ref 98–107)
CHOLEST SERPL-MCNC: 121 MG/DL (ref 0–200)
CO2 SERPL-SCNC: 25.2 MMOL/L (ref 22–29)
CREAT SERPL-MCNC: 1.15 MG/DL (ref 0.57–1)
EGFRCR SERPLBLD CKD-EPI 2021: 48.6 ML/MIN/1.73
EOSINOPHIL # BLD AUTO: 0.11 10*3/MM3 (ref 0–0.4)
EOSINOPHIL NFR BLD AUTO: 1.2 % (ref 0.3–6.2)
ERYTHROCYTE [DISTWIDTH] IN BLOOD BY AUTOMATED COUNT: 15.1 % (ref 12.3–15.4)
GLOBULIN SER CALC-MCNC: 2.5 GM/DL
GLUCOSE SERPL-MCNC: 168 MG/DL (ref 65–99)
HBA1C MFR BLD: 5.4 % (ref 4.8–5.6)
HCT VFR BLD AUTO: 38.5 % (ref 34–46.6)
HDLC SERPL-MCNC: 46 MG/DL (ref 40–60)
HGB BLD-MCNC: 12 G/DL (ref 12–15.9)
IMM GRANULOCYTES # BLD AUTO: 0.06 10*3/MM3 (ref 0–0.05)
IMM GRANULOCYTES NFR BLD AUTO: 0.6 % (ref 0–0.5)
LDLC SERPL CALC-MCNC: 50 MG/DL (ref 0–100)
LYMPHOCYTES # BLD AUTO: 1.74 10*3/MM3 (ref 0.7–3.1)
LYMPHOCYTES NFR BLD AUTO: 18.8 % (ref 19.6–45.3)
MCH RBC QN AUTO: 23.6 PG (ref 26.6–33)
MCHC RBC AUTO-ENTMCNC: 31.2 G/DL (ref 31.5–35.7)
MCV RBC AUTO: 75.6 FL (ref 79–97)
MONOCYTES # BLD AUTO: 0.36 10*3/MM3 (ref 0.1–0.9)
MONOCYTES NFR BLD AUTO: 3.9 % (ref 5–12)
NEUTROPHILS # BLD AUTO: 6.98 10*3/MM3 (ref 1.7–7)
NEUTROPHILS NFR BLD AUTO: 75.2 % (ref 42.7–76)
PLATELET # BLD AUTO: 74 10*3/MM3 (ref 140–450)
POTASSIUM SERPL-SCNC: 3.7 MMOL/L (ref 3.5–5.2)
PROT SERPL-MCNC: 6.2 G/DL (ref 6–8.5)
RBC # BLD AUTO: 5.09 10*6/MM3 (ref 3.77–5.28)
SODIUM SERPL-SCNC: 145 MMOL/L (ref 136–145)
TRIGL SERPL-MCNC: 145 MG/DL (ref 0–150)
TSH SERPL DL<=0.005 MIU/L-ACNC: 1.95 UIU/ML (ref 0.27–4.2)
VLDLC SERPL CALC-MCNC: 25 MG/DL (ref 5–40)
WBC # BLD AUTO: 9.28 10*3/MM3 (ref 3.4–10.8)

## 2025-04-24 ENCOUNTER — TELEPHONE (OUTPATIENT)
Dept: FAMILY MEDICINE CLINIC | Facility: CLINIC | Age: 80
End: 2025-04-24

## 2025-04-24 ENCOUNTER — OFFICE VISIT (OUTPATIENT)
Dept: FAMILY MEDICINE CLINIC | Facility: CLINIC | Age: 80
End: 2025-04-24
Payer: MEDICARE

## 2025-04-24 VITALS
SYSTOLIC BLOOD PRESSURE: 126 MMHG | BODY MASS INDEX: 31.34 KG/M2 | OXYGEN SATURATION: 98 % | TEMPERATURE: 97.1 F | DIASTOLIC BLOOD PRESSURE: 80 MMHG | WEIGHT: 176.9 LBS | HEART RATE: 67 BPM | HEIGHT: 63 IN

## 2025-04-24 DIAGNOSIS — G40.909 SEIZURE DISORDER: ICD-10-CM

## 2025-04-24 DIAGNOSIS — F41.8 DEPRESSION WITH ANXIETY: ICD-10-CM

## 2025-04-24 DIAGNOSIS — Z00.00 MEDICARE ANNUAL WELLNESS VISIT, SUBSEQUENT: Primary | ICD-10-CM

## 2025-04-24 DIAGNOSIS — I10 ESSENTIAL HYPERTENSION: ICD-10-CM

## 2025-04-24 DIAGNOSIS — E55.9 VITAMIN D DEFICIENCY: ICD-10-CM

## 2025-04-24 DIAGNOSIS — D69.3 CHRONIC ITP (IDIOPATHIC THROMBOCYTOPENIC PURPURA): ICD-10-CM

## 2025-04-24 DIAGNOSIS — E83.52 HYPERCALCEMIA: ICD-10-CM

## 2025-04-24 DIAGNOSIS — E66.09 EXOGENOUS OBESITY: ICD-10-CM

## 2025-04-24 DIAGNOSIS — G47.10 HYPERSOMNOLENCE: ICD-10-CM

## 2025-04-24 DIAGNOSIS — E78.2 MIXED HYPERLIPIDEMIA: ICD-10-CM

## 2025-04-24 DIAGNOSIS — D50.9 IRON DEFICIENCY ANEMIA, UNSPECIFIED IRON DEFICIENCY ANEMIA TYPE: ICD-10-CM

## 2025-04-24 DIAGNOSIS — N18.31 STAGE 3A CHRONIC KIDNEY DISEASE: ICD-10-CM

## 2025-04-24 DIAGNOSIS — M81.0 AGE-RELATED OSTEOPOROSIS WITHOUT CURRENT PATHOLOGICAL FRACTURE: ICD-10-CM

## 2025-04-24 DIAGNOSIS — N32.81 OVERACTIVE BLADDER: ICD-10-CM

## 2025-04-24 NOTE — TELEPHONE ENCOUNTER
Patients son called and stated that they believe they have found the reason patient is sleeping so much. Her son stated he was able to speak with someone at the facility and they went through her medications and directions they have.    It was discovered that they have been giving the patient 1 full tablet BID of her Keppra instead of 1/2 tab BID daily as instructed. Her son verified that the directions on the bottle are correct somehow got changed when staff at her facility took over.     I am contacting the facility to see if they need the correct order sent over or if what the family has is good enough for them to change the directions.

## 2025-04-25 NOTE — ASSESSMENT & PLAN NOTE
Patient's (Body mass index is 31.34 kg/m².) indicates that they are obese (BMI >30) with health conditions that include hypertension, impaired fasting glucose, dyslipidemias, GERD, and osteoarthritis . Weight is unchanged. BMI  is above average; BMI management plan is completed. We discussed portion control and increasing exercise.

## 2025-04-25 NOTE — PROGRESS NOTES
Subjective   The ABCs of the Annual Wellness Visit  Medicare Wellness Visit      Shyann Davis is a 79 y.o. patient who presents for a Medicare Wellness Visit.    The following portions of the patient's history were reviewed and   updated as appropriate: allergies, current medications, past family history, past medical history, past social history, past surgical history, and problem list.    Compared to one year ago, the patient's physical   health is worse.  Compared to one year ago, the patient's mental   health is worse.    Recent Hospitalizations:  She was not admitted to the hospital during the last year.     Current Medical Providers:  Patient Care Team:  Reese Underwood DO as PCP - General (Family Medicine)  Reese Underwood DO as Referring Physician (Family Medicine)  Henry oCnde MD as Consulting Physician (Hematology and Oncology)  Cesar Jesus II, MD as Consulting Physician (Hematology and Oncology)    Outpatient Medications Prior to Visit   Medication Sig Dispense Refill    atorvastatin (LIPITOR) 40 MG tablet TAKE 1 TABLET EVERY DAY 90 tablet 0    Calcium 600-200 MG-UNIT per tablet Take 1 tablet by mouth 2 (Two) Times a Day.      ferrous gluconate (FERGON) 324 MG tablet TAKE 1 TABLET EVERY DAY WITH BREAKFAST 90 tablet 0    Hydrocortisone, Perianal, (ANUSOL-HC) 2.5 % rectal cream Insert  into the rectum 2 (Two) Times a Day. 28 g 0    levETIRAcetam (KEPPRA) 500 MG tablet 1/2 tab BID 30 tablet 0    metoprolol succinate XL (TOPROL-XL) 50 MG 24 hr tablet TAKE 1 TABLET EVERY DAY 90 tablet 0    MULTIPLE VITAMINS ESSENTIAL PO Take  by mouth.      Myrbetriq 25 MG tablet sustained-release 24 hour 24 hr tablet       PARoxetine (PAXIL) 20 MG tablet TAKE 1 TABLET EVERY MORNING 90 tablet 0    Vitamin D, Ergocalciferol, 25841 units capsule Take  by mouth.       No facility-administered medications prior to visit.     No opioid medication identified on active medication list. I have reviewed chart for other  "potential  high risk medication/s and harmful drug interactions in the elderly.      Aspirin is not on active medication list.  Aspirin use is not indicated based on review of current medical condition/s. Risk of harm outweighs potential benefits.  .    Patient Active Problem List   Diagnosis    Impaired glucose tolerance    Mixed hyperlipidemia    Essential hypertension    Seizure disorder    Vitamin D deficiency    Arteriovenous malformation of gastrointestinal tract    Exogenous obesity    Depression with anxiety    Stage 3a chronic kidney disease    Overactive bladder    Age-related osteoporosis without current pathological fracture    Chronic ITP (idiopathic thrombocytopenic purpura)    Sepsis without acute organ dysfunction    Acute cholecystitis    Abnormal x-ray of lumbar spine    Thyroid mass    Microcytosis    Facial tic    Sleep disorder    Hypersomnolence    Hypercalcemia    Iron deficiency anemia    Leukocytosis     Advance Care Planning Advance Directive is on file.  ACP discussion was held with the patient during this visit. Patient has an advance directive in EMR which is still valid.             Objective   Vitals:    04/24/25 1342   BP: 126/80   BP Location: Left arm   Patient Position: Sitting   Cuff Size: Large Adult   Pulse: 67   Temp: 97.1 °F (36.2 °C)   TempSrc: Infrared   SpO2: 98%   Weight: 80.2 kg (176 lb 14.4 oz)   Height: 160 cm (62.99\")   PainSc: 0-No pain       Estimated body mass index is 31.34 kg/m² as calculated from the following:    Height as of this encounter: 160 cm (62.99\").    Weight as of this encounter: 80.2 kg (176 lb 14.4 oz).    BMI is >= 30 and <35. (Class 1 Obesity). The following options were offered after discussion;: exercise counseling/recommendations and nutrition counseling/recommendations           Does the patient have evidence of cognitive impairment? No  Lab Results   Component Value Date    CHLPL 121 04/17/2025    TRIG 145 04/17/2025    HDL 46 04/17/2025    " LDL 50 2025    VLDL 25 2025    HGBA1C 5.40 2025                                                                                                Health  Risk Assessment    Smoking Status:  Social History     Tobacco Use   Smoking Status Never   Smokeless Tobacco Never     Alcohol Consumption:  Social History     Substance and Sexual Activity   Alcohol Use No       Fall Risk Screen  STEADI Fall Risk Assessment was completed, and patient is at MODERATE risk for falls. Assessment completed on:2025    Depression Screening   Little interest or pleasure in doing things? Not at all   Feeling down, depressed, or hopeless? Not at all   PHQ-2 Total Score 0      Health Habits and Functional and Cognitive Screenin/24/2025     1:48 PM   Functional & Cognitive Status   Do you have difficulty preparing food and eating? No   Do you have difficulty bathing yourself, getting dressed or grooming yourself? No   Do you have difficulty using the toilet? No   Do you have difficulty moving around from place to place? No   Do you have trouble with steps or getting out of a bed or a chair? No   Current Diet Well Balanced Diet   Dental Exam Up to date   Eye Exam Up to date   Exercise (times per week) 0 times per week   Current Exercises Include No Regular Exercise   Do you need help using the phone?  No   Are you deaf or do you have serious difficulty hearing?  No   Do you need help to go to places out of walking distance? Yes   Do you need help shopping? Yes   Do you need help preparing meals?  Yes   Do you need help with housework?  Yes   Do you need help with laundry? Yes   Do you need help taking your medications? Yes   Do you need help managing money? Yes   Do you ever drive or ride in a car without wearing a seat belt? No   Have you felt unusual stress, anger or loneliness in the last month? No   Who do you live with? Community   If you need help, do you have trouble finding someone available to you? No    Have you been bothered in the last four weeks by sexual problems? No   Do you have difficulty concentrating, remembering or making decisions? Yes           Age-appropriate Screening Schedule:  Refer to the list below for future screening recommendations based on patient's age, sex and/or medical conditions. Orders for these recommended tests are listed in the plan section. The patient has been provided with a written plan.    Health Maintenance List  Health Maintenance   Topic Date Due    ZOSTER VACCINE (1 of 2) 05/08/2025 (Originally 7/20/1995)    Pneumococcal Vaccine 50+ (2 of 2 - PPSV23) 10/21/2025 (Originally 12/16/2020)    COVID-19 Vaccine (4 - 2024-25 season) 10/24/2025 (Originally 9/1/2024)    HEPATITIS C SCREENING  12/31/2025 (Originally 1/19/2016)    TDAP/TD VACCINES (2 - Td or Tdap) 12/31/2025 (Originally 10/1/2022)    RSV Vaccine - Adults (1 - 1-dose 75+ series) 04/24/2026 (Originally 7/20/2020)    INFLUENZA VACCINE  07/01/2025    DXA SCAN  09/26/2025    LIPID PANEL  04/17/2026    ANNUAL WELLNESS VISIT  04/24/2026    MAMMOGRAM  Discontinued    COLORECTAL CANCER SCREENING  Discontinued                                                                                                                                                CMS Preventative Services Quick Reference  Risk Factors Identified During Encounter  Depression/Dysphoria: Current medication is effective, no change recommended  Fall Risk-High or Moderate: Discussed Fall Prevention in the home  Immunizations Discussed/Encouraged: Influenza  Inactivity/Sedentary: Patient was advised to exercise at least 150 minutes a week per CDC recommendations.  Polypharmacy: Medication List reviewed  Dental Screening Recommended  Vision Screening Recommended    The above risks/problems have been discussed with the patient.  Pertinent information has been shared with the patient in the After Visit Summary.  An After Visit Summary and PPPS were made available to  the patient.    Follow Up:   Next Medicare Wellness visit to be scheduled in 1 year.         Additional E&M Note during same encounter follows:  Patient has additional, significant, and separately identifiable condition(s)/problem(s) that require work above and beyond the Medicare Wellness Visit     Chief Complaint  Medicare Wellness-subsequent (Labs prior), Cognitive decline (Gotten worse the past couple months), and Sleeping alot (Could Keppra dose be causing this?)    Subjective   HPI  Shyann is also being seen today for an annual adult preventative physical exam.  and Shyann is also being seen today for additional medical problem/s.  79-year-old white female here with AWV-Medicare as well as in follow-up for several chronic medical conditions.  She is currently living in an assisted living on the [place] [facility].  Of acute complaints she has been very tired, fatigued and somnolent.  There are some nights that she goes to sleep at 8:00 and does not wake up till 10:00 the next morning.  The family is suspicious that the institution has been dosing her Keppra at more than prescribed.  Sedation can be a side effect of this product.  Other medications include atorvastatin for hyperlipidemia as well as Toprol-XL for hypertension.  She also uses Paxil for depression with anxiety features.  She has urinary incontinence using Myrbetriq and she continues to use ferrous gluconate.  She does use a host of vitamins and supplements including vitamin D3.  Fasting labs have been acquired prior to this visit.  In retrospect patient states that she was not fasting when she had her labs done given the elevated sugar found.    Review of Systems   Cardiovascular:         Hypertension, hyperlipidemia   Genitourinary:         Urinary incontinence   Neurological:  Positive for seizures.   Psychiatric/Behavioral:  Positive for dysphoric mood and sleep disturbance. The patient is nervous/anxious.               Objective   Vital  "Signs:  /80 (BP Location: Left arm, Patient Position: Sitting, Cuff Size: Large Adult)   Pulse 67   Temp 97.1 °F (36.2 °C) (Infrared)   Ht 160 cm (62.99\")   Wt 80.2 kg (176 lb 14.4 oz)   SpO2 98%   BMI 31.34 kg/m²   Physical Exam  Vitals and nursing note reviewed.   Constitutional:       Appearance: Normal appearance. She is well-developed and well-groomed. She is obese.      Comments: Borderline exogenous obesity   HENT:      Head: Normocephalic and atraumatic.   Neck:      Thyroid: No thyroid mass or thyromegaly.      Vascular: Normal carotid pulses. No carotid bruit.      Trachea: Trachea and phonation normal.   Cardiovascular:      Rate and Rhythm: Normal rate and regular rhythm.      Heart sounds: Normal heart sounds. No murmur heard.     No friction rub. No gallop.   Pulmonary:      Effort: Pulmonary effort is normal. No respiratory distress.      Breath sounds: Normal breath sounds. No decreased breath sounds, wheezing, rhonchi or rales.   Musculoskeletal:      Cervical back: Neck supple.   Lymphadenopathy:      Cervical: No cervical adenopathy.   Skin:     General: Skin is warm and dry.      Findings: No rash.   Neurological:      Mental Status: She is alert and oriented to person, place, and time.   Psychiatric:         Attention and Perception: Attention and perception normal.         Mood and Affect: Mood and affect normal.         Speech: Speech normal.         Behavior: Behavior normal. Behavior is cooperative.         Thought Content: Thought content normal.         Cognition and Memory: Cognition and memory normal.         Judgment: Judgment normal.       Orders Only on 04/16/2025   Component Date Value Ref Range Status    Glucose 04/17/2025 168 (H)  65 - 99 mg/dL Final    BUN 04/17/2025 24 (H)  8 - 23 mg/dL Final    Creatinine 04/17/2025 1.15 (H)  0.57 - 1.00 mg/dL Final    EGFR Result 04/17/2025 48.6 (L)  >60.0 mL/min/1.73 Final    Comment: GFR Categories in Chronic Kidney Disease " (CKD)/X09/  /X09/  GFR Category          GFR (mL/min/1.73)    Interpretation  G1/X09/                    90 or greater/X09/        Normal or high  (1)  G2//                    60-89                Mild decrease  (1)  G3a                   45-59                Mild to moderate  decrease  G3b                   30-44                Moderate to  severe decrease  G4                    15-29                Severe decrease  G5                    14 or less           Kidney failure//  /[id number]/  (1)In the absence of evidence of kidney disease, neither  GFR category G1 or G2 fulfill the criteria for CKD.  eGFR calculation 2021 CKD-EPI creatinine equation, which  does not include race as a factor      BUN/Creatinine Ratio 04/17/2025 20.9  7.0 - 25.0 Final    Sodium 04/17/2025 145  136 - 145 mmol/L Final    Potassium 04/17/2025 3.7  3.5 - 5.2 mmol/L Final    Chloride 04/17/2025 108 (H)  98 - 107 mmol/L Final    Total CO2 04/17/2025 25.2  22.0 - 29.0 mmol/L Final    Calcium 04/17/2025 10.6 (H)  8.6 - 10.5 mg/dL Final    Total Protein 04/17/2025 6.2  6.0 - 8.5 g/dL Final    Albumin 04/17/2025 3.7  3.5 - 5.2 g/dL Final    Globulin 04/17/2025 2.5  gm/dL Final    A/G Ratio 04/17/2025 1.5  g/dL Final    Total Bilirubin 04/17/2025 0.7  0.0 - 1.2 mg/dL Final    Alkaline Phosphatase 04/17/2025 83  39 - 117 U/L Final    AST (SGOT) 04/17/2025 19  1 - 32 U/L Final    ALT (SGPT) 04/17/2025 13  1 - 33 U/L Final    TSH 04/17/2025 1.950  0.270 - 4.200 uIU/mL Final    25 Hydroxy, Vitamin D 04/17/2025 57.4  30.0 - 100.0 ng/ml Final    Comment: Reference Range for Total Vitamin D 25(OH)  Deficiency <20.0 ng/mL  Insufficiency 21-29 ng/mL  Sufficiency  ng/mL  Toxicity >100 ng/ml      Total Cholesterol 04/17/2025 121  0 - 200 mg/dL Final    Comment: Cholesterol Reference Ranges  (U.S. Department of Health and Human Services ATP III  Classifications)  Desirable          <200 mg/dL  Borderline High    200-239 mg/dL  High Risk           >240 mg/dL  Triglyceride Reference Ranges  (U.S. Department of Health and Human Services ATP III  Classifications)  Normal           <150 mg/dL  Borderline High  150-199 mg/dL  High             200-499 mg/dL  Very High        >500 mg/dL  HDL Reference Ranges  (U.S. Department of Health and Human Services ATP III  Classifications)  Low     <40 mg/dl (major risk factor for CHD)  High    >60 mg/dl ('negative' risk factor for CHD)  LDL Reference Ranges  (U.S. Department of Health and Human Services ATP III  Classifications)  Optimal          <100 mg/dL  Near Optimal     100-129 mg/dL  Borderline High  130-159 mg/dL  High             160-189 mg/dL  Very High        >189 mg/dL  LDL is calculated using the [facility] LDL-C calculation.      Triglycerides 04/17/2025 145  0 - 150 mg/dL Final    HDL Cholesterol 04/17/2025 46  40 - 60 mg/dL Final    VLDL Cholesterol Anders 04/17/2025 25  5 - 40 mg/dL Final    LDL Chol Calc ([facility]) 04/17/2025 50  0 - 100 mg/dL Final    Hemoglobin A1C 04/17/2025 5.40  4.80 - 5.60 % Final    Comment: Hemoglobin A1C Ranges:  Increased Risk for Diabetes  5.7% to 6.4%  Diabetes                     >= 6.5%  Diabetic Goal                < 7.0%      WBC 04/17/2025 9.28  3.40 - 10.80 10*3/mm3 Final    RBC 04/17/2025 5.09  3.77 - 5.28 10*6/mm3 Final    Hemoglobin 04/17/2025 12.0  12.0 - 15.9 g/dL Final    Hematocrit 04/17/2025 38.5  34.0 - 46.6 % Final    MCV 04/17/2025 75.6 (L)  79.0 - 97.0 fL Final    MCH 04/17/2025 23.6 (L)  26.6 - 33.0 pg Final    MCHC 04/17/2025 31.2 (L)  31.5 - 35.7 g/dL Final    RDW 04/17/2025 15.1  12.3 - 15.4 % Final    Platelets 04/17/2025 74 (L)  140 - 450 10*3/mm3 Final    Neutrophil Rel % 04/17/2025 75.2  42.7 - 76.0 % Final    Lymphocyte Rel % 04/17/2025 18.8 (L)  19.6 - 45.3 % Final    Monocyte Rel % 04/17/2025 3.9 (L)  5.0 - 12.0 % Final    Eosinophil Rel % 04/17/2025 1.2  0.3 - 6.2 % Final    Basophil Rel % 04/17/2025 0.3  0.0 - 1.5 % Final    Neutrophils Absolute 04/17/2025 6.98   1.70 - 7.00 10*3/mm3 Final    Lymphocytes Absolute 04/17/2025 1.74  0.70 - 3.10 10*3/mm3 Final    Monocytes Absolute 04/17/2025 0.36  0.10 - 0.90 10*3/mm3 Final    Eosinophils Absolute 04/17/2025 0.11  0.00 - 0.40 10*3/mm3 Final    Basophils Absolute 04/17/2025 0.03  0.00 - 0.20 10*3/mm3 Final    Immature Granulocyte Rel % 04/17/2025 0.6 (H)  0.0 - 0.5 % Final    Immature Grans Absolute 04/17/2025 0.06 (H)  0.00 - 0.05 10*3/mm3 Final         The following data was reviewed by: Reese Underwood DO on 04/24/2025:  Data reviewed : Routine labs  Common labs          8/13/2024    09:21 3/4/2025    13:06 4/17/2025    10:36   Common Labs   Glucose 102   168    BUN 20   24    Creatinine 1.18   1.15    Sodium 145   145    Potassium 3.9   3.7    Chloride 106   108    Calcium 11.1   10.6    Albumin 3.9   3.7    Total Bilirubin 0.4   0.7    Alkaline Phosphatase 122   83    AST (SGOT) 18   19    ALT (SGPT) 17   13    WBC 12.75  12.95  9.28    Hemoglobin 12.4  12.1  12.0    Hematocrit 41.0  38.1  38.5    Platelets 84     84  96     96  74    Total Cholesterol   121    Triglycerides   145    HDL Cholesterol   46    LDL Cholesterol    50    Hemoglobin A1C   5.40           Assessment and Plan Additional age appropriate preventative wellness advice topics were discussed during today's preventative wellness exam(some topics already addressed during AWV portion of the note above):    Physical Activity: Advised cardiovascular activity 150 minutes per week as tolerated. (example brisk walk for 30 minutes, 5 days a week).     Nutrition: Discussed nutrition plan with patient. Information shared in after visit summary. Goal is for a well balanced diet to enhance overall health.     Healthy Weight: Discussed current and goal BMI with patient. Steps to attain this goal discussed. Information shared in after visit summary.     Motor Vehicle Safety Discussion:  Wearing Seatbelt While in Motor Vehicle recommendation. Adhering to posted speed  limit recommendation.     Injury Prevention Discussion:  Information shared in after visit summary.           Medicare annual wellness visit, subsequent         Essential hypertension  Hypertension is stable and controlled  Continue current treatment regimen.  Blood pressure will be reassessed in 6 months.         Mixed hyperlipidemia   Lipid abnormalities are stable    Plan:  Continue same medication/s without change.      Discussed medication dosage, use, side effects, and goals of treatment in detail.    Counseled patient on lifestyle modifications to help control hyperlipidemia.     Patient Treatment Goals:   LDL goal is under 100    Followup in 6 months.         Chronic ITP (idiopathic thrombocytopenic purpura)         Exogenous obesity  Patient's (Body mass index is 31.34 kg/m².) indicates that they are obese (BMI >30) with health conditions that include hypertension, impaired fasting glucose, dyslipidemias, GERD, and osteoarthritis . Weight is unchanged. BMI  is above average; BMI management plan is completed. We discussed portion control and increasing exercise.          Vitamin D deficiency         Hypercalcemia         Stage 3a chronic kidney disease  Renal condition is stable.  Continue current treatment regimen.  Renal condition will be reassessed in 6 months.         Overactive bladder         Iron deficiency anemia, unspecified iron deficiency anemia type         Depression with anxiety  Patient's depression is a single episode that is mild without psychosis. Depression is in full remission and stable.    Plan:   Continue current medication therapy     Followup in 6 months.          Age-related osteoporosis without current pathological fracture         Hypersomnolence         Seizure disorder               I spent 30 minutes caring for Shyann on this date of service. This time includes time spent by me in the following activities:preparing for the visit, reviewing tests, obtaining and/or reviewing a  separately obtained history, performing a medically appropriate examination and/or evaluation , counseling and educating the patient/family/caregiver, ordering medications, tests, or procedures, referring and communicating with other health care professionals , documenting information in the medical record, independently interpreting results and communicating that information with the patient/family/caregiver, and care coordination  Follow Up   Return in about 6 months (around 10/24/2025) for Recheck.  Patient was given instructions and counseling regarding her condition or for health maintenance advice. Please see specific information pulled into the AVS if appropriate.

## 2025-05-03 DIAGNOSIS — G40.909 SEIZURE DISORDER: ICD-10-CM

## 2025-05-05 RX ORDER — LEVETIRACETAM 500 MG/1
250 TABLET ORAL 2 TIMES DAILY
Qty: 30 TABLET | Refills: 2 | Status: SHIPPED | OUTPATIENT
Start: 2025-05-05

## 2025-06-20 ENCOUNTER — TELEPHONE (OUTPATIENT)
Dept: FAMILY MEDICINE CLINIC | Facility: CLINIC | Age: 80
End: 2025-06-20

## 2025-06-20 DIAGNOSIS — E78.2 MIXED HYPERLIPIDEMIA: ICD-10-CM

## 2025-06-20 DIAGNOSIS — G40.909 SEIZURE DISORDER: ICD-10-CM

## 2025-06-20 DIAGNOSIS — D50.9 IRON DEFICIENCY ANEMIA, UNSPECIFIED IRON DEFICIENCY ANEMIA TYPE: ICD-10-CM

## 2025-06-20 RX ORDER — LEVETIRACETAM 500 MG/1
TABLET ORAL
Qty: 90 TABLET | Refills: 1 | Status: SHIPPED | OUTPATIENT
Start: 2025-06-20 | End: 2025-06-23 | Stop reason: SDUPTHER

## 2025-06-20 RX ORDER — FERROUS GLUCONATE 324(38)MG
TABLET ORAL
Qty: 90 TABLET | Refills: 1 | Status: SHIPPED | OUTPATIENT
Start: 2025-06-20 | End: 2025-06-23 | Stop reason: SDUPTHER

## 2025-06-20 RX ORDER — ATORVASTATIN CALCIUM 40 MG/1
TABLET, FILM COATED ORAL
Qty: 90 TABLET | Refills: 1 | Status: SHIPPED | OUTPATIENT
Start: 2025-06-20 | End: 2025-06-23 | Stop reason: SDUPTHER

## 2025-06-20 NOTE — TELEPHONE ENCOUNTER
Caller: CATINA LUNA    Relationship: Home Health    Best call back number: [phone]    Who are you requesting to speak with (clinical staff, provider,  specific staff member): CLINICAL STAFF    What was the call regarding: STATED THAT THEY ARE NEEDING TO SEE ABOUT GETTING VERBAL ORDERS FOR PHYSICAL THERAPY 2 TIMES FOR 3 WEEKS AND 1 TIME FOR 6 WEEKS. PLEASE CALL AND ADVISE

## 2025-06-21 RX ORDER — CALCIUM CARBONATE/VITAMIN D3 600 MG-10
TABLET ORAL
Qty: 90 TABLET | Refills: 1 | Status: SHIPPED | OUTPATIENT
Start: 2025-06-21 | End: 2025-06-23 | Stop reason: SDUPTHER

## 2025-06-23 ENCOUNTER — TELEPHONE (OUTPATIENT)
Dept: FAMILY MEDICINE CLINIC | Facility: CLINIC | Age: 80
End: 2025-06-23
Payer: MEDICARE

## 2025-06-23 DIAGNOSIS — F41.8 DEPRESSION WITH ANXIETY: ICD-10-CM

## 2025-06-23 DIAGNOSIS — E78.2 MIXED HYPERLIPIDEMIA: ICD-10-CM

## 2025-06-23 DIAGNOSIS — G40.909 SEIZURE DISORDER: ICD-10-CM

## 2025-06-23 DIAGNOSIS — D50.9 IRON DEFICIENCY ANEMIA, UNSPECIFIED IRON DEFICIENCY ANEMIA TYPE: ICD-10-CM

## 2025-06-23 RX ORDER — ATORVASTATIN CALCIUM 40 MG/1
40 TABLET, FILM COATED ORAL DAILY
Qty: 90 TABLET | Refills: 1 | Status: SHIPPED | OUTPATIENT
Start: 2025-06-23

## 2025-06-23 RX ORDER — LEVETIRACETAM 500 MG/1
250 TABLET ORAL 2 TIMES DAILY
Qty: 90 TABLET | Refills: 1 | Status: SHIPPED | OUTPATIENT
Start: 2025-06-23

## 2025-06-23 RX ORDER — PAROXETINE 20 MG/1
20 TABLET, FILM COATED ORAL DAILY
Qty: 90 TABLET | Refills: 1 | Status: SHIPPED | OUTPATIENT
Start: 2025-06-23

## 2025-06-23 RX ORDER — CALCIUM CARBONATE/VITAMIN D3 600 MG-10
1 TABLET ORAL DAILY
Qty: 90 TABLET | Refills: 1 | Status: SHIPPED | OUTPATIENT
Start: 2025-06-23

## 2025-06-23 RX ORDER — FERROUS GLUCONATE 324(38)MG
324 TABLET ORAL DAILY
Qty: 90 TABLET | Refills: 1 | Status: SHIPPED | OUTPATIENT
Start: 2025-06-23

## 2025-06-23 RX ORDER — METOPROLOL SUCCINATE 50 MG/1
50 TABLET, EXTENDED RELEASE ORAL DAILY
Qty: 90 TABLET | Refills: 1 | Status: SHIPPED | OUTPATIENT
Start: 2025-06-23

## 2025-06-23 NOTE — TELEPHONE ENCOUNTER
Caller: Ayla Davis    Relationship to patient: Emergency Contact    Best call back number: [phone]     Patient is needing: PATIENT IS NO LONGER SWITCHING [facility] TO [facility] WILL BE STAYING WITH [facility] NEEDS MEDICATIONS REFILLS RESENT TO [facility]

## 2025-08-13 ENCOUNTER — OFFICE VISIT (OUTPATIENT)
Dept: FAMILY MEDICINE CLINIC | Facility: CLINIC | Age: 80
End: 2025-08-13
Payer: MEDICARE

## 2025-08-13 VITALS
OXYGEN SATURATION: 98 % | BODY MASS INDEX: 31.4 KG/M2 | TEMPERATURE: 96.1 F | DIASTOLIC BLOOD PRESSURE: 84 MMHG | SYSTOLIC BLOOD PRESSURE: 120 MMHG | WEIGHT: 177.2 LBS | HEART RATE: 69 BPM | HEIGHT: 63 IN

## 2025-08-13 DIAGNOSIS — N39.0 ACUTE UTI: Primary | ICD-10-CM

## 2025-08-13 DIAGNOSIS — F02.C0 SEVERE ALZHEIMER'S DEMENTIA WITHOUT BEHAVIORAL DISTURBANCE, PSYCHOTIC DISTURBANCE, MOOD DISTURBANCE, OR ANXIETY, UNSPECIFIED TIMING OF DEMENTIA ONSET: ICD-10-CM

## 2025-08-13 DIAGNOSIS — G30.9 SEVERE ALZHEIMER'S DEMENTIA WITHOUT BEHAVIORAL DISTURBANCE, PSYCHOTIC DISTURBANCE, MOOD DISTURBANCE, OR ANXIETY, UNSPECIFIED TIMING OF DEMENTIA ONSET: ICD-10-CM

## 2025-08-13 RX ORDER — DONEPEZIL HYDROCHLORIDE 5 MG/1
5 TABLET, FILM COATED ORAL NIGHTLY
Qty: 30 TABLET | Refills: 0 | Status: SHIPPED | OUTPATIENT
Start: 2025-08-13 | End: 2025-08-14 | Stop reason: SDUPTHER

## 2025-08-13 RX ORDER — CEFDINIR 300 MG/1
300 CAPSULE ORAL 2 TIMES DAILY
Qty: 21 CAPSULE | Refills: 0 | Status: SHIPPED | OUTPATIENT
Start: 2025-08-13 | End: 2025-08-14 | Stop reason: SDUPTHER

## 2025-08-13 RX ORDER — NITROFURANTOIN 25; 75 MG/1; MG/1
100 CAPSULE ORAL
COMMUNITY
Start: 2025-08-08 | End: 2025-08-15

## 2025-08-14 DIAGNOSIS — G30.9 SEVERE ALZHEIMER'S DEMENTIA WITHOUT BEHAVIORAL DISTURBANCE, PSYCHOTIC DISTURBANCE, MOOD DISTURBANCE, OR ANXIETY, UNSPECIFIED TIMING OF DEMENTIA ONSET: ICD-10-CM

## 2025-08-14 DIAGNOSIS — F02.C0 SEVERE ALZHEIMER'S DEMENTIA WITHOUT BEHAVIORAL DISTURBANCE, PSYCHOTIC DISTURBANCE, MOOD DISTURBANCE, OR ANXIETY, UNSPECIFIED TIMING OF DEMENTIA ONSET: ICD-10-CM

## 2025-08-14 DIAGNOSIS — N39.0 ACUTE UTI: ICD-10-CM

## 2025-08-14 RX ORDER — CEFDINIR 300 MG/1
300 CAPSULE ORAL 2 TIMES DAILY
Qty: 21 CAPSULE | Refills: 0 | Status: SHIPPED | OUTPATIENT
Start: 2025-08-14

## 2025-08-14 RX ORDER — DONEPEZIL HYDROCHLORIDE 5 MG/1
5 TABLET, FILM COATED ORAL NIGHTLY
Qty: 30 TABLET | Refills: 0 | Status: SHIPPED | OUTPATIENT
Start: 2025-08-14

## (undated) DEVICE — TROCAR: Brand: KII® SLEEVE

## (undated) DEVICE — SUT MNCRYL 4/0 PS2 18 IN

## (undated) DEVICE — Device

## (undated) DEVICE — ELECTRD BLD MEGADYNE 2.75IN SS

## (undated) DEVICE — ENDOPOUCH RETRIEVER SPECIMEN RETRIEVAL BAGS: Brand: ENDOPOUCH RETRIEVER

## (undated) DEVICE — TRAP FLD MINIVAC MEGADYNE 100ML

## (undated) DEVICE — ENDOPATH XCEL BLUNT TIP TROCARS WITH SMOOTH SLEEVES: Brand: ENDOPATH XCEL

## (undated) DEVICE — SUCTION CANISTER, 3000CC,SAFELINER: Brand: DEROYAL

## (undated) DEVICE — SUCTION CANISTER, 1000CC,SAFELINER: Brand: DEROYAL

## (undated) DEVICE — TBG INSUFL W FLTR STRL

## (undated) DEVICE — 2, DISPOSABLE SUCTION/IRRIGATOR WITH DISPOSABLE TIP: Brand: STRYKEFLOW

## (undated) DEVICE — APPL HEMOS FOR DELIVERY FLOSEAL

## (undated) DEVICE — BNDG ADHS CURAD FLX/FABRC 2X4IN STRL LF

## (undated) DEVICE — SUT ETHIB 0/0 MO6 I8IN CX45D

## (undated) DEVICE — LAPAROSCOPIC TROCAR SLEEVE/SINGLE USE: Brand: KII® OPTICAL ACCESS SYSTEM

## (undated) DEVICE — RESERVOIR,SUCTION,100CC,SILICONE: Brand: MEDLINE

## (undated) DEVICE — BNDG ADHS PLSTC 1X3IN LF

## (undated) DEVICE — PATIENT RETURN ELECTRODE, SINGLE-USE, CONTACT QUALITY MONITORING, ADULT, WITH 9FT CORD, FOR PATIENTS WEIGING OVER 33LBS. (15KG): Brand: MEGADYNE

## (undated) DEVICE — Device: Brand: CAUTERY TIP CLEANER

## (undated) DEVICE — GLV SURG SIGNATURE ESSENTIAL PF LTX SZ8

## (undated) DEVICE — PK LAP GEN 90

## (undated) DEVICE — ENDOPATH 5MM ENDOSCOPIC BLUNT TIP DISSECTORS (12 POUCHES CONTAINING 3 DISSECTORS EACH): Brand: ENDOPATH

## (undated) DEVICE — PENCL ES MEGADINE EZ/CLEAN BUTN W/HOLSTR 10FT

## (undated) DEVICE — METZENBAUM SCISSOR TIP, DISPOSABLE: Brand: RENEW

## (undated) DEVICE — APPL CHLORAPREP HI/LITE 26ML ORNG

## (undated) DEVICE — LAPAROSCOPIC SMOKE FILTRATION SYSTEM: Brand: PALL LAPAROSHIELD® PLUS LAPAROSCOPIC SMOKE FILTRATION SYSTEM

## (undated) DEVICE — TUBING, SUCTION, 1/4" X 12', STRAIGHT: Brand: MEDLINE